# Patient Record
Sex: MALE | Race: BLACK OR AFRICAN AMERICAN | Employment: FULL TIME | URBAN - METROPOLITAN AREA
[De-identification: names, ages, dates, MRNs, and addresses within clinical notes are randomized per-mention and may not be internally consistent; named-entity substitution may affect disease eponyms.]

---
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
## 2017-01-17 RX ORDER — INSULIN GLARGINE 100 [IU]/ML
INJECTION, SOLUTION SUBCUTANEOUS
Qty: 4 VIAL | Refills: 0 | Status: SHIPPED | COMMUNITY
Start: 2017-01-17 | End: 2017-05-05 | Stop reason: SDUPTHER

## 2017-01-17 NOTE — TELEPHONE ENCOUNTER
Med list and chart notes reviewed.   Pharmacy Assistance Medication approved for pickup.    lantus 18 units qhs

## 2017-02-01 ENCOUNTER — CLINICAL SUPPORT (OUTPATIENT)
Dept: FAMILY MEDICINE CLINIC | Age: 52
End: 2017-02-01

## 2017-02-01 DIAGNOSIS — E11.9 TYPE 2 DIABETES MELLITUS WITHOUT COMPLICATION, UNSPECIFIED LONG TERM INSULIN USE STATUS: Primary | ICD-10-CM

## 2017-02-01 NOTE — PROGRESS NOTES
Discharge instructions reviewed with patient    Medication list and understanding of medications reviewed with patient. OTC and herbal medications reviewed and added to med list if applicable  Barriers to adherence assessed. Guidance given regarding new medications this visit, including reason for taking this medicine, and common side effects. Patient here to  lantus 4 vials from [employer] through the PAP. Went over meds and allergies. We also gave him 2 boxes of strips 1 box of syringes and relion meter and 1 box of lancets , and 1 box of alcohol wipes.

## 2017-02-02 ENCOUNTER — TELEPHONE (OUTPATIENT)
Dept: FAMILY MEDICINE CLINIC | Age: 52
End: 2017-02-02

## 2017-02-02 ENCOUNTER — OFFICE VISIT (OUTPATIENT)
Dept: FAMILY MEDICINE CLINIC | Age: 52
End: 2017-02-02

## 2017-02-02 VITALS
TEMPERATURE: 98.1 F | OXYGEN SATURATION: 99 % | HEART RATE: 83 BPM | HEIGHT: 73 IN | DIASTOLIC BLOOD PRESSURE: 75 MMHG | BODY MASS INDEX: 27.7 KG/M2 | RESPIRATION RATE: 16 BRPM | SYSTOLIC BLOOD PRESSURE: 140 MMHG | WEIGHT: 209 LBS

## 2017-02-02 DIAGNOSIS — Z79.4 TYPE 2 DIABETES MELLITUS WITHOUT COMPLICATION, WITH LONG-TERM CURRENT USE OF INSULIN (HCC): Primary | ICD-10-CM

## 2017-02-02 DIAGNOSIS — E11.9 TYPE 2 DIABETES MELLITUS WITHOUT COMPLICATION, WITH LONG-TERM CURRENT USE OF INSULIN (HCC): Primary | ICD-10-CM

## 2017-02-02 NOTE — PROGRESS NOTES
Discharge instructions reviewed with patient    Medication list and understanding of medications reviewed with patient. OTC and herbal medications reviewed and added to med list if applicable  Barriers to adherence assessed. Guidance given regarding new medications this visit, including reason for taking this medicine, and common side effects. Gave patient 10 units of Humalin R sc left upper arm per Dr. Grace Ledezma order for Blood Sugar [address]. Pt will re-check blood sugar in 2 hours at home.     Given AVS

## 2017-02-02 NOTE — PROGRESS NOTES
YOEL Amaral is a 46 y.o. male being seen today for   Chief Complaint   Patient presents with    Diabetes     \"pt stated that his diabetes is out of control\"   . he states that he started his insulin last night but just took 18 units still having high blood sugars all day. Feels numbness in hands and feet, low libido, poor energy. Blurry vision. Also having left shoulder pain. No injury. Tried otc meds but nothing helped. Worst with overhead movement. Past Medical History   Diagnosis Date    Diabetes ([address]          ROS  Patient states that he is feeling well. Denies complaints of chest pain, shortness of breath, swelling of legs, dizziness or weakness. he denies nausea, vomiting or diarrhea. Current Outpatient Prescriptions   Medication Sig    insulin glargine (LANTUS) 100 unit/mL injection Inject 18 units subcutaneously every night. Indications: type 2 diabetes mellitus    insulin lispro (HUMALOG) 100 unit/mL injection Use tid per sliding scale  Indications: type 2 diabetes mellitus    naproxen (NAPROSYN) 500 mg tablet Take 1 tablet by mouth two (2) times daily (with meals).  HYDROcodone-acetaminophen (NORCO) 5-325 mg per tablet Take 2 tablets by mouth every eight (8) hours as needed for Pain (cannot drive or work within 8 hours of taking narcotic pain medicine).  metFORMIN (GLUCOPHAGE) 1,000 mg tablet Take 1,000 mg by mouth two (2) times daily (with meals). No current facility-administered medications for this visit. PE  Visit Vitals    /75    Pulse 83    Temp 98.1 °F (36.7 °C) (Oral)    Resp 16    Ht 6' 1\" (1.854 m)    Wt 209 lb (94.8 kg)    SpO2 99%    BMI 27.57 kg/m2        Alert and oriented with normal mood and affect. he is well developed and well nourished . Lungs are clear without wheezing. Heart rate is regular without murmurs or gallops. There is no lower extremity edema.  Rotator cuff testing is negative but does have pain with forward abduction of arms. Results for orders placed or performed in visit on 09/26/16   AMB POC GLUCOSE BLOOD, BY GLUCOSE MONITORING DEVICE   Result Value Ref Range    Glucose POC [facility] mg/dL         Assessment and Plan:        ICD-10-CM ICD-9-CM    1. Type 2 diabetes mellitus without complication, with long-term current use of insulin ([facility]) E11.9 250.00     Z79.4 V58.67      incresae to 24 units of lantus tonight  If still elevated in a few days, increase to 30 units. Will refer to nurse navigator for case management. Try otc ibuprofen 800mg at bedtime.    Rotator cuff Alex Escobar MD

## 2017-02-02 NOTE — TELEPHONE ENCOUNTER
Can you call this nice patient next week.      He is self increasing insulin,   A lot of symptoms from high blood sugars so   Would like him to be able to get under better control fast.     Thank you

## 2017-02-02 NOTE — MR AVS SNAPSHOT
Visit Information Date & Time Provider Department Dept. Phone Encounter #  
 2/2/2017  3:15 PM Ryan Lopez, [address] [place] [address] [id number] Upcoming Health Maintenance Date Due Hepatitis C Screening 1965 HEMOGLOBIN A1C Q6M 1965 LIPID PANEL Q1 1963 FOOT EXAM Q1 8/18/1973 MICROALBUMIN Q1 8/18/1973 EYE EXAM RETINAL OR DILATED Q1 8/18/1973 Pneumococcal 19-64 Medium Risk (1 of 1 - PPSV23) 8/18/1982 DTaP/Tdap/Td series (1 - Tdap) 8/18/1984 FOBT Q 1 YEAR AGE 50-75 8/18/2013 INFLUENZA AGE 9 TO ADULT 8/1/2016 Allergies as of 2/2/2017  Review Complete On: 2/2/2017 By: Deshaun Santos No Known Allergies Current Immunizations  Never Reviewed No immunizations on file. Not reviewed this visit You Were Diagnosed With   
  
 Codes Comments Type 2 diabetes mellitus without complication, with long-term current use of insulin (HCC)    -  Primary ICD-10-CM: E11.9, Z79.4 ICD-9-CM: 250.00, V58.67 Vitals BP Pulse Temp Resp Height(growth percentile) Weight(growth percentile) 140/75 83 98.1 °F (36.7 °C) (Oral) 16 6' 1\" (1.854 m) 209 lb (94.8 kg) SpO2 BMI Smoking Status 99% 27.57 kg/m2 Never Smoker Vitals History BMI and BSA Data Body Mass Index Body Surface Area  
 27.57 kg/m 2 2.21 m 2 Preferred Pharmacy Pharmacy Name Phone Gavi Orellana [facility], [address] [phone] Your Updated Medication List  
  
   
This list is accurate as of: 2/2/17  4:28 PM.  Always use your most recent med list.  
  
  
  
  
 HYDROcodone-acetaminophen 5-325 mg per tablet Commonly known as:  Mandy Wheeler Take 2 tablets by mouth every eight (8) hours as needed for Pain (cannot drive or work within 8 hours of taking narcotic pain medicine). insulin glargine 100 unit/mL injection Commonly known as:  LANTUS  
 Inject 18 units subcutaneously every night. Indications: type 2 diabetes mellitus  
  
 insulin lispro 100 unit/mL injection Commonly known as:  HUMALOG Use tid per sliding scale  Indications: type 2 diabetes mellitus  
  
 metFORMIN 1,000 mg tablet Commonly known as:  GLUCOPHAGE Take 1,000 mg by mouth two (2) times daily (with meals). naproxen 500 mg tablet Commonly known as:  NAPROSYN Take 1 tablet by mouth two (2) times daily (with meals). Patient Instructions Increase lantus to 24 untis for the next few days, then increase to 30 units if blood sugars in morning still over 200. You can continue increasing by 3 or 4 units every 4 days until morning blood sugars are all in 100s or until you are getting low blood sugars. Rotator Cuff: Exercises Your Care Instructions Here are some examples of typical rehabilitation exercises for your condition. Start each exercise slowly. Ease off the exercise if you start to have pain. Your doctor or physical therapist will tell you when you can start these exercises and which ones will work best for you. How to do the exercises Pendulum swing Note: If you have pain in your back, do not do this exercise. 1. Hold on to a table or the back of a chair with your good arm. Then bend forward a little and let your sore arm hang straight down. This exercise does not use the arm muscles. Rather, use your legs and your hips to create movement that makes your arm swing freely. 2. Use the movement from your hips and legs to guide the slightly swinging arm back and forth like a pendulum (or elephant trunk). Then guide it in circles that start small (about the size of a dinner plate). Make the circles a bit larger each day, as your pain allows. 3. Do this exercise for 5 minutes, 5 to 7 times each day.  
4. As you have less pain, try bending over a little farther to do this exercise. This will increase the amount of movement at your shoulder. Posterior stretching exercise 1. Hold the elbow of your injured arm with your other hand. 2. Use your hand to pull your injured arm gently up and across your body. You will feel a gentle stretch across the back of your injured shoulder. 3. Hold for at least 15 to 30 seconds. Then slowly lower your arm. 4. Repeat 2 to 4 times. Up-the-back stretch Note: Your doctor or physical therapist may want you to wait to do this stretch until you have regained most of your range of motion and strength. You can do this stretch in different ways. Hold any of these stretches for at least 15 to 30 seconds. Repeat them 2 to 4 times. 1. Put your hand in your back pocket. Let it rest there to stretch your shoulder. 2. With your other hand, hold your injured arm (palm outward) behind your back by the wrist. Pull your arm up gently to stretch your shoulder. 3. Next, put a towel over your other shoulder. Put the hand of your injured arm behind your back. Now hold the back end of the towel. With the other hand, hold the front end of the towel in front of your body. Pull gently on the front end of the towel. This will bring your hand farther up your back to stretch your shoulder. Overhead stretch 1. Standing about an arm's length away, grasp onto a solid surface. You could use a countertop, a doorknob, or the back of a sturdy chair. 2. With your knees slightly bent, bend forward with your arms straight. Lower your upper body, and let your shoulders stretch. 3. As your shoulders are able to stretch farther, you may need to take a step or two backward. 4. Hold for at least 15 to 30 seconds. Then stand up and relax. If you had stepped back during your stretch, step forward so you can keep your hands on the solid surface. 5. Repeat 2 to 4 times. Shoulder flexion (lying down) Note: To make a wand for this exercise, use a piece of PVC pipe or a broom handle with the broom removed. Make the wand about a foot wider than your shoulders. 1. Lie on your back, holding a wand with both hands. Your palms should face down as you hold the wand. 2. Keeping your elbows straight, slowly raise your arms over your head. Raise them until you feel a stretch in your shoulders, upper back, and chest. 
3. Hold for 15 to 30 seconds. 4. Repeat 2 to 4 times. Shoulder rotation (lying down) Note: To make a wand for this exercise, use a piece of PVC pipe or a broom handle with the broom removed. Make the wand about a foot wider than your shoulders. 1. Lie on your back. Hold a wand with both hands with your elbows bent and palms up. 2. Keep your elbows close to your body, and move the wand across your body toward the sore arm. 3. Hold for 8 to 12 seconds. 4. Repeat 2 to 4 times. Wall climbing (to the side) Note: Avoid any movement that is straight to your side, and be careful not to arch your back. Your arm should stay about 30 degrees to the front of your side. 1. Stand with your side to a wall so that your fingers can just touch it at an angle about 30 degrees toward the front of your body. 2. Walk the fingers of your injured arm up the wall as high as pain permits. Try not to shrug your shoulder up toward your ear as you move your arm up. 3. Hold that position for a count of at least 15 to 20. 
4. Walk your fingers back down to the starting position. 5. Repeat at least 2 to 4 times. Try to reach higher each time. Wall climbing (to the front) Note: During this stretching exercise, be careful not to arch your back. 1. Face a wall, and stand so your fingers can just touch it. 2. Keeping your shoulder down, walk the fingers of your injured arm up the wall as high as pain permits. (Don't shrug your shoulder up toward your ear.) 3. Hold your arm in that position for at least 15 to 30 seconds. 4. Slowly walk your fingers back down to where you started. 5. Repeat at least 2 to 4 times. Try to reach higher each time. Shoulder blade squeeze 1. Stand with your arms at your sides, and squeeze your shoulder blades together. Do not raise your shoulders up as you squeeze. 2. Hold 6 seconds. 3. Repeat 8 to 12 times. Scapular exercise: Arm reach 1. Lie flat on your back. This exercise is a very slight motion that starts with your arms raised (elbows straight, arms straight). 2. From this position, reach higher toward the disha or ceiling. Keep your elbows straight. All motion should be from your shoulder blade only. 3. Relax your arms back to where you started. 4. Repeat 8 to 12 times. Arm raise to the side Note: During this strengthening exercise, your arm should stay about 30 degrees to the front of your side. 1. Slowly raise your injured arm to the side, with your thumb facing up. Raise your arm 60 degrees at the most (shoulder level is 90 degrees). 2. Hold the position for 3 to 5 seconds. Then lower your arm back to your side. If you need to, bring your \"good\" arm across your body and place it under the elbow as you lower your injured arm. Use your good arm to keep your injured arm from dropping down too fast. 
3. Repeat 8 to 12 times. 4. When you first start out, don't hold any extra weight in your hand. As you get stronger, you may use a 1-pound to 2-pound dumbbell or a small can of food. Shoulder flexor and extensor exercise Note: These are isometric exercises. That means you contract your muscles without actually moving. · Push forward (flex): Stand facing a wall or doorjamb, about 6 inches or less back. Hold your injured arm against your body. Make a closed fist with your thumb on top. Then gently push your hand forward into the wall with about 25% to 50% of your strength. Don't let your body move backward as you push. Hold for about 6 seconds. Relax for a few seconds. Repeat 8 to 12 times. · Push backward (extend): Stand with your back flat against a wall. Your upper arm should be against the wall, with your elbow bent 90 degrees (your hand straight ahead). Push your elbow gently back against the wall with about 25% to 50% of your strength. Don't let your body move forward as you push. Hold for about 6 seconds. Relax for a few seconds. Repeat 8 to 12 times. Scapular exercise: Wall push-ups Note: This exercise is best done with your fingers somewhat turned out, rather than straight up and down. 1. Stand facing a wall, about 12 inches to 18 inches away. 2. Place your hands on the wall at shoulder height. 3. Slowly bend your elbows and bring your face to the wall. Keep your back and hips straight. 4. Push back to where you started. 5. Repeat 8 to 12 times. 6. When you can do this exercise against a wall comfortably, you can try it against a counter. You can then slowly progress to the end of a couch, then to a sturdy chair, and finally to the floor. Scapular exercise: Retraction Note: For this exercise, you will need elastic exercise material, such as surgical tubing or Thera-Band. 1. Put the band around a solid object at about waist level. (A bedpost will work well.) Each hand should hold an end of the band. 2. With your elbows at your sides and bent to 90 degrees, pull the band back. Your shoulder blades should move toward each other. Then move your arms back where you started. 3. Repeat 8 to 12 times. 4. If you have good range of motion in your shoulders, try this exercise with your arms lifted out to the sides. Keep your elbows at a 90-degree angle. Raise the elastic band up to about shoulder level. Pull the band back to move your shoulder blades toward each other. Then move your arms back where you started. Internal rotator strengthening exercise 1. Start by tying a piece of elastic exercise material to a doorknob. You can use surgical tubing or Thera-Band. 2. Stand or sit with your shoulder relaxed and your elbow bent 90 degrees. Your upper arm should rest comfortably against your side. Squeeze a rolled towel between your elbow and your body for comfort. This will help keep your arm at your side. 3. Hold one end of the elastic band in the hand of the painful arm. 4. Slowly rotate your forearm toward your body until it touches your belly. Slowly move it back to where you started. 5. Keep your elbow and upper arm firmly tucked against the towel roll or at your side. 6. Repeat 8 to 12 times. External rotator strengthening exercise 1. Start by tying a piece of elastic exercise material to a doorknob. You can use surgical tubing or Thera-Band. (You may also hold one end of the band in each hand.) 2. Stand or sit with your shoulder relaxed and your elbow bent 90 degrees. Your upper arm should rest comfortably against your side. Squeeze a rolled towel between your elbow and your body for comfort. This will help keep your arm at your side. 3. Hold one end of the elastic band with the hand of the painful arm. 4. Start with your forearm across your belly. Slowly rotate the forearm out away from your body. Keep your elbow and upper arm tucked against the towel roll or the side of your body until you begin to feel tightness in your shoulder. Slowly move your arm back to where you started. 5. Repeat 8 to 12 times. Follow-up care is a key part of your treatment and safety. Be sure to make and go to all appointments, and call your doctor if you are having problems. It's also a good idea to know your test results and keep a list of the medicines you take. Where can you learn more? Go to http://sumi-kayode.info/. Enter Radha Mccrary in the search box to learn more about \"Rotator Cuff: Exercises. \" Current as of: May 23, 2016 Content Version: 11.1 © 6555-6475 [employer].  Care instructions adapted under license by [address] (which disclaims liability or warranty for this information). If you have questions about a medical condition or this instruction, always ask your healthcare professional. [address] any warranty or liability for your use of this information. Rotator Cuff Problems: Care Instructions Your Care Instructions The rotator cuff is a group of tendons and muscles around the shoulder that keeps the shoulder joint stable and allows you to raise and rotate your arm. Over time, daily wear and exercise can cause the tendons to rub on the bones of your shoulder. This is called impingement. This condition may cause the tendons to bruise, degenerate, or tear. In many people, these problems do not cause pain. When they do cause pain, you can use rest, physical therapy, ice and heat, and anti-inflammatory medicine to reduce pain and swelling. If you still have pain after trying these treatments, you and your doctor can discuss having a steroid injection or surgery. Follow-up care is a key part of your treatment and safety. Be sure to make and go to all appointments, and call your doctor if you are having problems. It's also a good idea to know your test results and keep a list of the medicines you take. How can you care for yourself at home? · Be safe with medicines. Read and follow all instructions on the label. ¨ If the doctor gave you a prescription medicine for pain, take it as prescribed. ¨ If you are not taking a prescription pain medicine, ask your doctor if you can take an over-the-counter medicine. · Put ice or a cold pack on your shoulder for 10 to 20 minutes at a time. Try to do this every 1 to 2 hours for the next 3 days (when you are awake). Put a thin cloth between the ice pack and your skin. · After 3 days, put a warm, wet towel on your shoulder. This is to relax the muscles and increase blood flow.  While holding the towel on your shoulder, lean forward so your arm hangs freely, and gently swing your arm back and forth like a pendulum. You also can do this standing under a warm shower. · Follow your doctor's advice for physical therapy. When your doctor says it is okay, try these stretching exercises. Do them slowly to avoid injury. Put a warm, wet towel on your shoulder before exercising. Stop any exercise that increases pain. ¨ Range-of-motion exercises. If it is not too painful, stretch your arm in four directions: across the body, up the back, to the side, and overhead. ¨ Pendulum exercise. Lean forward and hold onto a table or the back of a chair with your good arm. Bend at the waist, letting the arm with the sore shoulder hang straight down. Swing your arm back and forth like a pendulum, then in circles that start small and slowly grow larger. This exercise does not use the arm muscles. Instead, use your legs and your hips to create movement that makes your arm swing freely. Try this for about 5 minutes, several times a day. ¨ Wall climbing (to the side). Stand with your side to a wall so that your fingers can just touch it. Then turn so your body is turned slightly toward the wall. Walk the fingers of your injured arm up the wall as high as pain permits. Try not to shrug your shoulder up toward your ear as you move your arm up. Hold that position for a count of 15 to 30 seconds. Walk your fingers down to the starting position. Repeat 2 to 4 times, trying to reach higher each time. ¨ Wall climbing (to the front). Face a wall, standing so your fingers can just touch it. Walk the fingers of your affected arm up the wall as high as pain permits. Try not to shrug your shoulder up toward your ear as you move your arm up. Hold that position for a count of 15 to 30 seconds. Slowly walk your fingers to the starting position. Repeat 2 to 4 times, trying to reach higher each time. · Rest your shoulder when you are not doing stretches and other exercises. Your doctor may tell you to wait for the pain to go away before doing exercises. Do not lift heavy bags of groceries, play sports, or do anything else that makes you twist or stress your shoulder. Avoid activities where you move your affected arm above your head. When should you call for help? Call your doctor now or seek immediate medical care if: 
· You have severe pain. · You cannot move your shoulder or arm. · You have tingling or numbness in your arm or hand. · Your arm or hand is cool or pale. Watch closely for changes in your health, and be sure to contact your doctor if: 
· Your pain gets worse. · You have new or worse swelling in your arm or hand. · You do not get better as expected. Where can you learn more? Go to http://sumi-kayode.info/. Enter E207 in the search box to learn more about \"Rotator Cuff Problems: Care Instructions. \" Current as of: May 23, 2016 Content Version: 11.1 © 4215-3447 [employer]. Care instructions adapted under license by Vaughn Burton (which disclaims liability or warranty for this information). If you have questions about a medical condition or this instruction, always ask your healthcare professional. [address] any warranty or liability for your use of this information. Introducing [facility]! Sixto Santos introduces [email or web address] patient portal. Now you can access parts of your medical record, email your doctor's office, and request medication refills online. 1. In your internet browser, go to [email or web address] 2. Click on the First Time User? Click Here link in the Sign In box. You will see the New Member Sign Up page. 3. Enter your [email or web address] Access Code exactly as it appears below. You will not need to use this code after youve completed the sign-up process.  If you do not sign up before the expiration date, you must request a new code. · 2U Access Code: [email or web address] Expires: 5/3/2017  4:12 PM 
 
4. Enter the last four digits of your Social Security Number (xxxx) and Date of Birth (mm/dd/yyyy) as indicated and click Submit. You will be taken to the next sign-up page. 5. Create a 2U ID. This will be your 2U login ID and cannot be changed, so think of one that is secure and easy to remember. 6. Create a 2U password. You can change your password at any time. 7. Enter your Password Reset Question and Answer. This can be used at a later time if you forget your password. 8. Enter your e-mail address. You will receive e-mail notification when new information is available in [address] 9. Click Sign Up. You can now view and download portions of your medical record. 10. Click the Download Summary menu link to download a portable copy of your medical information. If you have questions, please visit the Frequently Asked Questions section of the 2U website. Remember, 2U is NOT to be used for urgent needs. For medical emergencies, dial 911. Now available from your iPhone and Android! Please provide this summary of care documentation to your next provider. Your primary care clinician is listed as [address]. If you have any questions after today's visit, please call [phone].

## 2017-02-02 NOTE — PATIENT INSTRUCTIONS
Increase lantus to 24 untis for the next few days, then increase to 30 units if blood sugars in morning still over 200. You can continue increasing by 3 or 4 units every 4 days until morning blood sugars are all in 100s or until you are getting low blood sugars. Rotator Cuff: Exercises  Your Care Instructions  Here are some examples of typical rehabilitation exercises for your condition. Start each exercise slowly. Ease off the exercise if you start to have pain. Your doctor or physical therapist will tell you when you can start these exercises and which ones will work best for you. How to do the exercises  Pendulum swing    Note: If you have pain in your back, do not do this exercise. 1. Hold on to a table or the back of a chair with your good arm. Then bend forward a little and let your sore arm hang straight down. This exercise does not use the arm muscles. Rather, use your legs and your hips to create movement that makes your arm swing freely. 2. Use the movement from your hips and legs to guide the slightly swinging arm back and forth like a pendulum (or elephant trunk). Then guide it in circles that start small (about the size of a dinner plate). Make the circles a bit larger each day, as your pain allows. 3. Do this exercise for 5 minutes, 5 to 7 times each day. 4. As you have less pain, try bending over a little farther to do this exercise. This will increase the amount of movement at your shoulder. Posterior stretching exercise    1. Hold the elbow of your injured arm with your other hand. 2. Use your hand to pull your injured arm gently up and across your body. You will feel a gentle stretch across the back of your injured shoulder. 3. Hold for at least 15 to 30 seconds. Then slowly lower your arm. 4. Repeat 2 to 4 times.   Up-the-back stretch    Note: Your doctor or physical therapist may want you to wait to do this stretch until you have regained most of your range of motion and strength. You can do this stretch in different ways. Hold any of these stretches for at least 15 to 30 seconds. Repeat them 2 to 4 times. 1. Put your hand in your back pocket. Let it rest there to stretch your shoulder. 2. With your other hand, hold your injured arm (palm outward) behind your back by the wrist. Pull your arm up gently to stretch your shoulder. 3. Next, put a towel over your other shoulder. Put the hand of your injured arm behind your back. Now hold the back end of the towel. With the other hand, hold the front end of the towel in front of your body. Pull gently on the front end of the towel. This will bring your hand farther up your back to stretch your shoulder. Overhead stretch    1. Standing about an arm's length away, grasp onto a solid surface. You could use a countertop, a doorknob, or the back of a sturdy chair. 2. With your knees slightly bent, bend forward with your arms straight. Lower your upper body, and let your shoulders stretch. 3. As your shoulders are able to stretch farther, you may need to take a step or two backward. 4. Hold for at least 15 to 30 seconds. Then stand up and relax. If you had stepped back during your stretch, step forward so you can keep your hands on the solid surface. 5. Repeat 2 to 4 times. Shoulder flexion (lying down)    Note: To make a wand for this exercise, use a piece of PVC pipe or a broom handle with the broom removed. Make the wand about a foot wider than your shoulders. 1. Lie on your back, holding a wand with both hands. Your palms should face down as you hold the wand. 2. Keeping your elbows straight, slowly raise your arms over your head. Raise them until you feel a stretch in your shoulders, upper back, and chest.  3. Hold for 15 to 30 seconds. 4. Repeat 2 to 4 times. Shoulder rotation (lying down)    Note: To make a wand for this exercise, use a piece of PVC pipe or a broom handle with the broom removed.  Make the wand about a foot wider than your shoulders. 1. Lie on your back. Hold a wand with both hands with your elbows bent and palms up. 2. Keep your elbows close to your body, and move the wand across your body toward the sore arm. 3. Hold for 8 to 12 seconds. 4. Repeat 2 to 4 times. Wall climbing (to the side)    Note: Avoid any movement that is straight to your side, and be careful not to arch your back. Your arm should stay about 30 degrees to the front of your side. 1. Stand with your side to a wall so that your fingers can just touch it at an angle about 30 degrees toward the front of your body. 2. Walk the fingers of your injured arm up the wall as high as pain permits. Try not to shrug your shoulder up toward your ear as you move your arm up. 3. Hold that position for a count of at least 15 to 20.  4. Walk your fingers back down to the starting position. 5. Repeat at least 2 to 4 times. Try to reach higher each time. Wall climbing (to the front)    Note: During this stretching exercise, be careful not to arch your back. 1. Face a wall, and stand so your fingers can just touch it. 2. Keeping your shoulder down, walk the fingers of your injured arm up the wall as high as pain permits. (Don't shrug your shoulder up toward your ear.)  3. Hold your arm in that position for at least 15 to 30 seconds. 4. Slowly walk your fingers back down to where you started. 5. Repeat at least 2 to 4 times. Try to reach higher each time. Shoulder blade squeeze    1. Stand with your arms at your sides, and squeeze your shoulder blades together. Do not raise your shoulders up as you squeeze. 2. Hold 6 seconds. 3. Repeat 8 to 12 times. Scapular exercise: Arm reach    1. Lie flat on your back. This exercise is a very slight motion that starts with your arms raised (elbows straight, arms straight). 2. From this position, reach higher toward the disha or ceiling. Keep your elbows straight.  All motion should be from your shoulder blade only.  3. Relax your arms back to where you started. 4. Repeat 8 to 12 times. Arm raise to the side    Note: During this strengthening exercise, your arm should stay about 30 degrees to the front of your side. 1. Slowly raise your injured arm to the side, with your thumb facing up. Raise your arm 60 degrees at the most (shoulder level is 90 degrees). 2. Hold the position for 3 to 5 seconds. Then lower your arm back to your side. If you need to, bring your \"good\" arm across your body and place it under the elbow as you lower your injured arm. Use your good arm to keep your injured arm from dropping down too fast.  3. Repeat 8 to 12 times. 4. When you first start out, don't hold any extra weight in your hand. As you get stronger, you may use a 1-pound to 2-pound dumbbell or a small can of food. Shoulder flexor and extensor exercise    Note: These are isometric exercises. That means you contract your muscles without actually moving. · Push forward (flex): Stand facing a wall or doorjamb, about 6 inches or less back. Hold your injured arm against your body. Make a closed fist with your thumb on top. Then gently push your hand forward into the wall with about 25% to 50% of your strength. Don't let your body move backward as you push. Hold for about 6 seconds. Relax for a few seconds. Repeat 8 to 12 times. · Push backward (extend): Stand with your back flat against a wall. Your upper arm should be against the wall, with your elbow bent 90 degrees (your hand straight ahead). Push your elbow gently back against the wall with about 25% to 50% of your strength. Don't let your body move forward as you push. Hold for about 6 seconds. Relax for a few seconds. Repeat 8 to 12 times. Scapular exercise: Wall push-ups    Note: This exercise is best done with your fingers somewhat turned out, rather than straight up and down. 1. Stand facing a wall, about 12 inches to 18 inches away.   2. Place your hands on the wall at shoulder height. 3. Slowly bend your elbows and bring your face to the wall. Keep your back and hips straight. 4. Push back to where you started. 5. Repeat 8 to 12 times. 6. When you can do this exercise against a wall comfortably, you can try it against a counter. You can then slowly progress to the end of a couch, then to a sturdy chair, and finally to the floor. Scapular exercise: Retraction    Note: For this exercise, you will need elastic exercise material, such as surgical tubing or Thera-Band. 1. Put the band around a solid object at about waist level. (A bedpost will work well.) Each hand should hold an end of the band. 2. With your elbows at your sides and bent to 90 degrees, pull the band back. Your shoulder blades should move toward each other. Then move your arms back where you started. 3. Repeat 8 to 12 times. 4. If you have good range of motion in your shoulders, try this exercise with your arms lifted out to the sides. Keep your elbows at a 90-degree angle. Raise the elastic band up to about shoulder level. Pull the band back to move your shoulder blades toward each other. Then move your arms back where you started. Internal rotator strengthening exercise    1. Start by tying a piece of elastic exercise material to a doorknob. You can use surgical tubing or Thera-Band. 2. Stand or sit with your shoulder relaxed and your elbow bent 90 degrees. Your upper arm should rest comfortably against your side. Squeeze a rolled towel between your elbow and your body for comfort. This will help keep your arm at your side. 3. Hold one end of the elastic band in the hand of the painful arm. 4. Slowly rotate your forearm toward your body until it touches your belly. Slowly move it back to where you started. 5. Keep your elbow and upper arm firmly tucked against the towel roll or at your side. 6. Repeat 8 to 12 times. External rotator strengthening exercise    1.  Start by tying a piece of elastic exercise material to a doorknob. You can use surgical tubing or Thera-Band. (You may also hold one end of the band in each hand.)  2. Stand or sit with your shoulder relaxed and your elbow bent 90 degrees. Your upper arm should rest comfortably against your side. Squeeze a rolled towel between your elbow and your body for comfort. This will help keep your arm at your side. 3. Hold one end of the elastic band with the hand of the painful arm. 4. Start with your forearm across your belly. Slowly rotate the forearm out away from your body. Keep your elbow and upper arm tucked against the towel roll or the side of your body until you begin to feel tightness in your shoulder. Slowly move your arm back to where you started. 5. Repeat 8 to 12 times. Follow-up care is a key part of your treatment and safety. Be sure to make and go to all appointments, and call your doctor if you are having problems. It's also a good idea to know your test results and keep a list of the medicines you take. Where can you learn more? Go to http://sumiItrybeforeIbuykayode.info/. Enter Betty Lamberto in the search box to learn more about \"Rotator Cuff: Exercises. \"  Current as of: May 23, 2016  Content Version: 11.1  © 1991-8544 [employer]. Care instructions adapted under license by [employer] (which disclaims liability or warranty for this information). If you have questions about a medical condition or this instruction, always ask your healthcare professional. [place] [address] any warranty or liability for your use of this information. Rotator Cuff Problems: Care Instructions  Your Care Instructions  The rotator cuff is a group of tendons and muscles around the shoulder that keeps the shoulder joint stable and allows you to raise and rotate your arm. Over time, daily wear and exercise can cause the tendons to rub on the bones of your shoulder. This is called impingement.  This condition may cause the tendons to bruise, degenerate, or tear. In many people, these problems do not cause pain. When they do cause pain, you can use rest, physical therapy, ice and heat, and anti-inflammatory medicine to reduce pain and swelling. If you still have pain after trying these treatments, you and your doctor can discuss having a steroid injection or surgery. Follow-up care is a key part of your treatment and safety. Be sure to make and go to all appointments, and call your doctor if you are having problems. It's also a good idea to know your test results and keep a list of the medicines you take. How can you care for yourself at home? · Be safe with medicines. Read and follow all instructions on the label. ¨ If the doctor gave you a prescription medicine for pain, take it as prescribed. ¨ If you are not taking a prescription pain medicine, ask your doctor if you can take an over-the-counter medicine. · Put ice or a cold pack on your shoulder for 10 to 20 minutes at a time. Try to do this every 1 to 2 hours for the next 3 days (when you are awake). Put a thin cloth between the ice pack and your skin. · After 3 days, put a warm, wet towel on your shoulder. This is to relax the muscles and increase blood flow. While holding the towel on your shoulder, lean forward so your arm hangs freely, and gently swing your arm back and forth like a pendulum. You also can do this standing under a warm shower. · Follow your doctor's advice for physical therapy. When your doctor says it is okay, try these stretching exercises. Do them slowly to avoid injury. Put a warm, wet towel on your shoulder before exercising. Stop any exercise that increases pain. ¨ Range-of-motion exercises. If it is not too painful, stretch your arm in four directions: across the body, up the back, to the side, and overhead. ¨ Pendulum exercise. Lean forward and hold onto a table or the back of a chair with your good arm.  Bend at the waist, letting the arm with the sore shoulder hang straight down. Swing your arm back and forth like a pendulum, then in circles that start small and slowly grow larger. This exercise does not use the arm muscles. Instead, use your legs and your hips to create movement that makes your arm swing freely. Try this for about 5 minutes, several times a day. ¨ Wall climbing (to the side). Stand with your side to a wall so that your fingers can just touch it. Then turn so your body is turned slightly toward the wall. Walk the fingers of your injured arm up the wall as high as pain permits. Try not to shrug your shoulder up toward your ear as you move your arm up. Hold that position for a count of 15 to 30 seconds. Walk your fingers down to the starting position. Repeat 2 to 4 times, trying to reach higher each time. ¨ Wall climbing (to the front). Face a wall, standing so your fingers can just touch it. Walk the fingers of your affected arm up the wall as high as pain permits. Try not to shrug your shoulder up toward your ear as you move your arm up. Hold that position for a count of 15 to 30 seconds. Slowly walk your fingers to the starting position. Repeat 2 to 4 times, trying to reach higher each time. · Rest your shoulder when you are not doing stretches and other exercises. Your doctor may tell you to wait for the pain to go away before doing exercises. Do not lift heavy bags of groceries, play sports, or do anything else that makes you twist or stress your shoulder. Avoid activities where you move your affected arm above your head. When should you call for help? Call your doctor now or seek immediate medical care if:  · You have severe pain. · You cannot move your shoulder or arm. · You have tingling or numbness in your arm or hand. · Your arm or hand is cool or pale. Watch closely for changes in your health, and be sure to contact your doctor if:  · Your pain gets worse.   · You have new or worse swelling in your arm or hand.  · You do not get better as expected. Where can you learn more? Go to http://sumi-kayode.info/. Enter E207 in the search box to learn more about \"Rotator Cuff Problems: Care Instructions. \"  Current as of: May 23, 2016  Content Version: 11.1  © 9260-0228 [employer]. Care instructions adapted under license by [employer] (which disclaims liability or warranty for this information). If you have questions about a medical condition or this instruction, always ask your healthcare professional. [address] any warranty or liability for your use of this information.

## 2017-02-21 ENCOUNTER — PATIENT OUTREACH (OUTPATIENT)
Dept: FAMILY MEDICINE CLINIC | Age: 52
End: 2017-02-21
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
## 2017-02-21 NOTE — PATIENT INSTRUCTIONS
Diabetes Care Plan    What is DM? Diabetes is a problem with your body that causes blood glucose (sugar) levels to rise higher than normal. Your body does not use insulin properly. At first, your pancreas makes extra insulin to make up for it. But, over time it isn't able to keep up and can't make enough insulin to keep your blood glucose at normal levels. Goal: Blood sugar goal before a meal should be below 130mg/dl. Blood sugar goal two hours after a meal should be below 180mg/dl. 1. Continue monitoring your blood glucose 3-4 times daily. 2. Record your blood sugar readings on your logs. 3. Review Eat Right When Money's Tight. 4.Take all your medication as prescribed. Call us for when you need refills. 5. Return for reevaluation 3 months to monitor your Diabetes. 6. Bring your blood sugar logs and your medication bottle at each visit with us. 7. Contact me if you have any questions or concerns @ [phone]    You have chosen to work on this goal before 2/28/17: Will take insulin daily as prescribed, eat a low carb diet and will have fasting morning blood sugars under 120. Diabetes increases your risk for many serious health problems. The good news? With the correct treatment and recommended lifestyle changes (eating healthy, exercising and taking your medication), many people with diabetes are able to prevent or delay the onset of complications. Next re-evaluation 2/28/17.

## 2017-02-21 NOTE — LETTER
2/22/2017 12:47 PM 
 
Mr. Sima Cuadra [address] [facility] [address] [id number] Dear Sima Cuadra, It was a pleasure talking with you about your Diabetes today. As we discussed, I am mailing you a copy of your Diabetes Care Plan, Blood Glucose logs, Diabetes class information, and some tips to eating healthy on a budget. Please call me if you have any questions or concerns @ [phone].  
 
 
 
Sincerely, 
 
 
Sirisha Henley RN

## 2017-02-24 ENCOUNTER — OFFICE VISIT (OUTPATIENT)
Dept: FAMILY MEDICINE CLINIC | Age: 52
End: 2017-02-24

## 2017-02-24 VITALS
HEIGHT: 73 IN | TEMPERATURE: 98.1 F | BODY MASS INDEX: 27.96 KG/M2 | SYSTOLIC BLOOD PRESSURE: 98 MMHG | DIASTOLIC BLOOD PRESSURE: 72 MMHG | OXYGEN SATURATION: 100 % | RESPIRATION RATE: 17 BRPM | WEIGHT: 211 LBS | HEART RATE: 77 BPM

## 2017-02-24 DIAGNOSIS — R07.9 CHEST PAIN, UNSPECIFIED TYPE: ICD-10-CM

## 2017-02-24 DIAGNOSIS — G62.9 NEUROPATHY: ICD-10-CM

## 2017-02-24 DIAGNOSIS — Z00.00 REGULAR CHECK-UP: Primary | ICD-10-CM

## 2017-02-24 DIAGNOSIS — E11.9 TYPE 2 DIABETES MELLITUS WITHOUT COMPLICATION, UNSPECIFIED LONG TERM INSULIN USE STATUS: ICD-10-CM

## 2017-02-24 LAB — GLUCOSE POC: 107 MG/DL
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
## 2017-02-24 NOTE — PROGRESS NOTES
EKG done,  Shown to Ana Driscoll NP. Discharge instructions reviewed with patient    Medication list and understanding of medications reviewed with patient. OTC and herbal medications reviewed and added to med list if applicable  Barriers to adherence assessed. Guidance given regarding new medications this visit, including reason for taking this medicine, and common side effects.     Exercise Stress test scheduled for Tuesday

## 2017-02-24 NOTE — PROGRESS NOTES
No chief complaint on file. 1. Have you been to the ER, urgent care clinic since your last visit? Hospitalized since your last visit? No    2. Have you seen or consulted any other health care providers outside of the [facility]\"" since your last visit? No    3. When was your last Mammogram, Pap smear, and/or Colon screening? Mammogram: Year: No Pap smear: year: No Colonoscopy: Year:No  PMH/FH/Social Hx reviewed and updated as needed      Applicable screenings reviewed and updated as needed  Medication reconciliation performed. Patient does not need medication refills. Health Maintenance reviewed.

## 2017-02-24 NOTE — MR AVS SNAPSHOT
Visit Information Date & Time Provider Department Dept. Phone Encounter #  
 2/24/2017  9:00 AM Hilario Flores [id number] Upcoming Health Maintenance Date Due Hepatitis C Screening 1965 HEMOGLOBIN A1C Q6M 1965 LIPID PANEL Q1 1965 FOOT EXAM Q1 8/18/1973 MICROALBUMIN Q1 8/18/1973 EYE EXAM RETINAL OR DILATED Q1 8/18/1973 Pneumococcal 19-64 Medium Risk (1 of 1 - PPSV23) 8/18/1982 DTaP/Tdap/Td series (1 - Tdap) 8/18/1984 FOBT Q 1 YEAR AGE 50-75 8/18/2013 INFLUENZA AGE 9 TO ADULT 8/1/2016 Allergies as of 2/24/2017  Review Complete On: 2/24/2017 By: Aimee Urban No Known Allergies Current Immunizations  Never Reviewed No immunizations on file. Not reviewed this visit You Were Diagnosed With   
  
 Codes Comments Regular check-up    -  Primary ICD-10-CM: Z00.00 ICD-9-CM: V70.0 Type 2 diabetes mellitus without complication, unspecified long term insulin use status (HCC)     ICD-10-CM: E11.9 ICD-9-CM: 250.00 Neuropathy     ICD-10-CM: G62.9 ICD-9-CM: 328. 9 Chest pain, unspecified type     ICD-10-CM: R07.9 ICD-9-CM: 786.50 Vitals BP  
  
  
  
  
  
 98/72 (BP 1 Location: Left arm, BP Patient Position: Sitting) Vitals History BMI and BSA Data Body Mass Index Body Surface Area  
 27.84 kg/m 2 2.22 m 2 Preferred Pharmacy Pharmacy Name Phone [facility], [address] [facility] [phone] Your Updated Medication List  
  
   
This list is accurate as of: 2/24/17 10:24 AM.  Always use your most recent med list.  
  
  
  
  
 HYDROcodone-acetaminophen 5-325 mg per tablet Commonly known as:  Soheila Gallagher Take 2 tablets by mouth every eight (8) hours as needed for Pain (cannot drive or work within 8 hours of taking narcotic pain medicine). insulin glargine 100 unit/mL injection Commonly known as:  LANTUS Inject 18 units subcutaneously every night. Indications: type 2 diabetes mellitus  
  
 insulin lispro 100 unit/mL injection Commonly known as:  HUMALOG Use tid per sliding scale  Indications: type 2 diabetes mellitus  
  
 metFORMIN 1,000 mg tablet Commonly known as:  GLUCOPHAGE Take 1,000 mg by mouth two (2) times daily (with meals). naproxen 500 mg tablet Commonly known as:  NAPROSYN Take 1 tablet by mouth two (2) times daily (with meals). We Performed the Following AMB POC GLUCOSE BLOOD, BY GLUCOSE MONITORING DEVICE [49578 CPT(R)] To-Do List   
 02/24/2017 ECG:  CARDIAC SPECT REST AND STRESS   
  
 02/24/2017 ECG:  NUCLEAR STRESS TEST   
  
 02/28/2017 ECG:  STRESS TEST CARDIAC   
  
 02/28/2017 12:30 PM  
  Appointment with [facility] NUC CARD ROOM at [facility] NON-INVASIVE CARD ([phone]) Age Limit for ALL Heart procedures @ all [facility] facilities: 18 yrs and older only. Under the age of 25, refer to [facility] ([phone]). PATIENTS SHOULD NOT BRING CHILDREN UNATTENDED TO APPTS. WEIGHT LIMIT:  300 lbs for the treadmill portion of this study. 1-NPO and no caffeine for 4 hours prior to the test 2-No beta blockers or calcium channel blockers day of the test unless specified by cardiology. Please bring with. 3-Patient will be walking/running on a Treadmill. Patient should wear comfortable shoes that are suitable for walking (NO Sandals/Flip Flops, High Heels, etc.) & comfortable clothing. Please report to the [facility] @ [facility],  [address], [address], [place], [address] [facility]! Sixto Santos introduces [email or web address] patient portal. Now you can access parts of your medical record, email your doctor's office, and request medication refills online. 1. In your internet browser, go to [email or web address] 2. Click on the First Time User? Click Here link in the Sign In box. You will see the New Member Sign Up page. 3. Enter your [employer] Access Code exactly as it appears below. You will not need to use this code after youve completed the sign-up process. If you do not sign up before the expiration date, you must request a new code. · [employer] Access Code: [email or web address] Expires: 5/3/2017  4:12 PM 
 
4. Enter the last four digits of your Social Security Number (xxxx) and Date of Birth (mm/dd/yyyy) as indicated and click Submit. You will be taken to the next sign-up page. 5. Create a [employer] ID. This will be your [employer] login ID and cannot be changed, so think of one that is secure and easy to remember. 6. Create a [employer] password. You can change your password at any time. 7. Enter your Password Reset Question and Answer. This can be used at a later time if you forget your password. 8. Enter your e-mail address. You will receive e-mail notification when new information is available in [address] 9. Click Sign Up. You can now view and download portions of your medical record. 10. Click the Download Summary menu link to download a portable copy of your medical information. If you have questions, please visit the Frequently Asked Questions section of the [employer] website. Remember, [employer] is NOT to be used for urgent needs. For medical emergencies, dial 911. Now available from your iPhone and Android! Please provide this summary of care documentation to your next provider. Your primary care clinician is listed as [address]. If you have any questions after today's visit, please call [phone].

## 2017-03-02 ENCOUNTER — PATIENT OUTREACH (OUTPATIENT)
Dept: FAMILY MEDICINE CLINIC | Age: 52
End: 2017-03-02

## 2017-03-02 NOTE — LETTER
3/2/2017 4:33 PM 
 
Mr. Rafael Haywood [facility], [address]  Dear Rafael Haywood, It was a pleasure talking with you about your Diabetes today. As we discussed, I am resending you some information I had previously mailed to your [place] address. You will find your Diabetes Care Plan, Blood Glucose logs, Diabetes class information, some tips to eating healthy on a budget and the Rule of 15 to treating low blood sugars. Please call me if you have any questions or concerns @ [phone].  
 
 
 
Sincerely, 
 
 
Cheryle Corwin, RN

## 2017-03-02 NOTE — PROGRESS NOTES
Follow up Chronic Condition    Focus: Diabetes    Pt reported BG readings:   AM PM  3/02 161   3/01 203 292  2/28 229 82  2/27 216  2/26 227  2/25 214 79  2/24 299  2/23 151 105    Unsure of accuracy of readings as pt was reading them off his meter and got confused. He was previously higher in the evenings and lower in the mornings. Pt reports he is taking 18-20 units Lantus. Recommended he keep it at 20-24 and monitor more frequently. Advised he write his BG's down on a log and be sure to eat some protein before bed. Pt is currently staying with a friend in [place] and would like me to resend the information I sent last week to his friends address at [address] [place], [address]  Will also send the [facility] of 15\" to help him when treating BG lows. Will follow up with pt in 1 week to obtain BG's. Patient has my number if questions arise.

## 2017-03-03 ENCOUNTER — HOSPITAL ENCOUNTER (OUTPATIENT)
Dept: NON INVASIVE DIAGNOSTICS | Age: 52
Discharge: HOME | End: 2017-03-03
Attending: NURSE PRACTITIONER

## 2017-03-06 ENCOUNTER — HOSPITAL ENCOUNTER (OUTPATIENT)
Dept: NON INVASIVE DIAGNOSTICS | Age: 52
Discharge: HOME | End: 2017-03-06
Attending: NURSE PRACTITIONER
Payer: SELF-PAY

## 2017-03-06 DIAGNOSIS — R07.9 CHEST PAIN, UNSPECIFIED TYPE: ICD-10-CM

## 2017-03-06 LAB
ATTENDING PHYSICIAN, CST07: NORMAL
DIAGNOSIS, 93000: NORMAL
DUKE TM SCORE RESULT, CST14: NORMAL
DUKE TREADMILL SCORE, CST13: NORMAL
ECG INTERP BEFORE EX, CST11: NORMAL
ECG INTERP DURING EX, CST12: NORMAL
FUNCTIONAL CAPACITY, CST17: NORMAL
KNOWN CARDIAC CONDITION, CST08: NORMAL
MAX. DIASTOLIC BP, CST04: 60 MMHG
MAX. HEART RATE, CST05: 137 BPM
MAX. SYSTOLIC BP, CST03: 164 MMHG
OVERALL BP RESPONSE TO EXERCISE, CST16: NORMAL
OVERALL HR RESPONSE TO EXERCISE, CST15: NORMAL
PEAK EX METS, CST10: 13.4 METS
PROTOCOL NAME, CST01: NORMAL
TEST INDICATION, CST09: NORMAL

## 2017-03-06 PROCEDURE — 93017 CV STRESS TEST TRACING ONLY: CPT

## 2017-03-07 NOTE — PROCEDURES
[address] [facility]  STRESS TEST (TREADMILL)    Name:  Brenda Rodriguez  MR#:  [id number]  :  1965  Account #:  [de-identified]  Date of Adm:  2017  Date of Service:  2017    ORDERING CARE GIVER: Adriana Mccord M.D. PRIMARY PHYSICIAN: Mark Anthony Aguilera M.D. REASON FOR STUDY: Chest pain. EKG RESTING: Normal sinus rhythm, rate 80, within normal limits. PROCEDURE PROTOCOL: Standard Lasha. TOTAL EXERCISE TIME: 10 minutes and 40 seconds, achieved heart  rate was 132, 82% of predicted maximum. REASON FOR TERMINATION: Knee pain, fatigue, dyspnea and knee  pain symptoms. The patient had chest tightness that was rated 1/10 at  rest remaining unchanged throughout the stress test which sounds  nonanginal.    ST CHANGES: None. BLOOD PRESSURE RESPONSE: Normal.    ARRHYTHMIAS: None. STRESS TEST FINDINGS: Inadequate stress test for diagnostic  purposes due to achieving less than 85% of predicted maximum, but a  completely negative test to 82% of predicted maximum with a good  exercise performance.         Rachele Trevizo DO     / [facility], [facility].  D:  2017   18:13  T:  2017   06:15  Job #:  [id number]

## 2017-03-10 ENCOUNTER — PATIENT OUTREACH (OUTPATIENT)
Dept: FAMILY MEDICINE CLINIC | Age: 52
End: 2017-03-10

## 2017-03-10 NOTE — PROGRESS NOTES
Follow up Chronic Condition    Focus: Blood glucose readings     AM Noon Dinner    Bedtime   3/06 257  3/07 152  253  3/08 152  191    156  3/09 203 143 205  3/10 162  154    246  3/11 155  90    263  3/12 131  76    392  3/13 129    Pt reports he gave himself too much of the Humalog, dropping his BG to 76. He became symptomatic and over treated the low BG which gave him the high of 392. Pt unaware of what his sliding scale should be. Will consult provider about sliding scale. Pt is mostly taking 18 units Lantus each night. Sometimes he will take 20 units and when his BG was 392 he took 25 units. Stressed to pt his BG's are still too high and he should not be taking less that 20 units. Pt states he will take 20 units every night for a week. Will follow up with pt when instructed by provider about sliding scale. Per provider, notified pt stress test was normal. Pt reports he is not currently experiencing chest pain, but does still feel it intermittently. Per provider, advised he try Pepcid 40mg daily to see if it will relieve his symptoms. Pt expressed understanding. Patient has my number if questions arise. ADDEND: Spoke with PCP and obtained sliding scale for pt. Called pt to instruct on sliding scale. Pt wrote down scale and I will also mail pt a copy. Pt expressed understanding. Will follow up with pt in 1 week for BG readings and follow up with diet changes.

## 2017-03-13 NOTE — PATIENT INSTRUCTIONS
Sliding Scale - Moderate Sensitivity     BG Value        Insulin Dose   0-60         ** Hypoglycemia Protocol **         No Insulin   150-169    Insulin 2 Units   170-219    Insulin 4 Units   220-269    Insulin 6 Units   270-300    Insulin 8 Units   Greater than 300 - 10 Units/Call Provider

## 2017-03-21 ENCOUNTER — PATIENT OUTREACH (OUTPATIENT)
Dept: FAMILY MEDICINE CLINIC | Age: 52
End: 2017-03-21

## 2017-04-11 ENCOUNTER — PATIENT OUTREACH (OUTPATIENT)
Dept: FAMILY MEDICINE CLINIC | Age: 52
End: 2017-04-11

## 2017-04-11 NOTE — LETTER
4/13/2017 12:25 PM 
 
Mr. Jem Peter [address] [facility] [address] [address] Dear Jem Peter, It was a pleasure talking with you about your Diabetes today. As we discussed, I am mailing you a copy of your updated Diabetes Care Plan and some information about stress management. Please call me if you have any questions or concerns @ [phone].  
 
 
 
Sincerely, 
 
 
Evangelina Bourne RN

## 2017-04-12 NOTE — PATIENT INSTRUCTIONS
Diabetes Care Plan    What is DM? Diabetes is a problem with your body that causes blood glucose (sugar) levels to rise higher than normal. Your body does not use insulin properly. At first, your pancreas makes extra insulin to make up for it. But, over time it isn't able to keep up and can't make enough insulin to keep your blood glucose at normal levels. Goal: Blood sugar goal before a meal should be below 130mg/dl. Blood sugar goal two hours after a meal should be below 180mg/dl. 1. Continue monitoring your blood glucose at least once daily but preferably 3 times daily. 2. Record your blood sugar readings on your logs. 3. Review the handouts on managing stress. 4. Exercise 3-5 times a week. 5.Take all your medication as prescribed. Call us for when you need refills. 6. Return for reevaluation May 2017 and every 3 months to monitor your Diabetes. 7. Bring your blood sugar logs and your medication bottle at each visit with us. 8. Contact me if you have any questions or concerns @ [phone]    You have chosen to work on this goal before next week: Try to work out a routine with your work schedule, so you are eating healthy meals at home and taking your insulin. Schedule a time when you feel you can take your Lantus at the same time every day. Take food to work with you. Next re-evaluation: I will call you on 4/20/17. Work-Life Balance: Care Instructions  Your Care Instructions  Do you ever feel like there is not enough time to do all of the things you have to do, and no time at all for the things you enjoy? If so, you are not alone. On average, people in the [place] have worked more and more hours each year since 1970. But in recent years, fewer people say they want to take on more at work, even if they would get promoted or get paid more money. More and more workers say they want time to spend with their families and to do things that are important to them.   Do you ever feel:  · That you always have more and more work to do at your job? · That too many people depend on you every day? · That you never have enough time for your family or friends? · That you never have time for hobbies or things you enjoy? · That each second of your day is scheduled? If you answered \"yes\" to any of these questions, take steps at work and at home to get your life into balance. Follow-up care is a key part of your treatment and safety. Be sure to make and go to all appointments, and call your doctor if you are having problems. How can you care for yourself at home? Manage your time  · Focus on the important things. Taking on too much can wear you out. Look at how you spend your time, and redirect your focus. Learn to say \"no\" and let go of things that do not matter. · Set one small goal at a time. Use a day planner. Break large projects into smaller ones. · Ask for help. Let your children, your spouse, your coworkers, and other people in your life help you get things done. · Leave your job at the office. If you give up free time to get more work done, you may pay for it with stress. If your job offers a flexible work schedule, use it to fit your own work style. For instance, come in earlier to have a longer lunch break, or make time for a yoga class or workout during your workday. · Unplug. Do not let technology (such as your cell phone or the Internet) erase the line between your time and your employer's time. Lower job stress  Job stress causes trouble at work and at home. At work, you may worry about things you have not had time to do at home. At home, you may worry about your job. This cycle upsets your work-life balance. Lowering your job stress can get your life back in balance. Job stress can be caused by:  · Pressure and deadlines. · Heavy workloads or long hours. · Not being allowed to make decisions. · Health and safety hazards. · Feeling you may lose your job.   · Unclear or changing job duties. · Too much responsibility. · Work that is very tiring or boring. Do any of these things bother you? Consider talking with your boss to change things. There are some things that you may not be able to control. But even a few small changes might help lower your stress. Take advantage of programs at work  Businesses make money and are better off in other ways if their employees are healthy and happy. For this reason, many companies have programs to help balance work life and home life. These programs may include:  · Flexible schedules and hours. · Time off for family reasons, education, or community service. · Being able to work from home. · Employee assistance programs to provide counseling. · Child-care programs. Check to see if your company has any of these or other programs that could help you. If not, consider talking to your boss about why work-life balance programs make good business sense. Even if your company does not start an official program, you may be able to get flexible hours, time off, or the ability to do some work from home. Know when to quit  If you are truly unhappy because of a stressful job, and if the suggestions here have not worked, it may be time to think about changing jobs or changing careers. But before you quit, take time to research your options. Where can you learn more? Go to http://sumi-kayode.info/. Enter X061 in the search box to learn more about \"Work-Life Balance: Care Instructions. \"  Current as of: July 26, 2016  Content Version: 11.2  © 4304-3801 [employer]. Care instructions adapted under license by [employer] (which disclaims liability or warranty for this information). If you have questions about a medical condition or this instruction, always ask your healthcare professional. [address] any warranty or liability for your use of this information.        Learning About Stress  What is stress? Stress is what you feel when you have to handle more than you are used to. Stress is a fact of life for most people, and it affects everyone differently. What causes stress for you may not be stressful for someone else. A lot of things can cause stress. You may feel stress when you go on a job interview, take a test, or run a race. This kind of short-term stress is normal and even useful. It can help you if you need to work hard or react quickly. For example, stress can help you finish an important job on time. Stress also can last a long time. Long-term stress is caused by stressful situations or events. Examples of long-term stress include long-term health problems, ongoing problems at work, or conflicts in your family. Long-term stress can harm your health. How does stress affect your health? When you are stressed, your body responds as though you are in danger. It makes hormones that speed up your heart, make you breathe faster, and give you a burst of energy. This is called the fight-or-flight stress response. If the stress is over quickly, your body goes back to normal and no harm is done. But if stress happens too often or lasts too long, it can have bad effects. Long-term stress can make you more likely to get sick, and it can make symptoms of some diseases worse. If you tense up when you are stressed, you may develop neck, shoulder, or low back pain. Stress is linked to high blood pressure and heart disease. Stress also harms your emotional health. It can make you kauffman, tense, or depressed. Your relationships may suffer, and you may not do well at work or school. What can you do to manage stress? How to relax your mind  · Write. It may help to write about things that are bothering you. This helps you find out how much stress you feel and what is causing it. When you know this, you can find better ways to cope. · Let your feelings out.  Talk, laugh, cry, and express anger when you need to. Talking with friends, family, a counselor, or a member of the clergy about your feelings is a healthy way to relieve stress. · Do something you enjoy. For example, listen to music or go to a movie. Practice your hobby or do volunteer work. · Meditate. This can help you relax, because you are not worrying about what happened before or what may happen in the future. · Do guided imagery. Imagine yourself in any setting that helps you feel calm. You can use audiotapes, books, or a teacher to guide you. How to relax your body  · Do something active. Exercise or activity can help reduce stress. Walking is a great way to get started. Even everyday activities such as housecleaning or yard work can help. · Do breathing exercises. For example:  ¨ From a standing position, bend forward from the waist with your knees slightly bent. Let your arms dangle close to the floor. ¨ Breathe in slowly and deeply as you return to a standing position. Roll up slowly and lift your head last.  ¨ Hold your breath for just a few seconds in the standing position. ¨ Breathe out slowly and bend forward from the waist.  · Try yoga or tobias chi. These techniques combine exercise and meditation. You may need some training at first to learn them. What can you do to prevent stress? · Manage your time. This helps you find time to do the things you want and need to do. · Get enough sleep. Your body recovers from the stresses of the day while you are sleeping. · Get support. Your family, friends, and community can make a difference in how you experience stress. Where can you learn more? Go to http://sumi-kayode.info/. Enter X780 in the search box to learn more about \"Learning About Stress. \"  Current as of: July 26, 2016  Content Version: 11.2  © 6630-5149 [employer].  Care instructions adapted under license by [employer] (which disclaims liability or warranty for this information). If you have questions about a medical condition or this instruction, always ask your healthcare professional. [place] [address] any warranty or liability for your use of this information.

## 2017-04-19 ENCOUNTER — HOSPITAL ENCOUNTER (OUTPATIENT)
Dept: LAB | Age: 52
Discharge: HOME | End: 2017-04-19

## 2017-04-19 ENCOUNTER — OFFICE VISIT (OUTPATIENT)
Dept: FAMILY MEDICINE CLINIC | Age: 52
End: 2017-04-19

## 2017-04-19 VITALS
OXYGEN SATURATION: 100 % | SYSTOLIC BLOOD PRESSURE: 93 MMHG | DIASTOLIC BLOOD PRESSURE: 62 MMHG | RESPIRATION RATE: 16 BRPM | HEIGHT: 73 IN | HEART RATE: 88 BPM | TEMPERATURE: 98.1 F

## 2017-04-19 DIAGNOSIS — E11.9 TYPE 2 DIABETES MELLITUS WITHOUT COMPLICATION, UNSPECIFIED LONG TERM INSULIN USE STATUS: ICD-10-CM

## 2017-04-19 DIAGNOSIS — Z00.00 ROUTINE CHECK-UP: Primary | ICD-10-CM

## 2017-04-19 LAB
ALBUMIN SERPL BCP-MCNC: 4.2 G/DL (ref 3.4–5)
ALBUMIN/GLOB SERPL: 1.3 {RATIO} (ref 0.8–1.7)
ALP SERPL-CCNC: 78 U/L (ref 45–117)
ALT SERPL-CCNC: 26 U/L (ref 16–61)
ANION GAP BLD CALC-SCNC: 7 MMOL/L (ref 3–18)
AST SERPL W P-5'-P-CCNC: 13 U/L (ref 15–37)
BILIRUB SERPL-MCNC: 0.2 MG/DL (ref 0.2–1)
BUN SERPL-MCNC: 15 MG/DL (ref 7–18)
BUN/CREAT SERPL: 13 (ref 12–20)
CALCIUM SERPL-MCNC: 9.3 MG/DL (ref 8.5–10.1)
CHLORIDE SERPL-SCNC: 103 MMOL/L (ref 100–108)
CHOLEST SERPL-MCNC: 219 MG/DL
CO2 SERPL-SCNC: 33 MMOL/L (ref 21–32)
CREAT SERPL-MCNC: 1.14 MG/DL (ref 0.6–1.3)
GLOBULIN SER CALC-MCNC: 3.2 G/DL (ref 2–4)
GLUCOSE POC: 253 MG/DL
GLUCOSE SERPL-MCNC: 172 MG/DL (ref 74–99)
HBA1C MFR BLD HPLC: 10.6 %
HDLC SERPL-MCNC: 41 MG/DL (ref 40–60)
HDLC SERPL: 5.3 {RATIO} (ref 0–5)
LDLC SERPL CALC-MCNC: 136.2 MG/DL (ref 0–100)
LIPID PROFILE,FLP: ABNORMAL
POTASSIUM SERPL-SCNC: 4.3 MMOL/L (ref 3.5–5.5)
PROT SERPL-MCNC: 7.4 G/DL (ref 6.4–8.2)
SODIUM SERPL-SCNC: 143 MMOL/L (ref 136–145)
TRIGL SERPL-MCNC: 209 MG/DL (ref ?–150)
TSH SERPL DL<=0.05 MIU/L-ACNC: 2.04 UIU/ML (ref 0.36–3.74)
VLDLC SERPL CALC-MCNC: 41.8 MG/DL

## 2017-04-19 PROCEDURE — 80061 LIPID PANEL: CPT | Performed by: NURSE PRACTITIONER

## 2017-04-19 PROCEDURE — 84443 ASSAY THYROID STIM HORMONE: CPT | Performed by: NURSE PRACTITIONER

## 2017-04-19 PROCEDURE — 80053 COMPREHEN METABOLIC PANEL: CPT | Performed by: NURSE PRACTITIONER

## 2017-04-19 RX ORDER — LISINOPRIL 5 MG/1
TABLET ORAL DAILY
COMMUNITY
End: 2017-09-08 | Stop reason: SDDI

## 2017-04-19 RX ORDER — HYDROXYZINE PAMOATE 25 MG/1
CAPSULE ORAL
Qty: 30 CAP | Refills: 3 | Status: SHIPPED | OUTPATIENT
Start: 2017-04-19 | End: 2019-01-02

## 2017-04-19 NOTE — PROGRESS NOTES
Discharge instructions reviewed with patient  Per provider:  Knee sleeve   Exercise  Call if you want further tx  PAP added cialis 10 mg prn 30 min prior to intercourse, Max dose 10 mg /day, and Lantus increased to 30 units sc daily       Medication list and understanding of medications reviewed with patient. OTC and herbal medications reviewed and added to med list if applicable  Barriers to adherence assessed. Guidance given regarding new medications this visit, including reason for taking this medicine, and common side effects.

## 2017-04-19 NOTE — PROGRESS NOTES
HPI  Jordy Downey is a 46 y.o. male being seen today for using 20 units of insulin nightly. Working night shift and feels it is making his BS worse but cannot change job at this time. Chief Complaint   Patient presents with    Medication Refill   . he states that he had a previous surgery to repair torn meniscus in his right knee and it hurts all the time. Had pain relief after surgery but pain has returned as he is on his feet all night. Does not wear any type brace on his knee. Past Medical History:   Diagnosis Date    Diabetes ([address]          ROS  Patient states that he is feeling well. Denies complaints of chest pain, shortness of breath, swelling of legs, dizziness or weakness. he denies nausea, vomiting or diarrhea. Current Outpatient Prescriptions   Medication Sig    lisinopril (PRINIVIL, ZESTRIL) 5 mg tablet Take  by mouth daily.  insulin glargine (LANTUS) 100 unit/mL injection Inject 18 units subcutaneously every night. Indications: type 2 diabetes mellitus (Patient taking differently: 20 Units by SubCUTAneous route nightly. Inject 18 units subcutaneously every night. Indications: type 2 diabetes mellitus)    insulin lispro (HUMALOG) 100 unit/mL injection Use tid per sliding scale  Indications: type 2 diabetes mellitus    metFORMIN (GLUCOPHAGE) 1,000 mg tablet Take 1,000 mg by mouth two (2) times daily (with meals).  naproxen (NAPROSYN) 500 mg tablet Take 1 tablet by mouth two (2) times daily (with meals).  HYDROcodone-acetaminophen (NORCO) 5-325 mg per tablet Take 2 tablets by mouth every eight (8) hours as needed for Pain (cannot drive or work within 8 hours of taking narcotic pain medicine). No current facility-administered medications for this visit.         PE  Visit Vitals    BP 93/62 (BP 1 Location: Left arm, BP Patient Position: Sitting)    Pulse 88    Temp 98.1 °F (36.7 °C) (Oral)    Resp 16    Ht 6' 1\" (1.854 m)    SpO2 100%        Alert and oriented with normal mood and affect. he is well developed and well nourished . Lungs are clear without wheezing. Heart rate is regular without murmurs or gallops. There is no lower extremity edema. Both knees most tender lateral and posterior aspect of joint. Results for orders placed or performed in visit on 04/19/17   AMB POC GLUCOSE BLOOD, BY GLUCOSE MONITORING DEVICE   Result Value Ref Range    Glucose  mg/dL         Assessment and Plan:        ICD-10-CM ICD-9-CM    1. Routine check-up Z00.00 V70.0 AMB POC GLUCOSE BLOOD, BY GLUCOSE MONITORING DEVICE   2.  Type 2 diabetes mellitus without complication, unspecified long term insulin use status E11.9 250.00 AMB POC HEMOGLOBIN N5W      METABOLIC PANEL, COMPREHENSIVE      LIPID PANEL      TSH 3RD GENERATION   decrease lisinopril to 2.5 mg daily for renal protection  Trial of vistaril prn daytime insomnia due to shift work (pt states benedryl did not work)  Pt asking to add cialis to PAP for ED - added for RN discharge to email [employer] and POC A1C 10.7 - increase insulin to 30 units daily  Exercise, change footwear and knee brace for knee pain - discussed injection as pain control option      Yanely Rubio NP

## 2017-04-19 NOTE — PATIENT INSTRUCTIONS
Knee Arthritis: Exercises  Your Care Instructions  Here are some examples of exercises for knee arthritis. Start each exercise slowly. Ease off the exercise if you start to have pain. Your doctor or physical therapist will tell you when you can start these exercises and which ones will work best for you. How to do the exercises  Knee flexion with heel slide    Lie on your back with your knees bent. Slide your heel back by bending your affected knee as far as you can. Then hook your other foot around your ankle to help pull your heel even farther back. Hold for about 6 seconds, then rest for up to 10 seconds. Repeat 8 to 12 times. Switch legs and repeat steps 1 through 4, even if only one knee is sore. Quad [employer] with your affected leg straight and supported on the floor or a firm bed. Place a small, rolled-up towel under your knee. Your other leg should be bent, with that foot flat on the floor. Tighten the thigh muscles of your affected leg by pressing the back of your knee down into the towel. Hold for about 6 seconds, then rest for up to 10 seconds. Repeat 8 to 12 times. Switch legs and repeat steps 1 through 4, even if only one knee is sore. Straight-leg raises to the front    Lie on your back with your good knee bent so that your foot rests flat on the floor. Your affected leg should be straight. Make sure that your low back has a normal curve. You should be able to slip your hand in between the floor and the small of your back, with your palm touching the floor and your back touching the back of your hand. Tighten the thigh muscles in your affected leg by pressing the back of your knee flat down to the floor. Hold your knee straight. Keeping the thigh muscles tight and your leg straight, lift your affected leg up so that your heel is about 12 inches off the floor. Hold for about 6 seconds, then lower slowly. Relax for up to 10 seconds between repetitions.   Repeat 8 to 12 times.  Switch legs and repeat steps 1 through 5, even if only one knee is sore. Active knee flexion    Lie on your stomach with your knees straight. If your kneecap is uncomfortable, roll up a washcloth and put it under your leg just above your kneecap. Lift the foot of your affected leg by bending the knee so that you bring the foot up toward your buttock. If this motion hurts, try it without bending your knee quite as far. This may help you avoid any painful motion. Slowly move your leg up and down. Repeat 8 to 12 times. Switch legs and repeat steps 1 through 4, even if only one knee is sore. Quadriceps stretch (facedown)    Lie flat on your stomach, and rest your face on the floor. Wrap a towel or belt strap around the lower part of your affected leg. Then use the towel or belt strap to slowly pull your heel toward your buttock until you feel a stretch. Hold for about 15 to 30 seconds, then relax your leg against the towel or belt strap. Repeat 2 to 4 times. Switch legs and repeat steps 1 through 4, even if only one knee is sore. Stationary exercise bike    If you do not have a stationary exercise bike at home, you can find one to ride at your local health club or community center. Adjust the height of the bike seat so that your knee is slightly bent when your leg is extended downward. If your knee hurts when the pedal reaches the top, you can raise the seat so that your knee does not bend as much. Start slowly. At first, try to do 5 to 10 minutes of cycling with little to no resistance. Then increase your time and the resistance bit by bit until you can do 20 to 30 minutes without pain. If you start to have pain, rest your knee until your pain gets back to the level that is normal for you. Or cycle for less time or with less effort. Follow-up care is a key part of your treatment and safety. Be sure to make and go to all appointments, and call your doctor if you are having problems.  It's also a good idea to know your test results and keep a list of the medicines you take. Where can you learn more? Go to http://sumi-kayode.info/. Enter C159 in the search box to learn more about \"Knee Arthritis: Exercises. \"  Current as of: May 23, 2016  Content Version: 11.2  © 9360-2782 [employer]. Care instructions adapted under license by [employer] (which disclaims liability or warranty for this information). If you have questions about a medical condition or this instruction, always ask your healthcare professional. [place] [address] any warranty or liability for your use of this information. Hamstring Strain: Rehab Exercises  Your Care Instructions  Here are some examples of typical rehabilitation exercises for your condition. Start each exercise slowly. Ease off the exercise if you start to have pain. Your doctor or physical therapist will tell you when you can start these exercises and which ones will work best for you. How to do the exercises  Hamstring set (heel dig)    1. Sit with your affected leg bent. Your good leg should be straight and supported on the floor. 2. Tighten the muscles on the back of your bent leg (hamstring) by pressing your heel into the floor. 3. Hold for about 6 seconds, and then rest for up to 10 seconds. 4. Repeat 8 to 12 times. Hamstring curl    1. Lie on your stomach with your knees straight. Place a pillow under your stomach. If your kneecap is uncomfortable, roll up a washcloth and put it under your leg just above your kneecap. 2. Lift the foot of your affected leg by bending your knee so that you bring your foot up toward your buttock. If this motion hurts, try it without bending your knee quite as far. This may help you avoid any painful motion. 3. Slowly move your leg up and down. 4. Repeat 8 to 12 times. When you can do this exercise with ease and no pain, add some resistance.  To do this:  · Tie the ends of an exercise band together to form a loop. Attach one end of the loop to a secure object or shut a door on it to hold it in place. (Or you can have someone hold one end of the loop to provide resistance.)  · Loop the other end of the exercise band around the lower part of your affected leg. · Repeat steps 1 through 4, slowly pulling back on the exercise band with your leg. Hip extension    1. Stand facing a wall with your hands on the wall at about chest level. 2. Keeping the knee of your affected leg straight, kick that leg straight back behind you. 3. Relax, and lower your leg back to the starting position. 4. Repeat 8 to 12 times. When you can do this exercise with ease and no pain, add some resistance. To do this:  · Tie the ends of an exercise band together to form a loop. Attach one end of the loop to a secure object or shut a door on it to hold it in place. (Or you can have someone hold one end of the loop to provide resistance.)  · Loop the other end of the exercise band around the lower part of your affected leg. · Repeat steps 1 through 4, slowly pulling back on the exercise band with your leg. Hamstring wall stretch    1. Lie on your back in a doorway, with your good leg through the open door. 2. Slide your affected leg up the wall to straighten your knee. You should feel a gentle stretch down the back of your leg. ¨ Do not arch your back. ¨ Do not bend either knee. ¨ Keep one heel touching the floor and the other heel touching the wall. Do not point your toes. 3. Hold the stretch for at least 1 minute to begin. Then try to lengthen the time you hold the stretch to as long as 6 minutes. 4. Repeat 2 to 4 times. If you do not have a place to do this exercise in a doorway, there is another way to do it:  1. Lie on your back, and bend the knee of your affected leg. 2. Loop a towel under the ball and toes of that foot, and hold the ends of the towel in your hands.   3. Straighten your knee, and slowly pull back on the towel. You should feel a gentle stretch down the back of your leg. 4. Hold the stretch for 15 to 30 seconds. Or even better, hold the stretch for 1 minute if you can. 5. Repeat 2 to 4 times. Calf stretch    1. Stand facing a wall with your hands on the wall at about eye level. Put your affected leg about a step behind your other leg. 2. Keeping your back leg straight and your back heel on the floor, bend your front knee and gently bring your hip and chest toward the wall until you feel a stretch in the calf of your back leg. 3. Hold the stretch for 15 to 30 seconds. 4. Repeat 2 to 4 times. 5. Repeat steps 1 through 4, but this time keep your back knee bent. Single-leg balance    1. Stand on a flat surface with your arms stretched out to your sides like you are making the letter \"T. \" Then lift your good leg off the floor, bending it at the knee. If you are not steady on your feet, use one hand to hold on to a chair, counter, or wall. 2. Standing on your affected leg, keep that knee straight. Try to balance on that leg for up to 30 seconds. Then rest for up to 10 seconds. 3. Repeat 6 to 8 times. 4. When you can balance on your affected leg for 30 seconds with your eyes open, try to balance on it with your eyes closed. When you can do this exercise with your eyes closed for 30 seconds and with ease and no pain, try standing on a pillow or piece of foam, and repeat steps 1 through 4. Follow-up care is a key part of your treatment and safety. Be sure to make and go to all appointments, and call your doctor if you are having problems. It's also a good idea to know your test results and keep a list of the medicines you take. Where can you learn more? Go to http://sumi-kayode.info/. Enter 182 [id number] [id number] in the search box to learn more about \"Hamstring Strain: Rehab Exercises. \"  Current as of: May 23, 2016  Content Version: 11.2  © 0444-7814 [employer]. Care instructions adapted under license by [employer] (which disclaims liability or warranty for this information). If you have questions about a medical condition or this instruction, always ask your healthcare professional. [address] any warranty or liability for your use of this information. Knee Arthritis: Care Instructions  Your Care Instructions  Knee arthritis is a breakdown of the cartilage that cushions your knee joint. When the cartilage wears down, your bones rub against each other. This causes pain and stiffness. Knee arthritis tends to get worse with time. Treatment for knee arthritis involves reducing pain, making the leg muscles stronger, and staying at a healthy body weight. The treatment usually does not improve the health of the cartilage, but it can reduce pain and improve how well your knee works. You can take simple measures to protect your knee joints, ease your pain, and help you stay active. Follow-up care is a key part of your treatment and safety. Be sure to make and go to all appointments, and call your doctor if you are having problems. It's also a good idea to know your test results and keep a list of the medicines you take. How can you care for yourself at home? · Know that knee arthritis will cause more pain on some days than on others. · Stay at a healthy weight. Lose weight if you are overweight. When you stand up, the pressure on your knees from every pound of body weight is multiplied four times. So if you lose 10 pounds, you will reduce the pressure on your knees by 40 pounds. · Talk to your doctor or physical therapist about exercises that will help ease joint pain. ¨ Stretch to help prevent stiffness and to prevent injury before you exercise. You may enjoy gentle forms of yoga to help keep your knee joints and muscles flexible. ¨ Walk instead of jog. ¨ Ride a bike.  This makes your thigh muscles stronger and takes pressure off your knee. ¨ Wear well-fitting and comfortable shoes. ¨ Exercise in chest-deep water. This can help you exercise longer with less pain. ¨ Avoid exercises that include squatting or kneeling. They can put a lot of strain on your knees. ¨ Talk to your doctor to make sure that the exercise you do is not making the arthritis worse. · Do not sit for long periods of time. Try to walk once in a while to keep your knee from getting stiff. · Ask your doctor or physical therapist whether shoe inserts may reduce your arthritis pain. · If you can afford it, get new athletic shoes at least every year. This can help reduce the strain on your knees. · Use a device to help you do everyday activities. ¨ A cane or walking stick can help you keep your balance when you walk. Hold the cane or walking stick in the hand opposite the painful knee. ¨ If you feel like you may fall when you walk, try using crutches or a front-wheeled walker. These can prevent falls that could cause more damage to your knee. ¨ A knee brace may help keep your knee stable and prevent pain. ¨ You also can use other things to make life easier, such as a higher toilet seat and handrails in the bathtub or shower. · Take pain medicines exactly as directed. ¨ Do not wait until you are in severe pain. You will get better results if you take it sooner. ¨ If you are not taking a prescription pain medicine, take an over-the-counter medicine such as acetaminophen (Tylenol), ibuprofen (Advil, Motrin), or naproxen (Aleve). Read and follow all instructions on the label. ¨ Do not take two or more pain medicines at the same time unless the doctor told you to. Many pain medicines have acetaminophen, which is Tylenol. Too much acetaminophen (Tylenol) can be harmful. ¨ Tell your doctor if you take a blood thinner, have diabetes, or have allergies to shellfish. · Ask your doctor if you might benefit from a shot of steroid medicine into your knee.  This may provide pain relief for several months. · Many people take the supplements glucosamine and chondroitin for osteoarthritis. Some people feel they help, but the medical research does not show that they work. Talk to your doctor before you take these supplements. When should you call for help? Call your doctor now or seek immediate medical care if:  · You have sudden swelling, warmth, or pain in your knee. · You have knee pain and a fever or rash. · You have such bad pain that you cannot use your knee. Watch closely for changes in your health, and be sure to contact your doctor if you have any problems. Where can you learn more? Go to http://sumi-kayode.info/. Enter O054 in the search box to learn more about \"Knee Arthritis: Care Instructions. \"  Current as of: October 31, 2016  Content Version: 11.2  © 8366-4849 [employer]. Care instructions adapted under license by [employer] (which disclaims liability or warranty for this information). If you have questions about a medical condition or this instruction, always ask your healthcare professional. [address] any warranty or liability for your use of this information.

## 2017-04-20 NOTE — PROGRESS NOTES
I have reviewed the attatched progress note and I agree with the plan and medical decision making as outlined by Hassie Meigs, Evalene Asa MD

## 2017-04-21 ENCOUNTER — PATIENT OUTREACH (OUTPATIENT)
Dept: FAMILY MEDICINE CLINIC | Age: 52
End: 2017-04-21

## 2017-04-21 NOTE — PROGRESS NOTES
Follow up Chronic Condition    Focus: Diabetes    Patient reports he is still having difficulty setting a routine for checking his BG's and administering his Lantus. Pt's current work schedule rotates between 10:30pm-7:30am and 7:30pm-4:30am. After talking through his rotating schedule, we decided 8pm would work best to administer his Lantus daily. Pt has not been checking his BG's much. Reports this morning BG at 10am when he woke up was 190. Pt took his Lantus, ate some grits and went back to bed. Pt has been using 30 units Lantus as directed by PCP. Pt was instructed by PCP to decrease his lisinopril to 2.5mg daily. Pt states he decided to stop taking the lisinopril until he picks up a new refill. Advised pt that it would be better if he continues to take the decreased dosage and check his BP at the pharmacy when he goes to  a refill. Pt states that the hydroxyzine was too strong for him and made him nauseous. Asking if the provider can prescribe something weaker. Will consult provider about sleeping pill and follow up with pt in 1 week to check BP reading. Patient has my number if questions arise.

## 2017-05-04 ENCOUNTER — PATIENT OUTREACH (OUTPATIENT)
Dept: FAMILY MEDICINE CLINIC | Age: 52
End: 2017-05-04

## 2017-05-04 NOTE — PROGRESS NOTES
Follow up Chronic Condition    Focus: Diabetes and Hypertension    Unable to reach pt by phone. Letter mailed to pt. Patient has my number if questions arise.

## 2017-05-04 NOTE — LETTER
5/10/2017 4:36 PM 
 
Mr. Krystal Blackmon [address] [facility] 83 [phone] Dear Krystal Blackmon I am the Nurse Navigator working with the [address] [place] [address]. I have been unable to reach you by phone. I would like to follow up with you about your diabetes when you have some time. You can reach me directly at [phone]. Thank you for allowing us to participate in your care!  
 
 
 
Sincerely, 
 
 
Delonte Thacker RN

## 2017-05-05 NOTE — LETTER
5/5/2017 12:36 PM 
 
Mr. Lenora Perez [address] [place] [phone] Thank you for participating in the [employer] Patient Assistance Program. 
 
This is notification that your item(s) was delivered to us. Please  your item(s) between 11:00 am and 1:00 pm, at one of our [place] sites within 14 days. When you arrive, you will need to register inside as a \"nurse visit\" to  your medication. Notify the registrar that you are there to  medication and they will register you as soon as possible. There may be a short wait but we try to make the process as fast as possible. If it has been more than 3 months since you saw the doctor, please come early and plan to stay for an office visit on the day you  your medicine. If you fail to follow-up for regular appointments, the [employer] may discontinue providing your medication. To see when the [address] will be in your neighborhood, go to 
[email or web address] 
or call [phone], select your language (1 for english or 2 for Vietnamese), and then option 2. Sincerely, Pranay Dyer MD

## 2017-05-05 NOTE — TELEPHONE ENCOUNTER
Per provider, received from [employer] medication assistance program:  Lantus insulin 100 units/mL  #4 -10 mL vials  Lot  [id number]  Exp 03/31/2019  Letter sent to patient for pickup

## 2017-05-12 RX ORDER — INSULIN GLARGINE 100 [IU]/ML
30 INJECTION, SOLUTION SUBCUTANEOUS
Qty: 4 VIAL | Refills: 0 | Status: SHIPPED | COMMUNITY
Start: 2017-05-12 | End: 2017-09-08 | Stop reason: SDUPTHER

## 2017-05-12 NOTE — TELEPHONE ENCOUNTER
Med list and chart notes reviewed.   Pharmacy Assistance Medication approved for pickup.    lantus 30 units daily

## 2017-06-07 NOTE — LETTER
6/7/2017 1:34 PM 
 
Mr. Pennie Chatterjee [address] [facility] 83 [phone] Thank you for participating in the [address] [place] [address] Patient Assistance Program. 
 
This is notification that your item(s) was delivered to us. Please  your item(s) between 11:00 am and 1:00 pm, at one of our [place] sites within 14 days. When you arrive, you will need to register inside as a \"nurse visit\" to  your medication. Notify the registrar that you are there to  medication and they will register you as soon as possible. There may be a short wait but we try to make the process as fast as possible. If it has been more than 3 months since you saw the doctor, please come early and plan to stay for an office visit on the day you  your medicine. If you fail to follow-up for regular appointments, the [address] [place] [address] may discontinue providing your medication. To see when the [address] will be in your neighborhood, go to 
[email or web address] 
or call [phone], select your language (1 for english or 2 for Kyrgyz), and then option 2. Sincerely, Trey Mcburney, MD

## 2017-06-07 NOTE — TELEPHONE ENCOUNTER
Rossi billy patient [employer] 4 vials of Humalog through the PAP. Letter sent to patient for  ands order routed to provider.

## 2017-06-08 RX ORDER — TADALAFIL 10 MG/1
10 TABLET ORAL
Qty: 30 TAB | Refills: 0 | Status: SHIPPED | COMMUNITY
Start: 2017-06-08 | End: 2017-09-08 | Stop reason: SDDI

## 2017-06-08 NOTE — TELEPHONE ENCOUNTER
Received #30 cialis 10mg from [employer] pt assistance  Med list and chart notes reviewed. Pharmacy Assistance Medication approved for pickup.    cialis 10mg po daily prn    If pt still has viagra, please makes ure he understands he can use either one but not both on same day.

## 2017-06-08 NOTE — LETTER
6/8/2017 7:19 AM 
 
Mr. Garret Lafleur [address] [facility] 83 [phone] Thank you for participating in the [address] [place] [address] Patient Assistance Program. 
 
This is notification that your cialis was delivered to us. Please  your item(s) between 11:00 am and 1:00 pm, at one of our [place] sites within 14 days. When you arrive, you will need to register inside as a \"nurse visit\" to  your medication. Notify the registrar that you are there to  medication and they will register you as soon as possible. There may be a short wait but we try to make the process as fast as possible. If you fail to follow-up for regular appointments, the [address] [place] [address] may discontinue providing your medication. To see when the [address] will be in your neighborhood, go to 
[email or web address] 
or call [phone], select your language (1 for english or 2 for Belarusian), and then option 2.

## 2017-06-09 ENCOUNTER — CLINICAL SUPPORT (OUTPATIENT)
Dept: FAMILY MEDICINE CLINIC | Age: 52
End: 2017-06-09

## 2017-06-09 DIAGNOSIS — E11.9 TYPE 2 DIABETES MELLITUS WITHOUT COMPLICATION, UNSPECIFIED LONG TERM INSULIN USE STATUS: Primary | ICD-10-CM

## 2017-06-09 NOTE — PROGRESS NOTES
Per provider patient picked up Pharmacy Assistance Program medication. Medication:cialis 30 tabs  ManufacturerTokimber Lea   Lot  [id number]         Exp 8/31/19      Medication: lantus 4 vials  : sanofi  Lot  [id number]        Exp 3/31/19      Medication: Humalog 4 vials  : Glendy Chaudhary  [id number]        Exp 12/31/19                      Patient verified understanding of medication and directions. Discharge instructions reviewed with patient. Medication list and understanding of medications reviewed with patient. OTC and herbal medications reviewed and added to med list if applicable  Barriers to adherence assessed. Guidance given regarding new medications this visit, including reason for taking this medicine, and common side effects.

## 2017-06-13 RX ORDER — INSULIN LISPRO 100 [IU]/ML
INJECTION, SOLUTION INTRAVENOUS; SUBCUTANEOUS
Qty: 4 VIAL | Refills: 0 | Status: SHIPPED | COMMUNITY
Start: 2017-06-13 | End: 2020-03-04 | Stop reason: SDUPTHER

## 2017-06-13 NOTE — TELEPHONE ENCOUNTER
Med list and chart notes reviewed.   Pharmacy Assistance Medication approved for pickup.    humalog insulin tid with meals per sliding scale

## 2017-08-23 ENCOUNTER — HOSPITAL ENCOUNTER (EMERGENCY)
Age: 52
Discharge: HOME | End: 2017-08-23
Attending: EMERGENCY MEDICINE
Payer: COMMERCIAL

## 2017-08-23 ENCOUNTER — APPOINTMENT (OUTPATIENT)
Dept: GENERAL RADIOLOGY | Age: 52
End: 2017-08-23
Attending: EMERGENCY MEDICINE
Payer: COMMERCIAL

## 2017-08-23 ENCOUNTER — APPOINTMENT (OUTPATIENT)
Dept: CT IMAGING | Age: 52
End: 2017-08-23
Attending: EMERGENCY MEDICINE
Payer: COMMERCIAL

## 2017-08-23 VITALS
SYSTOLIC BLOOD PRESSURE: 119 MMHG | RESPIRATION RATE: 19 BRPM | HEART RATE: 70 BPM | HEIGHT: 73 IN | DIASTOLIC BLOOD PRESSURE: 60 MMHG | OXYGEN SATURATION: 100 % | WEIGHT: 212 LBS | BODY MASS INDEX: 28.1 KG/M2 | TEMPERATURE: 98.3 F

## 2017-08-23 DIAGNOSIS — R42 DIZZINESS: Primary | ICD-10-CM

## 2017-08-23 DIAGNOSIS — R55 NEAR SYNCOPE: ICD-10-CM

## 2017-08-23 LAB
ALBUMIN SERPL-MCNC: 3.7 G/DL (ref 3.4–5)
ALBUMIN/GLOB SERPL: 1 {RATIO} (ref 0.8–1.7)
ALP SERPL-CCNC: 71 U/L (ref 45–117)
ALT SERPL-CCNC: 30 U/L (ref 16–61)
ANION GAP SERPL CALC-SCNC: 4 MMOL/L (ref 3–18)
APPEARANCE UR: CLEAR
AST SERPL-CCNC: 24 U/L (ref 15–37)
ATRIAL RATE: 67 BPM
ATRIAL RATE: 68 BPM
ATRIAL RATE: 79 BPM
BASOPHILS # BLD: 0 K/UL (ref 0–0.06)
BASOPHILS NFR BLD: 0 % (ref 0–2)
BILIRUB SERPL-MCNC: 0.2 MG/DL (ref 0.2–1)
BILIRUB UR QL: NEGATIVE
BUN SERPL-MCNC: 13 MG/DL (ref 7–18)
BUN/CREAT SERPL: 14 (ref 12–20)
CALCIUM SERPL-MCNC: 8.8 MG/DL (ref 8.5–10.1)
CALCULATED P AXIS, ECG09: 49 DEGREES
CALCULATED P AXIS, ECG09: 57 DEGREES
CALCULATED P AXIS, ECG09: 66 DEGREES
CALCULATED R AXIS, ECG10: 27 DEGREES
CALCULATED R AXIS, ECG10: 30 DEGREES
CALCULATED R AXIS, ECG10: 48 DEGREES
CALCULATED T AXIS, ECG11: 55 DEGREES
CALCULATED T AXIS, ECG11: 62 DEGREES
CALCULATED T AXIS, ECG11: 65 DEGREES
CHLORIDE SERPL-SCNC: 104 MMOL/L (ref 100–108)
CK MB CFR SERPL CALC: 0.7 % (ref 0–4)
CK MB CFR SERPL CALC: 0.7 % (ref 0–4)
CK MB SERPL-MCNC: 1.2 NG/ML (ref 5–25)
CK MB SERPL-MCNC: 1.5 NG/ML (ref 5–25)
CK SERPL-CCNC: 173 U/L (ref 39–308)
CK SERPL-CCNC: 206 U/L (ref 39–308)
CO2 SERPL-SCNC: 33 MMOL/L (ref 21–32)
COLOR UR: YELLOW
CREAT SERPL-MCNC: 0.96 MG/DL (ref 0.6–1.3)
DIAGNOSIS, 93000: NORMAL
DIFFERENTIAL METHOD BLD: ABNORMAL
EOSINOPHIL # BLD: 0.1 K/UL (ref 0–0.4)
EOSINOPHIL NFR BLD: 1 % (ref 0–5)
ERYTHROCYTE [DISTWIDTH] IN BLOOD BY AUTOMATED COUNT: 12.6 % (ref 11.6–14.5)
GLOBULIN SER CALC-MCNC: 3.7 G/DL (ref 2–4)
GLUCOSE BLD STRIP.AUTO-MCNC: 161 MG/DL (ref 70–110)
GLUCOSE SERPL-MCNC: 136 MG/DL (ref 74–99)
GLUCOSE UR STRIP.AUTO-MCNC: >1000 MG/DL
HCT VFR BLD AUTO: 40.1 % (ref 36–48)
HGB BLD-MCNC: 13.7 G/DL (ref 13–16)
HGB UR QL STRIP: NEGATIVE
KETONES UR QL STRIP.AUTO: NEGATIVE MG/DL
LEUKOCYTE ESTERASE UR QL STRIP.AUTO: NEGATIVE
LYMPHOCYTES # BLD: 1.8 K/UL (ref 0.9–3.6)
LYMPHOCYTES NFR BLD: 27 % (ref 21–52)
MAGNESIUM SERPL-MCNC: 2.5 MG/DL (ref 1.6–2.6)
MCH RBC QN AUTO: 29.7 PG (ref 24–34)
MCHC RBC AUTO-ENTMCNC: 34.2 G/DL (ref 31–37)
MCV RBC AUTO: 87 FL (ref 74–97)
MONOCYTES # BLD: 0.5 K/UL (ref 0.05–1.2)
MONOCYTES NFR BLD: 7 % (ref 3–10)
NEUTS SEG # BLD: 4.5 K/UL (ref 1.8–8)
NEUTS SEG NFR BLD: 65 % (ref 40–73)
NITRITE UR QL STRIP.AUTO: NEGATIVE
P-R INTERVAL, ECG05: 148 MS
P-R INTERVAL, ECG05: 152 MS
P-R INTERVAL, ECG05: 156 MS
PH UR STRIP: 5 [PH] (ref 5–8)
PLATELET # BLD AUTO: 212 K/UL (ref 135–420)
PMV BLD AUTO: 9.6 FL (ref 9.2–11.8)
POTASSIUM SERPL-SCNC: 3.9 MMOL/L (ref 3.5–5.5)
PROT SERPL-MCNC: 7.4 G/DL (ref 6.4–8.2)
PROT UR STRIP-MCNC: NEGATIVE MG/DL
Q-T INTERVAL, ECG07: 370 MS
Q-T INTERVAL, ECG07: 388 MS
Q-T INTERVAL, ECG07: 390 MS
QRS DURATION, ECG06: 82 MS
QRS DURATION, ECG06: 84 MS
QRS DURATION, ECG06: 86 MS
QTC CALCULATION (BEZET), ECG08: 412 MS
QTC CALCULATION (BEZET), ECG08: 412 MS
QTC CALCULATION (BEZET), ECG08: 424 MS
RBC # BLD AUTO: 4.61 M/UL (ref 4.7–5.5)
SODIUM SERPL-SCNC: 141 MMOL/L (ref 136–145)
SP GR UR REFRACTOMETRY: 1.03 (ref 1–1.03)
TROPONIN I SERPL-MCNC: <0.02 NG/ML (ref 0–0.04)
TROPONIN I SERPL-MCNC: <0.02 NG/ML (ref 0–0.04)
UROBILINOGEN UR QL STRIP.AUTO: 0.2 EU/DL (ref 0.2–1)
VENTRICULAR RATE, ECG03: 67 BPM
VENTRICULAR RATE, ECG03: 68 BPM
VENTRICULAR RATE, ECG03: 79 BPM
WBC # BLD AUTO: 6.9 K/UL (ref 4.6–13.2)

## 2017-08-23 PROCEDURE — 93005 ELECTROCARDIOGRAM TRACING: CPT

## 2017-08-23 PROCEDURE — 82962 GLUCOSE BLOOD TEST: CPT

## 2017-08-23 PROCEDURE — 99284 EMERGENCY DEPT VISIT MOD MDM: CPT

## 2017-08-23 PROCEDURE — 82550 ASSAY OF CK (CPK): CPT | Performed by: EMERGENCY MEDICINE

## 2017-08-23 PROCEDURE — 81003 URINALYSIS AUTO W/O SCOPE: CPT | Performed by: EMERGENCY MEDICINE

## 2017-08-23 PROCEDURE — 71010 XR CHEST PORT: CPT

## 2017-08-23 PROCEDURE — 80053 COMPREHEN METABOLIC PANEL: CPT | Performed by: EMERGENCY MEDICINE

## 2017-08-23 PROCEDURE — 96361 HYDRATE IV INFUSION ADD-ON: CPT

## 2017-08-23 PROCEDURE — 96374 THER/PROPH/DIAG INJ IV PUSH: CPT

## 2017-08-23 PROCEDURE — 83735 ASSAY OF MAGNESIUM: CPT | Performed by: EMERGENCY MEDICINE

## 2017-08-23 PROCEDURE — 70450 CT HEAD/BRAIN W/O DYE: CPT

## 2017-08-23 PROCEDURE — 74011250636 HC RX REV CODE- 250/636: Performed by: EMERGENCY MEDICINE

## 2017-08-23 PROCEDURE — 85025 COMPLETE CBC W/AUTO DIFF WBC: CPT | Performed by: EMERGENCY MEDICINE

## 2017-08-23 RX ORDER — MECLIZINE HCL 12.5 MG 12.5 MG/1
25 TABLET ORAL
Status: COMPLETED | OUTPATIENT
Start: 2017-08-23 | End: 2017-08-23

## 2017-08-23 RX ORDER — ONDANSETRON 2 MG/ML
4 INJECTION INTRAMUSCULAR; INTRAVENOUS
Status: COMPLETED | OUTPATIENT
Start: 2017-08-23 | End: 2017-08-23

## 2017-08-23 RX ORDER — MECLIZINE HYDROCHLORIDE 25 MG/1
25 TABLET ORAL
Qty: 20 TAB | Refills: 0 | Status: SHIPPED | OUTPATIENT
Start: 2017-08-23 | End: 2017-09-02

## 2017-08-23 RX ADMIN — SODIUM CHLORIDE 1000 ML: 900 INJECTION, SOLUTION INTRAVENOUS at 12:06

## 2017-08-23 RX ADMIN — MECLIZINE 25 MG: 12.5 TABLET ORAL at 12:05

## 2017-08-23 RX ADMIN — ONDANSETRON 4 MG: 2 INJECTION INTRAMUSCULAR; INTRAVENOUS at 12:05

## 2017-08-23 NOTE — ED NOTES
Hourly Rounding   Patient is is alert and oriented at this time. Patient is talking to a family friend at this time. Patient is in no acute distress.

## 2017-08-23 NOTE — LETTER
[address] [place] [address] [facility] EMERGENCY DEPT 
[address] [facility] [address] 
[phone] Work/School Note Date: 8/23/2017 To Whom It May concern: Rubi Murray was seen and treated today in the emergency room by the following provider(s): 
Attending Provider: Guicho Ramsey MD. Rubi Murray may return to work on August 25, 2017. Sincerely, Gilberto Albarran RN

## 2017-08-23 NOTE — ED NOTES
Hourly Rounding  Patient is in bed resting with eyes open , patient is stable on the monitor. Patient is alert and oriented x 4. Patient has family at the bedside. Will continue to monitor patient. Patient has been updated on plan of care a this time.

## 2017-08-23 NOTE — ED NOTES
Scheduled patient a follow-up appointment on 09/05/2017 at 9:50 am with Karime Pierson. Patient was giving date/time of his scheduled appointment. Patient will be contacted in 2 weeks to remind him of his scheduled appointment and the importance of keeping all scheduled appointments.

## 2017-08-23 NOTE — ED NOTES
Patient states he does not feel so good when he had to stand up for the orthostatics . Patient is clammy at this time. MD notified. Orders received.

## 2017-08-23 NOTE — ED PROVIDER NOTES
HPI Comments: Pt with history of DM, presents to ED with complaint of intermittent dizziness with occasional nausea over the past 4 days. He first noted symptoms on Sunday when \"I started my workout routine\". He notes that he started working out again [place] few months ago\". He denies any recent changes to his work-out and hasn't had prior symptoms. He had recurrent symptoms of lightheadedness, dizziness and \"feeling like I'm going to pass out\" after walking approx 1/2 mile to [facility] on Monday. First two episodes resolved with rest after \"a few minutes\". 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 No chest pain, no palpitations, no diaphoresis, no dyspnea, no neck or back pain, no abdominal pain. He states he came to the ED to be seen \"but there were just so many people here and I just didn't wait\". He denies any current symptoms. No recent changes to his medications. No fevers or chills, no headache, no numbness or weakness. He does note that he believes he has a sinus infection due to R sided pressure. No other acute symptoms or complaints were noted. Past Medical History:   Diagnosis Date    Diabetes [facility])        History reviewed. No pertinent surgical history. Family History:   Problem Relation Age of Onset    Seizures Mother     Hypertension Mother     No Known Problems Father     No Known Problems Sister     No Known Problems Brother     No Known Problems Sister     Hypertension Brother        Social History     Social History    Marital status: SINGLE     Spouse name: N/A    Number of children: N/A    Years of education: N/A     Occupational History    Not on file.      Social History Main Topics    Smoking status: Never Smoker    Smokeless tobacco: Never Used    Alcohol use No    Drug use: No    Sexual activity: No     Other Topics Concern    Not on file     Social History Narrative         ALLERGIES: Review of patient's allergies indicates no known allergies. Review of Systems   Constitutional: Negative for chills, diaphoresis and fever. HENT: Positive for congestion and sinus pressure. Negative for hearing loss, sore throat, trouble swallowing and voice change. Eyes: Negative for visual disturbance. Respiratory: Negative for chest tightness and shortness of breath. Cardiovascular: Negative for chest pain, palpitations and leg swelling. Gastrointestinal: Positive for nausea. Negative for abdominal pain, constipation, diarrhea and vomiting. Endocrine: Negative. Genitourinary: Negative for dysuria and hematuria. Musculoskeletal: Negative. Negative for back pain, neck pain and neck stiffness. Skin: Negative for pallor. Allergic/Immunologic: Negative. Neurological: Positive for dizziness and light-headedness. Negative for syncope, facial asymmetry, speech difficulty, weakness, numbness and headaches. Hematological: Does not bruise/bleed easily. Vitals:    08/23/17 1017   BP: 119/73   Pulse: 71   Resp: 20   Temp: 98.3 °F (36.8 °C)   SpO2: 99%   Weight: 96.2 kg (212 lb)   Height: 6' 1\" (1.854 m)            Physical Exam   Constitutional: He is oriented to person, place, and time. He appears well-developed and well-nourished. No distress. Resting comfortably on stretcher   HENT:   Head: Normocephalic and atraumatic. MM moist.  TM clear bilaterally. Small amount of clear fluid noted behind R TM, no purulence or drainage appreciated. Eyes: Conjunctivae and EOM are normal. Pupils are equal, round, and reactive to light. No scleral icterus. Sclera clear bilaterally   Neck: Normal range of motion. Neck supple. No JVD present. No thyromegaly present. Non-tender to palpation. No carotid bruits auscultated. Cardiovascular: Normal rate, regular rhythm and normal heart sounds.   Exam reveals no gallop and no friction rub. No murmur heard. Pulmonary/Chest: Effort normal and breath sounds normal. No respiratory distress. He has no wheezes. He has no rales. He exhibits no tenderness. No crepitance with palpation   Abdominal: Soft. Bowel sounds are normal. He exhibits no distension. There is no tenderness. There is no rebound and no guarding. Genitourinary:   Genitourinary Comments: No CVA tenderness   Musculoskeletal: He exhibits no edema or tenderness. Normal inspection of upper extremities. No edema noted to bilateral lower extremities   Lymphadenopathy:     He has no cervical adenopathy. Neurological: He is alert and oriented to person, place, and time. No cranial nerve deficit. He exhibits normal muscle tone. Coordination normal.   Normal motor and sensation bilaterally to upper and lower extremities. Gait WNL. Skin: Skin is warm and dry. No rash noted. He is not diaphoretic. Psychiatric:   Normal mood and affect. Vitals reviewed. MDM  Number of Diagnoses or Management Options  Diagnosis management comments: Pt with history of DM, presents with intermittent exertional dizziness and nausea. No history of CAD and no overt cardiopulmonary symptoms. Given exertional component and history of DM, will check labs, XR, EKG and eval for ACS. Dizziness with associated nausea, will check CT head for mass but neuro exam in ED is intact. Will give IVF and check orthostatics, possible that symptoms are due to sinusitis and more positional than exertional but other diagnoses need to be ruled out. Reviewed old chart:  Stress echo done March 2017 was non-diagnostic due to pt achieving only 82% of predicted max HR. Study concluded early due to knee pain/dyspnea/fatigue. Study was otherwise negative. Reviewed results to date with pt. He states that he is feeling the same lightheaded symptoms.   Normal sinus rhythm on monitor, BP WNL,no nystagmus on exam.  Will give PO meclizine and continue IVF, repeat enzymes. Amount and/or Complexity of Data Reviewed  Clinical lab tests: ordered and reviewed  Tests in the radiology section of CPT®: ordered and reviewed  Tests in the medicine section of CPT®: ordered and reviewed  Decide to obtain previous medical records or to obtain history from someone other than the patient: yes  Obtain history from someone other than the patient: yes  Review and summarize past medical records: yes  Independent visualization of images, tracings, or specimens: yes    Risk of Complications, Morbidity, and/or Mortality  Presenting problems: moderate  Management options: moderate    Patient Progress  Patient progress: stable    ED Course       Procedures    EKG:  NSR, rate 79, normal axis, normal intervals, no ST-T abnormalities. No significant changes noted from 2/24/17. CXR:  No acute pulmonary process    EKG #2:  NSR, rate 68, no ST-T abnormalities. No significant changes noted from 10:35 with exception of QTc of 412. EKG #3:  NSR, rate 67, no significant changes noted from 12:15.

## 2017-08-23 NOTE — ED NOTES
Hourly Rounding   Patient is in bed resting with eyes open , patient is stable on the monitor. Patient was given ice water and a diet ginger ale upon patient's request. MD made aware.

## 2017-08-23 NOTE — ED NOTES
Patient was able to ambulate around the ER without assistance , patient was in no acute distress. MD notified at this time.

## 2017-08-23 NOTE — DISCHARGE INSTRUCTIONS
Dizziness: Care Instructions  Your Care Instructions  Dizziness is the feeling of unsteadiness or fuzziness in your head. It is different than having vertigo, which is a feeling that the room is spinning or that you are moving or falling. It is also different from lightheadedness, which is the feeling that you are about to faint. It can be hard to know what causes dizziness. Some people feel dizzy when they have migraine headaches. Sometimes bouts of flu can make you feel dizzy. Some medical conditions, such as heart problems or high blood pressure, can make you feel dizzy. Many medicines can cause dizziness, including medicines for high blood pressure, pain, or anxiety. If a medicine causes your symptoms, your doctor may recommend that you stop or change the medicine. If it is a problem with your heart, you may need medicine to help your heart work better. If there is no clear reason for your symptoms, your doctor may suggest watching and waiting for a while to see if the dizziness goes away on its own. Follow-up care is a key part of your treatment and safety. Be sure to make and go to all appointments, and call your doctor if you are having problems. It's also a good idea to know your test results and keep a list of the medicines you take. How can you care for yourself at home? · If your doctor recommends or prescribes medicine, take it exactly as directed. Call your doctor if you think you are having a problem with your medicine. · Do not drive while you feel dizzy. · Try to prevent falls. Steps you can take include:  ¨ Using nonskid mats, adding grab bars near the tub, and using night-lights. ¨ Clearing your home so that walkways are free of anything you might trip on. ¨ Letting family and friends know that you have been feeling dizzy. This will help them know how to help you. When should you call for help? Call 911 anytime you think you may need emergency care.  For example, call if:  · You passed out (lost consciousness). · You have dizziness along with symptoms of a heart attack. These may include:  ¨ Chest pain or pressure, or a strange feeling in the chest.  ¨ Sweating. ¨ Shortness of breath. ¨ Nausea or vomiting. ¨ Pain, pressure, or a strange feeling in the back, neck, jaw, or upper belly or in one or both shoulders or arms. ¨ Lightheadedness or sudden weakness. ¨ A fast or irregular heartbeat. · You have symptoms of a stroke. These may include:  ¨ Sudden numbness, tingling, weakness, or loss of movement in your face, arm, or leg, especially on only one side of your body. ¨ Sudden vision changes. ¨ Sudden trouble speaking. ¨ Sudden confusion or trouble understanding simple statements. ¨ Sudden problems with walking or balance. ¨ A sudden, severe headache that is different from past headaches. Call your doctor now or seek immediate medical care if:  · You feel dizzy and have a fever, headache, or ringing in your ears. · You have new or increased nausea and vomiting. · Your dizziness does not go away or comes back. Watch closely for changes in your health, and be sure to contact your doctor if:  · You do not get better as expected. Where can you learn more? Go to http://sumi-kayode.info/. Enter J986 in the search box to learn more about \"Dizziness: Care Instructions. \"  Current as of: March 20, 2017  Content Version: 11.3  © 0206-8553 [employer]. Care instructions adapted under license by [employer] (which disclaims liability or warranty for this information). If you have questions about a medical condition or this instruction, always ask your healthcare professional. [place] [address] any warranty or liability for your use of this information. Lightheadedness or Faintness: Care Instructions  Your Care Instructions  Lightheadedness is a feeling that you are about to faint or \"pass out. \" You do not feel as if you or your surroundings are moving. It is different from vertigo, which is the feeling that you or things around you are spinning or tilting. Lightheadedness usually goes away or gets better when you lie down. If lightheadedness gets worse, it can lead to a fainting spell. It is common to feel lightheaded from time to time. Lightheadedness usually is not caused by a serious problem. It often is caused by a short-lasting drop in blood pressure and blood flow to your head that occurs when you get up too quickly from a seated or lying position. Follow-up care is a key part of your treatment and safety. Be sure to make and go to all appointments, and call your doctor if you are having problems. It's also a good idea to know your test results and keep a list of the medicines you take. How can you care for yourself at home? · Lie down for 1 or 2 minutes when you feel lightheaded. After lying down, sit up slowly and remain sitting for 1 to 2 minutes before slowly standing up. · Avoid movements, positions, or activities that have made you lightheaded in the past.  · Get plenty of rest, especially if you have a cold or flu, which can cause lightheadedness. · Make sure you drink plenty of fluids, especially if you have a fever or have been sweating. · Do not drive or put yourself and others in danger while you feel lightheaded. When should you call for help? Call 911 anytime you think you may need emergency care. For example, call if:  · You have symptoms of a stroke. These may include:  ¨ Sudden numbness, tingling, weakness, or loss of movement in your face, arm, or leg, especially on only one side of your body. ¨ Sudden vision changes. ¨ Sudden trouble speaking. ¨ Sudden confusion or trouble understanding simple statements. ¨ Sudden problems with walking or balance. ¨ A sudden, severe headache that is different from past headaches. · You have symptoms of a heart attack.  These may include:  ¨ Chest pain or pressure, or a strange feeling in the chest.  ¨ Sweating. ¨ Shortness of breath. ¨ Nausea or vomiting. ¨ Pain, pressure, or a strange feeling in the back, neck, jaw, or upper belly or in one or both shoulders or arms. ¨ Lightheadedness or sudden weakness. ¨ A fast or irregular heartbeat. After you call 911, the  may tell you to chew 1 adult-strength or 2 to 4 low-dose aspirin. Wait for an ambulance. Do not try to drive yourself. Watch closely for changes in your health, and be sure to contact your doctor if:  · Your lightheadedness gets worse or does not get better with home care. Where can you learn more? Go to http://sumi-kayode.info/. Enter E904 in the search box to learn more about \"Lightheadedness or Faintness: Care Instructions. \"  Current as of: March 20, 2017  Content Version: 11.3  © 5461-3749 [employer]. Care instructions adapted under license by [employer] (which disclaims liability or warranty for this information). If you have questions about a medical condition or this instruction, always ask your healthcare professional. [place] [address] any warranty or liability for your use of this information.

## 2017-08-31 ENCOUNTER — PATIENT OUTREACH (OUTPATIENT)
Dept: FAMILY MEDICINE CLINIC | Age: 52
End: 2017-08-31

## 2017-08-31 NOTE — PROGRESS NOTES
Follow up Chronic Condition    Focus: Diabetes    Notified patient now that he has insurance, we can no longer be his PCP and he will not be able to get PAP medications. Pt expressed understanding and has scheduled an appt with a new PCP on 9/5/17. Pt states his \"blood sugars are back on track\". He is now living in his own place and is cooking healthy meals and exercising. Stress level is decreased as well. Patient was seen in [facility] ED on 8/23/17 for dizziness and Near syncope. Pt states he was told he has a sinus infection but was not given any antibiotics, only medication for his dizziness and nausea. Pt has not picked up Meclizine. Advised pt to  the Meclizine to try because it may help relieve his symptoms. Advised of drowsiness side effect. Advised if he is still not feeling well by his appt, to discuss with new PCP or return to ED if symptoms get worse. Patient has my number if questions arise.

## 2017-09-08 ENCOUNTER — OFFICE VISIT (OUTPATIENT)
Dept: FAMILY MEDICINE CLINIC | Age: 52
End: 2017-09-08

## 2017-09-08 VITALS
HEART RATE: 85 BPM | DIASTOLIC BLOOD PRESSURE: 50 MMHG | BODY MASS INDEX: 27.5 KG/M2 | WEIGHT: 207.5 LBS | RESPIRATION RATE: 18 BRPM | TEMPERATURE: 98.5 F | OXYGEN SATURATION: 98 % | SYSTOLIC BLOOD PRESSURE: 98 MMHG | HEIGHT: 73 IN

## 2017-09-08 DIAGNOSIS — Z79.4 TYPE 2 DIABETES MELLITUS WITH MICROALBUMINURIA, WITH LONG-TERM CURRENT USE OF INSULIN (HCC): ICD-10-CM

## 2017-09-08 DIAGNOSIS — J01.10 ACUTE FRONTAL SINUSITIS, RECURRENCE NOT SPECIFIED: Primary | ICD-10-CM

## 2017-09-08 DIAGNOSIS — E11.29 TYPE 2 DIABETES MELLITUS WITH MICROALBUMINURIA, WITH LONG-TERM CURRENT USE OF INSULIN (HCC): ICD-10-CM

## 2017-09-08 DIAGNOSIS — R80.9 TYPE 2 DIABETES MELLITUS WITH MICROALBUMINURIA, WITH LONG-TERM CURRENT USE OF INSULIN (HCC): ICD-10-CM

## 2017-09-08 LAB
HBA1C MFR BLD HPLC: 11.4 %
MICROALBUMIN UR TEST STR-MCNC: 30 MG/L
MICROALBUMIN/CREAT RATIO POC: 100 MG/G

## 2017-09-08 RX ORDER — AMOXICILLIN AND CLAVULANATE POTASSIUM 875; 125 MG/1; MG/1
1 TABLET, FILM COATED ORAL 2 TIMES DAILY
Qty: 20 TAB | Refills: 0 | Status: SHIPPED | OUTPATIENT
Start: 2017-09-08 | End: 2017-09-18

## 2017-09-08 RX ORDER — INSULIN GLARGINE 100 [IU]/ML
25 INJECTION, SOLUTION SUBCUTANEOUS
Qty: 1 VIAL | Refills: 0
Start: 2017-09-08 | End: 2020-03-19 | Stop reason: SDUPTHER

## 2017-09-08 RX ORDER — LORATADINE 10 MG/1
10 TABLET ORAL DAILY
Qty: 30 TAB | Refills: 0 | Status: SHIPPED | OUTPATIENT
Start: 2017-09-08 | End: 2018-11-03

## 2017-09-08 NOTE — MR AVS SNAPSHOT
Visit Information Date & Time Provider Department Dept. Phone Encounter #  
 [facility]2017 10[phone] [id number] Follow-up Instructions Return if symptoms worsen or fail to improve. Upcoming Health Maintenance Date Due Hepatitis C Screening 1965 EYE EXAM RETINAL OR DILATED Q1 8/18/1973 Pneumococcal 19-64 Medium Risk (1 of 1 - PPSV23) 8/18/1982 DTaP/Tdap/Td series (1 - Tdap) 8/18/1984 FOBT Q 1 YEAR AGE 50-75 8/18/2013 INFLUENZA AGE 9 TO ADULT 8/1/2017 FOOT EXAM Q1 2/24/2018 HEMOGLOBIN A1C Q6M 3/8/2018 LIPID PANEL Q1 4/19/2018 MICROALBUMIN Q1 9/8/2018 Allergies as of 9/8/2017  Review Complete On: 9/8/2017 By: Fifi Wylie NP No Known Allergies Current Immunizations  Never Reviewed No immunizations on file. Not reviewed this visit You Were Diagnosed With   
  
 Codes Comments Type 2 diabetes mellitus without complication, unspecified long term insulin use status (HCC)    -  Primary ICD-10-CM: E11.9 ICD-9-CM: 250.00 Acute frontal sinusitis, recurrence not specified     ICD-10-CM: J01.10 ICD-9-CM: 375.3 Vitals BP Pulse Temp Resp Height(growth percentile) Weight(growth percentile) 98/50 (BP 1 Location: Right arm, BP Patient Position: Sitting) 85 98.5 °F (36.9 °C) (Oral) 18 6' 1\" (1.854 m) 207 lb 8 oz (94.1 kg) SpO2 BMI Smoking Status 98% 27.38 kg/m2 Former Smoker BMI and BSA Data Body Mass Index Body Surface Area  
 27.38 kg/m 2 2.2 m 2 Preferred Pharmacy Pharmacy Name Phone [facility] [address] [phone] Your Updated Medication List  
  
   
This list is accurate as of: 9/8/17 11:21 AM.  Always use your most recent med list.  
  
  
  
  
 amoxicillin-clavulanate 875-125 mg per tablet Commonly known as:  AUGMENTIN  
 Take 1 Tab by mouth two (2) times a day for 10 days. hydrOXYzine pamoate 25 mg capsule Commonly known as:  VISTARIL  
1 capsule by mouth daily as needed  Indications: insomnia  
  
 insulin glargine 100 unit/mL injection Commonly known as:  LANTUS  
25 Units by SubCUTAneous route nightly. Indications: type 2 diabetes mellitus  
  
 insulin lispro 100 unit/mL injection Commonly known as:  HUMALOG Use tid per sliding scale  Indications: type 2 diabetes mellitus  
  
 loratadine 10 mg tablet Commonly known as:  Kip Manual Take 1 Tab by mouth daily. Prescriptions Sent to Pharmacy Refills  
 amoxicillin-clavulanate (AUGMENTIN) 875-125 mg per tablet 0 Sig: Take 1 Tab by mouth two (2) times a day for 10 days. Class: Normal  
 Pharmacy: [place] [id number]. Ph #: [phone] Route: Oral  
 loratadine (CLARITIN) 10 mg tablet 0 Sig: Take 1 Tab by mouth daily. Class: Normal  
 Pharmacy: [place] [id number]. Ph #: [phone] Route: Oral  
  
We Performed the Following AMB POC HEMOGLOBIN A1C [56773 CPT(R)] AMB POC URINE, MICROALBUMIN, SEMIQUANTITATIVE [97363 CPT(R)] Follow-up Instructions Return if symptoms worsen or fail to improve. Introducing [facility] SERVICES! Wendy Reddy introduces [employer] patient portal. Now you can access parts of your medical record, email your doctor's office, and request medication refills online. 1. In your internet browser, go to [email or web address] 2. Click on the First Time User? Click Here link in the Sign In box. You will see the New Member Sign Up page. 3. Enter your [employer] Access Code exactly as it appears below. You will not need to use this code after youve completed the sign-up process. If you do not sign up before the expiration date, you must request a new code. · AVST Access Code: [email or web address] Expires: 11/21/2017  2:22 PM 
 
4. Enter the last four digits of your Social Security Number (xxxx) and Date of Birth (mm/dd/yyyy) as indicated and click Submit. You will be taken to the next sign-up page. 5. Create a AVST ID. This will be your AVST login ID and cannot be changed, so think of one that is secure and easy to remember. 6. Create a AVST password. You can change your password at any time. 7. Enter your Password Reset Question and Answer. This can be used at a later time if you forget your password. 8. Enter your e-mail address. You will receive e-mail notification when new information is available in [address] 9. Click Sign Up. You can now view and download portions of your medical record. 10. Click the Download Summary menu link to download a portable copy of your medical information. If you have questions, please visit the Frequently Asked Questions section of the AVST website. Remember, AVST is NOT to be used for urgent needs. For medical emergencies, dial 911. Now available from your iPhone and Android! Please provide this summary of care documentation to your next provider. Your primary care clinician is listed as NONE. If you have any questions after today's visit, please call [phone].

## 2017-09-08 NOTE — PROGRESS NOTES
HISTORY OF PRESENT ILLNESS  Earle Irving is a 45 y.o. male. 9/8/2017  10:54 AM    Chief Complaint   Patient presents with   [facility] Follow Up     had a ED visit on 8/23/17 for nausea and dizziness, still having these symptoms but not as bad,     Fatigue     Pt c/o felling tired alot        HPI: Here today as a new patient, not establishing care at this time. His main concern today that he would like to discuss is his ER visit on 8/23 where he was seen for nausea, dizziness, and sinus pressure- discharged on Meclizine- has not gotten filled. Still having symptoms and feels that they are getting worse- sinus pressure, congestion, dizziness, and fatigue. It is noted in the ED note that symptoms of dizziness and nausea occurring for 4 days. Initially, he agrees with this but later states that the ENT symptoms have been happening for weeks and maybe months. He states today that he was told nothing in the ER other than he had dizziness and possibly a sinus infection- sent home on Meclizine- did not get filled. Reports that he has tried OTC Sudafed for sinus symptoms. Believes he has a sinus infection. Just wants to know why he has been feeling this way. Review of Systems   Constitutional: Positive for malaise/fatigue. Negative for chills and fever. HENT: Positive for congestion and sinus pain. Negative for ear pain and sore throat. Eyes: Negative for double vision, photophobia and pain. Respiratory: Negative for cough, shortness of breath and wheezing. Cardiovascular: Negative for chest pain, palpitations and leg swelling. Gastrointestinal: Negative for abdominal pain, constipation, diarrhea, nausea and vomiting. Musculoskeletal: Negative for myalgias. Skin: Negative for rash. Neurological: Positive for dizziness. Negative for weakness and headaches.         PHQ Screening   PHQ over the last two weeks 2/24/2017   Little interest or pleasure in doing things Not at all   Feeling down, depressed or hopeless Not at all   Total Score PHQ 2 0         History  Past Medical History:   Diagnosis Date    Diabetes ([facility] [address]        Past Surgical History:   Procedure Laterality Date    HX MENISCUS REPAIR Right 2015       Social History     Social History    Marital status: SINGLE     Spouse name: N/A    Number of children: N/A    Years of education: N/A     Occupational History    Not on file. Social History Main Topics    Smoking status: Former Smoker    Smokeless tobacco: Never Used    Alcohol use 1.2 oz/week     2 Cans of beer per week      Comment: occ.  Drug use: No    Sexual activity: Yes     Partners: Female     Birth control/ protection: Condom     Other Topics Concern    Not on file     Social History Narrative       No Known Allergies    Current Outpatient Prescriptions   Medication Sig Dispense Refill    insulin lispro (HUMALOG) 100 unit/mL injection Use tid per sliding scale  Indications: type 2 diabetes mellitus 4 Vial 0    insulin glargine (LANTUS) 100 unit/mL injection 30 Units by SubCUTAneous route nightly. Indications: type 2 diabetes mellitus 4 Vial 0    hydrOXYzine pamoate (VISTARIL) 25 mg capsule 1 capsule by mouth daily as needed  Indications: insomnia 30 Cap 3    tadalafil (CIALIS) 10 mg tablet Take 1 Tab by mouth daily as needed. 30 Tab 0    lisinopril (PRINIVIL, ZESTRIL) 5 mg tablet Take  by mouth daily.  naproxen (NAPROSYN) 500 mg tablet Take 1 tablet by mouth two (2) times daily (with meals). 20 tablet 0    HYDROcodone-acetaminophen (NORCO) 5-325 mg per tablet Take 2 tablets by mouth every eight (8) hours as needed for Pain (cannot drive or work within 8 hours of taking narcotic pain medicine). 20 tablet 0    metFORMIN (GLUCOPHAGE) 1,000 mg tablet Take 1,000 mg by mouth two (2) times daily (with meals). Patient Care Team:  Patient Care Team:  None as PCP - General  Not On File Bsi        LABS:       Ref.  Range 9/8/2017 10:26   Hemoglobin A1c (POC) Latest Units: % 11.4      Ref. Range 9/8/2017 10:26   Microalbumin urine (POC) Latest Units: MG/L 30   Microalbumin/creat ratio (POC) Latest Units: MG/G 100       RADIOLOGY:    Portable chest x-ray, upright AP view, time 1046 hours on 8/23/2017:  INDICATION:  Dizziness. History of diabetes. COMPARISON STUDY: None. FINDINGS:  The study shows clear lungs bilaterally with normal pulmonary vascularity. Cardiac size and mediastinal contour are normal. In bony thorax, there is no  diagnostic finding. IMPRESSION  IMPRESSION:    Normal portable chest x-ray. CT HEAD WO CONT  IMPRESSION:    No acute intracranial process. Minimal sinusitis and mild sinonasal mucosal disease.       Physical Exam   Constitutional: He is oriented to person, place, and time. He appears well-developed and well-nourished. No distress. HENT:   Head: Normocephalic. Right Ear: Tympanic membrane, external ear and ear canal normal.   Left Ear: Tympanic membrane, external ear and ear canal normal.   Nose: Mucosal edema present. Right sinus exhibits maxillary sinus tenderness and frontal sinus tenderness. Left sinus exhibits no maxillary sinus tenderness and no frontal sinus tenderness. Mouth/Throat: Uvula is midline, oropharynx is clear and moist and mucous membranes are normal. No oropharyngeal exudate, posterior oropharyngeal edema or posterior oropharyngeal erythema. Neck: Normal range of motion. Neck supple. Cardiovascular: Normal rate, regular rhythm and normal heart sounds. No murmur heard. Pulmonary/Chest: Effort normal and breath sounds normal. No respiratory distress. Abdominal: Soft. Bowel sounds are normal. There is no tenderness. Lymphadenopathy:     He has no cervical adenopathy. Neurological: He is alert and oriented to person, place, and time. No cranial nerve deficit. He exhibits normal muscle tone. He displays a negative Romberg sign. Coordination normal.   Skin: Skin is warm and dry.        Vitals: 09/08/17 1044   BP: 98/50   Pulse: 85   Resp: 18   Temp: 98.5 °F (36.9 °C)   TempSrc: Oral   SpO2: 98%   Weight: 207 lb 8 oz (94.1 kg)   Height: 6' 1\" (1.854 m)   PainSc:   7   PainLoc: Knee     BP Readings from Last 3 Encounters:   09/08/17 98/50   08/23/17 119/60   04/19/17 93/62       ASSESSMENT and PLAN  Diagnoses and all orders for this visit:    Acute frontal sinusitis, recurrence not specified  *Discussed with patient. Advised on CT findings. Recommend AB therapy- at this time, he states now that he has been taking PCN for the last week due to symptoms- has not been improving symptoms. Advised on AB resistance risk and that self medicating is not encouraged. *Will do Augmentin BID for 10 days. *Advised that sinusitis may cause fatigue and dizziness. Can use Antivert if needed. Advised that allergies can also cause ENT symptoms. -     amoxicillin-clavulanate (AUGMENTIN) 875-125 mg per tablet; Take 1 Tab by mouth two (2) times a day for 10 days. -     loratadine (CLARITIN) 10 mg tablet; Take 1 Tab by mouth daily. Type 2 diabetes mellitus with microalbuminuria, with long-term current use of insulin (HCC)  *Uncontrolled. Medication list shows he should be on 30 units daily- he reports self decreasing- has been taking 20 units. Advised to increase up to 30 units daily and establish care with PCP. Encouraged to check glucose at least daily. *Microalbuminuria noted- attempted to discuss ACE/ARB but he reports being on in the past and not something he wants to do again.   -     AMB POC URINE, MICROALBUMIN, SEMIQUANTITATIVE  -     AMB POC HEMOGLOBIN A1C      *Plan of care reviewed with patient. Patient in agreement with plan and expresses understanding. All questions answered and patient encouraged to call or RTO if further questions or concerns. Follow-up Disposition:  Return if decide to establish care.

## 2018-03-24 ENCOUNTER — HOSPITAL ENCOUNTER (OUTPATIENT)
Dept: GENERAL RADIOLOGY | Age: 53
Discharge: HOME | End: 2018-03-24
Payer: COMMERCIAL

## 2018-03-24 DIAGNOSIS — M79.675 GREAT TOE PAIN, LEFT: ICD-10-CM

## 2018-03-24 PROCEDURE — 73630 X-RAY EXAM OF FOOT: CPT

## 2018-05-17 ENCOUNTER — HOSPITAL ENCOUNTER (OUTPATIENT)
Dept: LAB | Age: 53
Discharge: HOME | End: 2018-05-17
Payer: COMMERCIAL

## 2018-05-17 LAB
ANION GAP SERPL CALC-SCNC: 8 MMOL/L (ref 3–18)
BASOPHILS # BLD: 0 K/UL (ref 0–0.1)
BASOPHILS NFR BLD: 0 % (ref 0–2)
BUN SERPL-MCNC: 23 MG/DL (ref 7–18)
BUN/CREAT SERPL: 20 (ref 12–20)
CALCIUM SERPL-MCNC: 9.3 MG/DL (ref 8.5–10.1)
CHLORIDE SERPL-SCNC: 98 MMOL/L (ref 100–108)
CO2 SERPL-SCNC: 30 MMOL/L (ref 21–32)
CREAT SERPL-MCNC: 1.16 MG/DL (ref 0.6–1.3)
DIFFERENTIAL METHOD BLD: ABNORMAL
EOSINOPHIL # BLD: 0.1 K/UL (ref 0–0.4)
EOSINOPHIL NFR BLD: 2 % (ref 0–5)
ERYTHROCYTE [DISTWIDTH] IN BLOOD BY AUTOMATED COUNT: 12.7 % (ref 11.6–14.5)
GLUCOSE SERPL-MCNC: 332 MG/DL (ref 74–99)
HBA1C MFR BLD: 10.4 % (ref 4.2–5.6)
HCT VFR BLD AUTO: 35 % (ref 36–48)
HGB BLD-MCNC: 12 G/DL (ref 13–16)
LYMPHOCYTES # BLD: 2.1 K/UL (ref 0.9–3.6)
LYMPHOCYTES NFR BLD: 33 % (ref 21–52)
MCH RBC QN AUTO: 28.8 PG (ref 24–34)
MCHC RBC AUTO-ENTMCNC: 34.3 G/DL (ref 31–37)
MCV RBC AUTO: 83.9 FL (ref 74–97)
MONOCYTES # BLD: 0.4 K/UL (ref 0.05–1.2)
MONOCYTES NFR BLD: 6 % (ref 3–10)
NEUTS SEG # BLD: 3.6 K/UL (ref 1.8–8)
NEUTS SEG NFR BLD: 59 % (ref 40–73)
PLATELET # BLD AUTO: 232 K/UL (ref 135–420)
PMV BLD AUTO: 10.3 FL (ref 9.2–11.8)
POTASSIUM SERPL-SCNC: 4.8 MMOL/L (ref 3.5–5.5)
RBC # BLD AUTO: 4.17 M/UL (ref 4.7–5.5)
SODIUM SERPL-SCNC: 136 MMOL/L (ref 136–145)
WBC # BLD AUTO: 6.1 K/UL (ref 4.6–13.2)

## 2018-05-17 PROCEDURE — 36415 COLL VENOUS BLD VENIPUNCTURE: CPT | Performed by: PODIATRIST

## 2018-05-17 PROCEDURE — 85025 COMPLETE CBC W/AUTO DIFF WBC: CPT | Performed by: PODIATRIST

## 2018-05-17 PROCEDURE — 83036 HEMOGLOBIN GLYCOSYLATED A1C: CPT | Performed by: PODIATRIST

## 2018-05-17 PROCEDURE — 80048 BASIC METABOLIC PNL TOTAL CA: CPT | Performed by: PODIATRIST

## 2018-11-03 ENCOUNTER — HOSPITAL ENCOUNTER (EMERGENCY)
Age: 53
Discharge: HOME | End: 2018-11-03
Attending: EMERGENCY MEDICINE
Payer: COMMERCIAL

## 2018-11-03 ENCOUNTER — APPOINTMENT (OUTPATIENT)
Dept: VASCULAR SURGERY | Age: 53
End: 2018-11-03
Attending: EMERGENCY MEDICINE
Payer: COMMERCIAL

## 2018-11-03 VITALS
HEART RATE: 70 BPM | RESPIRATION RATE: 20 BRPM | WEIGHT: 219 LBS | TEMPERATURE: 98.6 F | SYSTOLIC BLOOD PRESSURE: 150 MMHG | BODY MASS INDEX: 29.03 KG/M2 | DIASTOLIC BLOOD PRESSURE: 82 MMHG | OXYGEN SATURATION: 99 % | HEIGHT: 73 IN

## 2018-11-03 DIAGNOSIS — E10.8 TYPE 1 DIABETES MELLITUS WITH COMPLICATION (HCC): ICD-10-CM

## 2018-11-03 DIAGNOSIS — M79.89 PAIN AND SWELLING OF LEFT LOWER LEG: ICD-10-CM

## 2018-11-03 DIAGNOSIS — M79.662 PAIN AND SWELLING OF LEFT LOWER LEG: ICD-10-CM

## 2018-11-03 DIAGNOSIS — R73.9 HYPERGLYCEMIA: Primary | ICD-10-CM

## 2018-11-03 LAB
ALBUMIN SERPL-MCNC: 3.3 G/DL (ref 3.4–5)
ALBUMIN/GLOB SERPL: 0.9 {RATIO} (ref 0.8–1.7)
ALP SERPL-CCNC: 67 U/L (ref 45–117)
ALT SERPL-CCNC: 26 U/L (ref 16–61)
ANION GAP SERPL CALC-SCNC: 7 MMOL/L (ref 3–18)
AST SERPL-CCNC: 18 U/L (ref 15–37)
BASOPHILS # BLD: 0 K/UL (ref 0–0.1)
BASOPHILS NFR BLD: 0 % (ref 0–2)
BILIRUB SERPL-MCNC: 0.1 MG/DL (ref 0.2–1)
BNP SERPL-MCNC: 401 PG/ML (ref 0–900)
BUN SERPL-MCNC: 10 MG/DL (ref 7–18)
BUN/CREAT SERPL: 11 (ref 12–20)
CALCIUM SERPL-MCNC: 8.1 MG/DL (ref 8.5–10.1)
CHLORIDE SERPL-SCNC: 103 MMOL/L (ref 100–108)
CO2 SERPL-SCNC: 31 MMOL/L (ref 21–32)
CREAT SERPL-MCNC: 0.95 MG/DL (ref 0.6–1.3)
D DIMER PPP FEU-MCNC: 0.56 UG/ML(FEU)
DIFFERENTIAL METHOD BLD: ABNORMAL
EOSINOPHIL # BLD: 0.2 K/UL (ref 0–0.4)
EOSINOPHIL NFR BLD: 3 % (ref 0–5)
ERYTHROCYTE [DISTWIDTH] IN BLOOD BY AUTOMATED COUNT: 12.9 % (ref 11.6–14.5)
GLOBULIN SER CALC-MCNC: 3.7 G/DL (ref 2–4)
GLUCOSE SERPL-MCNC: 170 MG/DL (ref 74–99)
HCT VFR BLD AUTO: 32.1 % (ref 36–48)
HGB BLD-MCNC: 10.5 G/DL (ref 13–16)
LYMPHOCYTES # BLD: 2.1 K/UL (ref 0.9–3.6)
LYMPHOCYTES NFR BLD: 30 % (ref 21–52)
MCH RBC QN AUTO: 29.3 PG (ref 24–34)
MCHC RBC AUTO-ENTMCNC: 32.7 G/DL (ref 31–37)
MCV RBC AUTO: 89.7 FL (ref 74–97)
MONOCYTES # BLD: 0.7 K/UL (ref 0.05–1.2)
MONOCYTES NFR BLD: 10 % (ref 3–10)
NEUTS SEG # BLD: 4.1 K/UL (ref 1.8–8)
NEUTS SEG NFR BLD: 57 % (ref 40–73)
PLATELET # BLD AUTO: 247 K/UL (ref 135–420)
PMV BLD AUTO: 9.4 FL (ref 9.2–11.8)
POTASSIUM SERPL-SCNC: 3.5 MMOL/L (ref 3.5–5.5)
PROT SERPL-MCNC: 7 G/DL (ref 6.4–8.2)
RBC # BLD AUTO: 3.58 M/UL (ref 4.7–5.5)
SODIUM SERPL-SCNC: 141 MMOL/L (ref 136–145)
TROPONIN I SERPL-MCNC: <0.02 NG/ML (ref 0–0.06)
WBC # BLD AUTO: 7.2 K/UL (ref 4.6–13.2)

## 2018-11-03 PROCEDURE — 85025 COMPLETE CBC W/AUTO DIFF WBC: CPT | Performed by: EMERGENCY MEDICINE

## 2018-11-03 PROCEDURE — 99285 EMERGENCY DEPT VISIT HI MDM: CPT

## 2018-11-03 PROCEDURE — 93971 EXTREMITY STUDY: CPT

## 2018-11-03 PROCEDURE — 85379 FIBRIN DEGRADATION QUANT: CPT | Performed by: EMERGENCY MEDICINE

## 2018-11-03 PROCEDURE — 80053 COMPREHEN METABOLIC PANEL: CPT | Performed by: EMERGENCY MEDICINE

## 2018-11-03 PROCEDURE — 83880 ASSAY OF NATRIURETIC PEPTIDE: CPT | Performed by: EMERGENCY MEDICINE

## 2018-11-03 PROCEDURE — 93005 ELECTROCARDIOGRAM TRACING: CPT

## 2018-11-03 PROCEDURE — 84484 ASSAY OF TROPONIN QUANT: CPT | Performed by: EMERGENCY MEDICINE

## 2018-11-03 NOTE — DISCHARGE INSTRUCTIONS
Please make sure that you understand these instructions & ask any questions before you leave. [email or web address] my additional verbal instructions. Return here if worsens or changes. Not all important medical problems are discovered in the ER, and that is why it is important to come back here if worse, and follow up with your health care provider without fail!

## 2018-11-03 NOTE — LETTER
NOTIFICATION OF RETURN TO WORK / SCHOOL 
11/3/2018 Mr. Mina Pierce [address] [facility] [address] [id number] To Whom It May Concern: Mina Pierce was under the care of [facility] today. He will return to work on 11/6/18. If there are questions or concerns please have the patient contact our department. Sincerely, YOLANDA Vann

## 2018-11-03 NOTE — ED TRIAGE NOTES
Pt states left foot/ankle/leg started swelling yesterday. States is a Type 2 diabetic and has been on antibiotics for a left foot ulcer. Also had a GI virus last week with vomiting and diarrhea. Also states his left calf feels tight

## 2018-11-03 NOTE — ED PROVIDER NOTES
EMERGENCY DEPARTMENT HISTORY AND PHYSICAL EXAM 
 
12:43 PM 
 
 
Date: 11/3/2018 Patient Name: Tommy Peralta History of Presenting Illness Chief Complaint Patient presents with  Leg Pain  Foot Pain History Provided By: Patient Chief Complaint: Leg Pain/Swelling Duration: 2 Days Timing:  Gradual 
Location: Left lower leg Quality: Tightness Severity: 8 out of 10 Modifying Factors: Elevated leg last night with no relief Associated Symptoms: denies any other associated signs or symptoms Additional History (Context): Tommy Peralta is a 52 y.o. male with PMHx of DM presenting to the ED c/o gradual onset of left lower leg pain and swelling that started 2 days ago. Describes pain as tightness and 8/10 in severity at this time. Reports he elevated his left leg last night with no relief. Reports no other modifying factors. Denies fever, chest pain, shortness of breath, and any other symptoms or complaints. Denies smoking, alcohol use, drug use. No family history of MI or CVA before age 61. PCP: Sandi Chadwick MD 
 
Current Outpatient Medications Medication Sig Dispense Refill  OTHER     
 insulin glargine (LANTUS) 100 unit/mL injection 25 Units by SubCUTAneous route nightly. Indications: type 2 diabetes mellitus 1 Vial 0  
 insulin lispro (HUMALOG) 100 unit/mL injection Use tid per sliding scale  Indications: type 2 diabetes mellitus 4 Vial 0  
 hydrOXYzine pamoate (VISTARIL) 25 mg capsule 1 capsule by mouth daily as needed  Indications: insomnia 30 Cap 3 Past History Past Medical History: 
Past Medical History:  
Diagnosis Date  Diabetes ([address] Past Surgical History: 
Past Surgical History:  
Procedure Laterality Date  HX MENISCUS REPAIR Right 2015 Family History: 
Family History Problem Relation Age of Onset  Seizures Mother  Hypertension Mother  No Known Problems Father  No Known Problems Sister  No Known Problems Brother  No Known Problems Sister  No Known Problems Brother Social History: 
Social History Tobacco Use  Smoking status: Former Smoker  Smokeless tobacco: Never Used  Tobacco comment: quit 0ver 25 years ago Substance Use Topics  Alcohol use: Yes Alcohol/week: 1.2 oz Types: 2 Cans of beer per week Comment: occ.  Drug use: No  
 
 
Allergies: 
No Known Allergies Review of Systems Review of Systems Constitutional: Negative for fever. HENT: Negative for sore throat. Eyes: Negative for pain. Respiratory: Negative for shortness of breath. Cardiovascular: Positive for leg swelling (left lower leg). Negative for chest pain. Gastrointestinal: Negative for abdominal pain and vomiting. Genitourinary: Negative for flank pain. Musculoskeletal: Positive for myalgias (left leg). Negative for back pain. Skin: Negative for rash. Neurological: Negative for syncope and headaches. Psychiatric/Behavioral: Negative for confusion. All other systems reviewed and are negative. Physical Exam  
 
Visit Vitals /82 Pulse 70 Temp 98.6 °F (37 °C) Resp 20 Ht 6' 1\" (1.854 m) Wt 99.3 kg (219 lb) SpO2 98% BMI 28.89 kg/m² Physical Exam  
Constitutional: He is oriented to person, place, and time. He appears well-developed and well-nourished. No distress. Cheerful, smiling HENT:  
Head: Normocephalic and atraumatic. Right Ear: External ear normal.  
Left Ear: External ear normal.  
Nose: Nose normal.  
Mouth/Throat: Oropharynx is clear and moist.  
Eyes: Conjunctivae and EOM are normal. Pupils are equal, round, and reactive to light. No scleral icterus. Neck: Normal range of motion. Neck supple. No JVD present. No tracheal deviation present. No thyromegaly present. Cardiovascular: Normal rate, regular rhythm, normal heart sounds and intact distal pulses. Exam reveals no gallop and no friction rub. No murmur heard. Pulmonary/Chest: Effort normal and breath sounds normal. He exhibits no tenderness. Abdominal: Soft. Bowel sounds are normal. He exhibits no distension. There is no tenderness. There is no rebound and no guarding. Musculoskeletal: Normal range of motion. He exhibits no edema or tenderness. Bilateral lower leg: No cords, posterior tib/fib tenderness, or Gabriel's sign. Left lower leg: Mild, barely perceptibel generalized edema. No hyperthermia. Left foot: Decubitus sore on great toes, per picture. No exudate. No signs of cellulitis. Pedal pulses strong and equal.  
  
Lymphadenopathy:  
  He has no cervical adenopathy. Neurological: He is alert and oriented to person, place, and time. No cranial nerve deficit. Coordination normal.  
Skin: Skin is warm and dry. See picture below. Psychiatric: He has a normal mood and affect. His behavior is normal. Judgment and thought content normal.  
Nursing note and vitals reviewed. Diagnostic Study Results Labs - Recent Results (from the past 12 hour(s)) D DIMER Collection Time: 11/03/18  1:17 PM  
Result Value Ref Range D DIMER 0.56 (H) <0.46 ug/ml(FEU) CBC WITH AUTOMATED DIFF Collection Time: 11/03/18  1:17 PM  
Result Value Ref Range WBC 7.2 4.6 - 13.2 K/uL  
 RBC 3.58 (L) 4.70 - 5.50 M/uL  
 HGB 10.5 (L) 13.0 - 16.0 g/dL HCT 32.1 (L) 36.0 - 48.0 % MCV 89.7 74.0 - 97.0 FL  
 MCH 29.3 24.0 - 34.0 PG  
 MCHC 32.7 31.0 - 37.0 g/dL  
 RDW 12.9 11.6 - 14.5 % PLATELET 247 518 - 083 K/uL MPV 9.4 9.2 - 11.8 FL  
 NEUTROPHILS 57 40 - 73 % LYMPHOCYTES 30 21 - 52 % MONOCYTES 10 3 - 10 % EOSINOPHILS 3 0 - 5 % BASOPHILS 0 0 - 2 %  
 ABS. NEUTROPHILS 4.1 1.8 - 8.0 K/UL  
 ABS. LYMPHOCYTES 2.1 0.9 - 3.6 K/UL  
 ABS. MONOCYTES 0.7 0.05 - 1.2 K/UL  
 ABS. EOSINOPHILS 0.2 0.0 - 0.4 K/UL  
 ABS. BASOPHILS 0.0 0.0 - 0.1 K/UL  
 DF AUTOMATED METABOLIC PANEL, COMPREHENSIVE  
 Collection Time: 11/03/18  1:17 PM  
Result Value Ref Range Sodium 141 136 - 145 mmol/L Potassium 3.5 3.5 - 5.5 mmol/L Chloride 103 100 - 108 mmol/L  
 CO2 31 21 - 32 mmol/L Anion gap 7 3.0 - 18 mmol/L Glucose 170 (H) 74 - 99 mg/dL BUN 10 7.0 - 18 MG/DL Creatinine 0.95 0.6 - 1.3 MG/DL  
 BUN/Creatinine ratio 11 (L) 12 - 20 GFR est AA >60 >60 ml/min/1.73m2 GFR est non-AA >60 >60 ml/min/1.73m2 Calcium 8.1 (L) 8.5 - 10.1 MG/DL Bilirubin, total 0.1 (L) 0.2 - 1.0 MG/DL  
 ALT (SGPT) 26 16 - 61 U/L  
 AST (SGOT) 18 15 - 37 U/L Alk. phosphatase 67 45 - 117 U/L Protein, total 7.0 6.4 - 8.2 g/dL Albumin 3.3 (L) 3.4 - 5.0 g/dL Globulin 3.7 2.0 - 4.0 g/dL A-G Ratio 0.9 0.8 - 1.7 NT-PRO BNP Collection Time: 11/03/18  1:17 PM  
Result Value Ref Range NT pro- 0 - 900 PG/ML  
TROPONIN I Collection Time: 11/03/18  1:17 PM  
Result Value Ref Range Troponin-I, Qt. <0.02 0.00 - 0.06 NG/ML  
EKG, 12 LEAD, INITIAL Collection Time: 11/03/18  1:26 PM  
Result Value Ref Range Ventricular Rate 63 BPM  
 Atrial Rate 63 BPM  
 P-R Interval 156 ms QRS Duration 84 ms Q-T Interval 390 ms QTC Calculation (Bezet) 399 ms Calculated P Axis 67 degrees Calculated R Axis 49 degrees Calculated T Axis 56 degrees Diagnosis Normal sinus rhythm Possible Left atrial enlargement Septal infarct (cited on or before 03-NOV-2018) Abnormal ECG When compared with ECG of 23-AUG-2017 13:28, 
Questionable change in initial forces of Septal leads Radiologic Studies - No orders to display DUPLEX LOWER EXT VENOUS LEFT Lower Extremity Venous Findings Right Lower Venous For comparison purposes, the right common femoral vein was briefly interrogated. The vein demonstrates normal color filing and compressibility. Doppler flow was phasic and spontaneous. Left Lower Venous The common femoral, saphenous femoral junction, profunda femoral, proximal femoral, mid femoral, distal femoral, popliteal, posterior tibial and peroneal vein(s) were imaged in the transverse and longitudinal planes. The vessels showed normal color filling and compressibility. Doppler interrogation of the veins showed phasic and spontaneous flow. Medical Decision Making I am the first provider for this patient. I reviewed the vital signs, available nursing notes, past medical history, past surgical history, family history and social history. Vital Signs-Reviewed the patient's vital signs. Pulse Oximetry Analysis -  97% on room air, normal  
 
EKG: Interpreted by the EP. Time Interpreted: 13:26 Rate: 63 Rhythm: Normal Sinus Rhythm Interpretation: Normal axis. Normal intervals. Q-waves in V1 and V2. Comparison: No change from previous EKG from Augst 23, 2017. Records Reviewed: Nursing Notes and Old Medical Records (Time of Review: 12:43 PM) ED Course: Progress Notes, Reevaluation, and Consults: 
Pt needs eval for left lower leg pain and swelling x 2 days. Ddx includes: DVT, CHF, diabetic neuropathy, Risk factors:  ETOH use. DM 1, htn, obesity, Suspect:  
Plan: screening tests for illness, ED ongong assessment,    
4:30 PM PVL results pending. Exam unchanged. 5:15 PM PVL results pending. Exam unchanged. 5:27 PM Dalton calling for PVL results. 5:34 PM Pt has continued to feel completely normal. Wants to go home because he is hungry. Offered pt a snack which he agreed to. ED Course Serial assessments were done - as above. Hemodynamically - normal tissue perfusion - Pt complaints - none - Pt condition during observation was unchanged -        
Discussed pt visit course, findings, trxt plan, answered questions - Gave verbal instructions - Verbalized understanding to me & a willingness to proceed with trxt plan -  
 
 Provider Notes (Medical Decision Making):  
 
Diagnosis Clinical Impression: 1. Hyperglycemia 2. Type 1 diabetes mellitus with complication (HCC) 3. Pain and swelling of left lower leg Disposition: Discharged Follow-up Information Follow up With Specialties Details Why Contact Info Rhea Camarena MD Family Practice Schedule an appointment as soon as possible for a visit in 2 days Recheck problems here today [address] [place], [place] [id number] 
[phone] [id number] [facility] EMERGENCY DEPT Emergency Medicine Go to If symptoms worsen or change  [address] [place] [id number] [phone] Medication List  
  
ASK your doctor about these medications   
hydrOXYzine pamoate 25 mg capsule Commonly known as:  VISTARIL 
1 capsule by mouth daily as needed  Indications: insomnia 
  
insulin glargine 100 unit/mL injection Commonly known as:  LANTUS 
25 Units by SubCUTAneous route nightly. Indications: type 2 diabetes mellitus 
  
insulin lispro 100 unit/mL injection Commonly known as:  HUMALOG Use tid per sliding scale  Indications: type 2 diabetes mellitus OTHER 
  
  
 
_______________________________ Attestations: 
Scribe Attestation Kaleemainor Raulan acting as a scribe for and in the presence of Linda Cyr MD     
November 03, 2018 at 12:43 PM 
    
Provider Attestation:     
I personally performed the services described in the documentation, reviewed the documentation, as recorded by the scribe in my presence, and it accurately and completely records my words and actions. November 03, 2018 at 12:43 PM - Linda Cyr MD   
_______________________________

## 2018-11-04 LAB
ATRIAL RATE: 63 BPM
CALCULATED P AXIS, ECG09: 67 DEGREES
CALCULATED R AXIS, ECG10: 49 DEGREES
CALCULATED T AXIS, ECG11: 56 DEGREES
DIAGNOSIS, 93000: NORMAL
P-R INTERVAL, ECG05: 156 MS
Q-T INTERVAL, ECG07: 390 MS
QRS DURATION, ECG06: 84 MS
QTC CALCULATION (BEZET), ECG08: 399 MS
VENTRICULAR RATE, ECG03: 63 BPM

## 2018-11-13 ENCOUNTER — HOSPITAL ENCOUNTER (OUTPATIENT)
Dept: GENERAL RADIOLOGY | Age: 53
Discharge: HOME | End: 2018-11-13
Payer: COMMERCIAL

## 2018-11-13 DIAGNOSIS — E11.40 DIABETIC NEUROPATHY (HCC): ICD-10-CM

## 2018-11-13 PROCEDURE — 73630 X-RAY EXAM OF FOOT: CPT

## 2018-12-27 ENCOUNTER — APPOINTMENT (OUTPATIENT)
Dept: MRI IMAGING | Age: 53
DRG: 617 | End: 2018-12-27
Attending: PODIATRIST
Payer: COMMERCIAL

## 2018-12-27 ENCOUNTER — APPOINTMENT (OUTPATIENT)
Dept: GENERAL RADIOLOGY | Age: 53
DRG: 617 | End: 2018-12-27
Attending: EMERGENCY MEDICINE
Payer: COMMERCIAL

## 2018-12-27 ENCOUNTER — HOSPITAL ENCOUNTER (INPATIENT)
Age: 53
LOS: 6 days | Discharge: HOME | DRG: 617 | End: 2019-01-02
Attending: EMERGENCY MEDICINE | Admitting: INTERNAL MEDICINE
Payer: COMMERCIAL

## 2018-12-27 ENCOUNTER — ANESTHESIA EVENT (OUTPATIENT)
Dept: SURGERY | Age: 53
DRG: 617 | End: 2018-12-27
Payer: COMMERCIAL

## 2018-12-27 DIAGNOSIS — E13.621 DIABETIC ULCER OF TOE OF LEFT FOOT ASSOCIATED WITH DIABETES MELLITUS OF OTHER TYPE, WITH OTHER ULCER SEVERITY (HCC): Primary | ICD-10-CM

## 2018-12-27 DIAGNOSIS — L97.528 DIABETIC ULCER OF TOE OF LEFT FOOT ASSOCIATED WITH DIABETES MELLITUS OF OTHER TYPE, WITH OTHER ULCER SEVERITY (HCC): Primary | ICD-10-CM

## 2018-12-27 PROBLEM — E11.621 DIABETIC FOOT ULCER (HCC): Status: ACTIVE | Noted: 2018-12-27

## 2018-12-27 PROBLEM — L97.509 DIABETIC FOOT ULCER (HCC): Status: ACTIVE | Noted: 2018-12-27

## 2018-12-27 LAB
ANION GAP SERPL CALC-SCNC: 9 MMOL/L (ref 3–18)
BASOPHILS # BLD: 0 K/UL (ref 0–0.1)
BASOPHILS NFR BLD: 0 % (ref 0–2)
BUN SERPL-MCNC: 24 MG/DL (ref 7–18)
BUN/CREAT SERPL: 16 (ref 12–20)
CALCIUM SERPL-MCNC: 9.5 MG/DL (ref 8.5–10.1)
CHLORIDE SERPL-SCNC: 100 MMOL/L (ref 100–108)
CO2 SERPL-SCNC: 29 MMOL/L (ref 21–32)
CREAT SERPL-MCNC: 1.48 MG/DL (ref 0.6–1.3)
DIFFERENTIAL METHOD BLD: ABNORMAL
EOSINOPHIL # BLD: 0.2 K/UL (ref 0–0.4)
EOSINOPHIL NFR BLD: 2 % (ref 0–5)
ERYTHROCYTE [DISTWIDTH] IN BLOOD BY AUTOMATED COUNT: 12.2 % (ref 11.6–14.5)
EST. AVERAGE GLUCOSE BLD GHB EST-MCNC: 246 MG/DL
GLUCOSE BLD STRIP.AUTO-MCNC: 139 MG/DL (ref 70–110)
GLUCOSE BLD STRIP.AUTO-MCNC: 142 MG/DL (ref 70–110)
GLUCOSE BLD STRIP.AUTO-MCNC: 231 MG/DL (ref 70–110)
GLUCOSE BLD STRIP.AUTO-MCNC: 254 MG/DL (ref 70–110)
GLUCOSE BLD STRIP.AUTO-MCNC: 98 MG/DL (ref 70–110)
GLUCOSE SERPL-MCNC: 275 MG/DL (ref 74–99)
HBA1C MFR BLD: 10.2 % (ref 4.2–5.6)
HCT VFR BLD AUTO: 34.3 % (ref 36–48)
HGB BLD-MCNC: 11.5 G/DL (ref 13–16)
LYMPHOCYTES # BLD: 1.8 K/UL (ref 0.9–3.6)
LYMPHOCYTES NFR BLD: 23 % (ref 21–52)
MCH RBC QN AUTO: 29.9 PG (ref 24–34)
MCHC RBC AUTO-ENTMCNC: 33.5 G/DL (ref 31–37)
MCV RBC AUTO: 89.3 FL (ref 74–97)
MONOCYTES # BLD: 0.9 K/UL (ref 0.05–1.2)
MONOCYTES NFR BLD: 11 % (ref 3–10)
NEUTS SEG # BLD: 5 K/UL (ref 1.8–8)
NEUTS SEG NFR BLD: 64 % (ref 40–73)
PLATELET # BLD AUTO: 305 K/UL (ref 135–420)
PMV BLD AUTO: 9.6 FL (ref 9.2–11.8)
POTASSIUM SERPL-SCNC: 4.2 MMOL/L (ref 3.5–5.5)
RBC # BLD AUTO: 3.84 M/UL (ref 4.7–5.5)
SODIUM SERPL-SCNC: 138 MMOL/L (ref 136–145)
WBC # BLD AUTO: 7.8 K/UL (ref 4.6–13.2)

## 2018-12-27 PROCEDURE — 74011250636 HC RX REV CODE- 250/636: Performed by: INTERNAL MEDICINE

## 2018-12-27 PROCEDURE — 74011000250 HC RX REV CODE- 250: Performed by: EMERGENCY MEDICINE

## 2018-12-27 PROCEDURE — 74011250637 HC RX REV CODE- 250/637: Performed by: FAMILY MEDICINE

## 2018-12-27 PROCEDURE — 36415 COLL VENOUS BLD VENIPUNCTURE: CPT

## 2018-12-27 PROCEDURE — 74011250637 HC RX REV CODE- 250/637: Performed by: INTERNAL MEDICINE

## 2018-12-27 PROCEDURE — 87186 SC STD MICRODIL/AGAR DIL: CPT

## 2018-12-27 PROCEDURE — 99284 EMERGENCY DEPT VISIT MOD MDM: CPT

## 2018-12-27 PROCEDURE — 74011250636 HC RX REV CODE- 250/636: Performed by: EMERGENCY MEDICINE

## 2018-12-27 PROCEDURE — 87070 CULTURE OTHR SPECIMN AEROBIC: CPT

## 2018-12-27 PROCEDURE — 74011250636 HC RX REV CODE- 250/636: Performed by: FAMILY MEDICINE

## 2018-12-27 PROCEDURE — 74011000258 HC RX REV CODE- 258: Performed by: FAMILY MEDICINE

## 2018-12-27 PROCEDURE — 85025 COMPLETE CBC W/AUTO DIFF WBC: CPT

## 2018-12-27 PROCEDURE — 82962 GLUCOSE BLOOD TEST: CPT

## 2018-12-27 PROCEDURE — 80048 BASIC METABOLIC PNL TOTAL CA: CPT

## 2018-12-27 PROCEDURE — 83036 HEMOGLOBIN GLYCOSYLATED A1C: CPT

## 2018-12-27 PROCEDURE — 74011636637 HC RX REV CODE- 636/637: Performed by: INTERNAL MEDICINE

## 2018-12-27 PROCEDURE — 73718 MRI LOWER EXTREMITY W/O DYE: CPT

## 2018-12-27 PROCEDURE — 65270000029 HC RM PRIVATE

## 2018-12-27 PROCEDURE — 87077 CULTURE AEROBIC IDENTIFY: CPT

## 2018-12-27 PROCEDURE — 74011636637 HC RX REV CODE- 636/637: Performed by: FAMILY MEDICINE

## 2018-12-27 PROCEDURE — 73660 X-RAY EXAM OF TOE(S): CPT

## 2018-12-27 PROCEDURE — 87040 BLOOD CULTURE FOR BACTERIA: CPT

## 2018-12-27 RX ORDER — OXYCODONE AND ACETAMINOPHEN 5; 325 MG/1; MG/1
1-2 TABLET ORAL
Status: DISCONTINUED | OUTPATIENT
Start: 2018-12-27 | End: 2019-01-02 | Stop reason: HOSPADM

## 2018-12-27 RX ORDER — ONDANSETRON 2 MG/ML
4 INJECTION INTRAMUSCULAR; INTRAVENOUS
Status: COMPLETED | OUTPATIENT
Start: 2018-12-27 | End: 2018-12-27

## 2018-12-27 RX ORDER — INSULIN GLARGINE 100 [IU]/ML
25 INJECTION, SOLUTION SUBCUTANEOUS DAILY
Status: DISCONTINUED | OUTPATIENT
Start: 2018-12-28 | End: 2019-01-02 | Stop reason: HOSPADM

## 2018-12-27 RX ORDER — DEXTROSE 50 % IN WATER (D50W) INTRAVENOUS SYRINGE
25-50 AS NEEDED
Status: DISCONTINUED | OUTPATIENT
Start: 2018-12-27 | End: 2019-01-02 | Stop reason: HOSPADM

## 2018-12-27 RX ORDER — ONDANSETRON 2 MG/ML
4 INJECTION INTRAMUSCULAR; INTRAVENOUS
Status: DISCONTINUED | OUTPATIENT
Start: 2018-12-27 | End: 2019-01-02 | Stop reason: HOSPADM

## 2018-12-27 RX ORDER — NALOXONE HYDROCHLORIDE 0.4 MG/ML
0.4 INJECTION, SOLUTION INTRAMUSCULAR; INTRAVENOUS; SUBCUTANEOUS AS NEEDED
Status: DISCONTINUED | OUTPATIENT
Start: 2018-12-27 | End: 2019-01-02 | Stop reason: HOSPADM

## 2018-12-27 RX ORDER — MORPHINE SULFATE 4 MG/ML
4 INJECTION INTRAVENOUS
Status: COMPLETED | OUTPATIENT
Start: 2018-12-27 | End: 2018-12-27

## 2018-12-27 RX ORDER — HEPARIN SODIUM 5000 [USP'U]/ML
5000 INJECTION, SOLUTION INTRAVENOUS; SUBCUTANEOUS EVERY 8 HOURS
Status: DISCONTINUED | OUTPATIENT
Start: 2018-12-28 | End: 2019-01-02 | Stop reason: HOSPADM

## 2018-12-27 RX ORDER — MAGNESIUM SULFATE 100 %
4 CRYSTALS MISCELLANEOUS AS NEEDED
Status: DISCONTINUED | OUTPATIENT
Start: 2018-12-27 | End: 2019-01-02 | Stop reason: HOSPADM

## 2018-12-27 RX ORDER — SODIUM CHLORIDE 0.9 % (FLUSH) 0.9 %
5-10 SYRINGE (ML) INJECTION AS NEEDED
Status: DISCONTINUED | OUTPATIENT
Start: 2018-12-27 | End: 2019-01-02 | Stop reason: HOSPADM

## 2018-12-27 RX ORDER — DOCUSATE SODIUM 100 MG/1
100 CAPSULE, LIQUID FILLED ORAL
Status: DISCONTINUED | OUTPATIENT
Start: 2018-12-27 | End: 2019-01-02 | Stop reason: HOSPADM

## 2018-12-27 RX ORDER — OXYCODONE AND ACETAMINOPHEN 5; 325 MG/1; MG/1
1 TABLET ORAL
Status: DISCONTINUED | OUTPATIENT
Start: 2018-12-27 | End: 2018-12-27

## 2018-12-27 RX ORDER — OXYCODONE AND ACETAMINOPHEN 5; 325 MG/1; MG/1
2 TABLET ORAL
Status: DISCONTINUED | OUTPATIENT
Start: 2018-12-27 | End: 2018-12-27

## 2018-12-27 RX ORDER — INSULIN GLARGINE 100 [IU]/ML
25 INJECTION, SOLUTION SUBCUTANEOUS ONCE
Status: COMPLETED | OUTPATIENT
Start: 2018-12-27 | End: 2018-12-27

## 2018-12-27 RX ORDER — FLUTICASONE PROPIONATE 50 MCG
2 SPRAY, SUSPENSION (ML) NASAL DAILY
Status: DISCONTINUED | OUTPATIENT
Start: 2018-12-28 | End: 2019-01-02 | Stop reason: HOSPADM

## 2018-12-27 RX ORDER — SODIUM CHLORIDE 0.9 % (FLUSH) 0.9 %
5-10 SYRINGE (ML) INJECTION EVERY 8 HOURS
Status: DISCONTINUED | OUTPATIENT
Start: 2018-12-27 | End: 2019-01-02 | Stop reason: HOSPADM

## 2018-12-27 RX ORDER — ACETAMINOPHEN 325 MG/1
650 TABLET ORAL
Status: DISCONTINUED | OUTPATIENT
Start: 2018-12-27 | End: 2019-01-02 | Stop reason: HOSPADM

## 2018-12-27 RX ORDER — INSULIN LISPRO 100 [IU]/ML
INJECTION, SOLUTION INTRAVENOUS; SUBCUTANEOUS
Status: DISCONTINUED | OUTPATIENT
Start: 2018-12-27 | End: 2019-01-02 | Stop reason: HOSPADM

## 2018-12-27 RX ADMIN — OXYCODONE AND ACETAMINOPHEN 1 TABLET: 5; 325 TABLET ORAL at 23:52

## 2018-12-27 RX ADMIN — INSULIN GLARGINE 25 UNITS: 100 INJECTION, SOLUTION SUBCUTANEOUS at 13:08

## 2018-12-27 RX ADMIN — OXYCODONE AND ACETAMINOPHEN 1 TABLET: 5; 325 TABLET ORAL at 13:32

## 2018-12-27 RX ADMIN — OXYCODONE AND ACETAMINOPHEN 1 TABLET: 5; 325 TABLET ORAL at 19:17

## 2018-12-27 RX ADMIN — MORPHINE SULFATE 4 MG: 4 INJECTION INTRAVENOUS at 05:44

## 2018-12-27 RX ADMIN — OXYCODONE AND ACETAMINOPHEN 1 TABLET: 5; 325 TABLET ORAL at 08:45

## 2018-12-27 RX ADMIN — Medication 10 ML: at 23:59

## 2018-12-27 RX ADMIN — INSULIN LISPRO 6 UNITS: 100 INJECTION, SOLUTION INTRAVENOUS; SUBCUTANEOUS at 13:08

## 2018-12-27 RX ADMIN — PIPERACILLIN SODIUM,TAZOBACTAM SODIUM 3.38 G: 3; .375 INJECTION, POWDER, FOR SOLUTION INTRAVENOUS at 13:09

## 2018-12-27 RX ADMIN — PIPERACILLIN SODIUM,TAZOBACTAM SODIUM 3.38 G: 3; .375 INJECTION, POWDER, FOR SOLUTION INTRAVENOUS at 19:15

## 2018-12-27 RX ADMIN — CEFTRIAXONE SODIUM 1 G: 1 INJECTION, POWDER, FOR SOLUTION INTRAMUSCULAR; INTRAVENOUS at 05:40

## 2018-12-27 RX ADMIN — ONDANSETRON 4 MG: 2 INJECTION INTRAMUSCULAR; INTRAVENOUS at 23:55

## 2018-12-27 RX ADMIN — ONDANSETRON 4 MG: 2 INJECTION INTRAMUSCULAR; INTRAVENOUS at 05:39

## 2018-12-27 RX ADMIN — Medication 10 ML: at 21:25

## 2018-12-27 NOTE — PROGRESS NOTES
MRI safety screening form needs to be filled out and faxed to [phone] MRI can be scheduled. If unable to acquire information from patient [employer] needs to be contacted.   If patient is claustrophobic or needs pain meds please have them ordered in advance to help facilitate patient exam.

## 2018-12-27 NOTE — PROGRESS NOTES
Reason for Admission:  Diabetic foot ulcer ([facility] [address]                 RRAT Score:    7           Plan for utilizing home health:   Not ordered at this time, pt is not homebound. Likelihood of Readmission:   LOW                         Transition of Care Plan:            Initial assessment completed with patient. Face sheet information confirmed:  yes. The patient requests we communicate with patient in reference to medical care. This patient lives in a single family home with patient. Patient is able to navigate steps as needed. Prior to hospitalization, patient was considered to be independent : yes . Cognitive status of patient: oriented to time, place, person and situation. Patient has a current ACP document on file: no  The patient's family will be available to transport patient home upon discharge. No DME. Patient is not currently active with home health. Patient has not ever stayed in a skilled nursing facility or rehab. .      This patient is on dialysis :no     Currently, the discharge plan is Home.       Care Management Interventions  PCP Verified by CM: Yes(pt stated a couple of months ago)  Palliative Care Criteria Met (RRAT>21 & CHF Dx)?: No  Mode of Transport at Discharge: Self  Transition of Care Consult (CM Consult): Discharge Planning  MyChart Signup: No  Discharge Durable Medical Equipment: No  Physical Therapy Consult: No  Occupational Therapy Consult: No  Speech Therapy Consult: No  Current Support Network: Lives Alone, Family Lives Nearby  Confirm Follow Up Transport: Self  Plan discussed with Pt/Family/Caregiver: Yes   Resource Information Provided?: No  Discharge Location  Discharge Placement: [address]  Case Management  [phone]

## 2018-12-27 NOTE — DIABETES MGMT
Diabetes Patient/Family Education Record  Factors That  May Influence Patients Ability  to Learn or  Comply with Recommendations   []   Language barrier    []   Cultural needs   []   Motivation    []   Cognitive limitation    []   Physical   []   Education    []   Physiological factors   []   Hearing/vision/speaking impairment   []   Quaker beliefs    []   Financial factors   []  Other:   [x]  No factors identified at this time. Person Instructed:   [x]   Patient   []   Family   []  Other     Preference for Learning:   [x]   Verbal   []   Written   []  Demonstration     Level of Comprehension & Competence:    [x]  Good                                      [] Fair                                     []  Poor                             []  Needs Reinforcement   [x]  Teachback completed    Education Component:   [x]  Medication management, including how to administer insulin (if appropriate) and potential medication interactions taking Lantus 35 units every HS and Humalog corrective coverage three times daily with meals. Patient works nightshift and sleeps during the day. He takes his insulin during the night. [x]  Nutritional management -obtain usual meal pattern states he watches his diet carefully. Attempts to do some \"natural\" recipes to aid in lowering his blood glucose. \"alkaline water\" \"unfiltered honey and organic cinnamon\"   []  Exercise   [x]  Signs, symptoms, and treatment of hyperglycemia and hypoglycemia states he has no issues with hypoglycemia. [x] Prevention, recognition and treatment of hyperglycemia and hypoglycemia   [x]  Importance of blood glucose monitoring and how to obtain a blood glucose meter  Has a glucometer and supplies at home. Checks his blood glucose at least 3 times daily.   Reinforced the importance of good blood glucose control for improved wound healing.   []  Instruction on use of the blood glucose meter   [x]  Discuss the importance of HbA1C monitoring current A1C pending. Will inform pt when complete   []  Sick day guidelines   []  Proper use and disposal of lancets, needles, syringes or insulin pens (if appropriate)   [x]  Potential long-term complications (retinopathy, kidney disease, neuropathy, foot care)   [x] Information about whom to contact in case of emergency or for more information    [x]  Goal:  Patient/family will demonstrate understanding of Diabetes Self Management Skills by: (date) __12/31/18____  Plan for post-discharge education or self-management support:    [] Outpatient class schedule provided            [] Patient Declined    [] Scheduled for outpatient classes (date) _______  Verify:  Does patient understand how diabetes medications work? yes  Does patient know what their most recent A1c is? pending  Does patient monitor glucose at home? yes  Does patient have difficulty obtaining diabetes medications?   No issues     Rita [facility] CDE  Ext [phone]

## 2018-12-27 NOTE — ED PROVIDER NOTES
Pt c/o left great toe pain, open ulceration. Says open x many months, but more open x one day, now w foul smelling drainage also x one day. No fever. + numbness to toe but still hurts. Foot swollen also. No other numbness. No injury. No weakness. Says long h/o dm. 129 last gluc check yest.  No cp or sob. Past Medical History:   Diagnosis Date    Diabetes [facility])        Past Surgical History:   Procedure Laterality Date    HX MENISCUS REPAIR Right 2015         Family History:   Problem Relation Age of Onset    Seizures Mother     Hypertension Mother     No Known Problems Father     No Known Problems Sister     No Known Problems Brother     No Known Problems Sister     No Known Problems Brother        Social History     Socioeconomic History    Marital status: SINGLE     Spouse name: Not on file    Number of children: Not on file    Years of education: Not on file    Highest education level: Not on file   Social Needs    Financial resource strain: Not on file    Food insecurity - worry: Not on file    Food insecurity - inability: Not on file   [employer] needs - medical: Not on file   [employer] needs - non-medical: Not on file   Occupational History    Not on file   Tobacco Use    Smoking status: Former Smoker    Smokeless tobacco: Never Used    Tobacco comment: quit 0ver 25 years ago   Substance and Sexual Activity    Alcohol use: Yes     Alcohol/week: 1.2 oz     Types: 2 Cans of beer per week     Comment: occ.  Drug use: No    Sexual activity: Yes     Partners: Female     Birth control/protection: Condom   Other Topics Concern    Not on file   Social History Narrative    Not on file         ALLERGIES: Patient has no known allergies. Review of Systems   Constitutional: Negative for diaphoresis and fever. HENT: Negative for congestion. Respiratory: Negative for cough and shortness of breath. Cardiovascular: Negative for chest pain.    Gastrointestinal: Negative for abdominal pain and nausea. Musculoskeletal: Positive for myalgias. Negative for back pain. Skin: Negative for rash. Neurological: Negative for dizziness. All other systems reviewed and are negative. Vitals:    12/27/18 0756 12/27/18 1117 12/27/18 1503 12/27/18 1923   BP: 130/73 114/66 118/65 150/83   Pulse: 69 65 70 69   Resp: 16 18 18 20   Temp: 97.4 °F (36.3 °C) 97.2 °F (36.2 °C) 97 °F (36.1 °C) 98.1 °F (36.7 °C)   SpO2: 98% 97% 98% 96%   Weight:       Height:                Physical Exam   Constitutional: He is oriented to person, place, and time. He appears well-developed. HENT:   Head: Normocephalic and atraumatic. Eyes: Conjunctivae are normal.   Neck: Normal range of motion. Cardiovascular: Normal rate and regular rhythm. Pulmonary/Chest: Effort normal. He has no wheezes. Abdominal: Soft. There is no tenderness. Musculoskeletal: He exhibits no tenderness. + left mid 1st toe posterior ulceration w bone exposed, scant foul smelling drainage. Distal toe sensitive to pain only, not to light touch. Neurological: He is alert and oriented to person, place, and time. Skin: No rash noted. Nursing note and vitals reviewed.        [facility]  ED Course as of Dec 27 1944   Thu Dec 27, 2018   0732 Pt transported for admit prior to my eval  [BT]      ED Course User Index  [BT] Ricardo Warren MD       Procedures    Vitals:  Patient Vitals for the past 12 hrs:   Temp Pulse Resp BP SpO2   12/27/18 1923 98.1 °F (36.7 °C) 69 20 150/83 96 %   12/27/18 1503 97 °F (36.1 °C) 70 18 118/65 98 %   12/27/18 1117 97.2 °F (36.2 °C) 65 18 114/66 97 %   12/27/18 0756 97.4 °F (36.3 °C) 69 16 130/73 98 %         Medications ordered:   Medications   insulin lispro (HUMALOG) injection (0 Units SubCUTAneous Held 12/27/18 1630)   glucose chewable tablet 16 g (not administered)   glucagon (GLUCAGEN) injection 1 mg (not administered)   dextrose (D50W) injection syrg 12.5-25 g (not administered) acetaminophen (TYLENOL) tablet 650 mg (not administered)   naloxone (NARCAN) injection 0.4 mg (not administered)   ondansetron (ZOFRAN) injection 4 mg (not administered)   docusate sodium (COLACE) capsule 100 mg (not administered)   heparin (porcine) injection 5,000 Units (not administered)   piperacillin-tazobactam (ZOSYN) 3.375 g in 0.9% sodium chloride (MBP/ADV) 100 mL MBP (3.375 g IntraVENous New Bag 12/27/18 1915)   insulin glargine (LANTUS) injection 25 Units (not administered)   sodium chloride (NS) flush 5-10 mL (5 mL IntraVENous Missed 12/27/18 1400)   sodium chloride (NS) flush 5-10 mL (not administered)   fluticasone (FLONASE) 50 mcg/actuation nasal spray 2 Spray (not administered)   oxyCODONE-acetaminophen (PERCOCET) 5-325 mg per tablet 1-2 Tab (1 Tab Oral Given 12/27/18 1917)   cefTRIAXone (ROCEPHIN) 1 g in sterile water (preservative free) 10 mL IV syringe (1 g IntraVENous Given 12/27/18 0540)   morphine injection 4 mg (4 mg IntraVENous Given 12/27/18 0544)   ondansetron (ZOFRAN) injection 4 mg (4 mg IntraVENous Given 12/27/18 0539)   insulin glargine (LANTUS) injection 25 Units (25 Units SubCUTAneous Given 12/27/18 1308)         Lab findings:  Recent Results (from the past 12 hour(s))   GLUCOSE, POC    Collection Time: 12/27/18  7:55 AM   Result Value Ref Range    Glucose (POC) 231 (H) 70 - 110 mg/dL   HEMOGLOBIN A1C WITH EAG    Collection Time: 12/27/18  9:53 AM   Result Value Ref Range    Hemoglobin A1c 10.2 (H) 4.2 - 5.6 %    Est. average glucose 246 mg/dL   GLUCOSE, POC    Collection Time: 12/27/18 11:16 AM   Result Value Ref Range    Glucose (POC) 254 (H) 70 - 110 mg/dL   GLUCOSE, POC    Collection Time: 12/27/18  3:06 PM   Result Value Ref Range    Glucose (POC) 139 (H) 70 - 110 mg/dL   GLUCOSE, POC    Collection Time: 12/27/18  4:54 PM   Result Value Ref Range    Glucose (POC) 98 70 - 110 mg/dL           X-Ray, CT or other radiology findings or impressions:  MRI FOOT LT WO CONT   Final Result IMPRESSION:   1. Pronounced soft tissue swelling within the great toe with acute   osteomyelitis involving the first distal phalanx. Normal T1 signal with   intermediate T2 signal in the first proximal phalanx favored to reflect reactive   osteitis. 2.  Ununited fracture of the second PIP joint. Mild marrow edema within the   distal aspect of the second proximal phalanx. Diffuse edema within the intrinsic   foot musculature which may reflect denervation change or infectious myositis. 3.  Moderate dorsal foot soft tissue swelling. 4.  Mild hallux digitorum longus tenosynovitis. 5.  Degenerative changes as above. XR GREAT TOE LT MIN 2 V   Final Result   IMPRESSION:        1. Exposure of the distal phalanx distal tuft of the great toe with   questionable cortical resorption. Suspect developing osteomyelitis. 2.  Subcutaneous emphysema. Potentially related to colonization by gas-forming   organism. MRI FOOT RT WO CONT    (Results Pending)       Progress notes, Consult notes or additional Procedure notes:   Concern for osteo, to admit. D/w dr Mumtaz Yeager, to d/w podiatry. Diagnosis:   1. Diabetic ulcer of toe of left foot associated with diabetes mellitus of other type, with other ulcer severity ([facility] [address]        Disposition: admit    Follow-up Information    None             Medication List      ASK your doctor about these medications    hydrOXYzine pamoate 25 mg capsule  Commonly known as:  VISTARIL  1 capsule by mouth daily as needed  Indications: insomnia     insulin glargine 100 unit/mL injection  Commonly known as:  LANTUS  25 Units by SubCUTAneous route nightly.  Indications: type 2 diabetes mellitus     insulin lispro 100 unit/mL injection  Commonly known as:  HUMALOG  Use tid per sliding scale  Indications: type 2 diabetes mellitus     IODOSORB 0.9 % gel  Generic drug:  cadexomer iodine     OTHER

## 2018-12-27 NOTE — ED NOTES
Hay Alexander [phone], they stated \"a crew is in the parking lot and will be right in\"    [id number] - [facility] Medical [facility] Crew came in and states they were going home and their supervisor would send a BLS crew from [place], [place] is 20 minutes

## 2018-12-27 NOTE — ROUTINE PROCESS
Bedside shift change report given to Laurent Messina (oncoming nurse) by Corby Louis RN (offgoing nurse). Report included the following information SBAR, Kardex, Intake/Output and Cardiac Rhythm NSR.

## 2018-12-27 NOTE — PROGRESS NOTES
conducted an initial consultation and Spiritual Assessment for Michael Enriquez, who is a 52 y. o.,male. Patients Primary Language is: [place]. According to the patients EMR [employer] Affiliation is: [facility].     The reason the Patient came to the hospital is:   Patient Active Problem List    Diagnosis Date Noted    Diabetic foot ulcer ([facility] 12/27/2018    Neuropathy 02/24/2017    Pain due to abscess 09/20/2016    Type 2 diabetes mellitus without complication ([facility] 89/07/7315        The  provided the following Interventions:  Initiated a relationship of care and support. Explored issues of ingrid, belief, spirituality and Yazidism/ritual needs while hospitalized. Listened empathically. Provided chaplaincy education. Provided information about Spiritual Care Services. Offered prayer and assurance of continued prayers on patient's behalf. Chart reviewed. The following outcomes where achieved:  Patient shared limited information about both their medical narrative and spiritual journey/beliefs.  confirmed Patient's Baptist Affiliation. Patient processed feeling about current hospitalization. Patient expressed gratitude for 's visit. Assessment:  Patient does not have any Yazidism/cultural needs that will affect patients preferences in health care. There are no spiritual or Yazidism issues which require intervention at this time. Plan:  Chaplains will continue to follow and will provide pastoral care on an as needed/requested basis.  recommends bedside caregivers page  on duty if patient shows signs of acute spiritual or emotional distress.     Chaplain Resident [address] [place] [address]   [phone]

## 2018-12-27 NOTE — ED TRIAGE NOTES
Patient presents to ER for C/O increase pain to left great toe. States he saw podiatry about a month ago and they shaved his ulceration, his next appointment is not until January 2nd.

## 2018-12-27 NOTE — H&P
Hospitalist Admission Note    NAME: Heaven Short   :  1965   MRN:  [id number]     Date/Time of admission:  2018 12:00 PM    Patient PCP: Sayda Romero MD  ________________________________________________________________________    My assessment of this patient's clinical condition and my plan of care is as follows. Assessment / Plan:  1. L 1st toe ulcer with cellulitis and suspected underlying osteomyelitis  2. Uncontrolled DM 2 with hyperglycemia, A1c 10.2%  3. Overweight, BMI 28.8     1. Admit to medical bed with podiatry in consultation. 2. Empiric zosyn. 3. MRI of forefoot, vascular studies if felt to be necessary. 4. Lantus, SSI. Diabetes educator consult. 5. Gentle hydration, monitor lytes/renal indices. 6. Analgesia as necessary. 7. Further plan pending podiatry evaluation - anticipate need for surgical intervention. Assistance appreciated in advance. Code Status: Full          Subjective:   CHIEF COMPLAINT: L great toe wound    HISTORY OF PRESENT ILLNESS:     Heaven Short is a 52 y.o.  male who presented to the ED overnight for evaluation of a L great toe wound and foot swelling. Patient reports developing an ulcer on the toe about 5 months ago and has been seen by his PCP as well as a podiatrist for treatment. He was given a walking boot about a month ago and states that he has been mostly compliant with wearing it as advised. Patient does admit to wearing a shoe for a day or two over this past weekend, and it was at this time that the wound \"opened up. \"  He attempted some local wound care himself, but it was when he developed swelling of the foot and purulent drainage that he decided to come to the ED for further evaluation. Xray of the toe done in ED this AM showed evidence for probable underlying bone involvement and patient has been referred for admission for further evaluation/management.       Past Medical History:   Diagnosis Date    Diabetes [facility])         Past Surgical History:   Procedure Laterality Date    HX MENISCUS REPAIR Right 2015       Social History     Tobacco Use    Smoking status: Former Smoker    Smokeless tobacco: Never Used    Tobacco comment: quit 0ver 25 years ago   Substance Use Topics    Alcohol use: Yes     Alcohol/week: 1.2 oz     Types: 2 Cans of beer per week     Comment: occ. Family History   Problem Relation Age of Onset    Seizures Mother     Hypertension Mother     No Known Problems Father     No Known Problems Sister     No Known Problems Brother     No Known Problems Sister     No Known Problems Brother      No Known Allergies     Prior to Admission medications    Medication Sig Start Date End Date Taking? Authorizing Provider   cadexomer iodine (IODOSORB) 0.9 % gel Apply  to affected area daily as needed. Yes Other, MD Amber   insulin glargine (LANTUS) 100 unit/mL injection 25 Units by SubCUTAneous route nightly.  Indications: type 2 diabetes mellitus 9/8/17  Yes Aileen Pacheco NP   OTHER     Other, MD Amber   insulin lispro (HUMALOG) 100 unit/mL injection Use tid per sliding scale  Indications: type 2 diabetes mellitus 6/13/17   Socorro Marie MD   hydrOXYzine pamoate (VISTARIL) 25 mg capsule 1 capsule by mouth daily as needed  Indications: insomnia 4/19/17   Cyndie Trent NP       REVIEW OF SYSTEMS:     Total of 12 systems reviewed as follows:       POSITIVE= bolded text  Negative = text not underlined  General:  fever, chills, sweats, generalized weakness, weight loss/gain,      loss of appetite   Eyes:    blurred vision, eye pain, loss of vision, double vision  ENT:    rhinorrhea, pharyngitis   Respiratory:   cough, sputum production, SOB, TAVAREZ, wheezing, pleuritic pain   Cardiology:   chest pain, palpitations, orthopnea, PND, edema, syncope   Gastrointestinal:  abdominal pain , N/V, diarrhea, dysphagia, constipation, bleeding   Genitourinary:  frequency, urgency, dysuria, hematuria, incontinence   Muskuloskeletal :  arthralgia, myalgia, back pain  Hematology:  easy bruising, nose or gum bleeding, lymphadenopathy   Dermatological: rash, ulceration, pruritis, color change / jaundice  Endocrine:   hot flashes or polydipsia   Neurological:  headache, dizziness, confusion, focal weakness, paresthesia,     Speech difficulties, memory loss, gait difficulty  Psychological: Feelings of anxiety, depression, agitation    Objective:   VITALS:    Visit Vitals  /66 (BP 1 Location: Left arm, BP Patient Position: At rest)   Pulse 65   Temp 97.2 °F (36.2 °C)   Resp 18   Ht 6' 1\" (1.854 m)   Wt 98.9 kg (218 lb)   SpO2 97%   BMI 28.76 kg/m²       PHYSICAL EXAM:    General:    In NAD. Nontoxic-appearing. HEENT: NCAT. Sclerae anicteric. EOMI. OP clear. Neck:  Supple, trachea midline. No TMG. Lungs:   Clear, no wheezes. Effort nonlabored. Chest wall:  No ACW TTP. Symmetrical expansion. Heart:   RRR. Abdomen:   Soft, NTTP. Extremities: Warm, no ischemia.  + L foot swelling. Skin:     Ulcer on dorsal and ventral surface of R great toe. Psych:  Mood normal, good insight. Neurologic: Awake and alert, motor grossly nonfocal.    _______________________________________________________________________  Care Plan discussed with:    Comments   Patient X    Family      RN X    Care Manager                    Consultant:      _______________________________________________________________________  Expected  Disposition:   Home  X   HH/PT/OT/RN    SNF/LTC    KEVIN    _________________________________________    Procedures:   XR L great toe:  IMPRESSION:       1. Exposure of the distal phalanx distal tuft of the great toe with  questionable cortical resorption. Suspect developing osteomyelitis.     2.  Subcutaneous emphysema. Potentially related to colonization by gas-forming  Organism.         LAB DATA REVIEWED:    Recent Results (from the past 24 hour(s))   CULTURE, WOUND W GRAM STAIN Collection Time: 12/27/18  3:35 AM   Result Value Ref Range    Special Requests: NO SPECIAL REQUESTS      GRAM STAIN RARE WBC'S      GRAM STAIN FEW GRAM POSITIVE COCCI IN PAIRS      Culture result: PENDING    CBC WITH AUTOMATED DIFF    Collection Time: 12/27/18  3:55 AM   Result Value Ref Range    WBC 7.8 4.6 - 13.2 K/uL    RBC 3.84 (L) 4.70 - 5.50 M/uL    HGB 11.5 (L) 13.0 - 16.0 g/dL    HCT 34.3 (L) 36.0 - 48.0 %    MCV 89.3 74.0 - 97.0 FL    MCH 29.9 24.0 - 34.0 PG    MCHC 33.5 31.0 - 37.0 g/dL    RDW 12.2 11.6 - 14.5 %    PLATELET 953 478 - 896 K/uL    MPV 9.6 9.2 - 11.8 FL    NEUTROPHILS 64 40 - 73 %    LYMPHOCYTES 23 21 - 52 %    MONOCYTES 11 (H) 3 - 10 %    EOSINOPHILS 2 0 - 5 %    BASOPHILS 0 0 - 2 %    ABS. NEUTROPHILS 5.0 1.8 - 8.0 K/UL    ABS. LYMPHOCYTES 1.8 0.9 - 3.6 K/UL    ABS. MONOCYTES 0.9 0.05 - 1.2 K/UL    ABS. EOSINOPHILS 0.2 0.0 - 0.4 K/UL    ABS.  BASOPHILS 0.0 0.0 - 0.1 K/UL    DF AUTOMATED     METABOLIC PANEL, BASIC    Collection Time: 12/27/18  3:55 AM   Result Value Ref Range    Sodium 138 136 - 145 mmol/L    Potassium 4.2 3.5 - 5.5 mmol/L    Chloride 100 100 - 108 mmol/L    CO2 29 21 - 32 mmol/L    Anion gap 9 3.0 - 18 mmol/L    Glucose 275 (H) 74 - 99 mg/dL    BUN 24 (H) 7.0 - 18 MG/DL    Creatinine 1.48 (H) 0.6 - 1.3 MG/DL    BUN/Creatinine ratio 16 12 - 20      GFR est AA 60 (L) >60 ml/min/1.73m2    GFR est non-AA 49 (L) >60 ml/min/1.73m2    Calcium 9.5 8.5 - 10.1 MG/DL   GLUCOSE, POC    Collection Time: 12/27/18  7:55 AM   Result Value Ref Range    Glucose (POC) 231 (H) 70 - 110 mg/dL   GLUCOSE, POC    Collection Time: 12/27/18 11:16 AM   Result Value Ref Range    Glucose (POC) 254 (H) 70 - 110 mg/dL       Franck Ortiz MD  [facility]

## 2018-12-27 NOTE — DIABETES MGMT
NUTRITIONAL ASSESSMENT GLYCEMIC CONTROL/ PLAN OF CARE     Helen Taylor           52 y.o.           12/27/2018                 1. Diabetic ulcer of toe of left foot associated with diabetes mellitus of other type, with other ulcer severity ([address]       INTERVENTIONS/PLAN:   Continue inpatient monitoring and intervention     ASSESSMENT:   Pt is a 52year old male with a past medical history significant for diabetes who presented with an infection of the left great toe. A1c elevated at 10.2%. Pt reports taking his home diabetes medications as prescribed. Reports following a healthy diet by avoiding fried food and fast food. Pt is currently NPO. Diabetes Management:   Recent blood glucose:     12/27/2018 07:55 12/27/2018 11:16   231 (H) 254 (H)   Within target range (non-ICU: <140; ICU<180): [] Yes   [x]  No    Current Insulin regimen:    Lantus insulin 25 units daily   Correctional Lispro insulin 4 times daily ACHS   Home medication/insulin regimen:  Lantus 35 units every HS and Humalog corrective coverage three times daily with meals.     HbA1c: 10.2% (estimated average glucose of 246 mg/dL)  Adequate glycemic control PTA:  [] Yes  [x] No     SUBJECTIVE/OBJECTIVE:   Information obtained from: patient, chart review     Diet: NPO    Medications: [x]  Reviewed     Most Recent POC Glucose:   Recent Labs     12/27/18  0355   *      Labs:   Lab Results   Component Value Date/Time    Hemoglobin A1c 10.2 (H) 12/27/2018 09:53 AM     Lab Results   Component Value Date/Time    Sodium 138 12/27/2018 03:55 AM    Potassium 4.2 12/27/2018 03:55 AM    Chloride 100 12/27/2018 03:55 AM    CO2 29 12/27/2018 03:55 AM    Anion gap 9 12/27/2018 03:55 AM    Glucose 275 (H) 12/27/2018 03:55 AM    BUN 24 (H) 12/27/2018 03:55 AM    Creatinine 1.48 (H) 12/27/2018 03:55 AM    Calcium 9.5 12/27/2018 03:55 AM    Magnesium 2.5 08/23/2017 10:37 AM    Albumin 3.3 (L) 11/03/2018 01:17 PM     Anthropometrics:   BMI (calculated): 28.8  Wt Readings from Last 1 Encounters:   12/27/18 98.9 kg (218 lb)      Ht Readings from Last 1 Encounters:   12/27/18 6' 1\" (1.854 m)     Estimated Nutrition Needs:   3017-8877 Kcal/day, 79-99 grams protein/day    Based on:   [x]  Actual BW    [] IBW   [] Adjusted BW      Nutrition Diagnoses:    Altered nutrition related lab value related to diabetes as evidenced by Hemoglobin A1c of 10.2%  Nutrition Interventions: assessment of home management   Goal: Blood glucose will be within target range of  mg/dL by 12/30/18     Nutrition Monitoring and Evaluation    []     Monitor po intake on meal rounds  [x]     Continue inpatient monitoring and intervention  []     Other:    Lc Cifuentes RD, CDE  pgr [phone]

## 2018-12-27 NOTE — CONSULTS
Consult    Patient: Earle Irving MRN: [id number]  SSN: [id number]    YOB: 1965  Age: 52 y.o. Sex: male      Subjective: Earle Irving is a 52 y.o. male who is being seen for asked to evaluate and treat infection left great toe. .Reviewed history outpatient treatment. Past Medical History:   Diagnosis Date    Diabetes [facility])      Past Surgical History:   Procedure Laterality Date    HX MENISCUS REPAIR Right 2015      Family History   Problem Relation Age of Onset    Seizures Mother     Hypertension Mother     No Known Problems Father     No Known Problems Sister     No Known Problems Brother     No Known Problems Sister     No Known Problems Brother      Social History     Tobacco Use    Smoking status: Former Smoker    Smokeless tobacco: Never Used    Tobacco comment: quit 0ver 25 years ago   Substance Use Topics    Alcohol use: Yes     Alcohol/week: 1.2 oz     Types: 2 Cans of beer per week     Comment: occ.       Current Facility-Administered Medications   Medication Dose Route Frequency Provider Last Rate Last Dose    insulin lispro (HUMALOG) injection   SubCUTAneous AC&HS Rony Copeland MD   6 Units at 12/27/18 1308    glucose chewable tablet 16 g  4 Tab Oral PRN Rony Copeland MD        glucagon (GLUCAGEN) injection 1 mg  1 mg IntraMUSCular PRN Rony Copeland MD        dextrose (D50W) injection syrg 12.5-25 g  25-50 mL IntraVENous PRN Rony Copeland MD        acetaminophen (TYLENOL) tablet 650 mg  650 mg Oral Q6H PRN Rony Copeland MD        oxyCODONE-acetaminophen (PERCOCET) 5-325 mg per tablet 1 Tab  1 Tab Oral Q6H PRN Rony Copeland MD   1 Tab at 12/27/18 0845    naloxone (NARCAN) injection 0.4 mg  0.4 mg IntraVENous PRN Rony Copeland MD        [facility]ron [facility]) injection 4 mg  4 mg IntraVENous Q6H PRN Rony Copeland MD        docusate sodium (COLACE) capsule 100 mg  100 mg Oral BID PRPARVEZ Copeland MD  [START ON 12/28/2018] heparin (porcine) injection 5,000 Units  5,000 Units SubCUTAneous Q8H Jenny Mccarthy MD        piperacillin-tazobactam (ZOSYN) 3.375 g in 0.9% sodium chloride (MBP/ADV) 100 mL MBP  3.375 g IntraVENous Q6H Aliec Blanco  mL/hr at 12/27/18 1309 3.375 g at 12/27/18 1309    [START ON 12/28/2018] insulin glargine (LANTUS) injection 25 Units  25 Units SubCUTAneous DAILY Alice Blanco MD            No Known Allergies    Review of Systems:  A comprehensive review of systems was negative except for that written in the History of Present Illness. Objective:     Vitals:    12/27/18 0632 12/27/18 0700 12/27/18 0756 12/27/18 1117   BP: 149/79 143/74 130/73 114/66   Pulse: 70 65 69 65   Resp: 16 12 16 18   Temp: 98.5 °F (36.9 °C)  97.4 °F (36.3 °C) 97.2 °F (36.2 °C)   SpO2: 97% 96% 98% 97%   Weight:       Height:            Physical Exam:  Seen at bedside awake and alert. Inspected left foot. Swelling and erythema of forefoot. Swollen great toe. Sloughed necrotic wet tissue distal half of hallux. Exposed necrotic bone. Smells. Charcot Hallux. Diabetic Neuropathy. Lytic process distal phalange. Assessment:     Hospital Problems  Date Reviewed: 9/8/2017          Codes Class Noted POA    [facility] ICD-10-CM: E11.621, L97.509  ICD-9-CM: 250.80, 707.15  12/27/2018 Unknown              Plan:     Osteitis, needs partial or complete toe amputation. Stat MRI.     Signed By: Carrie Alcaraz DPM     December 27, 2018

## 2018-12-28 ENCOUNTER — ANESTHESIA (OUTPATIENT)
Dept: SURGERY | Age: 53
DRG: 617 | End: 2018-12-28
Payer: COMMERCIAL

## 2018-12-28 LAB
ANION GAP SERPL CALC-SCNC: 4 MMOL/L (ref 3–18)
BASOPHILS # BLD: 0 K/UL (ref 0–0.1)
BASOPHILS NFR BLD: 0 % (ref 0–2)
BUN SERPL-MCNC: 14 MG/DL (ref 7–18)
BUN/CREAT SERPL: 15 (ref 12–20)
CALCIUM SERPL-MCNC: 8.2 MG/DL (ref 8.5–10.1)
CHLORIDE SERPL-SCNC: 105 MMOL/L (ref 100–108)
CO2 SERPL-SCNC: 28 MMOL/L (ref 21–32)
CREAT SERPL-MCNC: 0.96 MG/DL (ref 0.6–1.3)
DIFFERENTIAL METHOD BLD: ABNORMAL
EOSINOPHIL # BLD: 0.2 K/UL (ref 0–0.4)
EOSINOPHIL NFR BLD: 2 % (ref 0–5)
ERYTHROCYTE [DISTWIDTH] IN BLOOD BY AUTOMATED COUNT: 12.5 % (ref 11.6–14.5)
GLUCOSE BLD STRIP.AUTO-MCNC: 130 MG/DL (ref 70–110)
GLUCOSE BLD STRIP.AUTO-MCNC: 165 MG/DL (ref 70–110)
GLUCOSE BLD STRIP.AUTO-MCNC: 198 MG/DL (ref 70–110)
GLUCOSE BLD STRIP.AUTO-MCNC: 87 MG/DL (ref 70–110)
GLUCOSE BLD STRIP.AUTO-MCNC: 95 MG/DL (ref 70–110)
GLUCOSE BLD STRIP.AUTO-MCNC: 97 MG/DL (ref 70–110)
GLUCOSE SERPL-MCNC: 184 MG/DL (ref 74–99)
HCT VFR BLD AUTO: 30.6 % (ref 36–48)
HGB BLD-MCNC: 10.2 G/DL (ref 13–16)
LYMPHOCYTES # BLD: 1.6 K/UL (ref 0.9–3.6)
LYMPHOCYTES NFR BLD: 21 % (ref 21–52)
MAGNESIUM SERPL-MCNC: 2.2 MG/DL (ref 1.6–2.6)
MCH RBC QN AUTO: 29.3 PG (ref 24–34)
MCHC RBC AUTO-ENTMCNC: 33.3 G/DL (ref 31–37)
MCV RBC AUTO: 87.9 FL (ref 74–97)
MONOCYTES # BLD: 0.4 K/UL (ref 0.05–1.2)
MONOCYTES NFR BLD: 6 % (ref 3–10)
NEUTS SEG # BLD: 5.4 K/UL (ref 1.8–8)
NEUTS SEG NFR BLD: 71 % (ref 40–73)
PHOSPHATE SERPL-MCNC: 2.9 MG/DL (ref 2.5–4.9)
PLATELET # BLD AUTO: 287 K/UL (ref 135–420)
PMV BLD AUTO: 9.4 FL (ref 9.2–11.8)
POTASSIUM SERPL-SCNC: 4.2 MMOL/L (ref 3.5–5.5)
RBC # BLD AUTO: 3.48 M/UL (ref 4.7–5.5)
SODIUM SERPL-SCNC: 137 MMOL/L (ref 136–145)
T4 FREE SERPL-MCNC: 1.3 NG/DL (ref 0.7–1.5)
TSH SERPL DL<=0.05 MIU/L-ACNC: 0.87 UIU/ML (ref 0.36–3.74)
WBC # BLD AUTO: 7.6 K/UL (ref 4.6–13.2)

## 2018-12-28 PROCEDURE — 77030031139 HC SUT VCRL2 J&J -A: Performed by: PODIATRIST

## 2018-12-28 PROCEDURE — 74011636637 HC RX REV CODE- 636/637: Performed by: FAMILY MEDICINE

## 2018-12-28 PROCEDURE — 85025 COMPLETE CBC W/AUTO DIFF WBC: CPT

## 2018-12-28 PROCEDURE — 76060000033 HC ANESTHESIA 1 TO 1.5 HR: Performed by: PODIATRIST

## 2018-12-28 PROCEDURE — 77030010509 HC AIRWY LMA MSK TELE -A: Performed by: ANESTHESIOLOGY

## 2018-12-28 PROCEDURE — 74011000250 HC RX REV CODE- 250

## 2018-12-28 PROCEDURE — 84443 ASSAY THYROID STIM HORMONE: CPT

## 2018-12-28 PROCEDURE — 88305 TISSUE EXAM BY PATHOLOGIST: CPT

## 2018-12-28 PROCEDURE — 83735 ASSAY OF MAGNESIUM: CPT

## 2018-12-28 PROCEDURE — 84439 ASSAY OF FREE THYROXINE: CPT

## 2018-12-28 PROCEDURE — 88307 TISSUE EXAM BY PATHOLOGIST: CPT

## 2018-12-28 PROCEDURE — 87076 CULTURE ANAEROBE IDENT EACH: CPT

## 2018-12-28 PROCEDURE — 74011250636 HC RX REV CODE- 250/636: Performed by: INTERNAL MEDICINE

## 2018-12-28 PROCEDURE — 74011250636 HC RX REV CODE- 250/636

## 2018-12-28 PROCEDURE — 80048 BASIC METABOLIC PNL TOTAL CA: CPT

## 2018-12-28 PROCEDURE — 82962 GLUCOSE BLOOD TEST: CPT

## 2018-12-28 PROCEDURE — 87077 CULTURE AEROBIC IDENTIFY: CPT

## 2018-12-28 PROCEDURE — 77030018836 HC SOL IRR NACL ICUM -A: Performed by: PODIATRIST

## 2018-12-28 PROCEDURE — 74011250636 HC RX REV CODE- 250/636: Performed by: FAMILY MEDICINE

## 2018-12-28 PROCEDURE — 36415 COLL VENOUS BLD VENIPUNCTURE: CPT

## 2018-12-28 PROCEDURE — 88304 TISSUE EXAM BY PATHOLOGIST: CPT

## 2018-12-28 PROCEDURE — 74011000258 HC RX REV CODE- 258: Performed by: FAMILY MEDICINE

## 2018-12-28 PROCEDURE — 77030020782 HC GWN BAIR PAWS FLX 3M -B: Performed by: PODIATRIST

## 2018-12-28 PROCEDURE — 87186 SC STD MICRODIL/AGAR DIL: CPT

## 2018-12-28 PROCEDURE — 84100 ASSAY OF PHOSPHORUS: CPT

## 2018-12-28 PROCEDURE — 88311 DECALCIFY TISSUE: CPT

## 2018-12-28 PROCEDURE — 77030002986 HC SUT PROL J&J -A: Performed by: PODIATRIST

## 2018-12-28 PROCEDURE — 77030032490 HC SLV COMPR SCD KNE COVD -B: Performed by: PODIATRIST

## 2018-12-28 PROCEDURE — 74011250636 HC RX REV CODE- 250/636: Performed by: NURSE ANESTHETIST, CERTIFIED REGISTERED

## 2018-12-28 PROCEDURE — 65270000029 HC RM PRIVATE

## 2018-12-28 PROCEDURE — 74011636637 HC RX REV CODE- 636/637: Performed by: INTERNAL MEDICINE

## 2018-12-28 PROCEDURE — 74011250636 HC RX REV CODE- 250/636: Performed by: PODIATRIST

## 2018-12-28 PROCEDURE — 0Y6Q0Z3 DETACHMENT AT LEFT 1ST TOE, LOW, OPEN APPROACH: ICD-10-PCS | Performed by: PODIATRIST

## 2018-12-28 PROCEDURE — 74011000250 HC RX REV CODE- 250: Performed by: PODIATRIST

## 2018-12-28 PROCEDURE — 87075 CULTR BACTERIA EXCEPT BLOOD: CPT

## 2018-12-28 PROCEDURE — 87070 CULTURE OTHR SPECIMN AEROBIC: CPT

## 2018-12-28 PROCEDURE — 76210000006 HC OR PH I REC 0.5 TO 1 HR: Performed by: PODIATRIST

## 2018-12-28 PROCEDURE — 76010000149 HC OR TIME 1 TO 1.5 HR: Performed by: PODIATRIST

## 2018-12-28 PROCEDURE — 74011250637 HC RX REV CODE- 250/637: Performed by: FAMILY MEDICINE

## 2018-12-28 RX ORDER — MIDAZOLAM HYDROCHLORIDE 1 MG/ML
INJECTION, SOLUTION INTRAMUSCULAR; INTRAVENOUS AS NEEDED
Status: DISCONTINUED | OUTPATIENT
Start: 2018-12-28 | End: 2018-12-28 | Stop reason: HOSPADM

## 2018-12-28 RX ORDER — LIDOCAINE HYDROCHLORIDE 20 MG/ML
INJECTION, SOLUTION EPIDURAL; INFILTRATION; INTRACAUDAL; PERINEURAL AS NEEDED
Status: DISCONTINUED | OUTPATIENT
Start: 2018-12-28 | End: 2018-12-28 | Stop reason: HOSPADM

## 2018-12-28 RX ORDER — LIDOCAINE HYDROCHLORIDE 10 MG/ML
0.1 INJECTION, SOLUTION EPIDURAL; INFILTRATION; INTRACAUDAL; PERINEURAL AS NEEDED
Status: DISCONTINUED | OUTPATIENT
Start: 2018-12-28 | End: 2018-12-28 | Stop reason: HOSPADM

## 2018-12-28 RX ORDER — MAGNESIUM SULFATE 100 %
4 CRYSTALS MISCELLANEOUS AS NEEDED
Status: DISCONTINUED | OUTPATIENT
Start: 2018-12-28 | End: 2018-12-28 | Stop reason: HOSPADM

## 2018-12-28 RX ORDER — NEOMYCIN AND POLYMYXIN B SULFATES 40; 200000 MG/ML; [USP'U]/ML
SOLUTION IRRIGATION AS NEEDED
Status: DISCONTINUED | OUTPATIENT
Start: 2018-12-28 | End: 2018-12-28 | Stop reason: HOSPADM

## 2018-12-28 RX ORDER — INSULIN LISPRO 100 [IU]/ML
INJECTION, SOLUTION INTRAVENOUS; SUBCUTANEOUS ONCE
Status: DISCONTINUED | OUTPATIENT
Start: 2018-12-28 | End: 2018-12-28 | Stop reason: HOSPADM

## 2018-12-28 RX ORDER — SODIUM CHLORIDE, SODIUM LACTATE, POTASSIUM CHLORIDE, CALCIUM CHLORIDE 600; 310; 30; 20 MG/100ML; MG/100ML; MG/100ML; MG/100ML
75 INJECTION, SOLUTION INTRAVENOUS CONTINUOUS
Status: DISCONTINUED | OUTPATIENT
Start: 2018-12-28 | End: 2018-12-28 | Stop reason: HOSPADM

## 2018-12-28 RX ORDER — SODIUM CHLORIDE, SODIUM LACTATE, POTASSIUM CHLORIDE, CALCIUM CHLORIDE 600; 310; 30; 20 MG/100ML; MG/100ML; MG/100ML; MG/100ML
25 INJECTION, SOLUTION INTRAVENOUS CONTINUOUS
Status: DISCONTINUED | OUTPATIENT
Start: 2018-12-28 | End: 2018-12-28 | Stop reason: HOSPADM

## 2018-12-28 RX ORDER — FENTANYL CITRATE 50 UG/ML
50 INJECTION, SOLUTION INTRAMUSCULAR; INTRAVENOUS AS NEEDED
Status: DISCONTINUED | OUTPATIENT
Start: 2018-12-28 | End: 2018-12-28 | Stop reason: HOSPADM

## 2018-12-28 RX ORDER — ONDANSETRON 2 MG/ML
INJECTION INTRAMUSCULAR; INTRAVENOUS AS NEEDED
Status: DISCONTINUED | OUTPATIENT
Start: 2018-12-28 | End: 2018-12-28 | Stop reason: HOSPADM

## 2018-12-28 RX ORDER — FENTANYL CITRATE 50 UG/ML
INJECTION, SOLUTION INTRAMUSCULAR; INTRAVENOUS AS NEEDED
Status: DISCONTINUED | OUTPATIENT
Start: 2018-12-28 | End: 2018-12-28 | Stop reason: HOSPADM

## 2018-12-28 RX ORDER — PROPOFOL 10 MG/ML
INJECTION, EMULSION INTRAVENOUS AS NEEDED
Status: DISCONTINUED | OUTPATIENT
Start: 2018-12-28 | End: 2018-12-28 | Stop reason: HOSPADM

## 2018-12-28 RX ORDER — DEXTROSE 50 % IN WATER (D50W) INTRAVENOUS SYRINGE
25-50 AS NEEDED
Status: DISCONTINUED | OUTPATIENT
Start: 2018-12-28 | End: 2018-12-28 | Stop reason: HOSPADM

## 2018-12-28 RX ORDER — EPHEDRINE SULFATE 50 MG/ML
INJECTION, SOLUTION INTRAVENOUS AS NEEDED
Status: DISCONTINUED | OUTPATIENT
Start: 2018-12-28 | End: 2018-12-28 | Stop reason: HOSPADM

## 2018-12-28 RX ORDER — NALOXONE HYDROCHLORIDE 0.4 MG/ML
0.1 INJECTION, SOLUTION INTRAMUSCULAR; INTRAVENOUS; SUBCUTANEOUS ONCE
Status: DISCONTINUED | OUTPATIENT
Start: 2018-12-28 | End: 2018-12-28 | Stop reason: HOSPADM

## 2018-12-28 RX ADMIN — OXYCODONE AND ACETAMINOPHEN 2 TABLET: 5; 325 TABLET ORAL at 16:29

## 2018-12-28 RX ADMIN — PIPERACILLIN SODIUM,TAZOBACTAM SODIUM 3.38 G: 3; .375 INJECTION, POWDER, FOR SOLUTION INTRAVENOUS at 00:01

## 2018-12-28 RX ADMIN — SODIUM CHLORIDE, SODIUM LACTATE, POTASSIUM CHLORIDE, AND CALCIUM CHLORIDE: 600; 310; 30; 20 INJECTION, SOLUTION INTRAVENOUS at 14:00

## 2018-12-28 RX ADMIN — MIDAZOLAM HYDROCHLORIDE 2 MG: 1 INJECTION, SOLUTION INTRAMUSCULAR; INTRAVENOUS at 14:08

## 2018-12-28 RX ADMIN — INSULIN LISPRO 2 UNITS: 100 INJECTION, SOLUTION INTRAVENOUS; SUBCUTANEOUS at 22:13

## 2018-12-28 RX ADMIN — FLUTICASONE PROPIONATE 2 SPRAY: 50 SPRAY, METERED NASAL at 08:44

## 2018-12-28 RX ADMIN — PIPERACILLIN SODIUM,TAZOBACTAM SODIUM 3.38 G: 3; .375 INJECTION, POWDER, FOR SOLUTION INTRAVENOUS at 06:10

## 2018-12-28 RX ADMIN — OXYCODONE AND ACETAMINOPHEN 2 TABLET: 5; 325 TABLET ORAL at 19:54

## 2018-12-28 RX ADMIN — HEPARIN SODIUM 5000 UNITS: 5000 INJECTION, SOLUTION INTRAVENOUS; SUBCUTANEOUS at 19:54

## 2018-12-28 RX ADMIN — FENTANYL CITRATE 100 MCG: 50 INJECTION, SOLUTION INTRAMUSCULAR; INTRAVENOUS at 14:15

## 2018-12-28 RX ADMIN — Medication 10 ML: at 06:10

## 2018-12-28 RX ADMIN — OXYCODONE AND ACETAMINOPHEN 1 TABLET: 5; 325 TABLET ORAL at 08:43

## 2018-12-28 RX ADMIN — EPHEDRINE SULFATE 10 MG: 50 INJECTION, SOLUTION INTRAVENOUS at 14:48

## 2018-12-28 RX ADMIN — PIPERACILLIN SODIUM,TAZOBACTAM SODIUM 3.38 G: 3; .375 INJECTION, POWDER, FOR SOLUTION INTRAVENOUS at 16:30

## 2018-12-28 RX ADMIN — PIPERACILLIN SODIUM,TAZOBACTAM SODIUM 3.38 G: 3; .375 INJECTION, POWDER, FOR SOLUTION INTRAVENOUS at 19:57

## 2018-12-28 RX ADMIN — LIDOCAINE HYDROCHLORIDE 40 MG: 20 INJECTION, SOLUTION EPIDURAL; INFILTRATION; INTRACAUDAL; PERINEURAL at 14:15

## 2018-12-28 RX ADMIN — PROPOFOL 25 MG: 10 INJECTION, EMULSION INTRAVENOUS at 14:15

## 2018-12-28 RX ADMIN — EPHEDRINE SULFATE 10 MG: 50 INJECTION, SOLUTION INTRAVENOUS at 14:44

## 2018-12-28 RX ADMIN — Medication 10 ML: at 19:58

## 2018-12-28 RX ADMIN — ONDANSETRON 4 MG: 2 INJECTION INTRAMUSCULAR; INTRAVENOUS at 14:50

## 2018-12-28 RX ADMIN — INSULIN GLARGINE 25 UNITS: 100 INJECTION, SOLUTION SUBCUTANEOUS at 08:43

## 2018-12-28 NOTE — CDMP QUERY
The medical record reflects the following: 
 
Risk:   long h/o diabetes;  
 
Clinical Indicators:     creat   11/3-   0.95  with GFR  >  60 
                                   creat   12/27 -    1.48   with GFR  60 
                                   creat   0.96-      0.96   with   GFR   > 60 Treatment:   BMP monitoring daily Please clarify if this patient is being treated/managed for: 
 
=>   ARF  in setting  of   hyperglycemia with diabetic foot infection   With  metabolic panel   daily =>Other Explanation of clinical findings =>Unable to Determine (no explanation of clinical findings) R ISK:  Increased SCr x 1.5 or GFR decrease > 25% (within 7 days) INJURY:  Increased SCr x 2.0 or GFR decreased > 50% FAILURE:  Increased SCr x 3.0 or GFR decrease > 75% or SCr >4.0 mg/dL or acute increase >0.5 mg/dL Please clarify and document your clinical opinion in the progress notes and discharge summary including the definitive and/or presumptive diagnosis, (suspected or probable), related to the above clinical findings. Please include clinical findings supporting your diagnosis. If you DECLINE this query or would like to communicate with [facility], please utilize the \"[facility] message box\" at the TOP of the Progress Note on the right. Thank you,     April Ibarra RN   CCDS   x [phone]

## 2018-12-28 NOTE — BRIEF OP NOTE
BRIEF OPERATIVE NOTE    Date of Procedure: 12/28/2018   Preoperative Diagnosis: DX  Postoperative Diagnosis: * No post-op diagnosis entered *    Procedure(s):  INCISION AND DRAINAGE/POSSIBLE AMPUTATION OF LEFT GREAT TOE  Surgeon(s) and Role:     Andrea Ford DPM - Primary         Surgical Assistant: Antonio Krishnamurthy    Surgical Staff:  Circ-1: Paola Samuel  Scrub Tech-1: Adriana Murdock  Surg Asst-1: Natasha Barrier  Event Time In Time Out   Incision Start 12/28/2018 1430    Incision Close       Anesthesia: MAC   Estimated Blood Loss: 0  Specimens:   ID Type Source Tests Collected by Time Destination   1 : Left Great Toe Tissue Preservative Toe  Donte Gonzalez DPM 12/28/2018 1442 Pathology   2 : Left Great Toe, Distal Phalanx Bone Preservative Toe  Donte Gonzalez DPM 12/28/2018 1455 Pathology   3 : Clean Margins Proximal Phalanx Left Great Toe Preservative Toe  Donte Gonzalez DPM 12/28/2018 1456 Pathology   1 : Left Great Toe, Distal Phalanx Tissue Toe CULTURE, ANAEROBIC, CULTURE, TISSUE W Juanito Gobble [facility] 12/28/2018 1440 Microbiology   2 : Clean Margins Proximal Phalanx Left Great Toe Tissue   [facility] 12/28/2018 1441 Microbiology      Findings: osteitis distal phalange.    Complications: none  Implants: * No implants in log *

## 2018-12-28 NOTE — H&P
H&P Update:  Amrit Araiza was seen and examined. History and physical has been reviewed. The patient has been examined. There have been no significant clinical changes since the completion of the originally dated History and Physical.  Reviewed MRI results with wife and Patient, inspected foot. Recommend great toe partial or complete amputation. Advised need for long term antibiotics and difficulty and risk partial toe salvage.     Signed By: Lise Dowling DPM     December 28, 2018 12:49 PM

## 2018-12-28 NOTE — ROUTINE PROCESS
Bedside shift change report given to Khadra Estrada RN (oncoming nurse) by Olivia Doan RN (offgoing nurse). Report included the following information SBAR, Kardex, Procedure Summary, Intake/Output and Recent Results.

## 2018-12-28 NOTE — ROUTINE PROCESS
Bedside and Verbal shift change report given to SANDY Fajardo (oncoming nurse) by Orlando Nazario   (offgoing nurse).  Report included the following information SBAR, MAR.

## 2018-12-28 NOTE — PERIOP NOTES
TRANSFER - OUT REPORT:    Verbal report given to Corey Osman RN(name) on Heaven Em  being transferred to [address] [place] [address](unit) for routine post - op       Report consisted of patients Situation, Background, Assessment and   Recommendations(SBAR). Information from the following report(s) SBAR, Kardex, OR Summary, Procedure Summary, Intake/Output, MAR and Recent Results was reviewed with the receiving nurse. Lines:   Peripheral IV 12/27/18 Left Antecubital (Active)   Site Assessment Clean, dry, & intact 12/28/2018  3:15 PM   Phlebitis Assessment 0 12/28/2018  3:15 PM   Infiltration Assessment 0 12/28/2018  3:15 PM   Dressing Status Clean, dry, & intact 12/28/2018  3:15 PM   Dressing Type Tape;Transparent 12/28/2018  3:15 PM   Hub Color/Line Status Pink;Capped 12/28/2018  3:15 PM   Action Taken Open ports on tubing capped 12/28/2018  7:56 AM   Alcohol Cap Used Yes 12/28/2018  7:56 AM       Peripheral IV 12/27/18 Right Antecubital (Active)   Site Assessment Clean, dry, & intact 12/28/2018  3:15 PM   Phlebitis Assessment 0 12/28/2018  3:15 PM   Infiltration Assessment 0 12/28/2018  3:15 PM   Dressing Status Clean, dry, & intact 12/28/2018  3:15 PM   Dressing Type Tape;Transparent 12/28/2018  3:15 PM   Hub Color/Line Status Pink; Infusing 12/28/2018  3:15 PM   Action Taken Open ports on tubing capped 12/28/2018  7:56 AM   Alcohol Cap Used Yes 12/28/2018  7:56 AM        Opportunity for questions and clarification was provided.       Patient transported with:   Registered Nurse

## 2018-12-28 NOTE — PROGRESS NOTES
MRI Screening form needs to be filled out and faxed to [phone] MRI can be scheduled. If unable to obtain information from pt, [facility] needs to be contacted.  If pt is claustro or will need pain meds, please have ordered in advance in order to facilitate exam.

## 2018-12-28 NOTE — PROGRESS NOTES
[facility]  Progress Note    Patient: Karan Estimable Age: 52 y.o. : 1965 MR#: [id number] SSN: [id number]  Date/Time: 2018 12:46 PM    Subjective/24-hour events:     No new complaints overnight. Assessment:   L 1st toe ulcer with cellulitis and underlying acute osteomyelitis   Uncontrolled DM 2 with hyperglycemia  Overweight, BMI 28.8    Plan:  Surgical intervention per podiatry. Blood sugars well controlled, continue insulin as ordered. Supportive care o/w. Follow. Case discussed with:  [x]Patient  []Family  []Nursing  []Case Management  DVT Prophylaxis:  []Lovenox  [x]Hep SQ  []SCDs  []Coumadin   []On Heparin gtt    Objective:   VS:   Visit Vitals  /85 (BP 1 Location: Left arm, BP Patient Position: At rest)   Pulse 70   Temp 98.8 °F (37.1 °C)   Resp 16   Ht 6' 1\" (1.854 m)   Wt 97.8 kg (215 lb 11.2 oz)   SpO2 98%   BMI 28.46 kg/m²      Tmax/24hrs: Temp (24hrs), Av.3 °F (36.8 °C), Min:97 °F (36.1 °C), Max:99.1 °F (37.3 °C)      Intake/Output Summary (Last 24 hours) at 2018 1246  Last data filed at 2018 1805  Gross per 24 hour   Intake 100 ml   Output --   Net 100 ml       General:  In NAD. Nontoxic-appearing. Cardiovascular:  RRR. Pulmonary:  No wheezes, effort nonlabored. GI:  Abdomen soft, NTTP. Extremities:  Warm, no ischemia.   Neuro:  Awake and alert, motor grossly nonfocal.    Labs:    Recent Results (from the past 24 hour(s))   GLUCOSE, POC    Collection Time: 18  3:06 PM   Result Value Ref Range    Glucose (POC) 139 (H) 70 - 110 mg/dL   GLUCOSE, POC    Collection Time: 18  4:54 PM   Result Value Ref Range    Glucose (POC) 98 70 - 110 mg/dL   GLUCOSE, POC    Collection Time: 18  9:20 PM   Result Value Ref Range    Glucose (POC) 142 (H) 70 - 132 mg/dL   METABOLIC PANEL, BASIC    Collection Time: 18  2:52 AM   Result Value Ref Range    Sodium 137 136 - 145 mmol/L    Potassium 4.2 3.5 - 5.5 mmol/L    Chloride 105 100 - 108 mmol/L    CO2 28 21 - 32 mmol/L    Anion gap 4 3.0 - 18 mmol/L    Glucose 184 (H) 74 - 99 mg/dL    BUN 14 7.0 - 18 MG/DL    Creatinine 0.96 0.6 - 1.3 MG/DL    BUN/Creatinine ratio 15 12 - 20      GFR est AA >60 >60 ml/min/1.73m2    GFR est non-AA >60 >60 ml/min/1.73m2    Calcium 8.2 (L) 8.5 - 10.1 MG/DL   MAGNESIUM    Collection Time: 12/28/18  2:52 AM   Result Value Ref Range    Magnesium 2.2 1.6 - 2.6 mg/dL   PHOSPHORUS    Collection Time: 12/28/18  2:52 AM   Result Value Ref Range    Phosphorus 2.9 2.5 - 4.9 MG/DL   CBC WITH AUTOMATED DIFF    Collection Time: 12/28/18  2:52 AM   Result Value Ref Range    WBC 7.6 4.6 - 13.2 K/uL    RBC 3.48 (L) 4.70 - 5.50 M/uL    HGB 10.2 (L) 13.0 - 16.0 g/dL    HCT 30.6 (L) 36.0 - 48.0 %    MCV 87.9 74.0 - 97.0 FL    MCH 29.3 24.0 - 34.0 PG    MCHC 33.3 31.0 - 37.0 g/dL    RDW 12.5 11.6 - 14.5 %    PLATELET 049 876 - 952 K/uL    MPV 9.4 9.2 - 11.8 FL    NEUTROPHILS 71 40 - 73 %    LYMPHOCYTES 21 21 - 52 %    MONOCYTES 6 3 - 10 %    EOSINOPHILS 2 0 - 5 %    BASOPHILS 0 0 - 2 %    ABS. NEUTROPHILS 5.4 1.8 - 8.0 K/UL    ABS. LYMPHOCYTES 1.6 0.9 - 3.6 K/UL    ABS. MONOCYTES 0.4 0.05 - 1.2 K/UL    ABS. EOSINOPHILS 0.2 0.0 - 0.4 K/UL    ABS.  BASOPHILS 0.0 0.0 - 0.1 K/UL    DF AUTOMATED     TSH 3RD GENERATION    Collection Time: 12/28/18  2:52 AM   Result Value Ref Range    TSH 0.87 0.36 - 3.74 uIU/mL   T4, FREE    Collection Time: 12/28/18  2:52 AM   Result Value Ref Range    T4, Free 1.3 0.7 - 1.5 NG/DL   GLUCOSE, POC    Collection Time: 12/28/18  7:41 AM   Result Value Ref Range    Glucose (POC) 165 (H) 70 - 110 mg/dL   GLUCOSE, POC    Collection Time: 12/28/18 11:18 AM   Result Value Ref Range    Glucose (POC) 130 (H) 70 - 110 mg/dL       Signed By: Kin Melchor MD     December 28, 2018 12:46 PM

## 2018-12-28 NOTE — PROGRESS NOTES
MRI Safety Screening form needs to be filled out and faxed to [phone] MRI can be scheduled. If unable to acquire information from pt, [facility] must be contacted.  If pt is claustrophobic or will need pain meds, please have ordered in advance to help facilitate MRI exam.

## 2018-12-28 NOTE — ANESTHESIA POSTPROCEDURE EVALUATION
Procedure(s):  INCISION AND DRAINAGE/PARTIAL  AMPUTATION OF LEFT GREAT TOE.     Anesthesia Post Evaluation      Multimodal analgesia: multimodal analgesia used between 6 hours prior to anesthesia start to PACU discharge  Patient location during evaluation: bedside  Patient participation: complete - patient participated  Level of consciousness: awake  Pain management: adequate  Airway patency: patent  Anesthetic complications: no  Cardiovascular status: stable  Respiratory status: acceptable  Hydration status: acceptable  Post anesthesia nausea and vomiting:  controlled      Visit Vitals  /69   Pulse 75   Temp 36.7 °C (98 °F)   Resp 16   Ht 6' 1\" (1.854 m)   Wt 97.8 kg (215 lb 11.2 oz)   SpO2 100%   BMI 28.46 kg/m²

## 2018-12-29 LAB
ANION GAP SERPL CALC-SCNC: 4 MMOL/L (ref 3–18)
BACTERIA SPEC CULT: ABNORMAL
BASOPHILS # BLD: 0 K/UL (ref 0–0.1)
BASOPHILS NFR BLD: 0 % (ref 0–2)
BUN SERPL-MCNC: 8 MG/DL (ref 7–18)
BUN/CREAT SERPL: 8 (ref 12–20)
CALCIUM SERPL-MCNC: 8.2 MG/DL (ref 8.5–10.1)
CHLORIDE SERPL-SCNC: 105 MMOL/L (ref 100–108)
CO2 SERPL-SCNC: 30 MMOL/L (ref 21–32)
CREAT SERPL-MCNC: 0.95 MG/DL (ref 0.6–1.3)
DIFFERENTIAL METHOD BLD: ABNORMAL
EOSINOPHIL # BLD: 0.2 K/UL (ref 0–0.4)
EOSINOPHIL NFR BLD: 3 % (ref 0–5)
ERYTHROCYTE [DISTWIDTH] IN BLOOD BY AUTOMATED COUNT: 12.4 % (ref 11.6–14.5)
GLUCOSE BLD STRIP.AUTO-MCNC: 132 MG/DL (ref 70–110)
GLUCOSE BLD STRIP.AUTO-MCNC: 135 MG/DL (ref 70–110)
GLUCOSE BLD STRIP.AUTO-MCNC: 161 MG/DL (ref 70–110)
GLUCOSE SERPL-MCNC: 132 MG/DL (ref 74–99)
GRAM STN SPEC: ABNORMAL
GRAM STN SPEC: ABNORMAL
HCT VFR BLD AUTO: 30.4 % (ref 36–48)
HGB BLD-MCNC: 10 G/DL (ref 13–16)
LYMPHOCYTES # BLD: 2.2 K/UL (ref 0.9–3.6)
LYMPHOCYTES NFR BLD: 31 % (ref 21–52)
MCH RBC QN AUTO: 29.1 PG (ref 24–34)
MCHC RBC AUTO-ENTMCNC: 32.9 G/DL (ref 31–37)
MCV RBC AUTO: 88.4 FL (ref 74–97)
MONOCYTES # BLD: 0.4 K/UL (ref 0.05–1.2)
MONOCYTES NFR BLD: 6 % (ref 3–10)
NEUTS SEG # BLD: 4.2 K/UL (ref 1.8–8)
NEUTS SEG NFR BLD: 60 % (ref 40–73)
PLATELET # BLD AUTO: 286 K/UL (ref 135–420)
PMV BLD AUTO: 9.2 FL (ref 9.2–11.8)
POTASSIUM SERPL-SCNC: 3.8 MMOL/L (ref 3.5–5.5)
RBC # BLD AUTO: 3.44 M/UL (ref 4.7–5.5)
SERVICE CMNT-IMP: ABNORMAL
SODIUM SERPL-SCNC: 139 MMOL/L (ref 136–145)
WBC # BLD AUTO: 7 K/UL (ref 4.6–13.2)

## 2018-12-29 PROCEDURE — 74011250637 HC RX REV CODE- 250/637: Performed by: INTERNAL MEDICINE

## 2018-12-29 PROCEDURE — 74011636637 HC RX REV CODE- 636/637: Performed by: FAMILY MEDICINE

## 2018-12-29 PROCEDURE — 74011250637 HC RX REV CODE- 250/637: Performed by: FAMILY MEDICINE

## 2018-12-29 PROCEDURE — 74011250636 HC RX REV CODE- 250/636: Performed by: INTERNAL MEDICINE

## 2018-12-29 PROCEDURE — 74011250636 HC RX REV CODE- 250/636: Performed by: FAMILY MEDICINE

## 2018-12-29 PROCEDURE — 65270000029 HC RM PRIVATE

## 2018-12-29 PROCEDURE — 36415 COLL VENOUS BLD VENIPUNCTURE: CPT

## 2018-12-29 PROCEDURE — 80048 BASIC METABOLIC PNL TOTAL CA: CPT

## 2018-12-29 PROCEDURE — 74011636637 HC RX REV CODE- 636/637: Performed by: INTERNAL MEDICINE

## 2018-12-29 PROCEDURE — 74011000258 HC RX REV CODE- 258: Performed by: FAMILY MEDICINE

## 2018-12-29 PROCEDURE — 82962 GLUCOSE BLOOD TEST: CPT

## 2018-12-29 PROCEDURE — 85025 COMPLETE CBC W/AUTO DIFF WBC: CPT

## 2018-12-29 RX ADMIN — PIPERACILLIN SODIUM,TAZOBACTAM SODIUM 3.38 G: 3; .375 INJECTION, POWDER, FOR SOLUTION INTRAVENOUS at 08:16

## 2018-12-29 RX ADMIN — PIPERACILLIN SODIUM,TAZOBACTAM SODIUM 3.38 G: 3; .375 INJECTION, POWDER, FOR SOLUTION INTRAVENOUS at 12:55

## 2018-12-29 RX ADMIN — ACETAMINOPHEN 650 MG: 325 TABLET ORAL at 17:08

## 2018-12-29 RX ADMIN — HEPARIN SODIUM 5000 UNITS: 5000 INJECTION, SOLUTION INTRAVENOUS; SUBCUTANEOUS at 03:58

## 2018-12-29 RX ADMIN — PIPERACILLIN SODIUM,TAZOBACTAM SODIUM 3.38 G: 3; .375 INJECTION, POWDER, FOR SOLUTION INTRAVENOUS at 01:38

## 2018-12-29 RX ADMIN — INSULIN LISPRO 2 UNITS: 100 INJECTION, SOLUTION INTRAVENOUS; SUBCUTANEOUS at 08:40

## 2018-12-29 RX ADMIN — DOCUSATE SODIUM 100 MG: 100 CAPSULE, LIQUID FILLED ORAL at 10:08

## 2018-12-29 RX ADMIN — OXYCODONE AND ACETAMINOPHEN 2 TABLET: 5; 325 TABLET ORAL at 08:14

## 2018-12-29 RX ADMIN — INSULIN GLARGINE 25 UNITS: 100 INJECTION, SOLUTION SUBCUTANEOUS at 08:41

## 2018-12-29 RX ADMIN — ONDANSETRON 4 MG: 2 INJECTION INTRAMUSCULAR; INTRAVENOUS at 10:07

## 2018-12-29 RX ADMIN — HEPARIN SODIUM 5000 UNITS: 5000 INJECTION, SOLUTION INTRAVENOUS; SUBCUTANEOUS at 20:30

## 2018-12-29 RX ADMIN — Medication 5 ML: at 22:00

## 2018-12-29 RX ADMIN — HEPARIN SODIUM 5000 UNITS: 5000 INJECTION, SOLUTION INTRAVENOUS; SUBCUTANEOUS at 12:54

## 2018-12-29 RX ADMIN — Medication 10 ML: at 08:16

## 2018-12-29 RX ADMIN — PIPERACILLIN SODIUM,TAZOBACTAM SODIUM 3.38 G: 3; .375 INJECTION, POWDER, FOR SOLUTION INTRAVENOUS at 20:29

## 2018-12-29 RX ADMIN — FLUTICASONE PROPIONATE 2 SPRAY: 50 SPRAY, METERED NASAL at 08:43

## 2018-12-29 RX ADMIN — OXYCODONE AND ACETAMINOPHEN 2 TABLET: 5; 325 TABLET ORAL at 01:43

## 2018-12-29 RX ADMIN — ONDANSETRON 4 MG: 2 INJECTION INTRAMUSCULAR; INTRAVENOUS at 15:43

## 2018-12-29 RX ADMIN — OXYCODONE AND ACETAMINOPHEN 2 TABLET: 5; 325 TABLET ORAL at 15:42

## 2018-12-29 RX ADMIN — OXYCODONE AND ACETAMINOPHEN 2 TABLET: 5; 325 TABLET ORAL at 20:28

## 2018-12-29 RX ADMIN — Medication 10 ML: at 13:00

## 2018-12-29 NOTE — OP NOTES
[facility] REPORT    Yobany Miller  MR#: [id number]  : 1965  ACCOUNT #: [de-identified]   DATE OF SERVICE: 2018    PREOPERATIVE DIAGNOSIS:  Osteomyelitis, possible abscess, left great toe. POSTOPERATIVE DIAGNOSIS:  Osteomyelitis, distal phalanx. PROCEDURE PERFORMED:  Partial amputation of left great toe. SURGEON:  Carrie Alcaraz DPM.    ASSISTANT: -    ANESTHESIA: -    ESTIMATED BLOOD LOSS:  -    SPECIMENS REMOVED: -    IMPLANTS: -. HOSPITAL PROCEDURE:  The patient was placed on the operating table in supine position. After adequate induction of MAC anesthesia, which was converted to LMA intraoperatively, attention was directed to the left great toe. The end of the toe had devitalized tissue, was deformed with bone protruding through the skin. This was devitalized bone. Swelling in the foot and toe had reduced with IV antibiotics. I debrided away devitalized tissue. I then carefully excised around the distal phalanx incising all necrotic tissue and exposed the distal phalanx, which had osteomyelitis of its distal portion. I excised the distal phalanx completely. Wound edges were debrided. There was no deeper purulence noted. No pus extruding up the tendons and vascularity was grossly intact. I excised the interphalangeal joint sesamoid and debrided away the articular surface of the proximal phalanx to allow for closure. I sent bone for pathology and culture. I thoroughly irrigated with antibiotic solution and loosely approximated with Prolene suture and Steri-Strips. A Betadine soft dressing was applied. Patient tolerated procedure and anesthesia well. Vital signs stable throughout. The patient was transported to the recovery room in stable condition.       GRETCHEN Jackman / ELTON  D: 2018 15:02     T: 2018 10:59  JOB #: [id number]  CC: Manas Hogan DPM

## 2018-12-29 NOTE — ROUTINE PROCESS
Bedside and Verbal shift change report given to [address] (oncoming nurse) by Shellie Ibarra (offgoing nurse). Report included the following information SBAR, MAR, VS and summary of care. Patient awake and alert, family member at bs.

## 2018-12-29 NOTE — PROGRESS NOTES
Problem: Falls - Risk of  Goal: *Absence of Falls  Document Kraig Fall Risk and appropriate interventions in the flowsheet.   Outcome: Progressing Towards Goal  Fall Risk Interventions:  Mobility Interventions: Assess mobility with egress test, PT Consult for mobility concerns         Medication Interventions: Patient to call before getting OOB, Teach patient to arise slowly

## 2018-12-29 NOTE — ROUTINE PROCESS
patient wants to see or talk to dr. Omaira Hernandez or podiatrist on call. I paged . Waiting for call back. Dr. Ashley Garibay called back will see patient dixie. 1900 report given to tosha her.

## 2018-12-29 NOTE — PROGRESS NOTES
[facility]  Progress Note    Patient: Susana Zuniga Age: 52 y.o. : 1965 MR#: [id number] SSN: [id number]  Date/Time: 2018 2:00 PM    Subjective/24-hour events:     Some mild discomfort at surgcial site but nothing new overnight. Remains afebrile. Assessment:   L 1st toe ulcer with cellulitis and underlying acute osteomyelitis   Uncontrolled DM 2 with hyperglycemia  Overweight, BMI 28.8    Plan:  Wound care per podiatry. Insulin - adjust as necessary. Supportive care o/w. Follow. Case discussed with:  [x]Patient  [x]Family  []Nursing  []Case Management  DVT Prophylaxis:  []Lovenox  [x]Hep SQ  []SCDs  []Coumadin   []On Heparin gtt    Objective:   VS:   Visit Vitals  /76 (BP 1 Location: Right arm)   Pulse 70   Temp 97.2 °F (36.2 °C)   Resp 20   Ht 6' 1\" (1.854 m)   Wt 99.9 kg (220 lb 4.8 oz)   SpO2 95%   BMI 29.07 kg/m²      Tmax/24hrs: Temp (24hrs), Av.1 °F (36.7 °C), Min:97.2 °F (36.2 °C), Max:99 °F (37.2 °C)      Intake/Output Summary (Last 24 hours) at 2018 1400  Last data filed at 2018 0906  Gross per 24 hour   Intake 910 ml   Output --   Net 910 ml       General:  In NAD. Nontoxic-appearing. Cardiovascular:  RRR. Pulmonary:  No wheezes, effort nonlabored. GI:  Abdomen soft, NTTP. Extremities:  Warm, no ischemia.   Neuro:  Awake and alert, motor grossly nonfocal.    Labs:    Recent Results (from the past 24 hour(s))   CULTURE, TISSUE W GRAM STAIN    Collection Time: 18  2:40 PM   Result Value Ref Range    Special Requests: LEFT  TOE  GRT DISTAL PHALANX        GRAM STAIN NO WBC'S SEEN      GRAM STAIN FEW GRAM POSITIVE COCCI IN PAIRS      Culture result: MODERATE STREPTOCOCCI, BETA HEMOLYTIC GROUP B (A)      Culture result: MODERATE POSSIBLE STREPTOCOCCI, ALPHA HEMOLYTIC (A)     CULTURE, ANAEROBIC    Collection Time: 18  2:40 PM   Result Value Ref Range    Special Requests: LEFT  TOE  GRT DISTAL PHALANX Culture result: CULTURE IN PROGRESS,FURTHER UPDATES TO FOLLOW     CULTURE, TISSUE W GRAM STAIN    Collection Time: 12/28/18  2:41 PM   Result Value Ref Range    Special Requests: LEFT  TOE  CLEAN MERGINS PROXIMAL PHALANX        GRAM STAIN NO ORGANISMS SEEN      GRAM STAIN NO WBC'S SEEN      Culture result: CULTURE IN [address] UPDATES TO FOLLOW     CULTURE, ANAEROBIC    Collection Time: 12/28/18  2:41 PM   Result Value Ref Range    Special Requests: LEFT  TOE  CLEAN MERGINS PROXIMAL PHALANX        Culture result: CULTURE IN PROGRESS,FURTHER UPDATES TO FOLLOW     GLUCOSE, POC    Collection Time: 12/28/18  3:22 PM   Result Value Ref Range    Glucose (POC) 87 70 - 110 mg/dL   GLUCOSE, POC    Collection Time: 12/28/18  4:07 PM   Result Value Ref Range    Glucose (POC) 95 70 - 110 mg/dL   GLUCOSE, POC    Collection Time: 12/28/18 10:12 PM   Result Value Ref Range    Glucose (POC) 198 (H) 70 - 791 mg/dL   METABOLIC PANEL, BASIC    Collection Time: 12/29/18  1:54 AM   Result Value Ref Range    Sodium 139 136 - 145 mmol/L    Potassium 3.8 3.5 - 5.5 mmol/L    Chloride 105 100 - 108 mmol/L    CO2 30 21 - 32 mmol/L    Anion gap 4 3.0 - 18 mmol/L    Glucose 132 (H) 74 - 99 mg/dL    BUN 8 7.0 - 18 MG/DL    Creatinine 0.95 0.6 - 1.3 MG/DL    BUN/Creatinine ratio 8 (L) 12 - 20      GFR est AA >60 >60 ml/min/1.73m2    GFR est non-AA >60 >60 ml/min/1.73m2    Calcium 8.2 (L) 8.5 - 10.1 MG/DL   CBC WITH AUTOMATED DIFF    Collection Time: 12/29/18  1:54 AM   Result Value Ref Range    WBC 7.0 4.6 - 13.2 K/uL    RBC 3.44 (L) 4.70 - 5.50 M/uL    HGB 10.0 (L) 13.0 - 16.0 g/dL    HCT 30.4 (L) 36.0 - 48.0 %    MCV 88.4 74.0 - 97.0 FL    MCH 29.1 24.0 - 34.0 PG    MCHC 32.9 31.0 - 37.0 g/dL    RDW 12.4 11.6 - 14.5 %    PLATELET 476 509 - 813 K/uL    MPV 9.2 9.2 - 11.8 FL    NEUTROPHILS 60 40 - 73 %    LYMPHOCYTES 31 21 - 52 %    MONOCYTES 6 3 - 10 %    EOSINOPHILS 3 0 - 5 %    BASOPHILS 0 0 - 2 %    ABS.  NEUTROPHILS 4.2 1.8 - 8.0 K/UL ABS. LYMPHOCYTES 2.2 0.9 - 3.6 K/UL    ABS. MONOCYTES 0.4 0.05 - 1.2 K/UL    ABS. EOSINOPHILS 0.2 0.0 - 0.4 K/UL    ABS.  BASOPHILS 0.0 0.0 - 0.1 K/UL    DF AUTOMATED     GLUCOSE, POC    Collection Time: 12/29/18  8:21 AM   Result Value Ref Range    Glucose (POC) 161 (H) 70 - 110 mg/dL       Signed By: Rojelio Lopez MD     December 29, 2018 2:00 PM

## 2018-12-30 LAB
ANION GAP SERPL CALC-SCNC: 3 MMOL/L (ref 3–18)
BASOPHILS # BLD: 0 K/UL (ref 0–0.1)
BASOPHILS NFR BLD: 0 % (ref 0–2)
BUN SERPL-MCNC: 8 MG/DL (ref 7–18)
BUN/CREAT SERPL: 8 (ref 12–20)
CALCIUM SERPL-MCNC: 7.9 MG/DL (ref 8.5–10.1)
CHLORIDE SERPL-SCNC: 104 MMOL/L (ref 100–108)
CO2 SERPL-SCNC: 31 MMOL/L (ref 21–32)
CREAT SERPL-MCNC: 0.95 MG/DL (ref 0.6–1.3)
DIFFERENTIAL METHOD BLD: ABNORMAL
EOSINOPHIL # BLD: 0.2 K/UL (ref 0–0.4)
EOSINOPHIL NFR BLD: 3 % (ref 0–5)
ERYTHROCYTE [DISTWIDTH] IN BLOOD BY AUTOMATED COUNT: 12.3 % (ref 11.6–14.5)
GLUCOSE BLD STRIP.AUTO-MCNC: 100 MG/DL (ref 70–110)
GLUCOSE BLD STRIP.AUTO-MCNC: 120 MG/DL (ref 70–110)
GLUCOSE BLD STRIP.AUTO-MCNC: 135 MG/DL (ref 70–110)
GLUCOSE BLD STRIP.AUTO-MCNC: 193 MG/DL (ref 70–110)
GLUCOSE SERPL-MCNC: 104 MG/DL (ref 74–99)
HCT VFR BLD AUTO: 29.7 % (ref 36–48)
HGB BLD-MCNC: 9.9 G/DL (ref 13–16)
LYMPHOCYTES # BLD: 2.2 K/UL (ref 0.9–3.6)
LYMPHOCYTES NFR BLD: 34 % (ref 21–52)
MCH RBC QN AUTO: 29.5 PG (ref 24–34)
MCHC RBC AUTO-ENTMCNC: 33.3 G/DL (ref 31–37)
MCV RBC AUTO: 88.4 FL (ref 74–97)
MONOCYTES # BLD: 0.7 K/UL (ref 0.05–1.2)
MONOCYTES NFR BLD: 11 % (ref 3–10)
NEUTS SEG # BLD: 3.4 K/UL (ref 1.8–8)
NEUTS SEG NFR BLD: 52 % (ref 40–73)
PLATELET # BLD AUTO: 288 K/UL (ref 135–420)
PMV BLD AUTO: 9.1 FL (ref 9.2–11.8)
POTASSIUM SERPL-SCNC: 4 MMOL/L (ref 3.5–5.5)
RBC # BLD AUTO: 3.36 M/UL (ref 4.7–5.5)
SODIUM SERPL-SCNC: 138 MMOL/L (ref 136–145)
WBC # BLD AUTO: 6.6 K/UL (ref 4.6–13.2)

## 2018-12-30 PROCEDURE — 65270000029 HC RM PRIVATE

## 2018-12-30 PROCEDURE — 74011250636 HC RX REV CODE- 250/636: Performed by: INTERNAL MEDICINE

## 2018-12-30 PROCEDURE — 82962 GLUCOSE BLOOD TEST: CPT

## 2018-12-30 PROCEDURE — 74011250637 HC RX REV CODE- 250/637: Performed by: INTERNAL MEDICINE

## 2018-12-30 PROCEDURE — 74011250637 HC RX REV CODE- 250/637: Performed by: FAMILY MEDICINE

## 2018-12-30 PROCEDURE — 80048 BASIC METABOLIC PNL TOTAL CA: CPT

## 2018-12-30 PROCEDURE — 74011636637 HC RX REV CODE- 636/637: Performed by: INTERNAL MEDICINE

## 2018-12-30 PROCEDURE — 74011250636 HC RX REV CODE- 250/636: Performed by: FAMILY MEDICINE

## 2018-12-30 PROCEDURE — 74011636637 HC RX REV CODE- 636/637: Performed by: FAMILY MEDICINE

## 2018-12-30 PROCEDURE — 36415 COLL VENOUS BLD VENIPUNCTURE: CPT

## 2018-12-30 PROCEDURE — 85025 COMPLETE CBC W/AUTO DIFF WBC: CPT

## 2018-12-30 PROCEDURE — 74011000258 HC RX REV CODE- 258: Performed by: FAMILY MEDICINE

## 2018-12-30 RX ADMIN — Medication 5 ML: at 06:00

## 2018-12-30 RX ADMIN — PIPERACILLIN SODIUM,TAZOBACTAM SODIUM 3.38 G: 3; .375 INJECTION, POWDER, FOR SOLUTION INTRAVENOUS at 12:09

## 2018-12-30 RX ADMIN — PIPERACILLIN SODIUM,TAZOBACTAM SODIUM 3.38 G: 3; .375 INJECTION, POWDER, FOR SOLUTION INTRAVENOUS at 20:04

## 2018-12-30 RX ADMIN — Medication 10 ML: at 12:12

## 2018-12-30 RX ADMIN — HEPARIN SODIUM 5000 UNITS: 5000 INJECTION, SOLUTION INTRAVENOUS; SUBCUTANEOUS at 04:00

## 2018-12-30 RX ADMIN — PIPERACILLIN SODIUM,TAZOBACTAM SODIUM 3.38 G: 3; .375 INJECTION, POWDER, FOR SOLUTION INTRAVENOUS at 08:02

## 2018-12-30 RX ADMIN — PIPERACILLIN SODIUM,TAZOBACTAM SODIUM 3.38 G: 3; .375 INJECTION, POWDER, FOR SOLUTION INTRAVENOUS at 03:20

## 2018-12-30 RX ADMIN — ONDANSETRON 4 MG: 2 INJECTION INTRAMUSCULAR; INTRAVENOUS at 08:08

## 2018-12-30 RX ADMIN — HEPARIN SODIUM 5000 UNITS: 5000 INJECTION, SOLUTION INTRAVENOUS; SUBCUTANEOUS at 20:02

## 2018-12-30 RX ADMIN — DOCUSATE SODIUM 100 MG: 100 CAPSULE, LIQUID FILLED ORAL at 08:11

## 2018-12-30 RX ADMIN — ONDANSETRON 4 MG: 2 INJECTION INTRAMUSCULAR; INTRAVENOUS at 18:02

## 2018-12-30 RX ADMIN — OXYCODONE AND ACETAMINOPHEN 2 TABLET: 5; 325 TABLET ORAL at 08:07

## 2018-12-30 RX ADMIN — HEPARIN SODIUM 5000 UNITS: 5000 INJECTION, SOLUTION INTRAVENOUS; SUBCUTANEOUS at 12:05

## 2018-12-30 RX ADMIN — OXYCODONE AND ACETAMINOPHEN 2 TABLET: 5; 325 TABLET ORAL at 18:01

## 2018-12-30 RX ADMIN — INSULIN GLARGINE 25 UNITS: 100 INJECTION, SOLUTION SUBCUTANEOUS at 08:19

## 2018-12-30 RX ADMIN — INSULIN LISPRO 2 UNITS: 100 INJECTION, SOLUTION INTRAVENOUS; SUBCUTANEOUS at 12:03

## 2018-12-30 RX ADMIN — Medication 10 ML: at 23:07

## 2018-12-30 NOTE — PROGRESS NOTES
Seen at bedside awake and alert. Moderate pain. Dressing clean and dry, intact. Inspected wound. Well approximated. No signs infection. Redressed. Need ID consult in AM for antibiotic.

## 2018-12-30 NOTE — ROUTINE PROCESS
Bedside, Verbal and Written shift change report given to Remberto Mustafa (oncoming nurse) by Kamilah Dodge (offgoing nurse). Report included the following information SBAR, Kardex, MAR and Accordion.

## 2018-12-30 NOTE — PROGRESS NOTES
[facility]ist [facility]  Progress Note    Patient: Chung Francisco Age: 52 y.o. : 1965 MR#: [id number] SSN: [id number]  Date/Time: 2018 11:08 AM    Subjective/24-hour events:     No complaints. Remains afebrile. F/U wound assessment done by podiatry this AM.    Assessment:   L 1st toe ulcer with cellulitis and underlying acute osteomyelitis   Uncontrolled DM 2 with hyperglycemia  Overweight, BMI 28.8    Plan:  Continue wound care per podiatry. Wound looking good at this point. Patient prefers to wait for ID to see prior to being discharged. Continue Zosyn as ordered for now. ID coverage should be available in the next 1-2 days. Supportive care otherwise. Follow. Case discussed with:  [x]Patient  []Family  []Nursing  []Case Management  DVT Prophylaxis:  []Lovenox  [x]Hep SQ  []SCDs  []Coumadin   []On Heparin gtt    Objective:   VS:   Visit Vitals  /74 (BP 1 Location: Right arm, BP Patient Position: At rest)   Pulse 71   Temp 98.3 °F (36.8 °C)   Resp 18   Ht 6' 1\" (1.854 m)   Wt 100 kg (220 lb 6.4 oz)   SpO2 95%   BMI 29.08 kg/m²      Tmax/24hrs: Temp (24hrs), Av.2 °F (36.8 °C), Min:97.2 °F (36.2 °C), Max:98.5 °F (36.9 °C)      Intake/Output Summary (Last 24 hours) at 2018 1108  Last data filed at 2018 0320  Gross per 24 hour   Intake 1020 ml   Output --   Net 1020 ml       General:  In NAD. Nontoxic-appearing. Cardiovascular:  RRR. Pulmonary:  No wheezes, effort nonlabored. GI:  Abdomen soft, NTTP. Extremities:  Warm, no ischemia.   Neuro:  Awake and alert, motor grossly nonfocal.    Labs:    Recent Results (from the past 24 hour(s))   GLUCOSE, POC    Collection Time: 18  5:02 PM   Result Value Ref Range    Glucose (POC) 132 (H) 70 - 110 mg/dL   GLUCOSE, POC    Collection Time: 18  9:53 PM   Result Value Ref Range    Glucose (POC) 135 (H) 70 - 345 mg/dL   METABOLIC PANEL, BASIC    Collection Time: 18  3:15 AM   Result Value Ref Range    Sodium 138 136 - 145 mmol/L    Potassium 4.0 3.5 - 5.5 mmol/L    Chloride 104 100 - 108 mmol/L    CO2 31 21 - 32 mmol/L    Anion gap 3 3.0 - 18 mmol/L    Glucose 104 (H) 74 - 99 mg/dL    BUN 8 7.0 - 18 MG/DL    Creatinine 0.95 0.6 - 1.3 MG/DL    BUN/Creatinine ratio 8 (L) 12 - 20      GFR est AA >60 >60 ml/min/1.73m2    GFR est non-AA >60 >60 ml/min/1.73m2    Calcium 7.9 (L) 8.5 - 10.1 MG/DL   CBC WITH AUTOMATED DIFF    Collection Time: 12/30/18  3:15 AM   Result Value Ref Range    WBC 6.6 4.6 - 13.2 K/uL    RBC 3.36 (L) 4.70 - 5.50 M/uL    HGB 9.9 (L) 13.0 - 16.0 g/dL    HCT 29.7 (L) 36.0 - 48.0 %    MCV 88.4 74.0 - 97.0 FL    MCH 29.5 24.0 - 34.0 PG    MCHC 33.3 31.0 - 37.0 g/dL    RDW 12.3 11.6 - 14.5 %    PLATELET 794 922 - 692 K/uL    MPV 9.1 (L) 9.2 - 11.8 FL    NEUTROPHILS 52 40 - 73 %    LYMPHOCYTES 34 21 - 52 %    MONOCYTES 11 (H) 3 - 10 %    EOSINOPHILS 3 0 - 5 %    BASOPHILS 0 0 - 2 %    ABS. NEUTROPHILS 3.4 1.8 - 8.0 K/UL    ABS. LYMPHOCYTES 2.2 0.9 - 3.6 K/UL    ABS. MONOCYTES 0.7 0.05 - 1.2 K/UL    ABS. EOSINOPHILS 0.2 0.0 - 0.4 K/UL    ABS.  BASOPHILS 0.0 0.0 - 0.1 K/UL    DF AUTOMATED     GLUCOSE, POC    Collection Time: 12/30/18  7:20 AM   Result Value Ref Range    Glucose (POC) 120 (H) 70 - 110 mg/dL       Signed By: Silvina Reyes MD     December 30, 2018 11:08 AM

## 2018-12-31 LAB
BACTERIA SPEC CULT: ABNORMAL
GLUCOSE BLD STRIP.AUTO-MCNC: 114 MG/DL (ref 70–110)
GLUCOSE BLD STRIP.AUTO-MCNC: 155 MG/DL (ref 70–110)
GLUCOSE BLD STRIP.AUTO-MCNC: 179 MG/DL (ref 70–110)
GLUCOSE BLD STRIP.AUTO-MCNC: 180 MG/DL (ref 70–110)
GRAM STN SPEC: ABNORMAL
GRAM STN SPEC: ABNORMAL
SERVICE CMNT-IMP: ABNORMAL

## 2018-12-31 PROCEDURE — 74011000258 HC RX REV CODE- 258: Performed by: INTERNAL MEDICINE

## 2018-12-31 PROCEDURE — 74011636637 HC RX REV CODE- 636/637: Performed by: INTERNAL MEDICINE

## 2018-12-31 PROCEDURE — 74011250636 HC RX REV CODE- 250/636: Performed by: INTERNAL MEDICINE

## 2018-12-31 PROCEDURE — 65270000029 HC RM PRIVATE

## 2018-12-31 PROCEDURE — 82962 GLUCOSE BLOOD TEST: CPT

## 2018-12-31 PROCEDURE — 74011250636 HC RX REV CODE- 250/636: Performed by: FAMILY MEDICINE

## 2018-12-31 PROCEDURE — 74011250637 HC RX REV CODE- 250/637: Performed by: FAMILY MEDICINE

## 2018-12-31 PROCEDURE — 74011000258 HC RX REV CODE- 258: Performed by: FAMILY MEDICINE

## 2018-12-31 PROCEDURE — 74011636637 HC RX REV CODE- 636/637: Performed by: FAMILY MEDICINE

## 2018-12-31 RX ADMIN — INSULIN LISPRO 2 UNITS: 100 INJECTION, SOLUTION INTRAVENOUS; SUBCUTANEOUS at 12:35

## 2018-12-31 RX ADMIN — OXYCODONE AND ACETAMINOPHEN 2 TABLET: 5; 325 TABLET ORAL at 01:15

## 2018-12-31 RX ADMIN — INSULIN GLARGINE 25 UNITS: 100 INJECTION, SOLUTION SUBCUTANEOUS at 08:38

## 2018-12-31 RX ADMIN — PIPERACILLIN SODIUM,TAZOBACTAM SODIUM 3.38 G: 3; .375 INJECTION, POWDER, FOR SOLUTION INTRAVENOUS at 08:38

## 2018-12-31 RX ADMIN — FLUTICASONE PROPIONATE 2 SPRAY: 50 SPRAY, METERED NASAL at 09:00

## 2018-12-31 RX ADMIN — PIPERACILLIN AND TAZOBACTAM 4.5 G: 4; .5 INJECTION, POWDER, FOR SOLUTION INTRAVENOUS at 12:38

## 2018-12-31 RX ADMIN — INSULIN LISPRO 2 UNITS: 100 INJECTION, SOLUTION INTRAVENOUS; SUBCUTANEOUS at 17:31

## 2018-12-31 RX ADMIN — PIPERACILLIN SODIUM,TAZOBACTAM SODIUM 3.38 G: 3; .375 INJECTION, POWDER, FOR SOLUTION INTRAVENOUS at 01:15

## 2018-12-31 RX ADMIN — Medication 10 ML: at 17:32

## 2018-12-31 RX ADMIN — HEPARIN SODIUM 5000 UNITS: 5000 INJECTION, SOLUTION INTRAVENOUS; SUBCUTANEOUS at 20:32

## 2018-12-31 RX ADMIN — OXYCODONE AND ACETAMINOPHEN 1 TABLET: 5; 325 TABLET ORAL at 12:36

## 2018-12-31 RX ADMIN — Medication 5 ML: at 06:00

## 2018-12-31 RX ADMIN — HEPARIN SODIUM 5000 UNITS: 5000 INJECTION, SOLUTION INTRAVENOUS; SUBCUTANEOUS at 12:37

## 2018-12-31 RX ADMIN — PIPERACILLIN AND TAZOBACTAM 4.5 G: 4; .5 INJECTION, POWDER, FOR SOLUTION INTRAVENOUS at 20:32

## 2018-12-31 NOTE — CONSULTS
Infectious Disease Consultation Note    Requested by: Dr. Jennifer Jimenez    Reason: left great toe infection    Current abx Prior abx   Pip/tazo since 12/27 Ceftriaxone 12/27     Lines:       Assessment :    52 y.o.  male with h/o uncontrolled type 2 DM (last hgbA1C 10.2 on 12/27/18) who presented to the ED on 12/27/18 for evaluation of a L great toe wound and foot swelling. Clinical presentation c/w diabetic left foot infection, acute on chronic osteomyelitis left great toe distal phalanx s/p amputation of left great toe distal phalanx on 12/28. preop wound cultures 12/27 - few pseudomonas, many group b strep, moderate strep viridans, moderate cons. Intra op cultures 12/28 of left toe distal phalanx: group b strep, alpha strep. Clean margins - group b strep, alpha strep, staph capitis. Intra op findings d/w dr. Annabelle Gordillo. Grossly clean margins achieved at surgery. D/w pathology. Biopsy of clean margins elias be available on 1/2/19  Since MRI left foot revealed bone changes proximal bone left great toe, I will need to wait for histology prior to making decisions about outpatient iv versus po abx. Recommendations:    1. Continue pip/tazo - increase dose to 4.5 g iv q 6 hours  2. F/u biopsy of clean margins  3. Will finalize abx on 1/2/19 once above information available. Thank you for consultation request. Above plan was discussed in details with patient,  and dr Annabelle Gordillo. Please call me if any further questions or concerns. Will continue to participate in the care of this patient. HPI:    52 y.o.  male with h/o uncontrolled type 2 DM (last hgbA1C 10.2 on 12/27/18) who presented to the ED on 12/27/18 for evaluation of a L great toe wound and foot swelling. Patient reports developing an ulcer on the toe about 5 months ago and has been seen by his PCP as well as a podiatrist for treatment.   He was given a walking boot about a month ago and states that he has been mostly compliant with wearing it as advised. Patient does admit to wearing a shoe for a day or two 4 days prior to presentation, and it was at this time that the wound \"opened up. \"  He attempted some local wound care himself, but it was when he developed swelling of the foot and purulent drainage that he decided to come to the ED for further evaluation. Xray of the toe done in ED this AM showed evidence for probable underlying bone involvement and patient was admitted for further evaluation/management. He was evaluated by podiatrist and underwent partial amputation left great toe on 12/28/18. Intra op cultures 12/28 of left toe distal phalanx: group b strep, alpha strep. Clean margins - group b strep, alpha strep, staph capitis. I have been consulted for further recommendations.        patient denies any fever/chills throughout this time. Patient denies headaches, visual disturbances, sore throat, runny nose, earaches, cp, sob, chills, cough, abdominal pain, diarrhea, burning micturition,  or weakness in extremities. He denies back pain/flank pain. He denies recent sick contacts. No h/o recent travel. No known h/o MRSA colonization or infection in the past.  No prior h/o feet infection/surgery. Past Medical History:   Diagnosis Date    Diabetes [facility])        Past Surgical History:   Procedure Laterality Date    HX MENISCUS REPAIR Right 2015          Medication List      ASK your doctor about these medications    hydrOXYzine pamoate 25 mg capsule  Commonly known as:  VISTARIL  1 capsule by mouth daily as needed  Indications: insomnia     insulin glargine 100 unit/mL injection  Commonly known as:  LANTUS  25 Units by SubCUTAneous route nightly.  Indications: type 2 diabetes mellitus     insulin lispro 100 unit/mL injection  Commonly known as:  HUMALOG  Use tid per sliding scale  Indications: type 2 diabetes mellitus     IODOSORB 0.9 % gel  Generic drug:  cadexomer iodine     OTHER            Current Facility-Administered Medications   Medication Dose Route Frequency    insulin lispro (HUMALOG) injection   SubCUTAneous AC&HS    glucose chewable tablet 16 g  4 Tab Oral PRN    glucagon (GLUCAGEN) injection 1 mg  1 mg IntraMUSCular PRN    dextrose (D50W) injection syrg 12.5-25 g  25-50 mL IntraVENous PRN    acetaminophen (TYLENOL) tablet 650 mg  650 mg Oral Q6H PRN    naloxone (NARCAN) injection 0.4 mg  0.4 mg IntraVENous PRN    ondansetron (ZOFRAN) injection 4 mg  4 mg IntraVENous Q6H PRN    docusate sodium (COLACE) capsule 100 mg  100 mg Oral BID PRN    heparin (porcine) injection 5,000 Units  5,000 Units SubCUTAneous Q8H    piperacillin-tazobactam (ZOSYN) 3.375 g in 0.9% sodium chloride (MBP/ADV) 100 mL MBP  3.375 g IntraVENous Q6H    insulin glargine (LANTUS) injection 25 Units  25 Units SubCUTAneous DAILY    sodium chloride (NS) flush 5-10 mL  5-10 mL IntraVENous Q8H    sodium chloride (NS) flush 5-10 mL  5-10 mL IntraVENous PRN    fluticasone (FLONASE) 50 mcg/actuation nasal spray 2 Spray  2 Spray Both Nostrils DAILY    oxyCODONE-acetaminophen (PERCOCET) 5-325 mg per tablet 1-2 Tab  1-2 Tab Oral Q4H PRN       Allergies: Patient has no known allergies.     Family History   Problem Relation Age of Onset    Seizures Mother     Hypertension Mother     No Known Problems Father     No Known Problems Sister     No Known Problems Brother     No Known Problems Sister     No Known Problems Brother      Social History     Socioeconomic History    Marital status: SINGLE     Spouse name: Not on file    Number of children: Not on file    Years of education: Not on file    Highest education level: Not on file   Social Needs    Financial resource strain: Not on file    Food insecurity - worry: Not on file    Food insecurity - inability: Not on file   [employer] needs - medical: Not on file   [employer] needs - non-medical: Not on file   Occupational History    Not on file   Tobacco Use    Smoking status: Former Smoker    Smokeless tobacco: Never Used    Tobacco comment: quit 0ver 25 years ago   Substance and Sexual Activity    Alcohol use: Yes     Alcohol/week: 1.2 oz     Types: 2 Cans of beer per week     Comment: occ.  Drug use: No    Sexual activity: Yes     Partners: Female     Birth control/protection: Condom   Other Topics Concern    Not on file   Social History Narrative    Not on file     Social History     Tobacco Use   Smoking Status Former Smoker   Smokeless Tobacco Never Used   Tobacco Comment    quit 0ver 25 years ago        Temp (24hrs), Av.3 °F (36.8 °C), Min:98.1 °F (36.7 °C), Max:98.5 °F (36.9 °C)    Visit Vitals  /74 (BP 1 Location: Right arm, BP Patient Position: At rest)   Pulse 68   Temp 98.5 °F (36.9 °C)   Resp 18   Ht 6' 1\" (1.854 m)   Wt 99.9 kg (220 lb 4.8 oz)   SpO2 99%   BMI 29.07 kg/m²       ROS: 12 point ROS obtained in details. Pertinent positives as mentioned in HPI,   otherwise negative    Physical Exam:    General:          In NAD. Nontoxic-appearing. HEENT:           NCAT. Sclerae anicteric. EOMI. OP clear. Neck:               Supple, trachea midline. No TMG. Lungs:             Clear, no wheezes. Effort nonlabored. Chest wall:      No ACW TTP. Symmetrical expansion. Heart:              RRR. Abdomen:        Soft, NTTP. Extremities:     Warm, left foot surgical dressing not opened, +2 DP pulses right foot  Skin:                surgical changes left great toe   Psych:             Mood normal, good insight.   Neurologic:      Awake and alert, motor grossly nonfocal.           Labs: Results:   Chemistry Recent Labs     18   * 132*    139   K 4.0 3.8    105   CO2 31 30   BUN 8 8   CREA 0.95 0.95   CA 7.9* 8.2*   AGAP 3 4   BUCR 8* 8*      CBC w/Diff Recent Labs     18   WBC 6.6 7.0   RBC 3.36* 3.44*   HGB 9.9* 10.0*   HCT 29.7* 30.4*    286   GRANS 52 60 LYMPH 34 31   EOS 3 3      Microbiology Recent Labs     12/28/18  1441 12/28/18  1440   CULT CULTURE IN PROGRESS,FURTHER UPDATES TO FOLLOW  RARE STREPTOCOCCI, BETA HEMOLYTIC GROUP B*  RARE STREPTOCOCCI, ALPHA HEMOLYTIC*  RARE STAPHYLOCOCCUS SPECIES* ANAEROBES NOT RULED OUT. SCREENING IN PROGRESS.   MODERATE STREPTOCOCCI, BETA HEMOLYTIC GROUP B*  MODERATE STREPTOCOCCI, ALPHA HEMOLYTIC*  RARE ANGELIA ALBICANS*          RADIOLOGY:    All available imaging studies/reports in [facility] for this admission were reviewed    Dr. Amrit Palomares, Infectious Disease Specialist  [phone]  December 31, 2018  8:39 AM

## 2018-12-31 NOTE — ROUTINE PROCESS
Bedside and Verbal shift change report given to Georgie (oncoming nurse) by Zainab Ballard (offgoing nurse). Report included the following information Kardex, Intake/Output and MAR.

## 2018-12-31 NOTE — CDMP QUERY
The medical record reflects the following: 
 
Risk:    uncontrolled diabetes; osteitis left great toe. Clinical Indicators:  per op note \"  The end of the toe had devitalized tissue, was deformed with bone protruding through the skin. This was devitalized bone. Swelling in the foot and toe had reduced with IV antibiotics. I debrided away devitalized tissue. Treatment: partial amputation left great toe Please clarify if this patient is being treated/managed for: 
 
=>  left great toe bone necrosis  in setting of devitalized bone tissue  which was excised/ amputated 
=>Other Explanation of clinical findings =>Unable to Determine (no explanation of clinical findings) Please clarify and document your clinical opinion in the progress notes and discharge summary including the definitive and/or presumptive diagnosis, (suspected or probable), related to the above clinical findings. Please include clinical findings supporting your diagnosis. If you DECLINE this query or would like to communicate with [facility], please utilize the \"[employer] message box\" at the TOP of the Progress Note on the right. Thank you,    Татьяна Wood RN   CCDS  x [phone]

## 2018-12-31 NOTE — PROGRESS NOTES
[facility]    Patient: Negro Leal Age: 52 y.o. : 1965 MR#: [id number] SSN: [id number]  Date/Time: 2018 3:42 PM    Subjective/24-hour events:     No complaints. Assessment:   L 1st toe ulcer with cellulitis and underlying acute osteomyelitis   Uncontrolled DM 2 with hyperglycemia  Overweight, BMI 28.8    Plan:  Await final path results. Recommendations re: antibiotic therapy going forward pending final report. Zosyn dose increased to 4.5 g in interim. Wound care per podiatry. Insulin as ordered. Disposition once final antibiotic recs made. Case discussed with:  [x]Patient  []Family  []Nursing  []Case Management  DVT Prophylaxis:  []Lovenox  [x]Hep SQ  []SCDs  []Coumadin   []On Heparin gtt    Objective:   VS:   Visit Vitals  /79 (BP 1 Location: Right arm, BP Patient Position: At rest)   Pulse 61   Temp 97.7 °F (36.5 °C)   Resp 18   Ht 6' 1\" (1.854 m)   Wt 99.9 kg (220 lb 4.8 oz)   SpO2 98%   BMI 29.07 kg/m²      Tmax/24hrs: Temp (24hrs), Av.2 °F (36.8 °C), Min:97.7 °F (36.5 °C), Max:98.5 °F (36.9 °C)    No intake or output data in the 24 hours ending 18 1542    General:  In NAD. Nontoxic-appearing. Cardiovascular:  RRR. Pulmonary:  No wheezes, effort nonlabored. GI:  Abdomen soft, NTTP. Extremities:  Warm, no ischemia.   Neuro:  Awake and alert, motor grossly nonfocal.    Labs:    Recent Results (from the past 24 hour(s))   GLUCOSE, POC    Collection Time: 18  4:25 PM   Result Value Ref Range    Glucose (POC) 100 70 - 110 mg/dL   GLUCOSE, POC    Collection Time: 18  8:00 PM   Result Value Ref Range    Glucose (POC) 135 (H) 70 - 110 mg/dL   GLUCOSE, POC    Collection Time: 18  8:37 AM   Result Value Ref Range    Glucose (POC) 114 (H) 70 - 110 mg/dL   GLUCOSE, POC    Collection Time: 18 11:35 AM   Result Value Ref Range    Glucose (POC) 155 (H) 70 - 110 mg/dL       Signed By: Carolina Sampson Heidi Dunbar MD     December 31, 2018 3:42 PM

## 2018-12-31 NOTE — PROGRESS NOTES
Seen at bedside  Awake and alert. Wounds clean, no necrosis or drainage. Wound care. Discussed with ID and agree with plan.

## 2018-12-31 NOTE — ROUTINE PROCESS
Bedside, Verbal and Written shift change report given to Jose Loya RN (oncoming nurse) by Benito Harley RN (offgoing nurse). Report included the following information SBAR, Kardex, Intake/Output and MAR.

## 2019-01-01 LAB
BACTERIA SPEC CULT: ABNORMAL
GLUCOSE BLD STRIP.AUTO-MCNC: 103 MG/DL (ref 70–110)
GLUCOSE BLD STRIP.AUTO-MCNC: 117 MG/DL (ref 70–110)
GLUCOSE BLD STRIP.AUTO-MCNC: 149 MG/DL (ref 70–110)
GLUCOSE BLD STRIP.AUTO-MCNC: 154 MG/DL (ref 70–110)
GLUCOSE BLD STRIP.AUTO-MCNC: 234 MG/DL (ref 70–110)
GRAM STN SPEC: ABNORMAL
GRAM STN SPEC: ABNORMAL
SERVICE CMNT-IMP: ABNORMAL

## 2019-01-01 PROCEDURE — 74011250636 HC RX REV CODE- 250/636: Performed by: INTERNAL MEDICINE

## 2019-01-01 PROCEDURE — 74011636637 HC RX REV CODE- 636/637: Performed by: INTERNAL MEDICINE

## 2019-01-01 PROCEDURE — 74011000258 HC RX REV CODE- 258: Performed by: INTERNAL MEDICINE

## 2019-01-01 PROCEDURE — 65270000029 HC RM PRIVATE

## 2019-01-01 PROCEDURE — 74011250637 HC RX REV CODE- 250/637: Performed by: FAMILY MEDICINE

## 2019-01-01 PROCEDURE — 74011636637 HC RX REV CODE- 636/637: Performed by: FAMILY MEDICINE

## 2019-01-01 PROCEDURE — 82962 GLUCOSE BLOOD TEST: CPT

## 2019-01-01 RX ADMIN — PIPERACILLIN AND TAZOBACTAM 4.5 G: 4; .5 INJECTION, POWDER, FOR SOLUTION INTRAVENOUS at 19:03

## 2019-01-01 RX ADMIN — OXYCODONE AND ACETAMINOPHEN 2 TABLET: 5; 325 TABLET ORAL at 00:27

## 2019-01-01 RX ADMIN — Medication 5 ML: at 14:00

## 2019-01-01 RX ADMIN — HEPARIN SODIUM 5000 UNITS: 5000 INJECTION, SOLUTION INTRAVENOUS; SUBCUTANEOUS at 12:56

## 2019-01-01 RX ADMIN — INSULIN GLARGINE 25 UNITS: 100 INJECTION, SOLUTION SUBCUTANEOUS at 10:27

## 2019-01-01 RX ADMIN — INSULIN LISPRO 2 UNITS: 100 INJECTION, SOLUTION INTRAVENOUS; SUBCUTANEOUS at 00:03

## 2019-01-01 RX ADMIN — INSULIN LISPRO 2 UNITS: 100 INJECTION, SOLUTION INTRAVENOUS; SUBCUTANEOUS at 21:48

## 2019-01-01 RX ADMIN — PIPERACILLIN AND TAZOBACTAM 4.5 G: 4; .5 INJECTION, POWDER, FOR SOLUTION INTRAVENOUS at 12:55

## 2019-01-01 RX ADMIN — PIPERACILLIN AND TAZOBACTAM 4.5 G: 4; .5 INJECTION, POWDER, FOR SOLUTION INTRAVENOUS at 10:18

## 2019-01-01 RX ADMIN — Medication 5 ML: at 06:21

## 2019-01-01 RX ADMIN — HEPARIN SODIUM 5000 UNITS: 5000 INJECTION, SOLUTION INTRAVENOUS; SUBCUTANEOUS at 21:12

## 2019-01-01 RX ADMIN — FLUTICASONE PROPIONATE 2 SPRAY: 50 SPRAY, METERED NASAL at 10:27

## 2019-01-01 RX ADMIN — INSULIN LISPRO 4 UNITS: 100 INJECTION, SOLUTION INTRAVENOUS; SUBCUTANEOUS at 19:03

## 2019-01-01 RX ADMIN — PIPERACILLIN AND TAZOBACTAM 4.5 G: 4; .5 INJECTION, POWDER, FOR SOLUTION INTRAVENOUS at 02:03

## 2019-01-01 RX ADMIN — OXYCODONE AND ACETAMINOPHEN 1 TABLET: 5; 325 TABLET ORAL at 12:56

## 2019-01-01 RX ADMIN — Medication 10 ML: at 21:08

## 2019-01-01 RX ADMIN — OXYCODONE AND ACETAMINOPHEN 1 TABLET: 5; 325 TABLET ORAL at 19:03

## 2019-01-01 RX ADMIN — Medication 10 ML: at 00:29

## 2019-01-01 NOTE — ROUTINE PROCESS
Bedside, Verbal and Written shift change report given to Cadence Rao (oncoming nurse) by Minerva Solares (offgoing nurse). Report included the following information SBAR, Kardex, MAR and Accordion.

## 2019-01-01 NOTE — ROUTINE PROCESS
Bedside shift change report given to JUDE Macario (oncoming nurse) by Mckenna Roche RN (offgoing nurse). Report included the following information SBAR, Kardex, Intake/Output, Recent Results and Cardiac Rhythm NSR.

## 2019-01-01 NOTE — PROGRESS NOTES
[facility]    Patient: Elodia Jackson Age: 52 y.o. : 1965 MR#: [id number] SSN: [id number]  Date/Time: 2019 10:05 AM    Subjective/24-hour events:     No new medical complaints. Does state that he doesn't believe that his foot was dressed appropriately. Assessment:   L 1st toe ulcer with cellulitis and underlying acute osteomyelitis   Uncontrolled DM 2 with hyperglycemia  Overweight, BMI 28.8    Plan:  Path results pending. Continue antibiotics as ordered for now. Final recommendations on antibiotic to follow. Sugars acceptable. Continue lantus and SSI, adjust as necessary. Have discussed foot dressing issue with nursing. Disposition pending final antibiotic recs. Case discussed with:  [x]Patient  []Family  [x]Nursing  []Case Management  DVT Prophylaxis:  []Lovenox  [x]Hep SQ  []SCDs  []Coumadin   []On Heparin gtt    Objective:   VS:   Visit Vitals  /83 (BP 1 Location: Right arm, BP Patient Position: At rest)   Pulse 64   Temp 98 °F (36.7 °C)   Resp 18   Ht 6' 1\" (1.854 m)   Wt 99.9 kg (220 lb 4.8 oz)   SpO2 95%   BMI 29.07 kg/m²      Tmax/24hrs: Temp (24hrs), Av.2 °F (36.8 °C), Min:97.7 °F (36.5 °C), Max:98.6 °F (37 °C)      Intake/Output Summary (Last 24 hours) at 2019 1005  Last data filed at 2018 1833  Gross per 24 hour   Intake 100 ml   Output --   Net 100 ml       General:  In NAD. Nontoxic-appearing. Cardiovascular:  RRR. Pulmonary:  No wheezes, effort nonlabored. GI:  Abdomen soft, NTTP. Extremities:  Warm, no ischemia.   Neuro:  Awake and alert, motor grossly nonfocal.    Labs:    Recent Results (from the past 24 hour(s))   GLUCOSE, POC    Collection Time: 18 11:35 AM   Result Value Ref Range    Glucose (POC) 155 (H) 70 - 110 mg/dL   GLUCOSE, POC    Collection Time: 18  5:31 PM   Result Value Ref Range    Glucose (POC) 179 (H) 70 - 110 mg/dL   GLUCOSE, POC    Collection Time: 18 11:42 PM   Result Value Ref Range    Glucose (POC) 180 (H) 70 - 110 mg/dL   GLUCOSE, POC    Collection Time: 01/01/19  7:42 AM   Result Value Ref Range    Glucose (POC) 103 70 - 110 mg/dL       Signed By: Pretty Abrams MD     January 1, 2019 10:05 AM

## 2019-01-02 VITALS
DIASTOLIC BLOOD PRESSURE: 80 MMHG | HEIGHT: 73 IN | BODY MASS INDEX: 28.65 KG/M2 | WEIGHT: 216.2 LBS | TEMPERATURE: 97 F | RESPIRATION RATE: 20 BRPM | OXYGEN SATURATION: 99 % | SYSTOLIC BLOOD PRESSURE: 130 MMHG | HEART RATE: 66 BPM

## 2019-01-02 LAB
BACTERIA SPEC CULT: NORMAL
BACTERIA SPEC CULT: NORMAL
ERYTHROCYTE [DISTWIDTH] IN BLOOD BY AUTOMATED COUNT: 12.5 % (ref 11.6–14.5)
GLUCOSE BLD STRIP.AUTO-MCNC: 195 MG/DL (ref 70–110)
GLUCOSE BLD STRIP.AUTO-MCNC: 74 MG/DL (ref 70–110)
GLUCOSE BLD STRIP.AUTO-MCNC: 97 MG/DL (ref 70–110)
HCT VFR BLD AUTO: 31.9 % (ref 36–48)
HGB BLD-MCNC: 10.6 G/DL (ref 13–16)
MCH RBC QN AUTO: 29.4 PG (ref 24–34)
MCHC RBC AUTO-ENTMCNC: 33.2 G/DL (ref 31–37)
MCV RBC AUTO: 88.6 FL (ref 74–97)
PLATELET # BLD AUTO: 335 K/UL (ref 135–420)
PMV BLD AUTO: 8.9 FL (ref 9.2–11.8)
RBC # BLD AUTO: 3.6 M/UL (ref 4.7–5.5)
SERVICE CMNT-IMP: NORMAL
SERVICE CMNT-IMP: NORMAL
WBC # BLD AUTO: 7.2 K/UL (ref 4.6–13.2)

## 2019-01-02 PROCEDURE — 36415 COLL VENOUS BLD VENIPUNCTURE: CPT

## 2019-01-02 PROCEDURE — 74011250636 HC RX REV CODE- 250/636: Performed by: INTERNAL MEDICINE

## 2019-01-02 PROCEDURE — 74011636637 HC RX REV CODE- 636/637: Performed by: FAMILY MEDICINE

## 2019-01-02 PROCEDURE — 74011000258 HC RX REV CODE- 258: Performed by: INTERNAL MEDICINE

## 2019-01-02 PROCEDURE — 85027 COMPLETE CBC AUTOMATED: CPT

## 2019-01-02 PROCEDURE — 82962 GLUCOSE BLOOD TEST: CPT

## 2019-01-02 PROCEDURE — 74011636637 HC RX REV CODE- 636/637: Performed by: INTERNAL MEDICINE

## 2019-01-02 PROCEDURE — 77030029211 HC GEL MEDIH TU INLC -B

## 2019-01-02 PROCEDURE — 74011250637 HC RX REV CODE- 250/637: Performed by: FAMILY MEDICINE

## 2019-01-02 RX ORDER — LEVOFLOXACIN 750 MG/1
750 TABLET ORAL DAILY
Qty: 21 TAB | Refills: 0 | Status: SHIPPED | OUTPATIENT
Start: 2019-01-02 | End: 2019-01-23

## 2019-01-02 RX ORDER — OXYCODONE AND ACETAMINOPHEN 5; 325 MG/1; MG/1
1-2 TABLET ORAL
Qty: 10 TAB | Refills: 0 | Status: SHIPPED | OUTPATIENT
Start: 2019-01-02 | End: 2019-12-18

## 2019-01-02 RX ADMIN — PIPERACILLIN AND TAZOBACTAM 4.5 G: 4; .5 INJECTION, POWDER, FOR SOLUTION INTRAVENOUS at 08:31

## 2019-01-02 RX ADMIN — PIPERACILLIN AND TAZOBACTAM 4.5 G: 4; .5 INJECTION, POWDER, FOR SOLUTION INTRAVENOUS at 12:36

## 2019-01-02 RX ADMIN — INSULIN GLARGINE 25 UNITS: 100 INJECTION, SOLUTION SUBCUTANEOUS at 08:55

## 2019-01-02 RX ADMIN — FLUTICASONE PROPIONATE 2 SPRAY: 50 SPRAY, METERED NASAL at 08:59

## 2019-01-02 RX ADMIN — Medication 10 ML: at 15:08

## 2019-01-02 RX ADMIN — HEPARIN SODIUM 5000 UNITS: 5000 INJECTION, SOLUTION INTRAVENOUS; SUBCUTANEOUS at 05:54

## 2019-01-02 RX ADMIN — HEPARIN SODIUM 5000 UNITS: 5000 INJECTION, SOLUTION INTRAVENOUS; SUBCUTANEOUS at 12:36

## 2019-01-02 RX ADMIN — PIPERACILLIN AND TAZOBACTAM 4.5 G: 4; .5 INJECTION, POWDER, FOR SOLUTION INTRAVENOUS at 01:18

## 2019-01-02 RX ADMIN — OXYCODONE AND ACETAMINOPHEN 1 TABLET: 5; 325 TABLET ORAL at 12:36

## 2019-01-02 RX ADMIN — INSULIN LISPRO 2 UNITS: 100 INJECTION, SOLUTION INTRAVENOUS; SUBCUTANEOUS at 12:19

## 2019-01-02 RX ADMIN — Medication 10 ML: at 05:55

## 2019-01-02 NOTE — PROGRESS NOTES
Princess Melchor    Bedside and Verbal shift change report given to Angie Juarez RN (oncoming nurse) by Nazanin Henriquez RN (offgoing nurse). Report included the following information SBAR, Kardex, Intake/Output, MAR and Recent Results. Patient resting quietly w/o complaints of pain/discomfort.

## 2019-01-02 NOTE — PROGRESS NOTES
Infectious Disease progress Note    Requested by: Dr. Fiorella Camarena    Reason: left great toe infection    Current abx Prior abx   Pip/tazo since 12/27 Ceftriaxone 12/27     Lines:       Assessment :    52 y.o.  male with h/o uncontrolled type 2 DM (last hgbA1C 10.2 on 12/27/18) who presented to the ED on 12/27/18 for evaluation of a L great toe wound and foot swelling. Clinical presentation c/w diabetic left foot infection, acute on chronic osteomyelitis left great toe distal phalanx s/p amputation of left great toe distal phalanx on 12/28. preop wound cultures 12/27 - few pseudomonas, many group b strep, moderate strep viridans, moderate cons. Intra op cultures 12/28 of left toe distal phalanx: group b strep, alpha strep. Clean margins - group b strep, alpha strep, staph capitis. Intra op findings d/w dr. Sergio Gustafson. Grossly clean margins achieved at surgery. D/w pathology. Biopsy of clean margins negative for osteomyelitis. Clinically better. Recommendations:    1. discontinue pip/tazo. Start po levofloxacin 750 q 24 hour till 1/23/18.   2. F/u with podiatry as outpatient     Above plan was discussed in details with patient,  and dr Fiorella Camarena. Please call me if any further questions or concerns. Will continue to participate in the care of this patient. subjective:  Feels better.  patient denies any fever/chills throughout this time. Patient denies headaches, visual disturbances, sore throat, runny nose, earaches, cp, sob, chills, cough, abdominal pain, diarrhea, burning micturition,  or weakness in extremities. He denies back pain/flank pain. Medication List      ASK your doctor about these medications    hydrOXYzine pamoate 25 mg capsule  Commonly known as:  VISTARIL  1 capsule by mouth daily as needed  Indications: insomnia     insulin glargine 100 unit/mL injection  Commonly known as:  LANTUS  25 Units by SubCUTAneous route nightly.  Indications: type 2 diabetes mellitus     insulin lispro 100 unit/mL injection  Commonly known as:  HUMALOG  Use tid per sliding scale  Indications: type 2 diabetes mellitus     IODOSORB 0.9 % gel  Generic drug:  cadexomer iodine     OTHER            Current Facility-Administered Medications   Medication Dose Route Frequency    piperacillin-tazobactam (ZOSYN) 4.5 g in 0.9% sodium chloride (MBP/ADV) 100 mL MBP  4.5 g IntraVENous Q6H    insulin lispro (HUMALOG) injection   SubCUTAneous AC&HS    glucose chewable tablet 16 g  4 Tab Oral PRN    glucagon (GLUCAGEN) injection 1 mg  1 mg IntraMUSCular PRN    dextrose (D50W) injection syrg 12.5-25 g  25-50 mL IntraVENous PRN    acetaminophen (TYLENOL) tablet 650 mg  650 mg Oral Q6H PRN    naloxone (NARCAN) injection 0.4 mg  0.4 mg IntraVENous PRN    ondansetron (ZOFRAN) injection 4 mg  4 mg IntraVENous Q6H PRN    docusate sodium (COLACE) capsule 100 mg  100 mg Oral BID PRN    heparin (porcine) injection 5,000 Units  5,000 Units SubCUTAneous Q8H    insulin glargine (LANTUS) injection 25 Units  25 Units SubCUTAneous DAILY    sodium chloride (NS) flush 5-10 mL  5-10 mL IntraVENous Q8H    sodium chloride (NS) flush 5-10 mL  5-10 mL IntraVENous PRN    fluticasone (FLONASE) 50 mcg/actuation nasal spray 2 Spray  2 Spray Both Nostrils DAILY    oxyCODONE-acetaminophen (PERCOCET) 5-325 mg per tablet 1-2 Tab  1-2 Tab Oral Q4H PRN       Allergies: Patient has no known allergies. Temp (24hrs), Av.1 °F (36.7 °C), Min:97.9 °F (36.6 °C), Max:98.3 °F (36.8 °C)    Visit Vitals  /84 (BP 1 Location: Right arm, BP Patient Position: At rest)   Pulse 65   Temp 97.9 °F (36.6 °C)   Resp 20   Ht 6' 1\" (1.854 m)   Wt 98.1 kg (216 lb 3.2 oz)   SpO2 98%   BMI 28.52 kg/m²       ROS: 12 point ROS obtained in details. Pertinent positives as mentioned in HPI,   otherwise negative    Physical Exam:    General:          In NAD. Nontoxic-appearing. HEENT:           NCAT. Sclerae anicteric. EOMI.   OP clear.  Neck:               Supple, trachea midline. No TMG. Lungs:             Clear, no wheezes. Effort nonlabored. Chest wall:      No ACW TTP. Symmetrical expansion. Heart:              RRR. Abdomen:        Soft, NTTP. Extremities:     Warm, left foot surgical dressing not opened, +2 DP pulses right foot  Skin:                surgical changes left great toe   Psych:             Mood normal, good insight. Neurologic:      Awake and alert, motor grossly nonfocal.           Labs: Results:   Chemistry No results for input(s): GLU, NA, K, CL, CO2, BUN, CREA, CA, AGAP, BUCR, TBIL, GPT, AP, TP, ALB, GLOB, AGRAT in the last 72 hours. CBC w/Diff Recent Labs     01/02/19  0242   WBC 7.2   RBC 3.60*   HGB 10.6*   HCT 31.9*         Microbiology No results for input(s): CULT in the last 72 hours.        RADIOLOGY:    All available imaging studies/reports in [facility] for this admission were reviewed    Dr. Haleigh Zeng, Infectious Disease Specialist  [phone]  January 2, 2019  8:39 AM

## 2019-01-02 NOTE — ROUTINE PROCESS
Bedside and Verbal shift change report given to jessica RN (oncoming nurse) by Carlton Kumari RN   (offgoing nurse). Report included the following information SBAR, Kardex, Intake/Output and MAR.

## 2019-01-02 NOTE — PROGRESS NOTES
[facility] Hospitalist Group  Progress Note    Patient: Michael Enriquez Age: 52 y.o. : 1965 MR#: [id number] SSN: [id number]  Date/Time: 2019 1:03 PM    Subjective/24-hour events:     No new complaints. Assessment:   L 1st toe ulcer with cellulitis and underlying acute osteomyelitis and bone necrosis s/p partial amputation  Uncontrolled DM 2 with hyperglycemia  Overweight, BMI 28.8    Plan:  Home with PO levaquin x 3 weeks per ID recs. Outpatient f/u as advised. Case discussed with:  [x]Patient  []Family  [x]Nursing  []Case Management  DVT Prophylaxis:  []Lovenox  [x]Hep SQ  []SCDs  []Coumadin   []On Heparin gtt    Objective:   VS:   Visit Vitals  /79 (BP 1 Location: Right arm, BP Patient Position: Sitting)   Pulse 73   Temp 97.5 °F (36.4 °C)   Resp 20   Ht 6' 1\" (1.854 m)   Wt 98.1 kg (216 lb 3.2 oz)   SpO2 97%   BMI 28.52 kg/m²      Tmax/24hrs: Temp (24hrs), Av.9 °F (36.6 °C), Min:97.5 °F (36.4 °C), Max:98.2 °F (36.8 °C)    No intake or output data in the 24 hours ending 19 1303    General:  In NAD. Nontoxic-appearing. Cardiovascular:  RRR. Pulmonary:  No wheezes, effort nonlabored. GI:  Abdomen soft, NTTP. Extremities:  Warm, no ischemia.   Neuro:  Awake and alert, motor grossly nonfocal.    Labs:    Recent Results (from the past 24 hour(s))   GLUCOSE, POC    Collection Time: 19  2:52 PM   Result Value Ref Range    Glucose (POC) 234 (H) 70 - 110 mg/dL   GLUCOSE, POC    Collection Time: 19  7:12 PM   Result Value Ref Range    Glucose (POC) 149 (H) 70 - 110 mg/dL   GLUCOSE, POC    Collection Time: 19  9:48 PM   Result Value Ref Range    Glucose (POC) 154 (H) 70 - 110 mg/dL   CBC W/O DIFF    Collection Time: 19  2:42 AM   Result Value Ref Range    WBC 7.2 4.6 - 13.2 K/uL    RBC 3.60 (L) 4.70 - 5.50 M/uL    HGB 10.6 (L) 13.0 - 16.0 g/dL    HCT 31.9 (L) 36.0 - 48.0 %    MCV 88.6 74.0 - 97.0 FL    MCH 29.4 24.0 - 34.0 PG    MCHC 33.2 31.0 - 37.0 g/dL    RDW 12.5 11.6 - 14.5 %    PLATELET 680 501 - 945 K/uL    MPV 8.9 (L) 9.2 - 11.8 FL   GLUCOSE, POC    Collection Time: 01/02/19  7:18 AM   Result Value Ref Range    Glucose (POC) 97 70 - 110 mg/dL   GLUCOSE, POC    Collection Time: 01/02/19 11:18 AM   Result Value Ref Range    Glucose (POC) 195 (H) 70 - 110 mg/dL       Signed By: Robert Perera MD     January 2, 2019 1:03 PM

## 2019-01-02 NOTE — PROGRESS NOTES
Patient discharged home in stable condition. Signed AVS sheet placed in chart. Prescriptions and AVS reviewed and provided. Signed letter for absence from work from Dr. Howard Silence provided.

## 2019-01-02 NOTE — PROGRESS NOTES
1/2/2019      RE: Elayne Spatz      To Whom it May Concern: This is to certify that Elayne Spatz has been hospitalized and under my care from 12/27/2018 through 1/3/2019. He may return to work on Monday, 1/7/2019 with the following restriction:  No prolonged walking/standing > 20-30 minutes at a time. Please feel free to contact my office if you have any questions or concerns. Thank you for your assistance in this matter.     Sincerely,        Cabrera Ag MD   [facility] [address]  [phone]

## 2019-01-02 NOTE — PROGRESS NOTES
Seen at bedside awake and alert. Discussed with ID. Cleared for discharge. Medihoney ointment. Follow up 7-10 days.

## 2019-01-28 NOTE — DISCHARGE SUMMARY
Discharge Summary    Patient: Susana Zuniga MRN: [id number]  CSN: [id number]    YOB: 1965  Age: 52 y.o. Sex: male    DOA: 12/27/2018 LOS:  LOS: 6 days   Discharge Date: 1/2/2019     Admission Diagnoses: Diabetic foot ulcer ([facility] [address]    Discharge Diagnoses:    L 1st toe ulcer with cellulitis and underlying acute osteomyelitis   Uncontrolled DM 2 with hyperglycemia  Overweight, BMI 28.8      Discharge Condition: Stable    PHYSICAL EXAM  Visit Vitals  /80 (BP 1 Location: Right arm, BP Patient Position: At rest)   Pulse 66   Temp 97 °F (36.1 °C)   Resp 20   Ht 6' 1\" (1.854 m)   Wt 98.1 kg (216 lb 3.2 oz)   SpO2 99%   BMI 28.52 kg/m²       General: In NAD. HEENT: NCAT. Sclerae anicteric. Lungs:  Clear, no wheezes. Effort nonlabored. Heart:  RRR. Abdomen: Soft, NTTP. Extremities: Warm, no edema or ischemia. L foot dressings C/D/I. Psych:   Good insight. Not anxious or agitated. Neurologic:  Awake and alert, motor nonfocal.    Hospital Course:   See admissin H&P for full details of HPI. Patient admitted to medical bed with podiatry in consultation. He went for partial L great toe amputation on 12/28 per podiatry recommendation. Procedure was tolerated well and there were no wound care issues going forward. ID evaluation was subsequently obtained and final recommendations on antibiotic therapy have been made based on culture/pathology results. Patient is without any evidence for any ongoing or systemic infection and is medically stable for discharge home with outpatient follow up as advised. Consults:   Podiatry  Infectious Diseases    Significant Diagnostic Studies:   MRI L foot:  IMPRESSION  IMPRESSION:  1. Pronounced soft tissue swelling within the great toe with acute  osteomyelitis involving the first distal phalanx. Normal T1 signal with  intermediate T2 signal in the first proximal phalanx favored to reflect reactive  osteitis.    2.  Ununited fracture of the second PIP joint. Mild marrow edema within the  distal aspect of the second proximal phalanx. Diffuse edema within the intrinsic  foot musculature which may reflect denervation change or infectious myositis. 3.  Moderate dorsal foot soft tissue swelling. 4.  Mild hallux digitorum longus tenosynovitis. 5.  Degenerative changes as above. XR L great toe:  IMPRESSION:       1. Exposure of the distal phalanx distal tuft of the great toe with  questionable cortical resorption. Suspect developing osteomyelitis.     2.  Subcutaneous emphysema. Potentially related to colonization by gas-forming  organism. Discharge Medications:    Discharge Medication List as of 1/2/2019  4:23 PM      START taking these medications    Details   oxyCODONE-acetaminophen (PERCOCET) 5-325 mg per tablet Take 1-2 Tabs by mouth every four (4) hours as needed. Max Daily Amount: 12 Tabs., Print, Disp-10 Tab, R-0      levoFLOXacin (LEVAQUIN) 750 mg tablet Take 1 Tab by mouth daily for 21 days. , Print, Disp-21 Tab, R-0         CONTINUE these medications which have NOT CHANGED    Details   cadexomer iodine (IODOSORB) 0.9 % gel Apply  to affected area daily as needed., Historical Med      insulin glargine (LANTUS) 100 unit/mL injection 25 Units by SubCUTAneous route nightly. Indications: type 2 diabetes mellitus, No PrintPharmacy Assistance Program medicationDisp-1 Vial, R-0      insulin lispro (HUMALOG) 100 unit/mL injection Use tid per sliding scale  Indications: type 2 diabetes mellitus, ProgramPharmacy Assistance programDisp-4 Vial, R-0         STOP taking these medications       OTHER Comments:   Reason for Stopping:         hydrOXYzine pamoate (VISTARIL) 25 mg capsule Comments:   Reason for Stopping:                   Activity: As tolerated    Diet: Cardiac Diet and Diabetic Diet    Disposition:  Home    Follow-up: with PCP, Inna Simpson MD in 1 week and podiatry as directed. Morris Ruiz.  Michael White MD  [facility]

## 2019-10-22 NOTE — PROGRESS NOTES
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 States they are shorthanded causing him to  extra shifts and working variable hours. He is having a hard time keeping a consistent schedule so is not checking his BG's much and has not been taking insulin consistently. Reports he has been eating from the snack machine at work and when he gets home he is not eating much because he sleeps most of the day. Reports he has lost 4 lbs. Pt states he is getting tired of sleeping the day away and plans to work on getting up sooner. Pt reported BG's  [email or web address]: 215  [email or web address]: 156  [email or web address]: 207  [email or web address]: 219  [email or web address]: 194    Advised pt as he adjusts to his new schedule, try to create routines, such as when he wakes up he could make himself a healthy breakfast. He take his insulin at that time since work is too unpredictable as far as when he can break to eat. Advised to decide on a time to take his Lantus and stick to that same time everyday. Also encouraged to make checking his BG's part of that routine. Mailed pt updated care plan along with stress management information. Will follow up with pt in 1 week. Patient has my number if questions arise. The skin at the access site was anesthetized. Using a flashback needle with the Modified Seldinger technique the right femoral vein was succesfully accessed times 1 in a retrograde fashion over the guidewire, under fluoroscopic guidance using a Sheath Streetman Cv 5f .038x10 Old Part 93741-70.

## 2020-02-04 ENCOUNTER — ANESTHESIA EVENT (OUTPATIENT)
Dept: ENDOSCOPY | Age: 55
End: 2020-02-04
Payer: COMMERCIAL

## 2020-02-05 ENCOUNTER — ANESTHESIA (OUTPATIENT)
Dept: ENDOSCOPY | Age: 55
End: 2020-02-05
Payer: COMMERCIAL

## 2020-02-05 ENCOUNTER — HOSPITAL ENCOUNTER (OUTPATIENT)
Age: 55
Setting detail: OUTPATIENT SURGERY
Discharge: HOME | End: 2020-02-05
Attending: INTERNAL MEDICINE | Admitting: INTERNAL MEDICINE
Payer: COMMERCIAL

## 2020-02-05 VITALS
HEART RATE: 65 BPM | DIASTOLIC BLOOD PRESSURE: 69 MMHG | OXYGEN SATURATION: 98 % | RESPIRATION RATE: 15 BRPM | TEMPERATURE: 96.9 F | SYSTOLIC BLOOD PRESSURE: 110 MMHG | HEIGHT: 73 IN | BODY MASS INDEX: 26.93 KG/M2 | WEIGHT: 203.2 LBS

## 2020-02-05 LAB
GLUCOSE BLD STRIP.AUTO-MCNC: 224 MG/DL (ref 70–110)
GLUCOSE BLD STRIP.AUTO-MCNC: 235 MG/DL (ref 70–110)
GLUCOSE BLD STRIP.AUTO-MCNC: 269 MG/DL (ref 70–110)

## 2020-02-05 PROCEDURE — 74011000250 HC RX REV CODE- 250: Performed by: NURSE ANESTHETIST, CERTIFIED REGISTERED

## 2020-02-05 PROCEDURE — 77030018846 HC SOL IRR STRL H20 ICUM -A: Performed by: INTERNAL MEDICINE

## 2020-02-05 PROCEDURE — 74011250636 HC RX REV CODE- 250/636: Performed by: NURSE ANESTHETIST, CERTIFIED REGISTERED

## 2020-02-05 PROCEDURE — 77030008565 HC TBNG SUC IRR ERBE -B: Performed by: INTERNAL MEDICINE

## 2020-02-05 PROCEDURE — 82962 GLUCOSE BLOOD TEST: CPT

## 2020-02-05 PROCEDURE — 74011636637 HC RX REV CODE- 636/637: Performed by: NURSE ANESTHETIST, CERTIFIED REGISTERED

## 2020-02-05 PROCEDURE — 76060000031 HC ANESTHESIA FIRST 0.5 HR: Performed by: INTERNAL MEDICINE

## 2020-02-05 PROCEDURE — 76040000019: Performed by: INTERNAL MEDICINE

## 2020-02-05 RX ORDER — MAGNESIUM SULFATE 100 %
4 CRYSTALS MISCELLANEOUS AS NEEDED
Status: DISCONTINUED | OUTPATIENT
Start: 2020-02-05 | End: 2020-02-05 | Stop reason: HOSPADM

## 2020-02-05 RX ORDER — DEXTROSE 50 % IN WATER (D50W) INTRAVENOUS SYRINGE
25-50 AS NEEDED
Status: DISCONTINUED | OUTPATIENT
Start: 2020-02-05 | End: 2020-02-05 | Stop reason: HOSPADM

## 2020-02-05 RX ORDER — INSULIN LISPRO 100 [IU]/ML
INJECTION, SOLUTION INTRAVENOUS; SUBCUTANEOUS ONCE
Status: DISCONTINUED | OUTPATIENT
Start: 2020-02-05 | End: 2020-02-05 | Stop reason: HOSPADM

## 2020-02-05 RX ORDER — INSULIN LISPRO 100 [IU]/ML
INJECTION, SOLUTION INTRAVENOUS; SUBCUTANEOUS ONCE
Status: COMPLETED | OUTPATIENT
Start: 2020-02-05 | End: 2020-02-05

## 2020-02-05 RX ORDER — PROPOFOL 10 MG/ML
INJECTION, EMULSION INTRAVENOUS AS NEEDED
Status: DISCONTINUED | OUTPATIENT
Start: 2020-02-05 | End: 2020-02-05 | Stop reason: HOSPADM

## 2020-02-05 RX ORDER — SODIUM CHLORIDE, SODIUM LACTATE, POTASSIUM CHLORIDE, CALCIUM CHLORIDE 600; 310; 30; 20 MG/100ML; MG/100ML; MG/100ML; MG/100ML
50 INJECTION, SOLUTION INTRAVENOUS CONTINUOUS
Status: DISCONTINUED | OUTPATIENT
Start: 2020-02-05 | End: 2020-02-05 | Stop reason: HOSPADM

## 2020-02-05 RX ORDER — SODIUM CHLORIDE 0.9 % (FLUSH) 0.9 %
5-40 SYRINGE (ML) INJECTION EVERY 8 HOURS
Status: DISCONTINUED | OUTPATIENT
Start: 2020-02-05 | End: 2020-02-05 | Stop reason: HOSPADM

## 2020-02-05 RX ORDER — SODIUM CHLORIDE 0.9 % (FLUSH) 0.9 %
5-40 SYRINGE (ML) INJECTION AS NEEDED
Status: DISCONTINUED | OUTPATIENT
Start: 2020-02-05 | End: 2020-02-05 | Stop reason: HOSPADM

## 2020-02-05 RX ORDER — LIDOCAINE HYDROCHLORIDE 20 MG/ML
INJECTION, SOLUTION EPIDURAL; INFILTRATION; INTRACAUDAL; PERINEURAL AS NEEDED
Status: DISCONTINUED | OUTPATIENT
Start: 2020-02-05 | End: 2020-02-05 | Stop reason: HOSPADM

## 2020-02-05 RX ORDER — LIDOCAINE HYDROCHLORIDE 10 MG/ML
0.1 INJECTION, SOLUTION EPIDURAL; INFILTRATION; INTRACAUDAL; PERINEURAL AS NEEDED
Status: DISCONTINUED | OUTPATIENT
Start: 2020-02-05 | End: 2020-02-05 | Stop reason: HOSPADM

## 2020-02-05 RX ADMIN — SODIUM CHLORIDE, SODIUM LACTATE, POTASSIUM CHLORIDE, AND CALCIUM CHLORIDE 50 ML/HR: 600; 310; 30; 20 INJECTION, SOLUTION INTRAVENOUS at 07:38

## 2020-02-05 RX ADMIN — PROPOFOL 50 MG: 10 INJECTION, EMULSION INTRAVENOUS at 08:09

## 2020-02-05 RX ADMIN — PROPOFOL 100 MG: 10 INJECTION, EMULSION INTRAVENOUS at 08:06

## 2020-02-05 RX ADMIN — LIDOCAINE HYDROCHLORIDE 100 MG: 20 INJECTION, SOLUTION EPIDURAL; INFILTRATION; INTRACAUDAL; PERINEURAL at 08:06

## 2020-02-05 RX ADMIN — FAMOTIDINE 20 MG: 10 INJECTION, SOLUTION INTRAVENOUS at 07:38

## 2020-02-05 RX ADMIN — PROPOFOL 30 MG: 10 INJECTION, EMULSION INTRAVENOUS at 08:15

## 2020-02-05 RX ADMIN — PROPOFOL 30 MG: 10 INJECTION, EMULSION INTRAVENOUS at 08:10

## 2020-02-05 RX ADMIN — PROPOFOL 30 MG: 10 INJECTION, EMULSION INTRAVENOUS at 08:13

## 2020-02-05 RX ADMIN — PROPOFOL 30 MG: 10 INJECTION, EMULSION INTRAVENOUS at 08:19

## 2020-02-05 RX ADMIN — PROPOFOL 30 MG: 10 INJECTION, EMULSION INTRAVENOUS at 08:11

## 2020-02-05 RX ADMIN — PROPOFOL 30 MG: 10 INJECTION, EMULSION INTRAVENOUS at 08:17

## 2020-02-05 RX ADMIN — INSULIN LISPRO 9 UNITS: 100 INJECTION, SOLUTION INTRAVENOUS; SUBCUTANEOUS at 07:38

## 2020-02-05 RX ADMIN — PROPOFOL 30 MG: 10 INJECTION, EMULSION INTRAVENOUS at 08:07

## 2020-02-05 NOTE — ANESTHESIA POSTPROCEDURE EVALUATION
Procedure(s):  COLONOSCOPY. MAC    Anesthesia Post Evaluation      Multimodal analgesia: multimodal analgesia used between 6 hours prior to anesthesia start to PACU discharge  Patient location during evaluation: PACU  Patient participation: complete - patient participated  Level of consciousness: awake and alert  Pain management: adequate  Airway patency: patent  Anesthetic complications: no  Cardiovascular status: acceptable and hemodynamically stable  Respiratory status: acceptable and room air  Hydration status: acceptable  Post anesthesia nausea and vomiting:  controlled      Vitals Value Taken Time   /64 2/5/2020  8:45 AM   Temp 37.1 °C (98.7 °F) 2/5/2020  8:28 AM   Pulse 69 2/5/2020  8:56 AM   Resp 16 2/5/2020  8:56 AM   SpO2 100 % 2/5/2020  8:56 AM   Vitals shown include unvalidated device data.

## 2020-02-05 NOTE — ANESTHESIA PREPROCEDURE EVALUATION
Anesthetic History   No history of anesthetic complications            Review of Systems / Medical History  Patient summary reviewed, nursing notes reviewed and pertinent labs reviewed    Pulmonary  Within defined limits                 Neuro/Psych   Within defined limits           Cardiovascular  Within defined limits                Exercise tolerance: >4 METS     GI/Hepatic/Renal  Within defined limits              Endo/Other    Diabetes: using insulin         Other Findings              Physical Exam    Airway  Mallampati: II  TM Distance: 4 - 6 cm  Neck ROM: normal range of motion   Mouth opening: Normal     Cardiovascular    Rhythm: regular  Rate: normal         Dental  No notable dental hx       Pulmonary  Breath sounds clear to auscultation               Abdominal  GI exam deferred       Other Findings            Anesthetic Plan    ASA: 2  Anesthesia type: MAC            Anesthetic plan and risks discussed with: Patient

## 2020-02-05 NOTE — H&P
[facility]    [email or web address]      Impression:   1.crcs      Plan:     1. [place] mac all risks benfits and alt discussed       Chief Complaint:       HPI:  Rylie Wright is a 62 y.o. male who is being seen on consult for crcs. PMH:   Past Medical History:   Diagnosis Date    Diabetes ([address]        PSH:   Past Surgical History:   Procedure Laterality Date    HX AMPUTATION TOE Left 12/2018    left great toe    HX MENISCUS REPAIR Right 2015       Social HX:   Social History     Socioeconomic History    Marital status: SINGLE     Spouse name: Not on file    Number of children: Not on file    Years of education: Not on file    Highest education level: Not on file   Occupational History    Not on file   Social Needs    Financial resource strain: Not on file    Food insecurity:     Worry: Not on file     Inability: Not on file    Transportation needs:     Medical: Not on file     Non-medical: Not on file   Tobacco Use    Smoking status: Former Smoker    Smokeless tobacco: Never Used    Tobacco comment: quit 0ver 25 years ago   Substance and Sexual Activity    Alcohol use: Yes     Alcohol/week: 2.0 standard drinks     Types: 2 Cans of beer per week     Comment: occ.     Drug use: No    Sexual activity: Yes     Partners: Female     Birth control/protection: Condom   Lifestyle    Physical activity:     Days per week: Not on file     Minutes per session: Not on file    Stress: Not on file   Relationships    Social connections:     Talks on phone: Not on file     Gets together: Not on file     Attends Mosque service: Not on file     Active member of club or organization: Not on file     Attends meetings of clubs or organizations: Not on file     Relationship status: Not on file    Intimate partner violence:     Fear of current or ex partner: Not on file     Emotionally abused: Not on file     Physically abused: Not on file     Forced sexual activity: Not on file   Other Topics Concern    Not on file   Social History Narrative    Not on file       FHX:   Family History   Problem Relation Age of Onset    Seizures Mother     Hypertension Mother     No Known Problems Father     No Known Problems Sister     No Known Problems Brother     No Known Problems Sister     No Known Problems Brother        Allergy:   No Known Allergies    Home Medications:     Medications Prior to Admission   Medication Sig    insulin aspart protamine/insulin aspart (NOVOLOG MIX 70/30) 100 unit/mL (70-30) injection by SubCUTAneous route.  testosterone 30 mg/actuation (1.5 mL) slpm APPLY 2 PUMPS (60MG) TO SKIN DAILY IN THE MORNING TO THE UNDERARM    CIALIS 5 mg tablet Take 1 tab PO daily.  sildenafil, pulm. hypertension, (REVATIO) 20 mg tablet Take 2-5 tablets as needed.  insulin glargine (LANTUS) 100 unit/mL injection 25 Units by SubCUTAneous route nightly. Indications: type 2 diabetes mellitus    insulin lispro (HUMALOG) 100 unit/mL injection Use tid per sliding scale  Indications: type 2 diabetes mellitus       Review of Systems:     Constitutional: No fevers, chills, weight loss, fatigue. Skin: No rashes, pruritis, jaundice, ulcerations, erythema. HENT: No headaches, nosebleeds, sinus pressure, rhinorrhea, sore throat. Eyes: No visual changes, blurred vision, eye pain, photophobia, jaundice. Cardiovascular: No chest pain, heart palpitations. Respiratory: No cough, SOB, wheezing, chest discomfort, orthopnea. Gastrointestinal: neg   Genitourinary: No dysuria, bleeding, discharge, pyuria. Musculoskeletal: No weakness, arthralgias, wasting. Endo: No sweats. Heme: No bruising, easy bleeding. Allergies: As noted. Neurological: Cranial nerves intact. Alert and oriented. Gait not assessed. Psychiatric:  No anxiety, depression, hallucinations.                  Visit Vitals  Ht 6' 1\" (1.854 m)   Wt 96.2 kg (212 lb)   BMI 27.97 kg/m² Physical Assessment:     constitutional: appearance: well developed, well nourished, normal habitus, no deformities, in no acute distress. skin: inspection: no rashes, ulcers, icterus or other lesions; no clubbing or telangiectasias. palpation: no induration or subcutaneos nodules. eyes: inspection: normal conjunctivae and lids; no jaundice pupils: symmetrical, normoreactive to light, normal accommodation and size. ENMT: mouth: normal oral mucosa,lips and gums; good dentition. oropharynx: normal tongue, hard and soft palate; posterior pharynx without erythema, exudate or lesions. neck: no masses organomegaly or tenderness. respiratory: effort: normal chest excursion; no intercostal retraction or accessory muscle use. cardiovascular: abdominal aorta: normal size and position; no bruits. palpation: PMI of normal size and position; normal rhythm; no thrill or murmurs. abdominal: abdomen: normal consistency; no tenderness or masses. hernias: no hernias appreciated. liver: normal size and consistency. spleen: not palpable. rectal: hemoccult/guaiac: not performed. musculoskeletal: no deformities or muscle wasting   lymphatic: axilae: not palpable. groin: not palpable. neck: within normal limits. other: not palpable. neurologic: cranial nerves: II-XII normal.   psychiatric: judgement/insight: within normal limits. memory: within normal limits for recent and remote events. mood and affect: no evidence of depression, anxiety or agitation. orientation: oriented to time, space and person. Basic Metabolic Profile   No results for input(s): NA, K, CL, CO2, BUN, GLU, CA, MG, PHOS in the last 72 hours. No lab exists for component: CREAT      CBC w/Diff    No results for input(s): WBC, RBC, HGB, HCT, MCV, MCH, MCHC, RDW, PLT, HGBEXT, HCTEXT, PLTEXT in the last 72 hours.     No lab exists for component: MPV No results for input(s): GRANS, LYMPH, EOS, PRO, MYELO, METAS, BLAST in the last 72 hours.    No lab exists for component: MONO, BASO     Hepatic Function   No results for input(s): ALB, TP, TBILI, GPT, SGOT, AP, AML, LPSE in the last 72 hours. No lab exists for component: Glenwood Sacks, MD, M.D. [facility], [address] [place] [address]  [email or web address]

## 2020-02-05 NOTE — PERIOP NOTES
Discharge instructions reviewed with patient and family. No concerns voiced. verbalizes understanding.

## 2020-02-05 NOTE — DISCHARGE INSTRUCTIONS
Colonoscopy: What to Expect at [address] [place] [address]  After you have a colonoscopy, you will stay at the clinic for 1 to 2 hours until the medicines wear off. Then you can go home. But you will need to arrange for a ride. Your doctor will tell you when you can eat and do your other usual activities. Your doctor will talk to you about when you will need your next colonoscopy. Your doctor can help you decide how often you need to be checked. This will depend on the results of your test and your risk for colorectal cancer. After the test, you may be bloated or have gas pains. You may need to pass gas. If a biopsy was done or a polyp was removed, you may have streaks of blood in your stool (feces) for a few days. Problems such as heavy rectal bleeding may not occur until several weeks after the test. This isn't common. But it can happen after polyps are removed. This care sheet gives you a general idea about how long it will take for you to recover. But each person recovers at a different pace. Follow the steps below to get better as quickly as possible. How can you care for yourself at home? Activity    · Rest when you feel tired.     · You can do your normal activities when it feels okay to do so. Diet    · Follow your doctor's directions for eating.     · Unless your doctor has told you not to, drink plenty of fluids. This helps to replace the fluids that were lost during the colon prep.     · Do not drink alcohol. Medicines    · Your doctor will tell you if and when you can restart your medicines. He or she will also give you instructions about taking any new medicines.     · If you take blood thinners, such as warfarin (Coumadin), clopidogrel (Plavix), or aspirin, be sure to talk to your doctor. He or she will tell you if and when to start taking those medicines again.  Make sure that you understand exactly what your doctor wants you to do.     · If polyps were removed or a biopsy was done during the test, your doctor may tell you not to take aspirin or other anti-inflammatory medicines for a few days. These include ibuprofen (Advil, Motrin) and naproxen (Aleve). Other instructions    · For your safety, do not drive or operate machinery until the medicine wears off and you can think clearly. Your doctor may tell you not to drive or operate machinery until the day after your test.     · Do not sign legal documents or make major decisions until the medicine wears off and you can think clearly. The anesthesia can make it hard for you to fully understand what you are agreeing to. Follow-up care is a key part of your treatment and safety. Be sure to make and go to all appointments, and call your doctor if you are having problems. It's also a good idea to know your test results and keep a list of the medicines you take. When should you call for help? Call 911 anytime you think you may need emergency care. For example, call if:    · You passed out (lost consciousness).     · You pass maroon or bloody stools.     · You have trouble breathing.    Call your doctor now or seek immediate medical care if:    · You have pain that does not get better after you take pain medicine.     · You are sick to your stomach or cannot drink fluids.     · You have new or worse belly pain.     · You have blood in your stools.     · You have a fever.     · You cannot pass stools or gas.    Watch closely for changes in your health, and be sure to contact your doctor if you have any problems. Where can you learn more? Go to http://sumi-kayode.info/. Enter E264 in the search box to learn more about \"Colonoscopy: What to Expect at Home. \"  Current as of: December 19, 2018  Content Version: 12.2  © 4783-1383 [employer]. Care instructions adapted under license by [employer] (which disclaims liability or warranty for this information).  If you have questions about a medical condition or this instruction, always ask your healthcare professional. [place] [address] any warranty or liability for your use of this information. DISCHARGE SUMMARY from Nurse    PATIENT INSTRUCTIONS:    After general anesthesia or intravenous sedation, for 24 hours or while taking prescription Narcotics:  · Limit your activities  · Do not drive and operate hazardous machinery  · Do not make important personal or business decisions  · Do  not drink alcoholic beverages  · If you have not urinated within 8 hours after discharge, please contact your surgeon on call. Report the following to your surgeon:  · Excessive pain, swelling, redness or odor of or around the surgical area  · Temperature over 100.5  · Nausea and vomiting lasting longer than 4 hours or if unable to take medications  · Any signs of decreased circulation or nerve impairment to extremity: change in color, persistent  numbness, tingling, coldness or increase pain  · Any questions    What to do at Home:  Recommended activity: Activity as tolerated and no driving for today. *  Please give a list of your current medications to your Primary Care Provider. *  Please update this list whenever your medications are discontinued, doses are      changed, or new medications (including over-the-counter products) are added. *  Please carry medication information at all times in case of emergency situations. These are general instructions for a healthy lifestyle:    No smoking/ No tobacco products/ Avoid exposure to second hand smoke  Surgeon General's Warning:  Quitting smoking now greatly reduces serious risk to your health.     Obesity, smoking, and sedentary lifestyle greatly increases your risk for illness    A healthy diet, regular physical exercise & weight monitoring are important for maintaining a healthy lifestyle    You may be retaining fluid if you have a history of heart failure or if you experience any of the following symptoms:  Weight gain of 3 pounds or more overnight or 5 pounds in a week, increased swelling in our hands or feet or shortness of breath while lying flat in bed. Please call your doctor as soon as you notice any of these symptoms; do not wait until your next office visit. The discharge information has been reviewed with the patient and family. The patient and family verbalized understanding. Discharge medications reviewed with the patient and family and appropriate educational materials and side effects teaching were provided.   ___________________________________________________________________________________________________________________________________

## 2020-03-04 ENCOUNTER — OFFICE VISIT (OUTPATIENT)
Dept: FAMILY MEDICINE CLINIC | Age: 55
End: 2020-03-04

## 2020-03-04 VITALS
TEMPERATURE: 97.7 F | BODY MASS INDEX: 26.77 KG/M2 | RESPIRATION RATE: 16 BRPM | HEIGHT: 73 IN | OXYGEN SATURATION: 97 % | WEIGHT: 202 LBS | SYSTOLIC BLOOD PRESSURE: 98 MMHG | DIASTOLIC BLOOD PRESSURE: 63 MMHG | HEART RATE: 75 BPM

## 2020-03-04 DIAGNOSIS — E11.9 TYPE 2 DIABETES MELLITUS WITHOUT COMPLICATION, UNSPECIFIED WHETHER LONG TERM INSULIN USE (HCC): Primary | ICD-10-CM

## 2020-03-04 LAB
GLUCOSE POC: 384 MG/DL
GLUCOSE POC: NORMAL MG/DL

## 2020-03-04 RX ORDER — INSULIN GLARGINE 100 [IU]/ML
INJECTION, SOLUTION SUBCUTANEOUS
Qty: 1 VIAL | Refills: 0 | Status: SHIPPED | COMMUNITY
Start: 2020-03-04 | End: 2020-03-19 | Stop reason: ALTCHOICE

## 2020-03-04 RX ORDER — INSULIN LISPRO 100 [IU]/ML
INJECTION, SOLUTION INTRAVENOUS; SUBCUTANEOUS
Qty: 1 VIAL | Refills: 0 | Status: SHIPPED | COMMUNITY
Start: 2020-03-04 | End: 2020-04-07 | Stop reason: SDUPTHER

## 2020-03-04 NOTE — PROGRESS NOTES
CSB information given to the patient    Sample of lantus and humalog given to the patient    Discharge instructions reviewed with patient    Medication list and understanding of medications reviewed with patient. OTC and herbal medications reviewed and added to med list if applicable  Barriers to adherence assessed. Guidance given regarding new medications this visit, including reason for taking this medicine, and common side effects. AVS explained to patient. Patient expressed understanding.

## 2020-03-04 NOTE — PROGRESS NOTES
\"Pt came in stated to me he have not had any medication for his diabetes medication pt stated that he took his blood sugar this morning and it was 405\"

## 2020-03-04 NOTE — PROGRESS NOTES
HISTORY OF PRESENT ILLNESS  Kaila Harrison is a 62 y.o. male here for DM. HPI  Mr. David Oseguera has been off his Lantus for a month. He was taking 18units hs. He took his blood sugar this morning and it was 450. He had short-acting insulin/Humalog and took 10 units and ate breakfast (eggs) about an hour prior to visit. He denies CP, SOB, abdominal pain. He does have some anxiety about his diabetes not being controlled. He usually listens to music to cope with this but hasn't done it lately. He was last seen here in 2017 but has had insurance up until recently. Review of Systems   Constitutional: Negative. HENT: Negative. Eyes: Negative. Respiratory: Negative. Cardiovascular: Negative. Gastrointestinal: Negative. Genitourinary: Negative. Musculoskeletal: Negative. Skin: Negative. POC Glucose 9:15 HHH  POC Glucose 9:45 384  Physical Exam  Vitals signs and nursing note reviewed. Constitutional:       General: He is not in acute distress. Appearance: Normal appearance. He is normal weight. He is not ill-appearing, toxic-appearing or diaphoretic. HENT:      Mouth/Throat:      Mouth: Mucous membranes are moist.      Pharynx: Oropharynx is clear. Eyes:      Pupils: Pupils are equal, round, and reactive to light. Neck:      Musculoskeletal: Normal range of motion and neck supple. No neck rigidity or muscular tenderness. Cardiovascular:      Rate and Rhythm: Normal rate and regular rhythm. Pulses: Normal pulses. Radial pulses are 2+ on the right side and 2+ on the left side. Dorsalis pedis pulses are 2+ on the right side and 2+ on the left side. Heart sounds: Normal heart sounds, S1 normal and S2 normal. No murmur. Pulmonary:      Effort: Pulmonary effort is normal.      Breath sounds: Normal breath sounds and air entry. No wheezing or rhonchi. Abdominal:      General: Bowel sounds are normal.      Palpations: Abdomen is soft. Tenderness:  There is no abdominal tenderness. There is no right CVA tenderness or left CVA tenderness. Musculoskeletal: Normal range of motion. General: No tenderness. Right lower leg: No edema. Left lower leg: No edema. Skin:     General: Skin is warm and dry. Capillary Refill: Capillary refill takes less than 2 seconds. Neurological:      General: No focal deficit present. Mental Status: He is alert and oriented to person, place, and time. Cranial Nerves: No cranial nerve deficit. Sensory: No sensory deficit. Psychiatric:         Mood and Affect: Mood normal.         Behavior: Behavior normal.         Thought Content: Thought content normal.         Judgment: Judgment normal.         ASSESSMENT and PLAN    ICD-10-CM ICD-9-CM    1.  Type 2 diabetes mellitus without complication, unspecified whether long term insulin use (HCC) E11.9 250.00 AMB POC GLUCOSE BLOOD, BY GLUCOSE MONITORING DEVICE   PAP for Lantus and Humalog  Samples provided this visit: Lantus 1 vial and Humalog 1 vial  CSB contact info and \"walk-in Wed\"     -anxiety     -also discussed stress reduction techniques to include music therapy  RTC in 1 month for DM check  Refer to Kenny Henson for DM  Mr. Ashish Quiñonez agrees to plan  Reviewed emergent symptoms associated with diabetes  RTC in 1 month for DM recheck    Mr. Ashish Quiñonez agrees to plan   AMB POC GLUCOSE BLOOD, BY GLUCOSE MONITORING DEVICE

## 2020-03-19 DIAGNOSIS — E11.29 TYPE 2 DIABETES MELLITUS WITH MICROALBUMINURIA, WITH LONG-TERM CURRENT USE OF INSULIN (HCC): ICD-10-CM

## 2020-03-19 DIAGNOSIS — Z79.4 TYPE 2 DIABETES MELLITUS WITH MICROALBUMINURIA, WITH LONG-TERM CURRENT USE OF INSULIN (HCC): ICD-10-CM

## 2020-03-19 DIAGNOSIS — R80.9 TYPE 2 DIABETES MELLITUS WITH MICROALBUMINURIA, WITH LONG-TERM CURRENT USE OF INSULIN (HCC): ICD-10-CM

## 2020-03-19 RX ORDER — INSULIN GLARGINE 100 [IU]/ML
25 INJECTION, SOLUTION SUBCUTANEOUS
Qty: 1 VIAL | Refills: 0 | Status: SHIPPED | COMMUNITY
Start: 2020-03-19

## 2020-03-19 NOTE — TELEPHONE ENCOUNTER
Received (1) box 5X3ml box of Lantus Solostar from [employer].    Order routed to provider and letter sent to patient for pickup.

## 2020-03-19 NOTE — LETTER
3/19/2020 3:35 PM 
 
Mr. Ortiz Cristin [address] [facility] [phone] Thank you for participating in the [address] [place] [address] Patient Assistance Program. 
 
This is notification that your item(s) was delivered to us. Please  your item(s) between 11:00 am and 1:00 pm, at one of our [facility] sites as soon as possible When you arrive, you will need to register inside as a \"nurse visit\" to  your medication. Notify the registrar that you are there to  medication and they will register you as soon as possible. There may be a short wait but we try to make the process as fast as possible. It is important to see you regularly to make sure your insulin is at the correct dosage. If it has been more than 3 months since you saw the doctor, please come early and plan to stay for an office visit on the day you  your medicine. If you fail to follow-up for regular appointments, the [address] [place] [address] may discontinue providing your medication. To see when the [address] will be in your neighborhood, go to 
[email or web address] 
or call [phone], select your language (1 for english or 2 for Mozambican), and then option 2. 
 
DURING THE CORONA VIRUS PANDEMIC. PLEASE CALL OUR OFFICE FOR WHEN WE WILL BE IN [place] SINCE OUR SCHEDULES ARE SUBJECT TO CHANGE. PLEASE PICKUP YOUR MEDICINE AS SOON AS YOU CAN. Sincerely, Demetri Shin MD

## 2020-03-20 NOTE — TELEPHONE ENCOUNTER
Med list and chart notes reviewed.   Pharmacy Assistance Medication approved for pickup.    lantus 25 units daily

## 2020-03-23 ENCOUNTER — TELEPHONE (OUTPATIENT)
Dept: FAMILY MEDICINE CLINIC | Age: 55
End: 2020-03-23

## 2020-03-23 ENCOUNTER — PATIENT OUTREACH (OUTPATIENT)
Dept: FAMILY MEDICINE CLINIC | Age: 55
End: 2020-03-23

## 2020-03-23 RX ORDER — FLUTICASONE PROPIONATE 50 MCG
2 SPRAY, SUSPENSION (ML) NASAL DAILY
COMMUNITY
End: 2020-09-18

## 2020-03-23 NOTE — PATIENT INSTRUCTIONS
Diabetes Sick-Day Plan: Care Instructions  Your Care Instructions    If you have diabetes, many other illnesses can make your blood sugar go up. This can be dangerous. When you are sick with the flu or another illness, your body releases hormones to fight infection. These hormones raise blood sugar levels. They also make it hard for insulin or other medicines to lower your blood sugar. Work with your doctor to make a plan for what to do on days when you are sick. Follow-up care is a key part of your treatment and safety. Be sure to make and go to all appointments, and call your doctor if you are having problems. It's also a good idea to know your test results and keep a list of the medicines you take. How can you care for yourself at home? · Work with your doctor to write up a sick-day plan for what to do on days when you are sick. Your blood sugar can go up or down, depending on your illness and whether you can keep food down. Call your doctor when you are sick. Ask if you need to adjust your pills or insulin. · Write down the diabetes medicines you have been taking and whether you have changed the dose based on your sick-day plan. Have this information ready when you call your doctor. · Eat your normal types and amounts of food. Drink extra fluids, such as water, broth, and fruit juice, to prevent dehydration. ? If your blood sugar level is higher than the blood sugar level your doctor recommends (for example, above 240 milligrams per deciliter [mg/dL]), drink extra liquids that do not contain sugar. Examples are water and sugar-free cola. ? If you can't eat your usual foods, drink extra liquids, such as soup, sports drinks, or milk. You may also eat food that is gentle on the stomach. These foods include crackers, gelatin dessert, and applesauce. Try to eat or drink 50 grams of carbohydrates every 3 to 4 hours.  For example, 6 saltine crackers, 1 cup (8 ounces) of milk, and ½ cup (4 ounces) of orange juice each contain about 15 grams of carbohydrate. · Check your blood sugar at least every 3 to 4 hours. If it goes up fast, check it more often. And check it even through the night. Take insulin if your doctor told you to do so. If you and your doctor did not have a sick-day plan for taking extra insulin, call him or her for advice. · If you take insulin, check your urine or blood for ketones. This is even more important if your blood sugar is high. · Do not take any over-the-counter medicines, such as pain relievers, decongestants, or herbal products or other natural medicines, without talking with your doctor first.  · Do not drive. If you need to see your doctor or go anywhere else, ask a family member or friend to drive you. When should you call for help? Call 911 anytime you think you may need emergency care. For example, call if:    · You passed out (lost consciousness).     · You are confused or cannot think clearly.     · Your blood sugar is very high or very low.    Watch closely for changes in your health, and be sure to contact your doctor if:    · Your blood sugar stays outside the level your doctor set for you.     · You have any problems. Where can you learn more? Go to http://sumi-kayode.info/  Enter L970 in the search box to learn more about \"Diabetes Sick-Day Plan: Care Instructions. \"  Current as of: December 19, 2019Content Version: 12.4  © 3911-3656 Healthwise, Incorporated. Care instructions adapted under license by [employer] (which disclaims liability or warranty for this information). If you have questions about a medical condition or this instruction, always ask your healthcare professional. [address] any warranty or liability for your use of this information.

## 2020-03-23 NOTE — PROGRESS NOTES
Was asked by Jeremías Arambula NP to open patient to Case Management for Diabetes due to uncontrolled DM. PMH:   Past Medical History:   Diagnosis Date    Diabetes [facility])        Two patient identifiers verified. Discussed the role of Nurse Navigator and case management program.  Patient agrees to case management services at this time. Patient reports he recently lost his job, therefore losing his insurance as well. He had run out of Lantus. Got sample form [facility] and now taking 18 units daily. Also using Humalog per sliding scale usually twice a day. He receives SNAP benefit, but has no transportation and no money for anything other than food. Checks BG's 2-3 times daily. Reports recent BG's- [id number],[phone], 291, 177, [id number]. States he is eating a diabetic diet, eats whole grains, avoids fried foods, working on decreasing bread intake, eats a lot of salmon, does not eat fast food. Patient has experienced tremendous stress lately due to losing his job and unable to find work due to current pandemic. His girlfriend is also experiencing multiple stressors making it hard for him to talk to her about his worries. He did join a [employer] one month ago so will attend their online service during the pandemic. He also is trying meditation and music therapy. Patient plays the piano and guitar. Patient has noted he sometimes gets knots in his abdomen where he injects his insulin. He alternates sides but generally injecting in the same areas on each side. Discussed rotating injection sites, staying 2 inches from [employer]. Encouraged relaxation techniques during this stressful time and working out for stress reduction. Provider consulted regarding BG's. Patient also having sinus pain and pressure. Telephone call routed to provider and provider will call patient. Mailed to patient sick day management and [facility] Coronavirus update. Follow up with patient next week.        Patient verbalized understanding of all information discussed. Pt has my contact information for any further questions, concerns, or needs. Patient expressed no questions, concerns or needs for this NN at this time.

## 2020-03-23 NOTE — TELEPHONE ENCOUNTER
Spoke with diabetic pt on phone. States he has pressure and drainage for a few weeks. Is clear. He has used loratadine and flonase in the past and has been helpful but has not tried for this episode. He does have symptoms in the winter as well. He has some flonase at home but does not have loratadine. He is not sure he has the money to buy at this time. He will start flonase one time a day. Ask friend or family if anyone has otc loratadine or similar he can try. Could also use generic benadryl (often at dollar store) but will have likely fatigue with that.      If not iimproving he can call back    Increase lantus to 20 units a day

## 2020-03-23 NOTE — LETTER
3/23/2020 4:52 PM 
 
Mr. Jose A Lorenzo [address] [facility] [address] Dear Jose A Lorenzo, It was a pleasure talking with you about your Diabetes today. With the pandemic looming, I decided to send you some information about sick day management with Diabetes just in case. I've also included some information the [facility] has drafted for our patients. It includes a hotline [facility] has made available to the community for questions concerning Coronavirus. Please call me if you have any questions or concerns @ [phone].  
 
 
 
 
Sincerely, 
 
 
Luigi Cadet RN

## 2020-03-23 NOTE — TELEPHONE ENCOUNTER
Patient reports recent 's- [id number] 29, 291, 177, 204, 181. Patient taking Lantus 18 units daily and Humalog per sliding scale BID. Pt has complaint of sinus pain and pressure. Would appreciate call from provider.

## 2020-03-30 ENCOUNTER — PATIENT OUTREACH (OUTPATIENT)
Dept: FAMILY MEDICINE CLINIC | Age: 55
End: 2020-03-30

## 2020-03-30 NOTE — PROGRESS NOTES
Follow up Chronic Condition    Focus: Diabetes    Patient states his mother passed away last week and he was the one that found her. He had stopped eating and taking his medicine for a couple days but is back on track now. Reports he increased his Lantus to 22 units daily and BG's have been around 118. He would like to  his PAP Humalog tomorrow. Will coordinate with office staff tomorrow to have pt  his medication. Will follow up with patient next week. Patient has my number if questions arise.

## 2020-04-07 NOTE — TELEPHONE ENCOUNTER
Received Humalog 3 vials from [employer] patient assistance program. Order routed to the provider and  Call placed to patient to advise patient states he will  from office at [facility]

## 2020-04-08 ENCOUNTER — PATIENT OUTREACH (OUTPATIENT)
Dept: FAMILY MEDICINE CLINIC | Age: 55
End: 2020-04-08

## 2020-04-08 RX ORDER — INSULIN LISPRO 100 [IU]/ML
INJECTION, SOLUTION INTRAVENOUS; SUBCUTANEOUS
Qty: 3 VIAL | Refills: 0 | Status: SHIPPED | COMMUNITY
Start: 2020-04-08 | End: 2020-08-13 | Stop reason: SDUPTHER

## 2020-04-08 NOTE — TELEPHONE ENCOUNTER
Med list and chart notes reviewed.   Pharmacy Assistance Medication approved for pickup.    humalog tid per sliding scale

## 2020-04-08 NOTE — LETTER
4/10/2020 10:20 AM 
 
Mr. Jae Lopez [address] [place] [id number] Dear Jae Lopez I am the Nurse Navigator working with the [address] [place] [address]. It has been a pleasure working with you on your Diabetes. I am leaving the [address] [place] [address] temporarily to [address] elsewhere during the [facility], [place] Virus outbreak, therefore I must terminate your enrollment in case management. Please call our main office at [phone] if you have any questions or need anything. Thank you for allowing us to participate in your care!  
 
 
 
 
Sincerely, 
 
 
Michelle Sen RN

## 2020-04-10 NOTE — PROGRESS NOTES
Unable to reach patient by phone to notify need to terminate CM d/t NN reassignment during C19 outbreak. Episode closed and letter mailed to patient.

## 2020-06-29 ENCOUNTER — CLINICAL SUPPORT (OUTPATIENT)
Dept: FAMILY MEDICINE CLINIC | Age: 55
End: 2020-06-29

## 2020-06-29 DIAGNOSIS — E11.29 TYPE 2 DIABETES MELLITUS WITH MICROALBUMINURIA, WITH LONG-TERM CURRENT USE OF INSULIN (HCC): Primary | ICD-10-CM

## 2020-06-29 DIAGNOSIS — Z79.4 TYPE 2 DIABETES MELLITUS WITH MICROALBUMINURIA, WITH LONG-TERM CURRENT USE OF INSULIN (HCC): Primary | ICD-10-CM

## 2020-06-29 DIAGNOSIS — R80.9 TYPE 2 DIABETES MELLITUS WITH MICROALBUMINURIA, WITH LONG-TERM CURRENT USE OF INSULIN (HCC): Primary | ICD-10-CM

## 2020-06-30 NOTE — PROGRESS NOTES
Per provider patient picked up Pharmacy Assistance Program medication. Medication: Humalog 100mg (3 vials)  Gail Carver    Lot  [id number]            Exp 09/30/22    Patient verified understanding of medication and directions. Discharge instructions reviewed with patient. Medication list and understanding of medications reviewed with patient. OTC and herbal medications reviewed and added to med list if applicable  Barriers to adherence assessed. Medication handed to patient by Dr. Raquel Fang.

## 2020-08-13 RX ORDER — INSULIN LISPRO 100 [IU]/ML
INJECTION, SOLUTION INTRAVENOUS; SUBCUTANEOUS
Qty: 3 VIAL | Refills: 0 | Status: SHIPPED | COMMUNITY
Start: 2020-08-13 | End: 2020-11-16 | Stop reason: SDUPTHER

## 2020-08-13 NOTE — TELEPHONE ENCOUNTER
Received Humalog vials from [address] [place] [address] patient assistance program.  Order routed to provider and letter mailed to patient for .

## 2020-08-13 NOTE — LETTER
8/13/2020 10:33 AM 
 
Mr. Kathy Bartholomew [address] [place] [phone] Thank you for participating in the [address] [place] [address] Patient Assistance Program. 
 
This is notification that your item(s) was delivered to us. Please  your item(s) between 11:00 am and 1:00 pm, at one of our [place] sites as soon as possible When you arrive, you will need to register inside as a \"nurse visit\" to  your medication. Notify the registrar that you are there to  medication and they will register you as soon as possible. There may be a short wait but we try to make the process as fast as possible. It is important to see you regularly to make sure your insulin is at the correct dosage. If it has been more than 3 months since you saw the doctor, please come early and plan to stay for an office visit on the day you  your medicine. If you fail to follow-up for regular appointments, the [address] [place] [address] may discontinue providing your medication. To see when the [address] will be in your neighborhood, go to 
[email or web address] 
or call [phone], select your language (1 for english or 2 for Croatian), and then option 2. Sincerely, Anabel Canas MD

## 2020-08-13 NOTE — TELEPHONE ENCOUNTER
Med list and chart notes reviewed. Pharmacy Assistance Medication approved for pickup.    humalog tid per sliding sclae.    Pt due for follow up

## 2020-08-24 ENCOUNTER — OFFICE VISIT (OUTPATIENT)
Dept: FAMILY MEDICINE CLINIC | Facility: CLINIC | Age: 55
End: 2020-08-24

## 2020-08-24 ENCOUNTER — HOSPITAL ENCOUNTER (OUTPATIENT)
Dept: LAB | Age: 55
Discharge: HOME | End: 2020-08-24
Payer: COMMERCIAL

## 2020-08-24 VITALS
TEMPERATURE: 98.8 F | DIASTOLIC BLOOD PRESSURE: 78 MMHG | SYSTOLIC BLOOD PRESSURE: 148 MMHG | HEART RATE: 72 BPM | OXYGEN SATURATION: 97 % | RESPIRATION RATE: 18 BRPM

## 2020-08-24 DIAGNOSIS — J02.0 STREP PHARYNGITIS: Primary | ICD-10-CM

## 2020-08-24 DIAGNOSIS — J01.90 ACUTE SINUSITIS, RECURRENCE NOT SPECIFIED, UNSPECIFIED LOCATION: ICD-10-CM

## 2020-08-24 DIAGNOSIS — R05.9 COUGH: ICD-10-CM

## 2020-08-24 DIAGNOSIS — R53.83 FATIGUE, UNSPECIFIED TYPE: ICD-10-CM

## 2020-08-24 LAB
S PYO AG THROAT QL: POSITIVE
VALID INTERNAL CONTROL?: YES

## 2020-08-24 PROCEDURE — 87635 SARS-COV-2 COVID-19 AMP PRB: CPT

## 2020-08-24 RX ORDER — AMOXICILLIN 500 MG/1
500 CAPSULE ORAL 3 TIMES DAILY
Qty: 30 CAP | Refills: 0 | Status: SHIPPED | OUTPATIENT
Start: 2020-08-24 | End: 2020-09-18

## 2020-08-24 NOTE — LETTER
NOTIFICATION RETURN TO WORK / SCHOOL 
 
8/24/2020 2:17 PM 
 
Mr. Estrellita Peters [address] [facility] [phone] To Whom It May Concern: Estrellita Peters is currently under the care of Julio1 S Bentley Rasheed and must remain on quarantine until test results return and indicate Covid-19 is \"undetected\". He will return to work/school on: 8/31/2020 If there are questions or concerns please have the patient contact our office. Sincerely, Vonnie De Dios DNP

## 2020-08-24 NOTE — PROGRESS NOTES
SUBJECTIVE:  Chief Complaint   Patient presents with    Fatigue     2/3 days    Chest Pain     chest tightness x 2/3 days    Cough     productive x 2/3 days    Diarrhea     last week    Ear Pressure     left side     Patient also complains of sinus pain and pressure x1 month. States he has an appointment with ENT last week but there was an issue with the scope machine and after waiting 2-1/2 hours he had to leave. He states he has an appointment with ENT today. I did encourage him to cancel that appointment as he is being tested for COVID. Patient denies recent travel. Pt exposed to sick contacts under investigations for possible Covid UNKNOWN. Works for the [employer] as a  in [place]. States he wears a mask most of the day but is impossible to wear it all day because it is too hot. Pt is not a current smoker. OBJECTIVE    Visit Vitals  /78 (BP 1 Location: Left arm, BP Patient Position: Sitting)   Pulse 72   Temp 98.8 °F (37.1 °C) (Oral)   Resp 18   SpO2 97%      General:  well nourished, cooperative, pleasant and in no apparent distress. Sick but not toxic appearing. Eyes:   The lids are without swelling, lesions, or drainage. The conjunctiva is clear and noninjected. ENT:  ENT exam normal, no neck nodes or sinus tenderness, pharynx erythematous without exudate and postnasal drip noted. Pressure to maxillary areas. Neck: normal and no adenopathy. Lungs/CV: clear to auscultation, no wheezes or rales and unlabored breathing. Heart: regular rhythm normal rate. Skin:  No rashes, no jaundice. ASSESSMENT / PLAN     ICD-10-CM ICD-9-CM    1. Strep pharyngitis  J02.0 034.0 amoxicillin (AMOXIL) 500 mg capsule   2. Acute sinusitis, recurrence not specified, unspecified location  J01.90 461.9 amoxicillin (AMOXIL) 500 mg capsule   3. Fatigue, unspecified type  R53.83 780.79 AMB POC RAPID STREP A      NOVEL CORONAVIRUS (COVID-19)   4.  Cough  R05 786.2 NOVEL CORONAVIRUS (COVID-19) Positive for strep. Based on CDC recommendations and limited testing supplies, only those patients who meet criteria will be tested for Covid. High priority groups for testing   Symptomatic and/or Exposure /Test for Covid  Immunocompromised host (on prednisone, biological therapy, blood cancer, metastatic cancer or active chemotherapy)   [facility] care worker in the home    Other high-risk group: o age >47   o Uncontrolled DM   o Uncontrolled HTN   o BMI >40, CKD/ESRD    Dialysis patients (patients going to HD units, not asymptomatic home HD/PD)    Anyone living in a congregate setting     Non High-risk patient category           Test for COVID-19   Asymptomatic, no known exposure  No    Asymptomatic, possible exposure  No    Asymptomatic, definite exposure  Provider discretion    Symptomatic, no known exposure  Yes    Symptomatic, + exposure  Yes      Patient does  meet criteria for Covid testing. COVID-19 testing was completed. Work note was given until [employer] are available. If Covid testing was completed and is negative, patient may return back to work despite quarantine originally set in place if applicable. Awaiting Covid 19 results at this time. Instructed pt on the importance of rest, fluid intake (with avoidance of red fluids), and vit C supplements. Instructed pt to check temp if possible and to take acetaminophen or NSAIDs if fevers are noted. Instructed patient to remember to wash hands, disinfect surroundings, and avoid touching face. Instructed pt to remain home and and self quarantine until Covid results are negative and all symptoms have improved or subsided. If they must leave home, wear a mask. Patient verbalized understanding. Instructed pt to change toothbrush after 24 hours of taking ABX therapy to help prevent reinfection of strep. Patient verbalized understanding.     We have provided the patient with a detailed after visit summary which was reviewed, and red flag symptoms that would warrant an ER visit were emphasized. CC'd chart to PCP: No     Due to patient's symptoms of sinusitis, treated for both strep and sinusitis. Dr. Elayne Kinney, NOELC, DNP      This visit was provided as a focused evaluation during the COVID -19 pandemic/national emergency. A comprehensive review of all previous patient history and testing was not conducted. Pertinent findings were elicited during the visit.

## 2020-08-24 NOTE — PROGRESS NOTES
Evelyn Connelly presents today for   Chief Complaint   Patient presents with    Fatigue     2/3 days    Chest Pain     chest tightness x 2/3 days    Cough     productive x 2/3 days    Diarrhea     last week    Ear Pressure     left side       Is someone accompanying this pt? no    Is the patient using any DME equipment during OV? no    Travel and Exposure Screening was performed during check in or rooming process Yes, patient hasn't traveled. Depression Screening:  3 most recent PHQ Screens 8/24/2020   Little interest or pleasure in doing things Not at all   Feeling down, depressed, irritable, or hopeless Not at all   Total Score PHQ 2 0       Fall Risk  No flowsheet data found.     This Visit Test  Results for orders placed or performed in visit on 08/24/20   AMB POC RAPID STREP A   Result Value Ref Range    VALID INTERNAL CONTROL POC Yes     Group A Strep Ag Positive Negative

## 2020-08-27 ENCOUNTER — TELEPHONE (OUTPATIENT)
Dept: FAMILY MEDICINE CLINIC | Facility: CLINIC | Age: 55
End: 2020-08-27

## 2020-08-27 LAB — SARS-COV-2, COV2NT: NOT DETECTED

## 2020-08-27 NOTE — PROGRESS NOTES
[facility] clinic nurses: Please notify pt their Covid test was Negative. If patients symptoms have not improved, they need to follow-up with their PCP. Remind pt to stay home but if they must leave home, take all precautions: wear a mask, continue social distancing, disinfect home/work areas, avoid touching the face, and sanitize hands frequently. If they need a return to work note, please provide. Thank you.

## 2020-08-28 ENCOUNTER — TELEPHONE (OUTPATIENT)
Dept: FAMILY MEDICINE CLINIC | Facility: CLINIC | Age: 55
End: 2020-08-28

## 2020-09-11 ENCOUNTER — CLINICAL SUPPORT (OUTPATIENT)
Dept: FAMILY MEDICINE CLINIC | Age: 55
End: 2020-09-11

## 2020-09-11 DIAGNOSIS — E11.9 TYPE 2 DIABETES MELLITUS WITHOUT COMPLICATION, UNSPECIFIED WHETHER LONG TERM INSULIN USE (HCC): Primary | ICD-10-CM

## 2020-09-12 ENCOUNTER — HOSPITAL ENCOUNTER (OUTPATIENT)
Dept: MRI IMAGING | Age: 55
Discharge: HOME | End: 2020-09-12
Attending: PODIATRIST
Payer: MEDICAID

## 2020-09-12 DIAGNOSIS — M86.171 OSTEOMYELITIS OF ANKLE OR FOOT, RIGHT, ACUTE (HCC): ICD-10-CM

## 2020-09-12 LAB — CREAT UR-MCNC: 1.3 MG/DL (ref 0.6–1.3)

## 2020-09-12 PROCEDURE — A9575 INJ GADOTERATE MEGLUMI 0.1ML: HCPCS | Performed by: PODIATRIST

## 2020-09-12 PROCEDURE — 73720 MRI LWR EXTREMITY W/O&W/DYE: CPT

## 2020-09-12 PROCEDURE — 82565 ASSAY OF CREATININE: CPT

## 2020-09-12 PROCEDURE — 74011636320 HC RX REV CODE- 636/320: Performed by: PODIATRIST

## 2020-09-12 RX ADMIN — GADOTERATE MEGLUMINE 20 ML: 376.9 INJECTION INTRAVENOUS at 12:32

## 2020-09-15 ENCOUNTER — ANESTHESIA EVENT (OUTPATIENT)
Dept: SURGERY | Age: 55
DRG: 314 | End: 2020-09-15
Payer: MEDICAID

## 2020-09-15 ENCOUNTER — HOSPITAL ENCOUNTER (INPATIENT)
Age: 55
LOS: 3 days | Discharge: HOME HEALTH | DRG: 314 | End: 2020-09-18
Attending: EMERGENCY MEDICINE | Admitting: HOSPITALIST
Payer: MEDICAID

## 2020-09-15 ENCOUNTER — APPOINTMENT (OUTPATIENT)
Dept: VASCULAR SURGERY | Age: 55
DRG: 314 | End: 2020-09-15
Attending: HOSPITALIST
Payer: MEDICAID

## 2020-09-15 ENCOUNTER — ANESTHESIA (OUTPATIENT)
Dept: SURGERY | Age: 55
DRG: 314 | End: 2020-09-15
Payer: MEDICAID

## 2020-09-15 ENCOUNTER — APPOINTMENT (OUTPATIENT)
Dept: GENERAL RADIOLOGY | Age: 55
DRG: 314 | End: 2020-09-15
Attending: EMERGENCY MEDICINE
Payer: MEDICAID

## 2020-09-15 DIAGNOSIS — M86.9 OSTEOMYELITIS OF GREAT TOE OF RIGHT FOOT (HCC): Primary | ICD-10-CM

## 2020-09-15 DIAGNOSIS — R60.0 EDEMA OF RIGHT LOWER EXTREMITY: ICD-10-CM

## 2020-09-15 LAB
ANION GAP SERPL CALC-SCNC: 4 MMOL/L (ref 3–18)
BASOPHILS # BLD: 0 K/UL (ref 0–0.1)
BASOPHILS NFR BLD: 0 % (ref 0–2)
BUN SERPL-MCNC: 35 MG/DL (ref 7–18)
BUN/CREAT SERPL: 26 (ref 12–20)
CALCIUM SERPL-MCNC: 8.8 MG/DL (ref 8.5–10.1)
CHLORIDE SERPL-SCNC: 104 MMOL/L (ref 100–111)
CO2 SERPL-SCNC: 28 MMOL/L (ref 21–32)
COVID-19 RAPID TEST, COVR: NOT DETECTED
CREAT SERPL-MCNC: 1.37 MG/DL (ref 0.6–1.3)
DIFFERENTIAL METHOD BLD: ABNORMAL
EOSINOPHIL # BLD: 0 K/UL (ref 0–0.4)
EOSINOPHIL NFR BLD: 0 % (ref 0–5)
ERYTHROCYTE [DISTWIDTH] IN BLOOD BY AUTOMATED COUNT: 12.7 % (ref 11.6–14.5)
EST. AVERAGE GLUCOSE BLD GHB EST-MCNC: 200 MG/DL
GLUCOSE BLD STRIP.AUTO-MCNC: 102 MG/DL (ref 70–110)
GLUCOSE BLD STRIP.AUTO-MCNC: 117 MG/DL (ref 70–110)
GLUCOSE BLD STRIP.AUTO-MCNC: 134 MG/DL (ref 70–110)
GLUCOSE BLD STRIP.AUTO-MCNC: 219 MG/DL (ref 70–110)
GLUCOSE BLD STRIP.AUTO-MCNC: 242 MG/DL (ref 70–110)
GLUCOSE BLD STRIP.AUTO-MCNC: 92 MG/DL (ref 70–110)
GLUCOSE SERPL-MCNC: 138 MG/DL (ref 74–99)
HBA1C MFR BLD: 8.6 % (ref 4.2–5.6)
HCT VFR BLD AUTO: 32.5 % (ref 36–48)
HGB BLD-MCNC: 11 G/DL (ref 13–16)
LACTATE BLD-SCNC: 0.57 MMOL/L (ref 0.4–2)
LYMPHOCYTES # BLD: 2 K/UL (ref 0.9–3.6)
LYMPHOCYTES NFR BLD: 16 % (ref 21–52)
MCH RBC QN AUTO: 30 PG (ref 24–34)
MCHC RBC AUTO-ENTMCNC: 33.8 G/DL (ref 31–37)
MCV RBC AUTO: 88.6 FL (ref 74–97)
MONOCYTES # BLD: 1.1 K/UL (ref 0.05–1.2)
MONOCYTES NFR BLD: 8 % (ref 3–10)
NEUTS SEG # BLD: 9.6 K/UL (ref 1.8–8)
NEUTS SEG NFR BLD: 76 % (ref 40–73)
PLATELET # BLD AUTO: 311 K/UL (ref 135–420)
PMV BLD AUTO: 9.6 FL (ref 9.2–11.8)
POTASSIUM SERPL-SCNC: 4.1 MMOL/L (ref 3.5–5.5)
RBC # BLD AUTO: 3.67 M/UL (ref 4.7–5.5)
SODIUM SERPL-SCNC: 136 MMOL/L (ref 136–145)
SOURCE, COVRS: NORMAL
SPECIMEN TYPE, XMCV1T: NORMAL
WBC # BLD AUTO: 12.7 K/UL (ref 4.6–13.2)

## 2020-09-15 PROCEDURE — 87040 BLOOD CULTURE FOR BACTERIA: CPT

## 2020-09-15 PROCEDURE — 87205 SMEAR GRAM STAIN: CPT

## 2020-09-15 PROCEDURE — 74011636637 HC RX REV CODE- 636/637: Performed by: HOSPITALIST

## 2020-09-15 PROCEDURE — 74011000258 HC RX REV CODE- 258: Performed by: HOSPITALIST

## 2020-09-15 PROCEDURE — 76210000017 HC OR PH I REC 1.5 TO 2 HR: Performed by: PODIATRIST

## 2020-09-15 PROCEDURE — 77030040922 HC BLNKT HYPOTHRM STRY -A: Performed by: PODIATRIST

## 2020-09-15 PROCEDURE — 87075 CULTR BACTERIA EXCEPT BLOOD: CPT

## 2020-09-15 PROCEDURE — 88307 TISSUE EXAM BY PATHOLOGIST: CPT

## 2020-09-15 PROCEDURE — 74011000250 HC RX REV CODE- 250: Performed by: NURSE ANESTHETIST, CERTIFIED REGISTERED

## 2020-09-15 PROCEDURE — 77030031139 HC SUT VCRL2 J&J -A: Performed by: PODIATRIST

## 2020-09-15 PROCEDURE — 77030006773 HC BLD SAW OSC BRSM -A: Performed by: PODIATRIST

## 2020-09-15 PROCEDURE — 80048 BASIC METABOLIC PNL TOTAL CA: CPT

## 2020-09-15 PROCEDURE — 87077 CULTURE AEROBIC IDENTIFY: CPT

## 2020-09-15 PROCEDURE — 87186 SC STD MICRODIL/AGAR DIL: CPT

## 2020-09-15 PROCEDURE — 74011000272 HC RX REV CODE- 272: Performed by: PODIATRIST

## 2020-09-15 PROCEDURE — 76010000149 HC OR TIME 1 TO 1.5 HR: Performed by: PODIATRIST

## 2020-09-15 PROCEDURE — 2709999900 HC NON-CHARGEABLE SUPPLY: Performed by: PODIATRIST

## 2020-09-15 PROCEDURE — 65270000029 HC RM PRIVATE

## 2020-09-15 PROCEDURE — 2709999900 HC NON-CHARGEABLE SUPPLY

## 2020-09-15 PROCEDURE — 77030013708 HC HNDPC SUC IRR PULS STRY –B: Performed by: PODIATRIST

## 2020-09-15 PROCEDURE — 0Y6P0Z0 DETACHMENT AT RIGHT 1ST TOE, COMPLETE, OPEN APPROACH: ICD-10-PCS | Performed by: PODIATRIST

## 2020-09-15 PROCEDURE — 85025 COMPLETE CBC W/AUTO DIFF WBC: CPT

## 2020-09-15 PROCEDURE — 83036 HEMOGLOBIN GLYCOSYLATED A1C: CPT

## 2020-09-15 PROCEDURE — 74011250636 HC RX REV CODE- 250/636: Performed by: NURSE ANESTHETIST, CERTIFIED REGISTERED

## 2020-09-15 PROCEDURE — 93971 EXTREMITY STUDY: CPT

## 2020-09-15 PROCEDURE — 93005 ELECTROCARDIOGRAM TRACING: CPT

## 2020-09-15 PROCEDURE — 74011250636 HC RX REV CODE- 250/636: Performed by: EMERGENCY MEDICINE

## 2020-09-15 PROCEDURE — 96374 THER/PROPH/DIAG INJ IV PUSH: CPT

## 2020-09-15 PROCEDURE — 77030040361 HC SLV COMPR DVT MDII -B: Performed by: PODIATRIST

## 2020-09-15 PROCEDURE — 88311 DECALCIFY TISSUE: CPT

## 2020-09-15 PROCEDURE — 76060000033 HC ANESTHESIA 1 TO 1.5 HR: Performed by: PODIATRIST

## 2020-09-15 PROCEDURE — 74011000258 HC RX REV CODE- 258: Performed by: NURSE ANESTHETIST, CERTIFIED REGISTERED

## 2020-09-15 PROCEDURE — 77030011265 HC ELECTRD BLD HEX COVD -A: Performed by: PODIATRIST

## 2020-09-15 PROCEDURE — 74011000250 HC RX REV CODE- 250

## 2020-09-15 PROCEDURE — 88305 TISSUE EXAM BY PATHOLOGIST: CPT

## 2020-09-15 PROCEDURE — 87635 SARS-COV-2 COVID-19 AMP PRB: CPT

## 2020-09-15 PROCEDURE — 73562 X-RAY EXAM OF KNEE 3: CPT

## 2020-09-15 PROCEDURE — 74011000250 HC RX REV CODE- 250: Performed by: PODIATRIST

## 2020-09-15 PROCEDURE — 74011250636 HC RX REV CODE- 250/636: Performed by: HOSPITALIST

## 2020-09-15 PROCEDURE — 82962 GLUCOSE BLOOD TEST: CPT

## 2020-09-15 PROCEDURE — 77030010509 HC AIRWY LMA MSK TELE -A: Performed by: ANESTHESIOLOGY

## 2020-09-15 PROCEDURE — 83605 ASSAY OF LACTIC ACID: CPT

## 2020-09-15 PROCEDURE — 87102 FUNGUS ISOLATION CULTURE: CPT

## 2020-09-15 PROCEDURE — 99284 EMERGENCY DEPT VISIT MOD MDM: CPT

## 2020-09-15 PROCEDURE — 77030002986 HC SUT PROL J&J -A: Performed by: PODIATRIST

## 2020-09-15 RX ORDER — HYDROCODONE BITARTRATE AND ACETAMINOPHEN 5; 325 MG/1; MG/1
1 TABLET ORAL ONCE
Status: DISCONTINUED | OUTPATIENT
Start: 2020-09-15 | End: 2020-09-15 | Stop reason: HOSPADM

## 2020-09-15 RX ORDER — MAGNESIUM SULFATE 100 %
4 CRYSTALS MISCELLANEOUS AS NEEDED
Status: DISCONTINUED | OUTPATIENT
Start: 2020-09-15 | End: 2020-09-18 | Stop reason: HOSPADM

## 2020-09-15 RX ORDER — VANCOMYCIN/0.9 % SOD CHLORIDE 1.5G/250ML
1500 PLASTIC BAG, INJECTION (ML) INTRAVENOUS
Status: DISCONTINUED | OUTPATIENT
Start: 2020-09-15 | End: 2020-09-15

## 2020-09-15 RX ORDER — MORPHINE SULFATE 2 MG/ML
2 INJECTION, SOLUTION INTRAMUSCULAR; INTRAVENOUS ONCE
Status: COMPLETED | OUTPATIENT
Start: 2020-09-15 | End: 2020-09-15

## 2020-09-15 RX ORDER — AZELASTINE 1 MG/ML
2 SPRAY, METERED NASAL 2 TIMES DAILY
Status: DISCONTINUED | OUTPATIENT
Start: 2020-09-15 | End: 2020-09-18 | Stop reason: HOSPADM

## 2020-09-15 RX ORDER — ONDANSETRON 4 MG/1
4 TABLET, ORALLY DISINTEGRATING ORAL
Status: DISCONTINUED | OUTPATIENT
Start: 2020-09-15 | End: 2020-09-18 | Stop reason: HOSPADM

## 2020-09-15 RX ORDER — INSULIN GLARGINE 100 [IU]/ML
15 INJECTION, SOLUTION SUBCUTANEOUS
Status: DISCONTINUED | OUTPATIENT
Start: 2020-09-15 | End: 2020-09-18 | Stop reason: HOSPADM

## 2020-09-15 RX ORDER — BUPIVACAINE HYDROCHLORIDE 5 MG/ML
INJECTION, SOLUTION EPIDURAL; INTRACAUDAL AS NEEDED
Status: DISCONTINUED | OUTPATIENT
Start: 2020-09-15 | End: 2020-09-15 | Stop reason: HOSPADM

## 2020-09-15 RX ORDER — ACETAMINOPHEN 325 MG/1
650 TABLET ORAL
Status: DISCONTINUED | OUTPATIENT
Start: 2020-09-15 | End: 2020-09-18 | Stop reason: HOSPADM

## 2020-09-15 RX ORDER — MIDAZOLAM HYDROCHLORIDE 1 MG/ML
INJECTION, SOLUTION INTRAMUSCULAR; INTRAVENOUS AS NEEDED
Status: DISCONTINUED | OUTPATIENT
Start: 2020-09-15 | End: 2020-09-15 | Stop reason: HOSPADM

## 2020-09-15 RX ORDER — LIDOCAINE HYDROCHLORIDE 20 MG/ML
INJECTION, SOLUTION EPIDURAL; INFILTRATION; INTRACAUDAL; PERINEURAL AS NEEDED
Status: DISCONTINUED | OUTPATIENT
Start: 2020-09-15 | End: 2020-09-15 | Stop reason: HOSPADM

## 2020-09-15 RX ORDER — ACETAMINOPHEN 650 MG/1
650 SUPPOSITORY RECTAL
Status: DISCONTINUED | OUTPATIENT
Start: 2020-09-15 | End: 2020-09-18 | Stop reason: HOSPADM

## 2020-09-15 RX ORDER — KETOROLAC TROMETHAMINE 15 MG/ML
15 INJECTION, SOLUTION INTRAMUSCULAR; INTRAVENOUS
Status: COMPLETED | OUTPATIENT
Start: 2020-09-15 | End: 2020-09-15

## 2020-09-15 RX ORDER — FLUTICASONE PROPIONATE 50 MCG
2 SPRAY, SUSPENSION (ML) NASAL DAILY
Status: DISCONTINUED | OUTPATIENT
Start: 2020-09-16 | End: 2020-09-18 | Stop reason: HOSPADM

## 2020-09-15 RX ORDER — SODIUM CHLORIDE, SODIUM LACTATE, POTASSIUM CHLORIDE, CALCIUM CHLORIDE 600; 310; 30; 20 MG/100ML; MG/100ML; MG/100ML; MG/100ML
75 INJECTION, SOLUTION INTRAVENOUS CONTINUOUS
Status: DISCONTINUED | OUTPATIENT
Start: 2020-09-15 | End: 2020-09-15 | Stop reason: HOSPADM

## 2020-09-15 RX ORDER — VANCOMYCIN 2 GRAM/500 ML IN 0.9 % SODIUM CHLORIDE INTRAVENOUS
2000 ONCE
Status: COMPLETED | OUTPATIENT
Start: 2020-09-15 | End: 2020-09-15

## 2020-09-15 RX ORDER — HYDRALAZINE HYDROCHLORIDE 25 MG/1
25 TABLET, FILM COATED ORAL
Status: DISCONTINUED | OUTPATIENT
Start: 2020-09-15 | End: 2020-09-18 | Stop reason: HOSPADM

## 2020-09-15 RX ORDER — LIDOCAINE HYDROCHLORIDE 10 MG/ML
INJECTION, SOLUTION EPIDURAL; INFILTRATION; INTRACAUDAL; PERINEURAL AS NEEDED
Status: DISCONTINUED | OUTPATIENT
Start: 2020-09-15 | End: 2020-09-15 | Stop reason: HOSPADM

## 2020-09-15 RX ORDER — PROPOFOL 10 MG/ML
VIAL (ML) INTRAVENOUS
Status: DISCONTINUED | OUTPATIENT
Start: 2020-09-15 | End: 2020-09-15 | Stop reason: HOSPADM

## 2020-09-15 RX ORDER — DEXTROSE 50 % IN WATER (D50W) INTRAVENOUS SYRINGE
25-50 AS NEEDED
Status: DISCONTINUED | OUTPATIENT
Start: 2020-09-15 | End: 2020-09-18 | Stop reason: HOSPADM

## 2020-09-15 RX ORDER — DIPHENHYDRAMINE HYDROCHLORIDE 50 MG/ML
25 INJECTION, SOLUTION INTRAMUSCULAR; INTRAVENOUS
Status: DISCONTINUED | OUTPATIENT
Start: 2020-09-15 | End: 2020-09-15 | Stop reason: HOSPADM

## 2020-09-15 RX ORDER — ONDANSETRON 2 MG/ML
4 INJECTION INTRAMUSCULAR; INTRAVENOUS
Status: DISCONTINUED | OUTPATIENT
Start: 2020-09-15 | End: 2020-09-18 | Stop reason: HOSPADM

## 2020-09-15 RX ORDER — INSULIN LISPRO 100 [IU]/ML
INJECTION, SOLUTION INTRAVENOUS; SUBCUTANEOUS EVERY 6 HOURS
Status: DISCONTINUED | OUTPATIENT
Start: 2020-09-15 | End: 2020-09-18

## 2020-09-15 RX ORDER — VANCOMYCIN/0.9 % SOD CHLORIDE 1.5G/250ML
1500 PLASTIC BAG, INJECTION (ML) INTRAVENOUS
Status: COMPLETED | OUTPATIENT
Start: 2020-09-15 | End: 2020-09-17

## 2020-09-15 RX ORDER — HYDROMORPHONE HYDROCHLORIDE 2 MG/ML
0.5 INJECTION, SOLUTION INTRAMUSCULAR; INTRAVENOUS; SUBCUTANEOUS
Status: DISCONTINUED | OUTPATIENT
Start: 2020-09-15 | End: 2020-09-15 | Stop reason: HOSPADM

## 2020-09-15 RX ORDER — MAGNESIUM SULFATE 100 %
4 CRYSTALS MISCELLANEOUS AS NEEDED
Status: DISCONTINUED | OUTPATIENT
Start: 2020-09-15 | End: 2020-09-15 | Stop reason: HOSPADM

## 2020-09-15 RX ORDER — DEXTROSE 50 % IN WATER (D50W) INTRAVENOUS SYRINGE
25-50 AS NEEDED
Status: DISCONTINUED | OUTPATIENT
Start: 2020-09-15 | End: 2020-09-15 | Stop reason: HOSPADM

## 2020-09-15 RX ORDER — SODIUM CHLORIDE 9 MG/ML
75 INJECTION, SOLUTION INTRAVENOUS CONTINUOUS
Status: DISCONTINUED | OUTPATIENT
Start: 2020-09-15 | End: 2020-09-16

## 2020-09-15 RX ORDER — PROPOFOL 10 MG/ML
INJECTION, EMULSION INTRAVENOUS AS NEEDED
Status: DISCONTINUED | OUTPATIENT
Start: 2020-09-15 | End: 2020-09-15 | Stop reason: HOSPADM

## 2020-09-15 RX ORDER — MORPHINE SULFATE 2 MG/ML
2 INJECTION, SOLUTION INTRAMUSCULAR; INTRAVENOUS
Status: DISCONTINUED | OUTPATIENT
Start: 2020-09-15 | End: 2020-09-18

## 2020-09-15 RX ORDER — EPHEDRINE SULFATE/0.9% NACL/PF 25 MG/5 ML
SYRINGE (ML) INTRAVENOUS AS NEEDED
Status: DISCONTINUED | OUTPATIENT
Start: 2020-09-15 | End: 2020-09-15 | Stop reason: HOSPADM

## 2020-09-15 RX ORDER — FENTANYL CITRATE 50 UG/ML
50 INJECTION, SOLUTION INTRAMUSCULAR; INTRAVENOUS AS NEEDED
Status: DISCONTINUED | OUTPATIENT
Start: 2020-09-15 | End: 2020-09-15 | Stop reason: HOSPADM

## 2020-09-15 RX ORDER — POLYETHYLENE GLYCOL 3350 17 G/17G
17 POWDER, FOR SOLUTION ORAL DAILY PRN
Status: DISCONTINUED | OUTPATIENT
Start: 2020-09-15 | End: 2020-09-18 | Stop reason: HOSPADM

## 2020-09-15 RX ADMIN — HYDROMORPHONE HYDROCHLORIDE 0.5 MG: 2 INJECTION, SOLUTION INTRAMUSCULAR; INTRAVENOUS; SUBCUTANEOUS at 20:10

## 2020-09-15 RX ADMIN — PROPOFOL 180 MG: 10 INJECTION, EMULSION INTRAVENOUS at 17:54

## 2020-09-15 RX ADMIN — DEXMEDETOMIDINE HYDROCHLORIDE 6 MCG: 100 INJECTION, SOLUTION, CONCENTRATE INTRAVENOUS at 17:45

## 2020-09-15 RX ADMIN — PIPERACILLIN AND TAZOBACTAM 3.38 G: 3; .375 INJECTION, POWDER, LYOPHILIZED, FOR SOLUTION INTRAVENOUS at 23:21

## 2020-09-15 RX ADMIN — MORPHINE SULFATE 2 MG: 2 INJECTION, SOLUTION INTRAMUSCULAR; INTRAVENOUS at 05:11

## 2020-09-15 RX ADMIN — KETOROLAC TROMETHAMINE 15 MG: 15 INJECTION, SOLUTION INTRAMUSCULAR; INTRAVENOUS at 02:46

## 2020-09-15 RX ADMIN — PIPERACILLIN AND TAZOBACTAM 3.38 G: 3; .375 INJECTION, POWDER, LYOPHILIZED, FOR SOLUTION INTRAVENOUS at 06:40

## 2020-09-15 RX ADMIN — SODIUM CHLORIDE 125 ML/HR: 900 INJECTION, SOLUTION INTRAVENOUS at 03:00

## 2020-09-15 RX ADMIN — MIDAZOLAM HYDROCHLORIDE 2 MG: 2 INJECTION, SOLUTION INTRAMUSCULAR; INTRAVENOUS at 17:36

## 2020-09-15 RX ADMIN — PIPERACILLIN AND TAZOBACTAM 3.38 G: 3; .375 INJECTION, POWDER, LYOPHILIZED, FOR SOLUTION INTRAVENOUS at 18:20

## 2020-09-15 RX ADMIN — VANCOMYCIN HYDROCHLORIDE 2000 MG: 10 INJECTION, POWDER, LYOPHILIZED, FOR SOLUTION INTRAVENOUS at 07:27

## 2020-09-15 RX ADMIN — PROPOFOL 75 MCG/KG/MIN: 10 INJECTION, EMULSION INTRAVENOUS at 17:45

## 2020-09-15 RX ADMIN — MORPHINE SULFATE 2 MG: 2 INJECTION, SOLUTION INTRAMUSCULAR; INTRAVENOUS at 23:42

## 2020-09-15 RX ADMIN — MORPHINE SULFATE 2 MG: 2 INJECTION, SOLUTION INTRAMUSCULAR; INTRAVENOUS at 13:07

## 2020-09-15 RX ADMIN — INSULIN LISPRO 4 UNITS: 100 INJECTION, SOLUTION INTRAVENOUS; SUBCUTANEOUS at 06:48

## 2020-09-15 RX ADMIN — VANCOMYCIN HYDROCHLORIDE 1500 MG: 10 INJECTION, POWDER, LYOPHILIZED, FOR SOLUTION INTRAVENOUS at 23:21

## 2020-09-15 RX ADMIN — INSULIN LISPRO 4 UNITS: 100 INJECTION, SOLUTION INTRAVENOUS; SUBCUTANEOUS at 23:29

## 2020-09-15 RX ADMIN — Medication 10 MG: at 18:16

## 2020-09-15 RX ADMIN — SODIUM CHLORIDE 125 ML/HR: 900 INJECTION, SOLUTION INTRAVENOUS at 12:01

## 2020-09-15 RX ADMIN — PIPERACILLIN AND TAZOBACTAM 3.38 G: 3; .375 INJECTION, POWDER, LYOPHILIZED, FOR SOLUTION INTRAVENOUS at 12:58

## 2020-09-15 RX ADMIN — INSULIN GLARGINE 15 UNITS: 100 INJECTION, SOLUTION SUBCUTANEOUS at 21:52

## 2020-09-15 RX ADMIN — LIDOCAINE HYDROCHLORIDE 100 MG: 20 INJECTION, SOLUTION EPIDURAL; INFILTRATION; INTRACAUDAL; PERINEURAL at 17:36

## 2020-09-15 NOTE — PROGRESS NOTES
Advance Care Planning     Advance Care Planning Clinical Specialist  Conversation Note      Date of ACP Conversation: 9/15/2020    Conversation Conducted with:   Patient with Decision Making Capacity    ACP Clinical Specialist: Chiqui Main    *When Decision Maker makes decisions on behalf of the incapacitated patient: Tanya Jose is asked to consider and make decisions based on patient values, known preferences, or best interests. Health Care Decision Maker:    Current Designated Health Care Decision Maker:   (If there is a valid [place] named in the [address] Makers\" box in the ACP activity, but it is not visible above, be sure to open that field and then select the health care decision maker relationship (ie \"primary\") in the blank space to the right of the name.) Validate  this information as still accurate & up-to-date; edit [place] field as needed.)    Note: Assess and validate information in current ACP documents, as indicated. If no Decision Maker listed above or available through scanned documents, then:    If no Authorized Decision Maker has previously been identified, then patient chooses [place]:  \"Who would you like to name as your primary health care decision-maker? \"    Name: Jorge Carrillo   Relationship: Sister Phone number: [phone]  Zoe Espinoer this person be reached easily? \" YES    Note: If the relationship of these Decision-Makers to the patient does NOT follow your state's Next of Kin hierarchy, recommend that patient complete ACP document that meets state-specific requirements to allow them to act on the patient's behalf when appropriate.           NOTE: If the patient has a valid advance directive AND now provides care preference(s) that are inconsistent with that prior directive, advise the patient to consider either: creating a new advance directive that complies with state-specific requirements; or, if that is not possible, orally revoking that prior directive in accordance with state-specific requirements, which must be documented in the EHR.         Conversation Outcomes:  [x] [address] discussion completed  [] Existing advance directive reviewed with patient; no changes to patient's previously recorded wishes   [] New Advance Directive completed   [] Portable Do Not Rescitate prepared for Provider review and signature  [] POLST/POST/MOLST/MOST prepared for Provider review and signature       Follow-up plan:    [] Schedule follow-up conversation to continue planning  [] Referred individual to Provider for additional questions/concerns   [] Advised patient/agent/surrogate to review completed ACP document and update if needed with changes in condition, patient preferences or care setting     [x] This note routed to one or more involved healthcare providers

## 2020-09-15 NOTE — ED PROVIDER NOTES
Coleman Feng is a 60 y.o. male with past medical history of type 2 diabetes coming in complaining of right great toe pain and swelling for the last couple weeks. Patient states that he had an ulcer of the toe for which he was seeing his podiatrist, Dr. Katya Albarran, and it improved, however worsened again over the last few weeks. 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 States he has been on Bactrim as well as another antibiotic which she cannot remember the name of. Patient states he saw his podiatrist in the office this morning and was told to come in for admission for partial amputation of his right great toe due to a bone infection. Patient states a couple nights ago he had some chills and checked his temperature and was 100.7. He states that he took some ibuprofen and it decreased. He denies any body aches, cough, shortness of breath, or exposure to COVID-19. Patient does note that he saw an ENT a few weeks ago and was diagnosed with strep pharyngitis and was given a course of antibiotics. He states his symptoms have improved. Patient also notes that he has had some achy, constant, non-radiating pain to his right medial knee after he fell and twisted it. He does report some edema in the lower extremity distal to the knee and the ankle and foot. Denies any purulent drainage. Patient has no other complaints at this time.            Past Medical History:   Diagnosis Date    Diabetes [facility])        Past Surgical History:   Procedure Laterality Date    COLONOSCOPY N/A 2/5/2020    COLONOSCOPY performed by Griffin Alvarado MD at [facility]COPY    HX AMPUTATION TOE Left 12/2018    left great toe    HX MENISCUS REPAIR Right 2015         Family History:   Problem Relation Age of Onset    Seizures Mother     Hypertension Mother     No Known Problems Father     No Known Problems Sister     No Known Problems Brother     No Known Problems Sister     No Known Problems Brother Social History     Socioeconomic History    Marital status: SINGLE     Spouse name: Not on file    Number of children: Not on file    Years of education: Not on file    Highest education level: Not on file   Occupational History    Not on file   Social Needs    Financial resource strain: Not on file    Food insecurity     Worry: Not on file     Inability: Not on file    Transportation needs     Medical: Not on file     Non-medical: Not on file   Tobacco Use    Smoking status: Former Smoker    Smokeless tobacco: Never Used    Tobacco comment: quit 0ver 25 years ago   Substance and Sexual Activity    Alcohol use: Yes     Alcohol/week: 2.0 standard drinks     Types: 2 Cans of beer per week     Comment: occ.  Drug use: No    Sexual activity: Yes     Partners: Female     Birth control/protection: Condom   Lifestyle    Physical activity     Days per week: Not on file     Minutes per session: Not on file    Stress: Not on file   Relationships    Social connections     Talks on phone: Not on file     Gets together: Not on file     Attends Gnosticist service: Not on file     Active member of club or organization: Not on file     Attends meetings of clubs or organizations: Not on file     Relationship status: Not on file    Intimate partner violence     Fear of current or ex partner: Not on file     Emotionally abused: Not on file     Physically abused: Not on file     Forced sexual activity: Not on file   Other Topics Concern    Not on file   Social History Narrative    Not on file         ALLERGIES: Patient has no known allergies. Review of Systems   Constitutional: Positive for chills and fever. Respiratory: Negative. Negative for cough and shortness of breath. Cardiovascular: Positive for leg swelling. Negative for chest pain. Gastrointestinal: Negative. Negative for abdominal pain, nausea and vomiting. Musculoskeletal: Positive for arthralgias. Negative for myalgias.    Skin: Positive for rash and wound. Neurological: Negative. Negative for dizziness, weakness and light-headedness. All other systems reviewed and are negative. Vitals:    09/15/20 0206   BP: (!) 186/90   Pulse: 77   Resp: 16   Temp: 98.6 °F (37 °C)   SpO2: 99%   Weight: 103.4 kg (228 lb)            Physical Exam  Vitals signs reviewed. Constitutional:       General: He is not in acute distress. Appearance: Normal appearance. He is well-developed. HENT:      Head: Normocephalic and atraumatic. Eyes:      Extraocular Movements: Extraocular movements intact. Conjunctiva/sclera: Conjunctivae normal.      Pupils: Pupils are equal, round, and reactive to light. Neck:      Musculoskeletal: Normal range of motion and neck supple. Cardiovascular:      Rate and Rhythm: Normal rate and regular rhythm. Heart sounds: S1 normal and S2 normal.   Pulmonary:      Effort: Pulmonary effort is normal. No accessory muscle usage or respiratory distress. Breath sounds: Normal breath sounds. Abdominal:      General: There is no distension. Palpations: Abdomen is not rigid. Tenderness: There is no abdominal tenderness. Musculoskeletal: Normal range of motion. General: No tenderness. Right lower leg: Edema present. Comments: Full range of motion of the right knee. No warmth or effusion. Skin:     General: Skin is warm. Comments: Patient has a ulcer of the distal and plantar aspect of the right great toe. No evidence of fluctuance or ascending erythema or warmth. Full range of motion of the ankle and knee. There is some soft tissue swelling over the right lower extremity proximal to the ankle into the ankle and foot. No crepitus. Compartments are soft. Neurovascularly intact. Neurological:      General: No focal deficit present. Mental Status: He is alert and oriented to person, place, and time.    Psychiatric:         Speech: Speech normal. MDM  Number of Diagnoses or Management Options  Osteomyelitis of great toe of right foot [facility]):   Diagnosis management comments: Kadi Villagran is a 60 y.o. male coming in from his podiatry office with instructions to be admitted for toe osteomyelitis. Patient does have a chronic nonhealing ulcer. He does have an MRI report from a couple days ago that shows osteomyelitis of the great toe. Patient did have a reported fever of 100.7 at home a couple days ago, afebrile here. No other symptoms at risk factors for COVID-19. Symptoms likely due to osteomyelitis. No evidence of sepsis or significant systemic involvement. Will touch base with patient's podiatrist and plan for admission. Procedures        Vitals:  Patient Vitals for the past 12 hrs:   Temp Pulse Resp BP SpO2   09/15/20 0206 98.6 °F (37 °C) 77 16 (!) 186/90 99 %       Medications ordered:   Medications   0.9% sodium chloride infusion (125 mL/hr IntraVENous New Bag 9/15/20 0300)   ketorolac (TORADOL) injection 15 mg (15 mg IntraVENous Given 9/15/20 0246)         Lab findings:  Recent Results (from the past 12 hour(s))   CBC WITH AUTOMATED DIFF    Collection Time: 09/15/20  2:35 AM   Result Value Ref Range    WBC 12.7 4.6 - 13.2 K/uL    RBC 3.67 (L) 4.70 - 5.50 M/uL    HGB 11.0 (L) 13.0 - 16.0 g/dL    HCT 32.5 (L) 36.0 - 48.0 %    MCV 88.6 74.0 - 97.0 FL    MCH 30.0 24.0 - 34.0 PG    MCHC 33.8 31.0 - 37.0 g/dL    RDW 12.7 11.6 - 14.5 %    PLATELET 970 987 - 210 K/uL    MPV 9.6 9.2 - 11.8 FL    NEUTROPHILS 76 (H) 40 - 73 %    LYMPHOCYTES 16 (L) 21 - 52 %    MONOCYTES 8 3 - 10 %    EOSINOPHILS 0 0 - 5 %    BASOPHILS 0 0 - 2 %    ABS. NEUTROPHILS 9.6 (H) 1.8 - 8.0 K/UL    ABS. LYMPHOCYTES 2.0 0.9 - 3.6 K/UL    ABS. MONOCYTES 1.1 0.05 - 1.2 K/UL    ABS. EOSINOPHILS 0.0 0.0 - 0.4 K/UL    ABS.  BASOPHILS 0.0 0.0 - 0.1 K/UL    DF AUTOMATED     METABOLIC PANEL, BASIC    Collection Time: 09/15/20  2:35 AM   Result Value Ref Range    Sodium 136 136 - 145 mmol/L    Potassium 4.1 3.5 - 5.5 mmol/L    Chloride 104 100 - 111 mmol/L    CO2 28 21 - 32 mmol/L    Anion gap 4 3.0 - 18 mmol/L    Glucose 138 (H) 74 - 99 mg/dL    BUN 35 (H) 7.0 - 18 MG/DL    Creatinine 1.37 (H) 0.6 - 1.3 MG/DL    BUN/Creatinine ratio 26 (H) 12 - 20      GFR est AA >60 >60 ml/min/1.73m2    GFR est non-AA 54 (L) >60 ml/min/1.73m2    Calcium 8.8 8.5 - 10.1 MG/DL   GLUCOSE, POC    Collection Time: 09/15/20  2:40 AM   Result Value Ref Range    Glucose (POC) 134 (H) 70 - 110 mg/dL   POC LACTIC ACID    Collection Time: 09/15/20  2:42 AM   Result Value Ref Range    Lactic Acid (POC) 0.57 0.40 - 2.00 mmol/L       X-Ray, CT or other radiology findings or impressions:  XR KNEE RT 3 V    (Results Pending)       Progress notes, Consult notes or additional Procedure notes:     Consult: Spoke to Dr. Emerson France, podiatry, regarding patient's symptoms, exam, and history. He recommended n.p.o. after breakfast and maintenance IV fluids. States that he is plan to take the patient to the operating room tomorrow. Specifically asked if he wanted any IV antibiotics started for the patient in the emergency department and he declined. Consult: Spoke to Dr. Marco Ragsdale, hospitalist, regarding patient's symptoms, exam, history, and podiatry recommendations. He agrees to accept the patient to medical/surgical floor. Disposition:  Diagnosis:   1. Osteomyelitis of great toe of right foot ([facility] [address]    2. Edema of right lower extremity        Disposition: Admit to med/surg    Follow-up Information    None          Patient's Medications   Start Taking    No medications on file   Continue Taking    AMOXICILLIN (AMOXIL) 500 MG CAPSULE    Take 1 Cap by mouth three (3) times daily. Indications: throat irritation    FLUTICASONE PROPIONATE (FLONASE ALLERGY RELIEF) 50 MCG/ACTUATION NASAL SPRAY    2 Sprays by Both Nostrils route daily. INJECTOR DEVICE DAYA    DISPENSE INJECT-EASE AUTO-INJECTOR FOR USE WITH BI-MIX INJECTIONS. INSULIN GLARGINE (LANTUS) 100 UNIT/ML INJECTION    25 Units by SubCUTAneous route nightly. Indications: type 2 diabetes mellitus    INSULIN LISPRO (HUMALOG) 100 UNIT/ML INJECTION    Use tid per sliding scale  Indications: type 2 diabetes mellitus    SILDENAFIL, PULM. HYPERTENSION, (REVATIO) 20 MG TABLET    Take 2-5 tablets as needed. SYRINGE WITH NEEDLE, DISP, (ALLERGY SYRINGE) 1 ML 27 X 1/2\" SYRG    FOR USE WITH INTRACAVERNOSAL INJECTIONS. TADALAFIL (CIALIS) 5 MG TABLET    Take 1 Tab by mouth nightly. Take 1 tab PO daily.     TESTOSTERONE 30 MG/ACTUATION (1.5 ML) SLPM    APPLY 2 PUMPS (60MG) TO SKIN DAILY IN THE MORNING TO THE UNDERARM    TRI MIX COMPOUND    0.7-1 mL by IntraCAVernosal route as needed (FOR E.D.). DISPENSE COMPOUNDED TRI-MIX (PGE-1  5 mcg/ Papaverine 50 mg/  Phentolamine 6 mg/ml)   These Medications have changed    No medications on file   Stop Taking    No medications on file

## 2020-09-15 NOTE — PROGRESS NOTES
Reason for Admission:  Osteomyelitis of great toe of right foot ([facility] [address] [M86.9]  Edema of right lower extremity [R60.0]  Osteomyelitis of great toe of right foot ([address] [M86.9]                 RUR Score:    9%            Plan for utilizing home health:    Yes, FOC verbal consent for [facility]. Likelihood of Readmission:   LOW                         Transition of Care Plan:              Initial assessment completed with patient. Cognitive status of patient: oriented to time, place, person and situation. Face sheet information confirmed:  yes. The patient designates his sister Trevor Ramos [phone] to participate in his discharge plan and to receive any needed information. This patient lives alone  in a single family home,  with 3 steps to enter. Patient is able to navigate steps as needed. Prior to hospitalization, patient was considered to be independent with ADLs/IADLS : yes . Patient has a current ACP document on file: yes     One of the patient's family members will be available to transport patient home upon discharge. The patient already has none reported,  medical equipment available in the home. Patient is not currently active with home health. Patient has not stayed in a skilled nursing facility or rehab. This patient is on dialysis :no    List of available Home Health agencies were provided and reviewed with the patient prior to discharge. Freedom of choice signed: yes, for [facility]. Currently, the discharge plan is Home with [address] [facility] [place]. The patient states that he can obtain his medications from the pharmacy, and take his medications as directed. Patient's current insurance is [employer].                   Shelly Bennett RN  Case Management [phone]

## 2020-09-15 NOTE — PROGRESS NOTES
conducted an initial consultation and Spiritual Assessment for Chelsie Lehman, who is a 60 y. o.,male. Patients Primary Language is: [place]. According to the patients EMR Pentecostal Affiliation is: Devine.     The reason the Patient came to the hospital is:   Patient Active Problem List    Diagnosis Date Noted    Edema of right lower extremity 09/15/2020    Osteomyelitis of great toe of right foot ([facility] [address] 09/15/2020    Diabetic foot ulcer ([facility] 12/27/2018    Neuropathy 02/24/2017    Pain due to abscess 09/20/2016    Type 2 diabetes mellitus without complication ([facility] 49/32/3428        The  provided the following Interventions:  Initiated a relationship of care and support. Explored issues of ingrid, belief, spirituality and Roman Catholic/ritual needs while hospitalized. Listened empathically. Offered prayer and assurance of continued prayers on patient's behalf. Chart reviewed. The following outcomes where achieved:  Patient shared limited information about both their medical narrative and spiritual journey/beliefs. Patient processed feeling about current hospitalization. Patient expressed gratitude for 's visit. Assessment:  Patient does not have any Roman Catholic/cultural needs that will affect patients preferences in health care. There are no spiritual or Roman Catholic issues which require intervention at this time. Plan:  Chaplains will continue to follow and will provide pastoral care on an as needed/requested basis.  recommends bedside caregivers page  on duty if patient shows signs of acute spiritual or emotional distress.       [address] [facility]   [phone]

## 2020-09-15 NOTE — H&P
HISTORY & PHYSICAL      Patient: Paul Nickerson MRN: [id number]  CSN: [id number]    YOB: 1965  Age: 60 y.o. Sex: male    DOA: 9/15/2020 LOS:  LOS: 0 days        DOA: 9/15/2020        Assessment/Plan     Active Problems:    Edema of right lower extremity (9/15/2020)      Osteomyelitis of great toe of right foot ([address] (9/15/2020)        Plan:  1. Osteomyelitis right great toe IV antibiotics, IV fluids, n.p.o. for surgery in a.m. Podiatry consulted in [address] [place] [address]. 2. History of type 2 diabetes is currently on Lantus and lispro at home, will hold and start on insulin sliding scale since patient is n.p.o.  3. History of recent sinusitistreated with p.o. antibiotics. DVT prophylaxisSCDs no anticoagulation since plan for surgery in a.m. can start anticoagulation postop  Full code              HPI:     Paul Nickerson is a 60 y.o. male who has a history of type 2 diabetes, controlled with Lantus and lispro, Is being admitted to the hospital secondary to osteomyelitis right great toe. Patient mentions that he has had a problem with ulceration on the toe for the last 2-1/2 to 3 months. Initially there was a small ulcer which has slowly gotten bigger with time. He had seen the podiatrist and mentions that with time the ulcer had healed. He had gone back to work at the [employer] and mentions that the toe was exposed to moist situations and the ulcer opened up again. He went to see podiatry yesterday and was advised to go to the emergency room since he has also noted that his lower leg below the knee has also been swollen. ER evaluationpatient noted to have ulceration of the right great toe with possible osteomyelitis. Podiatry consulted by ER and advised to keep n.p.o. after midnight for possible surgery in a.m. Patient has been started on broad-spectrum IV antibiotics. Patient will be admitted to the hospital for further evaluation and treatment.     Past Medical History: Diagnosis Date    Diabetes [facility])        Past Surgical History:   Procedure Laterality Date    COLONOSCOPY N/A 2/5/2020    COLONOSCOPY performed by Brenda Sullivan MD at [address] HX AMPUTATION TOE Left 12/2018    left great toe    HX MENISCUS REPAIR Right 2015       Family History   Problem Relation Age of Onset    Seizures Mother     Hypertension Mother     No Known Problems Father     No Known Problems Sister     No Known Problems Brother     No Known Problems Sister     No Known Problems Brother        Social History     Socioeconomic History    Marital status: SINGLE     Spouse name: Not on file    Number of children: Not on file    Years of education: Not on file    Highest education level: Not on file   Tobacco Use    Smoking status: Former Smoker    Smokeless tobacco: Never Used    Tobacco comment: quit 0ver 25 years ago   Substance and Sexual Activity    Alcohol use: Yes     Alcohol/week: 2.0 standard drinks     Types: 2 Cans of beer per week     Comment: occ.  Drug use: No    Sexual activity: Yes     Partners: Female     Birth control/protection: Condom       Prior to Admission medications    Medication Sig Start Date End Date Taking? Authorizing Provider   amoxicillin (AMOXIL) 500 mg capsule Take 1 Cap by mouth three (3) times daily.  Indications: throat irritation 8/24/20   [facility] [place]   insulin lispro (HUMALOG) 100 unit/mL injection Use tid per sliding scale  Indications: type 2 diabetes mellitus 8/13/20   Miri Yin MD   Syringe with Needle, Disp, (Allergy Syringe) 1 mL 27 x 1/2\" syrg FOR USE WITH INTRACAVERNOSAL INJECTIONS. 7/27/20   Rodrigue Morgan MD   Injector Device rah DISPENSE INJECT-EASE AUTO-INJECTOR FOR USE WITH BI-MIX INJECTIONS. 7/27/20   Rodrigue Morgan MD   tri mix compound 0.7-1 mL by IntraCAVernosal route as needed (FOR E.D.). DISPENSE COMPOUNDED TRI-MIX (PGE-1  5 mcg/ Papaverine 50 mg/  Phentolamine 6 mg/ml) 7/27/20   Jyothi Dalton, MD   tadalafiL (Cialis) 5 mg tablet Take 1 Tab by mouth nightly. Take 1 tab PO daily. 7/14/20   Todd Tinoco MD   fluticasone propionate (Flonase Allergy Relief) 50 mcg/actuation nasal spray 2 Sprays by Both Nostrils route daily. Provider, Historical   insulin glargine (LANTUS) 100 unit/mL injection 25 Units by SubCUTAneous route nightly. Indications: type 2 diabetes mellitus 3/19/20   Varinder Sol MD   testosterone 30 mg/actuation (1.5 mL) slpm APPLY 2 PUMPS (60MG) TO SKIN DAILY IN THE MORNING TO THE UNDERARM 12/14/19   Provider, Historical   sildenafil, pulm. hypertension, (REVATIO) 20 mg tablet Take 2-5 tablets as needed. 12/18/19   Todd Tinoco MD       No Known Allergies    Review of Systems  A comprehensive review of systems was negative except for that written in the History of Present Illness. Physical Exam:      Visit Vitals  BP (!) 186/90 (BP 1 Location: Left arm, BP Patient Position: At rest)   Pulse 77   Temp 98.6 °F (37 °C)   Resp 16   Wt 103.4 kg (228 lb)   SpO2 99%   BMI 30.08 kg/m²       Physical Exam:    Gen: In general, this is a well nourished male in no acute distress  HEENT: Sclerae nonicteric. Oral mucous membranes moist. Dentition normal  Neck: Supple with midline trachea. CV: RRR without murmur or rub appreciated. Resp:Respirations are unlabored without use of accessory muscles. Lung fields B/L without wheezes or rhonchi. Abd: Soft, nontender, nondistended. Extrem: Right leg swollen 1+ edema, right foot first toe significant ulceration with discoloration and possible osteomyelitis  Skin: Warm, no visible rashes. Neuro: Patient is alert, oriented, and cooperative. No obvious focal defects. Moves all 4 extremities.     Labs Reviewed:    Recent Results (from the past 24 hour(s))   CBC WITH AUTOMATED DIFF    Collection Time: 09/15/20  2:35 AM   Result Value Ref Range    WBC 12.7 4.6 - 13.2 K/uL    RBC 3.67 (L) 4.70 - 5.50 M/uL    HGB 11.0 (L) 13.0 - 16.0 g/dL HCT 32.5 (L) 36.0 - 48.0 %    MCV 88.6 74.0 - 97.0 FL    MCH 30.0 24.0 - 34.0 PG    MCHC 33.8 31.0 - 37.0 g/dL    RDW 12.7 11.6 - 14.5 %    PLATELET 676 026 - 566 K/uL    MPV 9.6 9.2 - 11.8 FL    NEUTROPHILS 76 (H) 40 - 73 %    LYMPHOCYTES 16 (L) 21 - 52 %    MONOCYTES 8 3 - 10 %    EOSINOPHILS 0 0 - 5 %    BASOPHILS 0 0 - 2 %    ABS. NEUTROPHILS 9.6 (H) 1.8 - 8.0 K/UL    ABS. LYMPHOCYTES 2.0 0.9 - 3.6 K/UL    ABS. MONOCYTES 1.1 0.05 - 1.2 K/UL    ABS. EOSINOPHILS 0.0 0.0 - 0.4 K/UL    ABS. BASOPHILS 0.0 0.0 - 0.1 K/UL    DF AUTOMATED     METABOLIC PANEL, BASIC    Collection Time: 09/15/20  2:35 AM   Result Value Ref Range    Sodium 136 136 - 145 mmol/L    Potassium 4.1 3.5 - 5.5 mmol/L    Chloride 104 100 - 111 mmol/L    CO2 28 21 - 32 mmol/L    Anion gap 4 3.0 - 18 mmol/L    Glucose 138 (H) 74 - 99 mg/dL    BUN 35 (H) 7.0 - 18 MG/DL    Creatinine 1.37 (H) 0.6 - 1.3 MG/DL    BUN/Creatinine ratio 26 (H) 12 - 20      GFR est AA >60 >60 ml/min/1.73m2    GFR est non-AA 54 (L) >60 ml/min/1.73m2    Calcium 8.8 8.5 - 10.1 MG/DL   GLUCOSE, POC    Collection Time: 09/15/20  2:40 AM   Result Value Ref Range    Glucose (POC) 134 (H) 70 - 110 mg/dL   POC LACTIC ACID    Collection Time: 09/15/20  2:42 AM   Result Value Ref Range    Lactic Acid (POC) 0.57 0.40 - 2.00 mmol/L       Imaging Reviewed:    XR Results (most recent):  Results from Hospital Encounter encounter on 12/27/18   XR GREAT TOE LT MIN 2 V    Narrative EXAM:  Left great toe series    CLINICAL INDICATION:  Increased pain to the great toe, shaved ulceration at  podiatrist office about a month ago. COMPARISON:  11/13/18    TECHNIQUE:  3 views. FINDINGS:      - On the lateral view, there is probable exposure of the distal tuft of the  great toe distal phalanx to the soft tissue defect, i.e. exposure to the  external elements. Questionable cortical resorption at the distal tuft.   - Subcutaneous emphysema is observed in the distal toe soft tissue.  - Healing fracture is identified at the 2nd proximal phalanx. Impression IMPRESSION:      1. Exposure of the distal phalanx distal tuft of the great toe with  questionable cortical resorption. Suspect developing osteomyelitis. 2.  Subcutaneous emphysema. Potentially related to colonization by gas-forming  organism. Linn Peterson MD  9/15/2020, 5:05 AM        Disclaimer: Sections of this note are dictated using utilizing voice recognition software. Minor typographical errors may be present. If questions arise, please do not hesitate to contact me or call our department.

## 2020-09-15 NOTE — ED TRIAGE NOTES
Pt presents to triage with a rx dated for 9/14 from Dr Alec Fuller requesting admit for right great toe amputation. Pt states he was seen in the office today by his provider and was told to come to the ed.

## 2020-09-15 NOTE — ANESTHESIA PREPROCEDURE EVALUATION
Relevant Problems   No relevant active problems       Anesthetic History   No history of anesthetic complications            Review of Systems / Medical History  Patient summary reviewed, nursing notes reviewed and pertinent labs reviewed    Pulmonary                Comments: Had some SOB with strep infection 3 weeks ago, cleared with antibiotics. No further sx. Sinus infection. Started steroids     Neuro/Psych         Neuromuscular disease    Comments: Diabetic neuropathy, but still good pain reception in feet Cardiovascular                  Exercise tolerance: >4 METS     GI/Hepatic/Renal                Endo/Other    Diabetes: well controlled, type 2, using insulin         Other Findings   Comments: Osteomyelitis R great toe.            Physical Exam    Airway  Mallampati: I  TM Distance: 4 - 6 cm  Neck ROM: normal range of motion   Mouth opening: Normal     Cardiovascular    Rhythm: regular  Rate: normal         Dental  No notable dental hx       Pulmonary  Breath sounds clear to auscultation               Abdominal  GI exam deferred       Other Findings            Anesthetic Plan    ASA: 3  Anesthesia type: MAC and general - backup          Induction: Intravenous  Anesthetic plan and risks discussed with: Patient

## 2020-09-15 NOTE — ANESTHESIA POSTPROCEDURE EVALUATION
Procedure(s):  AMPUTATION RIGHT GREAT TOE.    MAC, general - backup    Anesthesia Post Evaluation      Multimodal analgesia: multimodal analgesia used between 6 hours prior to anesthesia start to PACU discharge  Patient location during evaluation: bedside  Patient participation: complete - patient participated  Level of consciousness: awake  Pain management: adequate  Airway patency: patent  Anesthetic complications: no  Cardiovascular status: acceptable  Respiratory status: acceptable  Hydration status: acceptable  Post anesthesia nausea and vomiting:  controlled  Final Post Anesthesia Temperature Assessment:  Normothermia (36.0-37.5 degrees C)      INITIAL Post-op Vital signs:   Vitals Value Taken Time   /67 9/15/2020  6:57 PM   Temp 36.4 °C (97.6 °F) 9/15/2020  6:57 PM   Pulse 65 9/15/2020  7:06 PM   Resp 13 9/15/2020  7:06 PM   SpO2 100 % 9/15/2020  7:06 PM   Vitals shown include unvalidated device data.

## 2020-09-15 NOTE — PROGRESS NOTES
Patient admitted this morning, seen for follow-up. Patient feeling better. Denies any chest pain or shortness of breath. Patient sees he does not have high blood pressure and does not take any medications at home. Lantus 18 units at nighttime. Visit Vitals  BP (!) 164/89   Pulse 70   Temp 98.1 °F (36.7 °C)   Resp 15   Wt 103.4 kg (228 lb)   SpO2 100%   BMI 30.08 kg/m²       Exam  General:  Alert, cooperative, no acute distress    Pulmonary:  CTA Bilaterally. No Wheezing/Rales. Cardiovascular: Regular rate and Rhythm. GI:  Soft, Non distended, Non tender. + Bowel sounds. Extremities: Right great toe ulcer with slough tissue, no redness but diffuse swelling of his toe foot and leg noted. No erythema  Psych: Good insight. Not anxious or agitated. Neurologic: Alert and oriented X 3. No acute neuro deficits. Labs, chart, and vitals noted    Patient for OR for partial amputation of his left great toe. We will continue empiric antibiotics with vancomycin and Zosyn, ID will be consulted. Discussed with Dr. Ismael Gramajo  Patient low cardiovascular risk for low risk surgery. Benefits outweigh risk. Patient does not wanted to be started on hypertensive medication, he states he will talk to his PCP. Will use as needed hydralazine for now. Disclaimer: Sections of this note are dictated using utilizing voice recognition software. Minor typographical errors may be present. If questions arise, please do not hesitate to contact me or call our department.

## 2020-09-15 NOTE — PROGRESS NOTES
Physician Progress Note      PATIENT:               Michelle Garza  CSN #:                  [id number]  :                       1965  ADMIT DATE:       9/15/2020 2:09 AM  DISCH DATE:  RESPONDING  PROVIDER #:        Vadim Chavez MD          QUERY TEXT:    Pt admitted with osteomyelitis to the right great toe. Pt noted to have DM. Please document in progress notes and discharge summary the likely cause of ulcer: The medical record reflects the following:  Risk Factors: DM, right great toe ulcer  Clinical Indicators: MRI Osteomyelitis involving the great toe distal phalanx  Treatment: Podiatry consulted, surgery in a.m. right great toe amputation    Thank you,  Carmella Cortes RN, [facility]  [phone]  Options provided:  -- Right great toe ulcer and osteomyelitis due to diabetes  -- Right great toe ulcer and osteomyelitis NOT related to diabetes  -- Other - I will add my own diagnosis  -- Disagree - Not applicable / Not valid  -- Disagree - Clinically unable to determine / Unknown  -- Refer to Clinical Documentation Reviewer    PROVIDER RESPONSE TEXT:    This patient has Right great toe ulcer and osteomyelitis due to diabetes.     Query created by: Fahad Miller on 9/15/2020 11:04 AM      Electronically signed by:  Vadim Chavez MD 9/15/2020 3:16 PM

## 2020-09-15 NOTE — CONSULTS
Podiatry Consult    Subjective:         Date of Consultation: September 15, 2020     Patient is a 60 y.o. male with PMHx of diabetes with peripheral neuropathy was seen in office for right great toe ulcer. He has chronic ulcer to right great toe which was healed at one point however it opened back up when he went to work. He couldn't stay off from work any longer at that time and had to go to work. He wore steel toe boots and had to be on his feet longer period of time. He had an MRI outpatient which confirmed osteomyelitis of right 1st distal phalanx. He was recommended to go to ER and get admitted to hospital for toe amputation and abx management. Patient also complaints of knee pain and states that he fell down in man \[facility]\"" recently and since then his symptoms have gotten worse. Patient denies N/V/c/F. Patient Active Problem List    Diagnosis Date Noted    Edema of right lower extremity 09/15/2020    Osteomyelitis of great toe of right foot ([facility] 09/15/2020    Diabetic foot ulcer ([facility] 12/27/2018    Neuropathy 02/24/2017    Pain due to abscess 09/20/2016    Type 2 diabetes mellitus without complication ([facility] 63/95/8479     Past Medical History:   Diagnosis Date    Diabetes [facility])       Past Surgical History:   Procedure Laterality Date    COLONOSCOPY N/A 2/5/2020    COLONOSCOPY performed by Tammy Raza MD at [address] HX AMPUTATION TOE Left 12/2018    left great toe    HX MENISCUS REPAIR Right 2015      Family History   Problem Relation Age of Onset    Seizures Mother     Hypertension Mother     No Known Problems Father     No Known Problems Sister     No Known Problems Brother     No Known Problems Sister     No Known Problems Brother       Social History     Tobacco Use    Smoking status: Former Smoker    Smokeless tobacco: Never Used    Tobacco comment: quit 0ver 25 years ago   Substance Use Topics    Alcohol use:  Yes     Alcohol/week: 2.0 standard drinks     Types: 2 Cans of beer per week     Comment: occ. Current Facility-Administered Medications   Medication Dose Route Frequency Provider Last Rate Last Dose    0.9% sodium chloride infusion  125 mL/hr IntraVENous CONTINUOUS Shanna Libman,  mL/hr at 09/15/20 1201 125 mL/hr at 09/15/20 1201    acetaminophen (TYLENOL) tablet 650 mg  650 mg Oral Q6H PRN Shanna Libman, MD        Or   [facility] acetaminophen (TYLENOL) suppository 650 mg  650 mg Rectal Q6H PRN Shanna Libman, MD        polyethylene glycol (MIRALAX) packet 17 g  17 g Oral DAILY PRN Shanna Libman, MD        ondansetron (ZOFRAN ODT) tablet 4 mg  4 mg Oral Q8H PRN Shanna Libman, MD        Or    ondansetron [facility]) injection 4 mg  4 mg IntraVENous Q6H PRN Shanna Libman, MD        morphine injection 2 mg  2 mg IntraVENous Q4H PRN Shanna Libman, MD        piperacillin-tazobactam (ZOSYN) 3.375 g in 0.9% sodium chloride (MBP/ADV) 100 mL MBP  3.375 g IntraVENous Q6H Shanna Libman,  mL/hr at 09/15/20 0640 3.375 g at 09/15/20 0640    insulin lispro (HUMALOG) injection   SubCUTAneous Q6H Shanna Libman, MD   Stopped at 09/15/20 1200    glucose chewable tablet 16 g  4 Tab Oral PRN Shanna Libman, MD        glucagon [facility]) injection 1 mg  1 mg IntraMUSCular PRN Shanna Libman, MD        dextrose (D50W) injection syrg 12.5-25 g  25-50 mL IntraVENous PRN Shanna Libman, MD        vancomycin (VANCOCIN) 1500 mg in  ml infusion  1,500 mg IntraVENous Q18H Shanna Libman, MD         Current Outpatient Medications   Medication Sig Dispense Refill    amoxicillin (AMOXIL) 500 mg capsule Take 1 Cap by mouth three (3) times daily. Indications: throat irritation 30 Cap 0    insulin lispro (HUMALOG) 100 unit/mL injection Use tid per sliding scale  Indications: type 2 diabetes mellitus 3 Vial 0    Syringe with Needle, Disp, (Allergy Syringe) 1 mL 27 x 1/2\" syrg FOR USE WITH INTRACAVERNOSAL INJECTIONS.  25 Pen Needle 2    Injector Device rah DISPENSE INJECT-EASE AUTO-INJECTOR FOR USE WITH BI-MIX INJECTIONS. 1 Each 1    tri mix compound 0.7-1 mL by IntraCAVernosal route as needed (FOR E.D.). DISPENSE COMPOUNDED TRI-MIX (PGE-1  5 mcg/ Papaverine 50 mg/  Phentolamine 6 mg/ml) 5 mL 5    tadalafiL (Cialis) 5 mg tablet Take 1 Tab by mouth nightly. Take 1 tab PO daily. 90 Tab 1    fluticasone propionate (Flonase Allergy Relief) 50 mcg/actuation nasal spray 2 Sprays by Both Nostrils route daily.  insulin glargine (LANTUS) 100 unit/mL injection 25 Units by SubCUTAneous route nightly. Indications: type 2 diabetes mellitus 1 Vial 0    testosterone 30 mg/actuation (1.5 mL) slpm APPLY 2 PUMPS (60MG) TO SKIN DAILY IN THE MORNING TO THE UNDERARM      sildenafil, pulm. hypertension, (REVATIO) 20 mg tablet Take 2-5 tablets as needed. 90 Tab 3      No Known Allergies     Review of Systems:  A comprehensive review of systems was negative except for that written in the History of Present Illness. Objective:     Patient Vitals for the past 8 hrs:   BP Temp Pulse Resp SpO2   09/15/20 0704 (!) 157/78 98.1 °F (36.7 °C) 73 17 98 %     Temp (24hrs), Av.4 °F (36.9 °C), Min:98.1 °F (36.7 °C), Max:98.6 °F (37 °C)      Physical Exam:    Bilateral MULU: palpable pedal pulses, CFT less than 3 seconds to distal digits, right great toe ulceration on plantar surface probing deep to bone, dusky appearance of distal tip of right great toe, no edema noted. No active drainage seen from ulcer site. Diminished protective sensation noted. Diffuse swelling noted to right ankle.      Lab Review:   Recent Results (from the past 24 hour(s))   CULTURE, BLOOD    Collection Time: 09/15/20  2:20 AM    Specimen: Blood   Result Value Ref Range    Special Requests: NO SPECIAL REQUESTS      Culture result: NO GROWTH AFTER 3 HOURS     CBC WITH AUTOMATED DIFF    Collection Time: 09/15/20  2:35 AM   Result Value Ref Range    WBC 12.7 4.6 - 13.2 K/uL    RBC 3.67 (L) 4.70 - 5.50 M/uL    HGB 11.0 (L) 13.0 - 16.0 g/dL    HCT 32.5 (L) 36.0 - 48.0 %    MCV 88.6 74.0 - 97.0 FL    MCH 30.0 24.0 - 34.0 PG    MCHC 33.8 31.0 - 37.0 g/dL    RDW 12.7 11.6 - 14.5 %    PLATELET 854 545 - 207 K/uL    MPV 9.6 9.2 - 11.8 FL    NEUTROPHILS 76 (H) 40 - 73 %    LYMPHOCYTES 16 (L) 21 - 52 %    MONOCYTES 8 3 - 10 %    EOSINOPHILS 0 0 - 5 %    BASOPHILS 0 0 - 2 %    ABS. NEUTROPHILS 9.6 (H) 1.8 - 8.0 K/UL    ABS. LYMPHOCYTES 2.0 0.9 - 3.6 K/UL    ABS. MONOCYTES 1.1 0.05 - 1.2 K/UL    ABS. EOSINOPHILS 0.0 0.0 - 0.4 K/UL    ABS.  BASOPHILS 0.0 0.0 - 0.1 K/UL    DF AUTOMATED     METABOLIC PANEL, BASIC    Collection Time: 09/15/20  2:35 AM   Result Value Ref Range    Sodium 136 136 - 145 mmol/L    Potassium 4.1 3.5 - 5.5 mmol/L    Chloride 104 100 - 111 mmol/L    CO2 28 21 - 32 mmol/L    Anion gap 4 3.0 - 18 mmol/L    Glucose 138 (H) 74 - 99 mg/dL    BUN 35 (H) 7.0 - 18 MG/DL    Creatinine 1.37 (H) 0.6 - 1.3 MG/DL    BUN/Creatinine ratio 26 (H) 12 - 20      GFR est AA >60 >60 ml/min/1.73m2    GFR est non-AA 54 (L) >60 ml/min/1.73m2    Calcium 8.8 8.5 - 10.1 MG/DL   CULTURE, BLOOD    Collection Time: 09/15/20  2:35 AM    Specimen: Blood   Result Value Ref Range    Special Requests: NO SPECIAL REQUESTS      Culture result: NO GROWTH AFTER 3 HOURS     HEMOGLOBIN A1C WITH EAG    Collection Time: 09/15/20  2:35 AM   Result Value Ref Range    Hemoglobin A1c 8.6 (H) 4.2 - 5.6 %    Est. average glucose 200 mg/dL   GLUCOSE, POC    Collection Time: 09/15/20  2:40 AM   Result Value Ref Range    Glucose (POC) 134 (H) 70 - 110 mg/dL   POC LACTIC ACID    Collection Time: 09/15/20  2:42 AM   Result Value Ref Range    Lactic Acid (POC) 0.57 0.40 - 2.00 mmol/L   GLUCOSE, POC    Collection Time: 09/15/20  6:45 AM   Result Value Ref Range    Glucose (POC) 219 (H) 70 - 110 mg/dL   GLUCOSE, POC    Collection Time: 09/15/20 11:59 AM   Result Value Ref Range    Glucose (POC) 117 (H) 70 - 110 mg/dL       Impression:     Diabetic foot ulcer with osteomyelitis, right ankle pain/swelling    Recommendation:     - His outpatient MRI reviewed. He noted to have osteomyelitis of 1st distal phalanx.   - Patient will need amputation of right great toe. He is requesting to have only partial toe removed. Will attempt to salvage as much toe as possible. Discussed with patient that if more tissue noted to be necrotic then he may end with entire great toe amputation. He agrees with plan. - Keep him NPO. Surgery added to OR schedule as an add-on case, likely go around 5 pm.   - Hold off on IV abxs. - Remain NWB to right forefoot. - Also ordered right ankle x-rays since patient complaining of pain and swelling.

## 2020-09-16 ENCOUNTER — APPOINTMENT (OUTPATIENT)
Dept: GENERAL RADIOLOGY | Age: 55
DRG: 314 | End: 2020-09-16
Attending: PODIATRIST
Payer: MEDICAID

## 2020-09-16 LAB
ALBUMIN SERPL-MCNC: 2.5 G/DL (ref 3.4–5)
ALBUMIN/GLOB SERPL: 0.6 {RATIO} (ref 0.8–1.7)
ALP SERPL-CCNC: 59 U/L (ref 45–117)
ALT SERPL-CCNC: 15 U/L (ref 16–61)
ANION GAP SERPL CALC-SCNC: 3 MMOL/L (ref 3–18)
AST SERPL-CCNC: 15 U/L (ref 10–38)
ATRIAL RATE: 66 BPM
BASOPHILS # BLD: 0 K/UL (ref 0–0.1)
BASOPHILS NFR BLD: 0 % (ref 0–2)
BILIRUB SERPL-MCNC: 0.6 MG/DL (ref 0.2–1)
BUN SERPL-MCNC: 22 MG/DL (ref 7–18)
BUN/CREAT SERPL: 17 (ref 12–20)
CALCIUM SERPL-MCNC: 7.5 MG/DL (ref 8.5–10.1)
CALCULATED P AXIS, ECG09: 58 DEGREES
CALCULATED R AXIS, ECG10: 34 DEGREES
CALCULATED T AXIS, ECG11: 54 DEGREES
CHLORIDE SERPL-SCNC: 109 MMOL/L (ref 100–111)
CO2 SERPL-SCNC: 28 MMOL/L (ref 21–32)
CREAT SERPL-MCNC: 1.3 MG/DL (ref 0.6–1.3)
DIAGNOSIS, 93000: NORMAL
DIFFERENTIAL METHOD BLD: ABNORMAL
EOSINOPHIL # BLD: 0.2 K/UL (ref 0–0.4)
EOSINOPHIL NFR BLD: 2 % (ref 0–5)
ERYTHROCYTE [DISTWIDTH] IN BLOOD BY AUTOMATED COUNT: 13 % (ref 11.6–14.5)
GLOBULIN SER CALC-MCNC: 4.4 G/DL (ref 2–4)
GLUCOSE BLD STRIP.AUTO-MCNC: 177 MG/DL (ref 70–110)
GLUCOSE BLD STRIP.AUTO-MCNC: 194 MG/DL (ref 70–110)
GLUCOSE BLD STRIP.AUTO-MCNC: 87 MG/DL (ref 70–110)
GLUCOSE SERPL-MCNC: 139 MG/DL (ref 74–99)
HCT VFR BLD AUTO: 30.5 % (ref 36–48)
HGB BLD-MCNC: 9.7 G/DL (ref 13–16)
LYMPHOCYTES # BLD: 2.2 K/UL (ref 0.9–3.6)
LYMPHOCYTES NFR BLD: 31 % (ref 21–52)
MCH RBC QN AUTO: 29.1 PG (ref 24–34)
MCHC RBC AUTO-ENTMCNC: 31.8 G/DL (ref 31–37)
MCV RBC AUTO: 91.6 FL (ref 74–97)
MONOCYTES # BLD: 0.5 K/UL (ref 0.05–1.2)
MONOCYTES NFR BLD: 6 % (ref 3–10)
NEUTS SEG # BLD: 4.3 K/UL (ref 1.8–8)
NEUTS SEG NFR BLD: 61 % (ref 40–73)
P-R INTERVAL, ECG05: 148 MS
PLATELET # BLD AUTO: 260 K/UL (ref 135–420)
PMV BLD AUTO: 9.8 FL (ref 9.2–11.8)
POTASSIUM SERPL-SCNC: 4.4 MMOL/L (ref 3.5–5.5)
PROT SERPL-MCNC: 6.9 G/DL (ref 6.4–8.2)
Q-T INTERVAL, ECG07: 394 MS
QRS DURATION, ECG06: 84 MS
QTC CALCULATION (BEZET), ECG08: 413 MS
RBC # BLD AUTO: 3.33 M/UL (ref 4.7–5.5)
SODIUM SERPL-SCNC: 140 MMOL/L (ref 136–145)
VENTRICULAR RATE, ECG03: 66 BPM
WBC # BLD AUTO: 7.2 K/UL (ref 4.6–13.2)

## 2020-09-16 PROCEDURE — 73600 X-RAY EXAM OF ANKLE: CPT

## 2020-09-16 PROCEDURE — 74011250637 HC RX REV CODE- 250/637: Performed by: HOSPITALIST

## 2020-09-16 PROCEDURE — 74011250636 HC RX REV CODE- 250/636: Performed by: HOSPITALIST

## 2020-09-16 PROCEDURE — 74011000258 HC RX REV CODE- 258: Performed by: HOSPITALIST

## 2020-09-16 PROCEDURE — 36415 COLL VENOUS BLD VENIPUNCTURE: CPT

## 2020-09-16 PROCEDURE — 77010033678 HC OXYGEN DAILY

## 2020-09-16 PROCEDURE — 74011636637 HC RX REV CODE- 636/637: Performed by: HOSPITALIST

## 2020-09-16 PROCEDURE — 80053 COMPREHEN METABOLIC PANEL: CPT

## 2020-09-16 PROCEDURE — 74011250637 HC RX REV CODE- 250/637: Performed by: INTERNAL MEDICINE

## 2020-09-16 PROCEDURE — 77030027138 HC INCENT SPIROMETER -A

## 2020-09-16 PROCEDURE — 74011636637 HC RX REV CODE- 636/637: Performed by: INTERNAL MEDICINE

## 2020-09-16 PROCEDURE — 85025 COMPLETE CBC W/AUTO DIFF WBC: CPT

## 2020-09-16 PROCEDURE — 2709999900 HC NON-CHARGEABLE SUPPLY

## 2020-09-16 PROCEDURE — 82962 GLUCOSE BLOOD TEST: CPT

## 2020-09-16 PROCEDURE — 65270000029 HC RM PRIVATE

## 2020-09-16 PROCEDURE — 73630 X-RAY EXAM OF FOOT: CPT

## 2020-09-16 RX ORDER — FAMOTIDINE 20 MG/1
20 TABLET, FILM COATED ORAL EVERY EVENING
Status: DISCONTINUED | OUTPATIENT
Start: 2020-09-16 | End: 2020-09-18 | Stop reason: HOSPADM

## 2020-09-16 RX ORDER — ACETAMINOPHEN 500 MG
1000 TABLET ORAL EVERY 8 HOURS
Status: DISCONTINUED | OUTPATIENT
Start: 2020-09-16 | End: 2020-09-18 | Stop reason: HOSPADM

## 2020-09-16 RX ADMIN — MORPHINE SULFATE 2 MG: 2 INJECTION, SOLUTION INTRAMUSCULAR; INTRAVENOUS at 14:25

## 2020-09-16 RX ADMIN — ACETAMINOPHEN 1000 MG: 500 TABLET, FILM COATED ORAL at 14:03

## 2020-09-16 RX ADMIN — AZELASTINE HYDROCHLORIDE 2 SPRAY: 137 SPRAY, METERED NASAL at 08:45

## 2020-09-16 RX ADMIN — INSULIN LISPRO 3 UNITS: 100 INJECTION, SOLUTION INTRAVENOUS; SUBCUTANEOUS at 12:06

## 2020-09-16 RX ADMIN — INSULIN GLARGINE 15 UNITS: 100 INJECTION, SOLUTION SUBCUTANEOUS at 22:00

## 2020-09-16 RX ADMIN — FLUTICASONE PROPIONATE 2 SPRAY: 50 SPRAY, METERED NASAL at 08:45

## 2020-09-16 RX ADMIN — INSULIN LISPRO 3 UNITS: 100 INJECTION, SOLUTION INTRAVENOUS; SUBCUTANEOUS at 17:07

## 2020-09-16 RX ADMIN — FAMOTIDINE 20 MG: 20 TABLET ORAL at 17:09

## 2020-09-16 RX ADMIN — PIPERACILLIN AND TAZOBACTAM 3.38 G: 3; .375 INJECTION, POWDER, LYOPHILIZED, FOR SOLUTION INTRAVENOUS at 11:57

## 2020-09-16 RX ADMIN — PIPERACILLIN AND TAZOBACTAM 3.38 G: 3; .375 INJECTION, POWDER, LYOPHILIZED, FOR SOLUTION INTRAVENOUS at 06:01

## 2020-09-16 RX ADMIN — MORPHINE SULFATE 2 MG: 2 INJECTION, SOLUTION INTRAMUSCULAR; INTRAVENOUS at 09:18

## 2020-09-16 RX ADMIN — PIPERACILLIN AND TAZOBACTAM 3.38 G: 3; .375 INJECTION, POWDER, LYOPHILIZED, FOR SOLUTION INTRAVENOUS at 17:09

## 2020-09-16 RX ADMIN — VANCOMYCIN HYDROCHLORIDE 1500 MG: 10 INJECTION, POWDER, LYOPHILIZED, FOR SOLUTION INTRAVENOUS at 14:17

## 2020-09-16 RX ADMIN — MORPHINE SULFATE 2 MG: 2 INJECTION, SOLUTION INTRAMUSCULAR; INTRAVENOUS at 20:21

## 2020-09-16 NOTE — PERIOP NOTES
TRANSFER - OUT REPORT:    Verbal report given to JUDE White on Rachel Sanabria  being transferred to (unit) for routine post - op       Report consisted of patients Situation, Background, Assessment and   Recommendations(SBAR). Information from the following report(s) SBAR, Kardex, Procedure Summary, Intake/Output, MAR and Recent Results was reviewed with the receiving nurse. Lines:   Peripheral IV 09/15/20 Left Antecubital (Active)   Site Assessment Clean, dry, & intact 09/15/20 1857   Phlebitis Assessment 0 09/15/20 1857   Infiltration Assessment 0 09/15/20 1857   Dressing Status Clean, dry, & intact 09/15/20 1857   Dressing Type Tape;Transparent 09/15/20 1857   Hub Color/Line Status Pink; Infusing 09/15/20 1857   Action Taken Blood drawn 09/15/20 0220      Visit Vitals  /67   Pulse 66   Temp 98.4 °F (36.9 °C)   Resp 11   Wt 103.4 kg (228 lb)   SpO2 100%   BMI 30.08 kg/m²       Opportunity for questions and clarification was provided.       Patient transported with:   O2 @ 3 liters  Registered Nurse

## 2020-09-16 NOTE — ROUTINE PROCESS
Patient remains alert and oriented times three with no signs or symptoms of distress. Foot dressing remains intact dry and clean and warm to touch.  Medicated patient for pain

## 2020-09-16 NOTE — CONSULTS
Infectious Disease Consultation Note        Reason: right great toe infection    Current abx Prior abx   Piperacillin/tazobactam, vancomycin since 9/15/2020      Lines:       Assessment :    55 y.o.   male with h/o uncontrolled type 2 DM (last hgbA1C 8.6 on 9/15/20) who presented to the ED on 9/15/2020 with concerns of infection of right great toe.     Hospitalization 12/2019 for diabetic left foot infection, acute on chronic osteomyelitis left great toe distal phalanx s/p amputation of left great toe distal phalanx on 12/28/19.      preop wound cultures 12/27 - few pseudomonas, many group b strep, moderate strep viridans, moderate cons. Intra op cultures 12/28 of left toe distal phalanx: group b strep, alpha strep. Clean margins - group b strep, alpha strep, staph capitis.     Clinical presentation consistent with diabetic right foot infection, acute on chronic osteomyelitis right great toe distal phalanx status post amputation of right great toe distal phalanx on 9/15/2020. Grossly clean margins achieved at surgery. Intraoperative cultures pending.        Recommendations:     1.continue pip/tazo, vancomycin  2. F/u intra op cultures  3. F/u bone biopsy of clean margins  4. Will finalize abx based on above test results      Above plan was discussed in details with patient,  and primary team. Please call me if any further questions or concerns. Will continue to participate in the care of this patient    Thank you for consultation request.     HPI:    60 y.o.   male with h/o uncontrolled type 2 DM (last hgbA1C 8.6 on 9/15/20) who presented to the ED on 9/15/2020 with concerns of infection of right great toe. Patient is known to me from prior inpatient consultation at [facility] in 2019 as mentioned above. He was subsequently doing well. Patient mentions that he has had a problem with ulceration on the toe for the last 2-1/2 to 3 months.   Initially there was a small ulcer which has slowly gotten bigger with time. He had seen the podiatrist and mentions that with time the ulcer had healed. He had gone back to work at the [employer] and mentions that the toe was exposed to moist situations and the ulcer opened up again. He went to see podiatry yesterday and was advised to go to the emergency room since he has also noted that his lower leg below the knee has also been swollen. ER evaluationpatient noted to have ulceration of the right great toe with possible osteomyelitis. Podiatry consulted by ER. Patient has been started on broad-spectrum IV antibiotics. Status post partial amputation of right great toe on 9/15/2020. I have been consulted for further recommendations. Patient denies any fever or chills throughout this time. He states that he thinks he is still shows at work rub against his foot and cause the problems in his toes        Past Medical History:   Diagnosis Date    Diabetes [facility])        Past Surgical History:   Procedure Laterality Date    COLONOSCOPY N/A 2/5/2020    COLONOSCOPY performed by Brenda Sullivan MD at [address] [place] [address] [address] TO[address] Left 12/2018    left great toe    HX MENISCUS REPAIR Right 2015       home Medication List    Details   amoxicillin (AMOXIL) 500 mg capsule Take 1 Cap by mouth three (3) times daily. Indications: throat irritation  Qty: 30 Cap, Refills: 0    Associated Diagnoses: Strep pharyngitis; Acute sinusitis, recurrence not specified, unspecified location      insulin lispro (HUMALOG) 100 unit/mL injection Use tid per sliding scale  Indications: type 2 diabetes mellitus  Qty: 3 Vial, Refills: 0    Comments: Pharmacy Assistance program      Syringe with Needle, Disp, (Allergy Syringe) 1 mL 27 x 1/2\" syrg FOR USE WITH INTRACAVERNOSAL INJECTIONS. Qty: 25 Pen Needle, Refills: 2      Injector Device rah DISPENSE INJECT-EASE AUTO-INJECTOR FOR USE WITH BI-MIX INJECTIONS.   Qty: 1 Each, Refills: 1      tadalafiL (Cialis) 5 mg tablet Take 1 Tab by mouth nightly. Take 1 tab PO daily. Qty: 90 Tab, Refills: 1      fluticasone propionate (Flonase Allergy Relief) 50 mcg/actuation nasal spray 2 Sprays by Both Nostrils route daily. insulin glargine (LANTUS) 100 unit/mL injection 25 Units by SubCUTAneous route nightly. Indications: type 2 diabetes mellitus  Qty: 1 Vial, Refills: 0    Comments: Pharmacy Assistance Program medication  Associated Diagnoses: Type 2 diabetes mellitus with microalbuminuria, with long-term current use of insulin (HCC)      tri mix compound 0.7-1 mL by IntraCAVernosal route as needed (FOR E.D.). DISPENSE COMPOUNDED TRI-MIX (PGE-1  5 mcg/ Papaverine 50 mg/  Phentolamine 6 mg/ml)  Qty: 5 mL, Refills: 5      testosterone 30 mg/actuation (1.5 mL) slpm APPLY 2 PUMPS (60MG) TO SKIN DAILY IN THE MORNING TO THE UNDERARM      sildenafil, pulm. hypertension, (REVATIO) 20 mg tablet Take 2-5 tablets as needed.   Qty: 90 Tab, Refills: 3             Current Facility-Administered Medications   Medication Dose Route Frequency    0.9% sodium chloride infusion  75 mL/hr IntraVENous CONTINUOUS    fluticasone propionate (FLONASE) 50 mcg/actuation nasal spray 2 Spray  2 Spray Both Nostrils DAILY    acetaminophen (TYLENOL) tablet 650 mg  650 mg Oral Q6H PRN    Or    acetaminophen (TYLENOL) suppository 650 mg  650 mg Rectal Q6H PRN    polyethylene glycol (MIRALAX) packet 17 g  17 g Oral DAILY PRN    ondansetron (ZOFRAN ODT) tablet 4 mg  4 mg Oral Q8H PRN    Or    ondansetron (ZOFRAN) injection 4 mg  4 mg IntraVENous Q6H PRN    morphine injection 2 mg  2 mg IntraVENous Q4H PRN    piperacillin-tazobactam (ZOSYN) 3.375 g in 0.9% sodium chloride (MBP/ADV) 100 mL MBP  3.375 g IntraVENous Q6H    insulin lispro (HUMALOG) injection   SubCUTAneous Q6H    glucose chewable tablet 16 g  4 Tab Oral PRN    glucagon (GLUCAGEN) injection 1 mg  1 mg IntraMUSCular PRN    dextrose (D50W) injection syrg 12.5-25 g  25-50 mL IntraVENous PRN    vancomycin (VANCOCIN) 1500 mg in  ml infusion  1,500 mg IntraVENous Q18H    azelastine (ASTELIN) 137mcg/spray nasal spray  2 Spray Both Nostrils BID    insulin glargine (LANTUS) injection 15 Units  15 Units SubCUTAneous QHS    hydrALAZINE (APRESOLINE) tablet 25 mg  25 mg Oral QID PRN       Allergies: Patient has no known allergies. Family History   Problem Relation Age of Onset    Seizures Mother     Hypertension Mother     No Known Problems Father     No Known Problems Sister     No Known Problems Brother     No Known Problems Sister     No Known Problems Brother      Social History     Socioeconomic History    Marital status: SINGLE     Spouse name: Not on file    Number of children: Not on file    Years of education: Not on file    Highest education level: Not on file   Occupational History    Not on file   Social Needs    Financial resource strain: Not on file    Food insecurity     Worry: Not on file     Inability: Not on file    Transportation needs     Medical: Not on file     Non-medical: Not on file   Tobacco Use    Smoking status: Former Smoker    Smokeless tobacco: Never Used    Tobacco comment: quit 0ver 25 years ago   Substance and Sexual Activity    Alcohol use: Yes     Alcohol/week: 2.0 standard drinks     Types: 2 Cans of beer per week     Comment: occ.     Drug use: No    Sexual activity: Yes     Partners: Female     Birth control/protection: Condom   Lifestyle    Physical activity     Days per week: Not on file     Minutes per session: Not on file    Stress: Not on file   Relationships    Social connections     Talks on phone: Not on file     Gets together: Not on file     Attends Advent service: Not on file     Active member of club or organization: Not on file     Attends meetings of clubs or organizations: Not on file     Relationship status: Not on file    Intimate partner violence     Fear of current or ex partner: Not on file     Emotionally abused: Not on file     Physically abused: Not on file     Forced sexual activity: Not on file   Other Topics Concern    Not on file   Social History Narrative    Not on file     Social History     Tobacco Use   Smoking Status Former Smoker   Smokeless Tobacco Never Used   Tobacco Comment    quit 0ver 25 years ago        Temp (24hrs), Av.2 °F (36.8 °C), Min:97.6 °F (36.4 °C), Max:99.5 °F (37.5 °C)    Visit Vitals  BP (!) 153/80   Pulse 78   Temp 98.1 °F (36.7 °C)   Resp 16   Wt 103.4 kg (228 lb)   SpO2 95%   BMI 30.08 kg/m²       ROS: 12 point ROS obtained in details. Pertinent positives as mentioned in HPI,   otherwise negative    Physical Exam:    Gen: In general, this is a well nourished male in no acute distress  HEENT: Sclerae nonicteric. Oral mucous membranes moist. Dentition normal  Neck: Supple with midline trachea. CV: RRR without murmur or rub appreciated. Resp:Respirations are unlabored without use of accessory muscles. Lung fields B/L without wheezes or rhonchi. Abd: Soft, nontender, nondistended. Extrem:  Right foot surgical dressing not removed, left foot surgical site well-healed. Skin: Warm, no visible rashes. Neuro: Patient is alert, oriented, and cooperative. No obvious focal defects. Moves all 4 extremities.     Labs: Results:   Chemistry Recent Labs     09/16/20  0311 09/15/20  0235   * 138*    136   K 4.4 4.1    104   CO2 28 28   BUN 22* 35*   CREA 1.30 1.37*   CA 7.5* 8.8   AGAP 3 4   BUCR 17 26*   AP 59  --    TP 6.9  --    ALB 2.5*  --    GLOB 4.4*  --    AGRAT 0.6*  --       CBC w/Diff Recent Labs     09/16/20  0311 09/15/20  0235   WBC 7.2 12.7   RBC 3.33* 3.67*   HGB 9.7* 11.0*   HCT 30.5* 32.5*    311   GRANS 61 76*   LYMPH 31 16*   EOS 2 0      Microbiology Recent Labs     09/15/20  1829 09/15/20  0503 09/15/20  0235 09/15/20  0220   CULT PENDING PENDING NO GROWTH 1 DAY NO GROWTH 1 DAY          RADIOLOGY:    All available imaging studies/reports in [facility] for this admission were reviewed    Dr. Ermelinda Whyte, Infectious Disease Specialist  [phone]  September 16, 2020  10:53 AM

## 2020-09-16 NOTE — OP NOTES
Operative Report     Patient: Lisseth Lux MRN: [id number]  SSN: [id number]    YOB: 1965  Age: 60 y.o. Sex: male       Indications: The patient was admitted to the hospital for right great toe osteomyelitis. Patient had an outpatient MRI confirming osteomyelitis of 1st distal phalanx. Patient has ulcer on right great toe probing deep to bone. Patient needed amputation of distal portion of right great toe. All risks, benefits, complications of procedure discussed in detail with patient. Possible complications include non-healing of surgical site, requirement of further proximal amputation, loss of limb. No guarantee made to outcome of procedure. Patient voiced understanding and signed consent. Date of Surgery: 9/15/2020     Preoperative Diagnosis:  OSTEOMYELITIS OF RIGHT FIRST DISTAL PHALANX    Postoperative Diagnosis: OSTEOMYELITIS OF  RIGHT FIRST DISTAL PHALANX     Surgeon(s) and Role:     * Lela Aaron DPM - Primary      Surgical Assistants: Operating Room Staff    Anesthesia: MAC with local    Procedure:  Procedure(s):  PARTIAL AMPUTATION RIGHT GREAT TOE    Procedure in Detail:     The patient was brought to the operative room and kept on hospital bed in supine position. After IV sedation from department of anesthesia, local block consisting of 20 cc of 1:1 mixture of 1% lidocaine plain and 0.5% marcaine plain administered as hallux block. Right foot prepped and draped in usual sterile fashion. Attention then directed to right great toe where fish-mouth incision performed at base of distal phalanx down to bone with # 10 blade. Distal phalanx grabbed with towel clamp and disarticulated at IPJ and placed on back paris table and sent to histology in formalin container. Swab wound culture also obtained from amputated toe. Extensor and flexor tendons transected as proximal as possible. Surgical site irrigated with normal saline solution.  Using bony rongeur, piece of bone resected from head of proximal phalanx and sent to histology as clean margin. Redundant tissue resected with # 15 blade as necessary. Subcutaneous tissue approximated with 3-0 vicryl. Skin was closed with 3-0 prolene using simple interrupted technique. The patient tolerated the procedure and anesthesia well. The patient was sent to the recovery room in apparent satisfactory condition. Normal color, turgor, and texture were present in all remaining digits. Findings:  1st distal phalanx noted to be brittle and soft, it fragmented while removing it.     Estimated Blood Loss:  20 cc    Drains: none           Specimens:   ID Type Source Tests Collected by Time Destination   1 : RIGHT GREAT TOE, CLEAN MARGIN Preservative Toe  [place] 9/15/2020 1822 Pathology   2 : RIGHT GREAT TOE  Preservative Toe  [place] 9/15/2020 1838 Pathology   1 : RIGHT GREAT TOE DISTAL PHALANX , AEROBIC, ANEROBIC C/S Wound  CULTURE, ANAEROBIC, CULTURE, WOUND W GRAM STAIN, CULTURE, FUNGUS [facility] [place], [facility] 9/15/2020 1829 Microbiology               Implants:  * No implants in log *           Complications:  None

## 2020-09-16 NOTE — PROGRESS NOTES
Hospitalist Progress Note    Patient: Ryann Schmitz Age: 60 y.o. : 1965 MR#: [id number] SSN: [id number]  Date/Time: 2020 9:05 AM    DOA: 9/15/2020  PCP: Fartun Chun MD    Subjective:     Still with right lower leg swelling and pain. Noted duplex of leg without DVT. No fever. Tolerated partial amputation right great toe 9/15/20. Still on Vanconycin and zosyn      Interval Hospital Course:        ROS:  No current fever/chills, no headache, no dizziness, no facial pain, no sinus congestion,   No swallowing pain, No chest pain, no palpitation, no shortness of breath, no abd pain,  No diarrhea, no urinary complaint, +right leg pain or swelling      Assessment/Plan:   1. Ulceration and Osteomyelitis of great toe of right foot associate with diabetes mellitus   2. Hyperglycemia with T2DM, HgbA1c 8.6%   3.  Acute renal insufficiency, resolved   4. Normocytic anemia of chronic disease   5. Right lower leg edema, likely from great toe infection. Negative DVT   6. Suspected ALEJANDRA per report   7. Hypertension. Cont IV antibiotics, culture follow up. Elevated legs, wound care and pain control   Resume home medications as tolerated. lantus and TAMARA   Spoke with him about his suspected ALEJANDRA, sleep on his side but needs OP sleep study for further care.    pepcid   Full code       Additional Notes:    Time spent >30 minutes    Case discussed with:  [x]Patient  []Family  [x]Nursing  [x]Case Management  DVT Prophylaxis:  []Lovenox  []Hep SQ  []SCDs  []Coumadin   []On Heparin gtt    Signed By: Ann Hou MD     2020 9:05 AM              Objective:   VS:   Visit Vitals  BP (!) 153/80   Pulse 78   Temp 98.1 °F (36.7 °C)   Resp 16   Wt 103.4 kg (228 lb)   SpO2 95%   BMI 30.08 kg/m²      Tmax/24hrs: Temp (24hrs), Av.2 °F (36.8 °C), Min:97.6 °F (36.4 °C), Max:99.5 °F (37.5 °C)      Intake/Output Summary (Last 24 hours) at 2020 0905  Last data filed at 2020 0601  Gross per 24 hour   Intake 3906.25 ml   Output 1400 ml   Net 2506.25 ml       Tele:   General:  Cooperative, Not in acute distress, speaks in full sentence while in bed  HEENT: PERRL, EOMI, supple neck, no JVD, dry oral mucosa  Cardiovascular: S1S2 regular, no rub/gallop   Pulmonary: Clear air entry bilaterally, no wheezing, no crackle  GI:  Soft, non tender, non distended, +bs, no guarding   Extremities:  +right pedal edema, +distal pulses appreciated   Right toe dress  Neuro: AOx3, moving all extremities, no gross deficit.      Additional:       Current Facility-Administered Medications   Medication Dose Route Frequency    0.9% sodium chloride infusion  75 mL/hr IntraVENous CONTINUOUS    fluticasone propionate (FLONASE) 50 mcg/actuation nasal spray 2 Spray  2 Spray Both Nostrils DAILY    acetaminophen (TYLENOL) tablet 650 mg  650 mg Oral Q6H PRN    Or    acetaminophen (TYLENOL) suppository 650 mg  650 mg Rectal Q6H PRN    polyethylene glycol (MIRALAX) packet 17 g  17 g Oral DAILY PRN    ondansetron (ZOFRAN ODT) tablet 4 mg  4 mg Oral Q8H PRN    Or    ondansetron (ZOFRAN) injection 4 mg  4 mg IntraVENous Q6H PRN    morphine injection 2 mg  2 mg IntraVENous Q4H PRN    piperacillin-tazobactam (ZOSYN) 3.375 g in 0.9% sodium chloride (MBP/ADV) 100 mL MBP  3.375 g IntraVENous Q6H    insulin lispro (HUMALOG) injection   SubCUTAneous Q6H    glucose chewable tablet 16 g  4 Tab Oral PRN    glucagon (GLUCAGEN) injection 1 mg  1 mg IntraMUSCular PRN    dextrose (D50W) injection syrg 12.5-25 g  25-50 mL IntraVENous PRN    vancomycin (VANCOCIN) 1500 mg in  ml infusion  1,500 mg IntraVENous Q18H    azelastine (ASTELIN) 137mcg/spray nasal spray  2 Spray Both Nostrils BID    insulin glargine (LANTUS) injection 15 Units  15 Units SubCUTAneous QHS    hydrALAZINE (APRESOLINE) tablet 25 mg  25 mg Oral QID PRN            Lab/Data Review:  Labs: Results:       Chemistry Recent Labs     09/16/20  0311 09/15/20  0235   GLU 139* 138*    136   K 4.4 4.1    104   CO2 28 28   BUN 22* 35*   CREA 1.30 1.37*   BUCR 17 26*   AGAP 3 4   CA 7.5* 8.8     Recent Labs     09/16/20 0311   ALT 15*   TP 6.9   ALB 2.5*   GLOB 4.4*   AGRAT 0.6*      CBC w/Diff Recent Labs     09/16/20  0311 09/15/20  0235   WBC 7.2 12.7   RBC 3.33* 3.67*   HGB 9.7* 11.0*   HCT 30.5* 32.5*   MCV 91.6 88.6   MCH 29.1 30.0   MCHC 31.8 33.8   RDW 13.0 12.7    311   GRANS 61 76*   LYMPH 31 16*   EOS 2 0      Coagulation No results for input(s): PTP, INR, APTT, INREXT in the last 72 hours.     Iron/Ferritin No results found for: IRON, FE, TIBC, IBCT, PSAT, FERR    BNP    Cardiac Enzymes Lab Results   Component Value Date/Time     08/23/2017 01:21 PM    CK - MB 1.2 08/23/2017 01:21 PM    CK-MB Index 0.7 08/23/2017 01:21 PM    Troponin-I, QT <0.02 11/03/2018 01:17 PM        Lactic Acid    Thyroid Studies          All Micro Results     Procedure Component Value Units Date/Time    CULTURE, BLOOD [[id number]] Collected:  09/15/20 0220    Order Status:  Completed Specimen:  Blood Updated:  09/16/20 0754     Special Requests: NO SPECIAL REQUESTS        Culture result: NO GROWTH 1 DAY       CULTURE, BLOOD [[id number]] Collected:  09/15/20 0235    Order Status:  Completed Specimen:  Blood Updated:  09/16/20 0754     Special Requests: NO SPECIAL REQUESTS        Culture result: NO GROWTH 1 DAY       CULTURE, Laurel Garner STAIN [[id number]] Collected:  09/15/20 1829    Order Status:  Completed Specimen:  Great Toe Updated:  09/16/20 0036     Special Requests: RIGHT GREAT TOE     [place] STAIN RARE WBCS SEEN               FEW GRAM POSITIVE COCCI IN CLUSTERS           Culture result: PENDING    CULTURE, ANAEROBIC [[id number]] Collected:  09/15/20 1829    Order Status:  Completed Updated:  09/16/20 0032    CULTURE, FUNGUS [[id number]] Collected:  09/15/20 1829    Order Status:  Completed Updated:  09/16/20 0032    CULTURE, Laurel Garner STAIN [[id number]] Collected: 09/15/20 0503    Order Status:  Completed Specimen:  Foot Updated:  09/15/20 1827     Special Requests: NO SPECIAL REQUESTS        GRAM STAIN RARE WBCS SEEN         NO ORGANISMS SEEN        Culture result: PENDING            Images:    MRI (Most Recent). MRI Results (most recent):  Results from [place] encounter on 09/12/20   MRI FOOT RT W WO CONT    Narrative Examination: MRI right forefoot with and without contrast.    HISTORY: 60year-old diabetic patient with osteomyelitis of the right foot,  pain, swelling. TECHNIQUE: MRI of the right forefoot was performed utilizing a local coil. Coronal and sagittal STIR and T1-weighted images as well as axial T1 and T2  fat-suppressed images are obtained before the uneventful intravenous  administration of 20 mL Dotarem intravenous gadolinium contrast. Postcontrast  imaging was performed in axial, coronal, and sagittal planes utilizing T1  fat-suppressed techniques. COMPARISON: No relevant prior imaging of the right foot is available for review. FINDINGS:    In keeping with the provided history, there is diffuse T1 marrow hypointensity  which involves the distal phalanx of the great toe. Parallel STIR signal  abnormality along with abnormal intramedullary enhancement demonstrated on  postcontrast imaging related to the distal phalanx of the great toe. There is no  evidence of necrotic or nonenhancing bone formation. Joint effusion and  synovitis at the IP joint of the great toe is noted. Adjacent proximal phalanx  of the great toe demonstrates normal marrow signal.    There is moderately severe chondrosis of the right first MTP joint with  additional involvement of the tibial greater than fibular sesamoids. MTP and IP  joints of the lesser toes appear within normal limits. Included flexor and extensor tendons appear intact.     Diffuse abnormalities of intrinsic muscle bulk and signal are noted, mild in  nature, and characterized by mild intrinsic atrophy with superimposed  intramuscular edema signal. There is no evidence of an organized or drainable  intramuscular or subcutaneous fluid collection. Moderate dorsal pedal edema is  present. Impression IMPRESSION:  1. Osteomyelitis involving the great toe distal phalanx without evidence of  necrotic or nonenhancing bone. No evidence of an organized or drainable fluid  collection. 2. Moderately severe chondrosis of the right first MTP and MTS joint. 3. Diffuse abnormalities of intrinsic muscle bulk and signal typical for changes  related to subacute denervation. 4. Moderate dorsal pedal edema. XRAY (Most Recent) XR Results (most recent):  Results from Hospital Encounter encounter on 09/15/20   XR KNEE RT 3 V    Narrative EXAM: Right Knee X-ray    INDICATION: knee pain    TECHNIQUE: 3 views of the right knee obtained. COMPARISON: 1/14/2015 and 9/14/2006    FINDINGS: The bone density is grossly unremarkable. Small joint effusion is  present. The alignment of the knee is unremarkable. No evidence of acute  fracture or dislocation. Tricompartmental osteophyte formation is noted. The  medial compartment demonstrates asymmetric joint space narrowing with mild  subchondral sclerosis. This is progressed since prior imaging. There is no  evidence of erosions or periostitis. No lytic or blastic focus appreciated. The  soft tissues are unremarkable. Impression IMPRESSION:  1. Moderate predominantly medial compartment osteoarthritis which is  progressed since prior imaging. 2.  Small joint effusion. 3.  No fracture or dislocation. EKG No results found for this or any previous visit.      2D ECHO

## 2020-09-16 NOTE — DIABETES MGMT
Diabetes Patient/Family Education Record  Factors That  May Influence Patients Ability  to Learn or  Comply with Recommendations   []   Language barrier    []   Cultural needs   []   Motivation    []   Cognitive limitation    []   Physical   []   Education    []   Physiological factors   []   Hearing/vision/speaking impairment   []   Restorationism beliefs    []   Financial factors   []  Other:   [x]  No factors identified at this time. Person Instructed:   [x]   Patient   []   Family   []  Other     Preference for Learning:   [x]   Verbal   [x]   Written - provided printed material for BG and A1c targets - portions and plate method   []  Demonstration     Level of Comprehension & Competence:    [x]  Good                                      [] Fair                                     []  Poor                             []  Needs Reinforcement   [x]  Teachback completed    Education Component:   See DM note    [x]  Medication management, including how to administer insulin (if appropriate) and potential medication interactions    [x]  Nutritional management - follows a Mediterranean diet at home with 2-3 meals/day     [x]  Exercise   [x]  Signs, symptoms, and treatment of hyperglycemia and hypoglycemia -  Reports symptoms of hypoglycemia in the 50's-60's.   Treats with juice or glucose tabs   [x] Prevention, recognition and treatment of hyperglycemia and hypoglycemia   [x]  Importance of blood glucose monitoring - monitors BG 2-3 times per day    []  Instruction on use of the blood glucose meter   [x]  Discuss the importance of HbA1C monitoring   Reviewed today - 8.6%   []  Sick day guidelines   []  Proper use and disposal of lancets, needles, syringes or insulin pens (if appropriate)   [x]  Potential long-term complications - admitted with DM foot wound - s/p partial amputation R great toe   [x] Information about whom to contact in case of emergency or for more information - to f/u with PCP -  Goal:  Patient/family will demonstrate understanding of Diabetes Self Management Skills by: (date) _______  Plan for post-discharge education or self-management support:    - Outpatient class schedule provided            - Patient Declined - provided printed materials     - Scheduled for outpatient classes (date) _______  Verify:  Does patient understand how diabetes medications work? _____yes___________  Does patient know what their most recent A1c is? _____yes - 8.6%___________  Does patient monitor glucose at home? ___________yes_____________  Does patient have difficulty obtaining diabetes medications or testing supplies?   ___no___         Francine Carranza MPH RN CDE  [phone]

## 2020-09-16 NOTE — ROUTINE PROCESS
Bedside shift change report given to von BOWEN (oncoming nurse) by Rebecca Jane (offgoing nurse). Report included the following information SBAR.

## 2020-09-16 NOTE — PROGRESS NOTES
Chart reviewed. Pt had toe amp yesterday. Has signed [address] for [facility] home health already. Plan is to return home with home health services. Will continue to monitor for changing discharge needs.   Amarilis Shelby RN - Outcomes Manager  [phone]

## 2020-09-16 NOTE — ROUTINE PROCESS
Pateint is alert and oriented times three with no signs or symptoms of distress.  Dressing is clean dry and intact and pulses palpable

## 2020-09-16 NOTE — PROGRESS NOTES
Bedside and Verbal shift change report given to Marek Munoz RN (oncoming nurse) by Ibrahima Valdes RN (offgoing nurse). Report included the following information SBAR and Kardex.

## 2020-09-16 NOTE — PROGRESS NOTES
Problem: Diabetes Self-Management  Goal: *Disease process and treatment process  Description: Define diabetes and identify own type of diabetes; list 3 options for treating diabetes. Outcome: Progressing Towards Goal  Goal: *Incorporating nutritional management into lifestyle  Description: Describe effect of type, amount and timing of food on blood glucose; list 3 methods for planning meals. Outcome: Progressing Towards Goal  Goal: *Incorporating physical activity into lifestyle  Description: State effect of exercise on blood glucose levels. Outcome: Progressing Towards Goal  Goal: *Developing strategies to promote health/change behavior  Description: Define the ABC's of diabetes; identify appropriate screenings, schedule and personal plan for screenings. Outcome: Progressing Towards Goal  Goal: *Using medications safely  Description: State effect of diabetes medications on diabetes; name diabetes medication taking, action and side effects. Outcome: Progressing Towards Goal  Goal: *Monitoring blood glucose, interpreting and using results  Description: Identify recommended blood glucose targets  and personal targets. Outcome: Progressing Towards Goal  Goal: *Prevention, detection, treatment of acute complications  Description: List symptoms of hyper- and hypoglycemia; describe how to treat low blood sugar and actions for lowering  high blood glucose level. Outcome: Progressing Towards Goal  Goal: *Prevention, detection and treatment of chronic complications  Description: Define the natural course of diabetes and describe the relationship of blood glucose levels to long term complications of diabetes.   Outcome: Progressing Towards Goal  Goal: *Developing strategies to address psychosocial issues  Description: Describe feelings about living with diabetes; identify support needed and support network  Outcome: Progressing Towards Goal  Goal: *Insulin pump training  Outcome: Progressing Towards Goal  Goal: *Sick day guidelines  Outcome: Progressing Towards Goal  Goal: *Patient Specific Goal (EDIT GOAL, INSERT TEXT)  Outcome: Progressing Towards Goal     Problem: Patient Education: Go to Patient Education Activity  Goal: Patient/Family Education  Outcome: Progressing Towards Goal     Problem: Falls - Risk of  Goal: *Absence of Falls  Description: Document Reese Pasquale Fall Risk and appropriate interventions in the flowsheet.   Outcome: Progressing Towards Goal  Note: Fall Risk Interventions:            Medication Interventions: Assess postural VS orthostatic hypotension, Evaluate medications/consider consulting pharmacy, Patient to call before getting OOB, Teach patient to arise slowly                   Problem: Patient Education: Go to Patient Education Activity  Goal: Patient/Family Education  Outcome: Progressing Towards Goal

## 2020-09-16 NOTE — ROUTINE PROCESS
Patient is alert and oriented times three with no signs or symptoms of distress.  Dressing is clean dry and intact foot is warm to touch and sensation present

## 2020-09-16 NOTE — DIABETES MGMT
Glycemic Control Plan of Care    Assessment:  Admitted for DM foot wound and s/p partial amputation of right great toe. Denies any HTN or hyperlipidemia, is a nonsmoker, follows a mediterranean diet. Works at [employer] and is on his feet all day - attributes this to his foot wound which he first noticed in June - states he was initially on oral antibiotics but wound progressed. States compliance with diet and medications. Reviewed proper foot care and footwear. Reviewed target A1c of 7% and importance of good BG control for wound healing. Reviewed target post-prandial BG and recommended he monitor BG 2 hours after a meal several times per week. Will continue to monitor -     Most recent blood glucose values:        Current A1C:  8.6% for average  mg/dl for previous 2-3 months  Lab Results   Component Value Date/Time    Hemoglobin A1c 8.6 (H) 09/15/2020 02:35 AM    Hemoglobin A1c (POC) 11.4 09/08/2017 10:26 AM   .      Current hospital diabetes medications:  lantus 15 units daily. Corrective lispro, very insulin resistant, 4 times per day. TTD 9/15/2020 - 23 units. 15 units lantus. 8 units lispro. Home diabetes medications:  lantus 25 units daily.   Mealtime humalog     Goals:  Blood glucose will be within target range of  mg/dL by 9/18/2020     Education:  _X__  Refer to Diabetes Education Record             ___  Education not indicated at this time      Rolinda Hatchet MPH RN CDE  [phone]

## 2020-09-17 ENCOUNTER — APPOINTMENT (OUTPATIENT)
Dept: VASCULAR SURGERY | Age: 55
DRG: 314 | End: 2020-09-17
Attending: INTERNAL MEDICINE
Payer: MEDICAID

## 2020-09-17 LAB
ALBUMIN SERPL-MCNC: 2.5 G/DL (ref 3.4–5)
ALBUMIN/GLOB SERPL: 0.6 {RATIO} (ref 0.8–1.7)
ALP SERPL-CCNC: 60 U/L (ref 45–117)
ALT SERPL-CCNC: 15 U/L (ref 16–61)
ANION GAP SERPL CALC-SCNC: 3 MMOL/L (ref 3–18)
AST SERPL-CCNC: 15 U/L (ref 10–38)
BACTERIA SPEC CULT: NORMAL
BASOPHILS # BLD: 0 K/UL (ref 0–0.1)
BASOPHILS NFR BLD: 0 % (ref 0–2)
BILIRUB SERPL-MCNC: 0.1 MG/DL (ref 0.2–1)
BUN SERPL-MCNC: 19 MG/DL (ref 7–18)
BUN/CREAT SERPL: 19 (ref 12–20)
CALCIUM SERPL-MCNC: 7.8 MG/DL (ref 8.5–10.1)
CHLORIDE SERPL-SCNC: 107 MMOL/L (ref 100–111)
CO2 SERPL-SCNC: 30 MMOL/L (ref 21–32)
CREAT SERPL-MCNC: 1.02 MG/DL (ref 0.6–1.3)
DATE LAST DOSE: ABNORMAL
DIFFERENTIAL METHOD BLD: ABNORMAL
EOSINOPHIL # BLD: 0.3 K/UL (ref 0–0.4)
EOSINOPHIL NFR BLD: 3 % (ref 0–5)
ERYTHROCYTE [DISTWIDTH] IN BLOOD BY AUTOMATED COUNT: 12.7 % (ref 11.6–14.5)
GLOBULIN SER CALC-MCNC: 4.4 G/DL (ref 2–4)
GLUCOSE BLD STRIP.AUTO-MCNC: 109 MG/DL (ref 70–110)
GLUCOSE BLD STRIP.AUTO-MCNC: 143 MG/DL (ref 70–110)
GLUCOSE BLD STRIP.AUTO-MCNC: 148 MG/DL (ref 70–110)
GLUCOSE BLD STRIP.AUTO-MCNC: 169 MG/DL (ref 70–110)
GLUCOSE BLD STRIP.AUTO-MCNC: 196 MG/DL (ref 70–110)
GLUCOSE SERPL-MCNC: 132 MG/DL (ref 74–99)
GRAM STN SPEC: NORMAL
GRAM STN SPEC: NORMAL
HCT VFR BLD AUTO: 30.6 % (ref 36–48)
HGB BLD-MCNC: 10 G/DL (ref 13–16)
LYMPHOCYTES # BLD: 2.2 K/UL (ref 0.9–3.6)
LYMPHOCYTES NFR BLD: 28 % (ref 21–52)
MCH RBC QN AUTO: 29.7 PG (ref 24–34)
MCHC RBC AUTO-ENTMCNC: 32.7 G/DL (ref 31–37)
MCV RBC AUTO: 90.8 FL (ref 74–97)
MONOCYTES # BLD: 0.7 K/UL (ref 0.05–1.2)
MONOCYTES NFR BLD: 9 % (ref 3–10)
NEUTS SEG # BLD: 4.7 K/UL (ref 1.8–8)
NEUTS SEG NFR BLD: 60 % (ref 40–73)
PLATELET # BLD AUTO: 243 K/UL (ref 135–420)
PMV BLD AUTO: 9.2 FL (ref 9.2–11.8)
POTASSIUM SERPL-SCNC: 4.3 MMOL/L (ref 3.5–5.5)
PROT SERPL-MCNC: 6.9 G/DL (ref 6.4–8.2)
RBC # BLD AUTO: 3.37 M/UL (ref 4.7–5.5)
REPORTED DOSE,DOSE: ABNORMAL UNITS
REPORTED DOSE/TIME,TMG: 1400
SERVICE CMNT-IMP: NORMAL
SODIUM SERPL-SCNC: 140 MMOL/L (ref 136–145)
VANCOMYCIN TROUGH SERPL-MCNC: 9 UG/ML (ref 10–20)
WBC # BLD AUTO: 8 K/UL (ref 4.6–13.2)

## 2020-09-17 PROCEDURE — 74011250636 HC RX REV CODE- 250/636: Performed by: INTERNAL MEDICINE

## 2020-09-17 PROCEDURE — 97161 PT EVAL LOW COMPLEX 20 MIN: CPT

## 2020-09-17 PROCEDURE — 80202 ASSAY OF VANCOMYCIN: CPT

## 2020-09-17 PROCEDURE — 65270000029 HC RM PRIVATE

## 2020-09-17 PROCEDURE — 74011250637 HC RX REV CODE- 250/637: Performed by: INTERNAL MEDICINE

## 2020-09-17 PROCEDURE — 74011250636 HC RX REV CODE- 250/636: Performed by: HOSPITALIST

## 2020-09-17 PROCEDURE — 80053 COMPREHEN METABOLIC PANEL: CPT

## 2020-09-17 PROCEDURE — 97165 OT EVAL LOW COMPLEX 30 MIN: CPT

## 2020-09-17 PROCEDURE — 2709999900 HC NON-CHARGEABLE SUPPLY

## 2020-09-17 PROCEDURE — 97535 SELF CARE MNGMENT TRAINING: CPT

## 2020-09-17 PROCEDURE — 36415 COLL VENOUS BLD VENIPUNCTURE: CPT

## 2020-09-17 PROCEDURE — 93923 UPR/LXTR ART STDY 3+ LVLS: CPT

## 2020-09-17 PROCEDURE — 74011000258 HC RX REV CODE- 258: Performed by: HOSPITALIST

## 2020-09-17 PROCEDURE — 74011636637 HC RX REV CODE- 636/637: Performed by: INTERNAL MEDICINE

## 2020-09-17 PROCEDURE — 82962 GLUCOSE BLOOD TEST: CPT

## 2020-09-17 PROCEDURE — 85025 COMPLETE CBC W/AUTO DIFF WBC: CPT

## 2020-09-17 PROCEDURE — 74011636637 HC RX REV CODE- 636/637: Performed by: HOSPITALIST

## 2020-09-17 RX ORDER — AMLODIPINE BESYLATE 5 MG/1
5 TABLET ORAL
Status: DISCONTINUED | OUTPATIENT
Start: 2020-09-17 | End: 2020-09-18 | Stop reason: HOSPADM

## 2020-09-17 RX ORDER — VANCOMYCIN/0.9 % SOD CHLORIDE 1.5G/250ML
1500 PLASTIC BAG, INJECTION (ML) INTRAVENOUS EVERY 12 HOURS
Status: DISCONTINUED | OUTPATIENT
Start: 2020-09-17 | End: 2020-09-18

## 2020-09-17 RX ADMIN — PIPERACILLIN AND TAZOBACTAM 3.38 G: 3; .375 INJECTION, POWDER, LYOPHILIZED, FOR SOLUTION INTRAVENOUS at 05:06

## 2020-09-17 RX ADMIN — ACETAMINOPHEN 1000 MG: 500 TABLET, FILM COATED ORAL at 14:00

## 2020-09-17 RX ADMIN — ACETAMINOPHEN 1000 MG: 500 TABLET, FILM COATED ORAL at 21:04

## 2020-09-17 RX ADMIN — VANCOMYCIN HYDROCHLORIDE 1500 MG: 10 INJECTION, POWDER, LYOPHILIZED, FOR SOLUTION INTRAVENOUS at 22:47

## 2020-09-17 RX ADMIN — MORPHINE SULFATE 2 MG: 2 INJECTION, SOLUTION INTRAMUSCULAR; INTRAVENOUS at 20:44

## 2020-09-17 RX ADMIN — MORPHINE SULFATE 2 MG: 2 INJECTION, SOLUTION INTRAMUSCULAR; INTRAVENOUS at 10:01

## 2020-09-17 RX ADMIN — AZELASTINE HYDROCHLORIDE 2 SPRAY: 137 SPRAY, METERED NASAL at 18:00

## 2020-09-17 RX ADMIN — INSULIN GLARGINE 15 UNITS: 100 INJECTION, SOLUTION SUBCUTANEOUS at 01:00

## 2020-09-17 RX ADMIN — PIPERACILLIN AND TAZOBACTAM 3.38 G: 3; .375 INJECTION, POWDER, LYOPHILIZED, FOR SOLUTION INTRAVENOUS at 18:00

## 2020-09-17 RX ADMIN — FLUTICASONE PROPIONATE 2 SPRAY: 50 SPRAY, METERED NASAL at 09:00

## 2020-09-17 RX ADMIN — VANCOMYCIN HYDROCHLORIDE 1500 MG: 10 INJECTION, POWDER, LYOPHILIZED, FOR SOLUTION INTRAVENOUS at 09:52

## 2020-09-17 RX ADMIN — AZELASTINE HYDROCHLORIDE 2 SPRAY: 137 SPRAY, METERED NASAL at 09:00

## 2020-09-17 RX ADMIN — FAMOTIDINE 20 MG: 20 TABLET ORAL at 18:22

## 2020-09-17 RX ADMIN — ACETAMINOPHEN 1000 MG: 500 TABLET, FILM COATED ORAL at 05:07

## 2020-09-17 RX ADMIN — INSULIN LISPRO 3 UNITS: 100 INJECTION, SOLUTION INTRAVENOUS; SUBCUTANEOUS at 12:55

## 2020-09-17 RX ADMIN — PIPERACILLIN AND TAZOBACTAM 3.38 G: 3; .375 INJECTION, POWDER, LYOPHILIZED, FOR SOLUTION INTRAVENOUS at 12:43

## 2020-09-17 RX ADMIN — ACETAMINOPHEN 1000 MG: 500 TABLET, FILM COATED ORAL at 00:19

## 2020-09-17 RX ADMIN — PIPERACILLIN AND TAZOBACTAM 3.38 G: 3; .375 INJECTION, POWDER, LYOPHILIZED, FOR SOLUTION INTRAVENOUS at 00:24

## 2020-09-17 NOTE — PROGRESS NOTES
Hospitalist Progress Note    Patient: Corwin Carrillo Age: 60 y.o. : 1965 MR#: [id number] SSN: [id number]  Date/Time: 2020 12:22 PM    DOA: 9/15/2020  PCP: Monika Luis MD    Subjective:     He stated that he does not understand why his right leg continue to swell up. He continues to have right knee pain but does not understand why he has osteoarthritis on his knee Xray. No fever. No sensation loss. Noted Duplex of leg without DVT on admission. Tolerated partial amputation right great toe 9/15/20. Still on Vanconycin and zosyn  Pending intraoperative culture. MRI foot reviewed with him. His BP remains elevated but he refuses new medication for BP control. Interval Hospital Course:        ROS:  No current fever/chills, no headache, no dizziness, no facial pain, no sinus congestion,   No swallowing pain, No chest pain, no palpitation, no shortness of breath, no abd pain,  No diarrhea, no urinary complaint, +right leg pain or swelling    Assessment/Plan:     1. Ulceration and Osteomyelitis of great toe of right foot associate with diabetes mellitus   2. Hyperglycemia with T2DM, HgbA1c 8.6%   3.  Acute renal insufficiency, resolved   4. Normocytic anemia of chronic disease   5. Right lower leg edema, likely from great toe infection. Negative DVT        Noted DJD on right knee and history of meniscus pathology  6. Suspected ALEJANDRA per report   7. Hypertension. Patient declined antihypertensive medication    Will get Duplex arterial with VALENTIN for further evaluation. Continue wound care and IV antibiotics. Elevated right leg. Will consult Ortho for further recommendation   ID follows  Hold of antihypertensive medications as he Does not want them  Resume home medications as tolerated. lantus and TAMARA   Spoke with him about his suspected ALEJANDRA, sleep on his side but needs OP sleep study for further care.    pepcid   Full code       Additional Notes:    Time spent >30 minutes    Case discussed with:  [x]Patient  []Family  [x]Nursing  [x]Case Management  DVT Prophylaxis:  []Lovenox  []Hep SQ  []SCDs  []Coumadin   []On Heparin gtt    Signed By: Renee Watkins MD     2020 12:22 PM              Objective:   VS:   Visit Vitals  BP (!) 167/82 Comment: notified Benedicto Farfan RN   Pulse 65   Temp 97.9 °F (36.6 °C)   Resp 12   Wt 103.4 kg (228 lb)   SpO2 97%   BMI 30.08 kg/m²      Tmax/24hrs: Temp (24hrs), Av.7 °F (36.5 °C), Min:97.4 °F (36.3 °C), Max:97.9 °F (36.6 °C)    No intake or output data in the 24 hours ending 20 1222    Tele:   General:  Cooperative, Not in acute distress, speaks in full sentence while in bed  HEENT: PERRL, EOMI, supple neck, no JVD, dry oral mucosa  Cardiovascular: S1S2 regular, no rub/gallop   Pulmonary: Clear air entry bilaterally, no wheezing, no crackle  GI:  Soft, non tender, non distended, +bs, no guarding   Extremities:  +right pedal edema, +distal pulses appreciated   Right toe dress, right knee without significant swelling, ROM intact   Neuro: AOx3, moving all extremities, no gross deficit.      Additional:       Current Facility-Administered Medications   Medication Dose Route Frequency    vancomycin (VANCOCIN) 1500 mg in  ml infusion  1,500 mg IntraVENous Q12H    famotidine (PEPCID) tablet 20 mg  20 mg Oral QPM    acetaminophen (TYLENOL) tablet 1,000 mg  1,000 mg Oral Q8H    fluticasone propionate (FLONASE) 50 mcg/actuation nasal spray 2 Spray  2 Spray Both Nostrils DAILY    acetaminophen (TYLENOL) tablet 650 mg  650 mg Oral Q6H PRN    Or    acetaminophen (TYLENOL) suppository 650 mg  650 mg Rectal Q6H PRN    polyethylene glycol (MIRALAX) packet 17 g  17 g Oral DAILY PRN    ondansetron (ZOFRAN ODT) tablet 4 mg  4 mg Oral Q8H PRN    Or    ondansetron (ZOFRAN) injection 4 mg  4 mg IntraVENous Q6H PRN    morphine injection 2 mg  2 mg IntraVENous Q4H PRN    piperacillin-tazobactam (ZOSYN) 3.375 g in 0.9% sodium chloride (MBP/ADV) 100 mL MBP  3.375 g IntraVENous Q6H    insulin lispro (HUMALOG) injection   SubCUTAneous Q6H    glucose chewable tablet 16 g  4 Tab Oral PRN    glucagon (GLUCAGEN) injection 1 mg  1 mg IntraMUSCular PRN    dextrose (D50W) injection syrg 12.5-25 g  25-50 mL IntraVENous PRN    azelastine (ASTELIN) 137mcg/spray nasal spray  2 Spray Both Nostrils BID    insulin glargine (LANTUS) injection 15 Units  15 Units SubCUTAneous QHS    hydrALAZINE (APRESOLINE) tablet 25 mg  25 mg Oral QID PRN            Lab/Data Review:  Labs: Results:       Chemistry Recent Labs     09/17/20  0125 09/16/20  0311 09/15/20  0235   * 139* 138*    140 136   K 4.3 4.4 4.1    109 104   CO2 30 28 28   BUN 19* 22* 35*   CREA 1.02 1.30 1.37*   BUCR 19 17 26*   AGAP 3 3 4   CA 7.8* 7.5* 8.8     Recent Labs     09/17/20 0125 09/16/20 0311   ALT 15* 15*   TP 6.9 6.9   ALB 2.5* 2.5*   GLOB 4.4* 4.4*   AGRAT 0.6* 0.6*      CBC w/Diff Recent Labs     09/17/20  0125 09/16/20  0311 09/15/20  0235   WBC 8.0 7.2 12.7   RBC 3.37* 3.33* 3.67*   HGB 10.0* 9.7* 11.0*   HCT 30.6* 30.5* 32.5*   MCV 90.8 91.6 88.6   MCH 29.7 29.1 30.0   MCHC 32.7 31.8 33.8   RDW 12.7 13.0 12.7    260 311   GRANS 60 61 76*   LYMPH 28 31 16*   EOS 3 2 0      Coagulation No results for input(s): PTP, INR, APTT, INREXT, INREXT in the last 72 hours.     Iron/Ferritin No results found for: IRON, FE, TIBC, IBCT, PSAT, FERR    BNP    Cardiac Enzymes Lab Results   Component Value Date/Time     08/23/2017 01:21 PM    CK - MB 1.2 08/23/2017 01:21 PM    CK-MB Index 0.7 08/23/2017 01:21 PM    Troponin-I, QT <0.02 11/03/2018 01:17 PM        Lactic Acid    Thyroid Studies          All Micro Results     Procedure Component Value Units Date/Time    CULTURE, BLOOD [[id number]] Collected:  09/15/20 0220    Order Status:  Completed Specimen:  Blood Updated:  09/17/20 0658     Special Requests: NO SPECIAL REQUESTS        Culture result: NO GROWTH 2 DAYS CULTURE, BLOOD [[id number]] Collected:  09/15/20 0235    Order Status:  Completed Specimen:  Blood Updated:  09/17/20 0658     Special Requests: NO SPECIAL REQUESTS        Culture result: NO GROWTH 2 DAYS       CULTURE, Erick Neville STAIN [[id number]] Collected:  09/15/20 1829    Order Status:  Completed Specimen:  Great Toe Updated:  09/16/20 1559     Special Requests: RIGHT GREAT TOE     [place] STAIN RARE WBCS SEEN               FEW GRAM POSITIVE COCCI IN CLUSTERS           Culture result:       CULTURE IN [facility] UPDATES TO FOLLOW          CULTURE, Erick Neville STAIN [[id number]] Collected:  09/15/20 0503    Order Status:  Completed Specimen:  Foot Updated:  09/16/20 1327     Special Requests: NO SPECIAL REQUESTS        GRAM STAIN RARE WBCS SEEN         NO ORGANISMS SEEN        Culture result: NO GROWTH AFTER 21 HOURS       CULTURE, ANAEROBIC [[id number]] Collected:  09/15/20 1829    Order Status:  Completed Updated:  09/16/20 0032    CULTURE, FUNGUS [[id number]] Collected:  09/15/20 1829    Order Status:  Completed Updated:  09/16/20 0032            Images:    MRI (Most Recent). MRI Results (most recent):  Results from [place] encounter on 09/12/20   MRI FOOT RT W WO CONT    Narrative Examination: MRI right forefoot with and without contrast.    HISTORY: 60year-old diabetic patient with osteomyelitis of the right foot,  pain, swelling. TECHNIQUE: MRI of the right forefoot was performed utilizing a local coil. Coronal and sagittal STIR and T1-weighted images as well as axial T1 and T2  fat-suppressed images are obtained before the uneventful intravenous  administration of 20 mL Dotarem intravenous gadolinium contrast. Postcontrast  imaging was performed in axial, coronal, and sagittal planes utilizing T1  fat-suppressed techniques. COMPARISON: No relevant prior imaging of the right foot is available for review.     FINDINGS:    In keeping with the provided history, there is diffuse T1 marrow hypointensity  which involves the distal phalanx of the great toe. Parallel STIR signal  abnormality along with abnormal intramedullary enhancement demonstrated on  postcontrast imaging related to the distal phalanx of the great toe. There is no  evidence of necrotic or nonenhancing bone formation. Joint effusion and  synovitis at the IP joint of the great toe is noted. Adjacent proximal phalanx  of the great toe demonstrates normal marrow signal.    There is moderately severe chondrosis of the right first MTP joint with  additional involvement of the tibial greater than fibular sesamoids. MTP and IP  joints of the lesser toes appear within normal limits. Included flexor and extensor tendons appear intact. Diffuse abnormalities of intrinsic muscle bulk and signal are noted, mild in  nature, and characterized by mild intrinsic atrophy with superimposed  intramuscular edema signal. There is no evidence of an organized or drainable  intramuscular or subcutaneous fluid collection. Moderate dorsal pedal edema is  present. Impression IMPRESSION:  1. Osteomyelitis involving the great toe distal phalanx without evidence of  necrotic or nonenhancing bone. No evidence of an organized or drainable fluid  collection. 2. Moderately severe chondrosis of the right first MTP and MTS joint. 3. Diffuse abnormalities of intrinsic muscle bulk and signal typical for changes  related to subacute denervation. 4. Moderate dorsal pedal edema. XRAY (Most Recent) XR Results (most recent):  Results from Hospital Encounter encounter on 09/15/20   XR ANKLE RT AP/LAT    Narrative EXAM: XR ANKLE RT AP/LAT    INDICATION: ankle pain/swelling    COMPARISON: 9/16/2020. FINDINGS: 2 views of the right ankle    No fracture or malalignment. Joint spaces are preserved. Soft tissue swelling. No soft tissue gas. Impression IMPRESSION:    Soft tissue swelling without osseous abnormality of the ankle.           EKG No results found for this or any previous visit.      2D ECHO

## 2020-09-17 NOTE — ADVANCED PRACTICE NURSE
Bedside and Verbal shift change report given to JUDE Boyd (oncoming nurse) by Yinka Mcdaniel RN (offgoing nurse). Report included the following information SBAR, Kardex, Procedure Summary, Intake/Output, MAR and Recent Results.

## 2020-09-17 NOTE — PROGRESS NOTES
Infectious Disease progress Note        Reason: right great toe infection    Current abx Prior abx   Piperacillin/tazobactam, vancomycin  9/15/2020      Lines:       Assessment :    60 y.o.   male with h/o uncontrolled type 2 DM (last hgbA1C 8.6 on 9/15/20) who presented to the ED on 9/15/2020 with concerns of infection of right great toe.     Hospitalization 12/2019 for diabetic left foot infection, acute on chronic osteomyelitis left great toe distal phalanx s/p amputation of left great toe distal phalanx on 12/28/19.      preop wound cultures 12/27 - few pseudomonas, many group b strep, moderate strep viridans, moderate cons. Intra op cultures 12/28 of left toe distal phalanx: group b strep, alpha strep. Clean margins - group b strep, alpha strep, staph capitis.     Clinical presentation consistent with diabetic right foot infection, acute on chronic osteomyelitis right great toe distal phalanx status post amputation of right great toe distal phalanx on 9/15/2020. Grossly clean margins achieved at surgery. Intraoperative cultures -christina-negative rods. Biopsy of clean margins 9/15/2020-negative for osteomyelitis     Recommendations:     1.continue pip/tazo, vancomycin  2. F/u intra op cultures  3. We will switch to p.o. antibiotics based on the culture results      Above plan was discussed in details with patient,  and primary team. Please call me if any further questions or concerns. Will continue to participate in the care of this patient        HPI:    No new complaints. Patient denies any fever or chills throughout this time. home Medication List    Details   amoxicillin (AMOXIL) 500 mg capsule Take 1 Cap by mouth three (3) times daily.  Indications: throat irritation  Qty: 30 Cap, Refills: 0    Associated Diagnoses: Strep pharyngitis; Acute sinusitis, recurrence not specified, unspecified location      insulin lispro (HUMALOG) 100 unit/mL injection Use tid per sliding scale Indications: type 2 diabetes mellitus  Qty: 3 Vial, Refills: 0    Comments: Pharmacy Assistance program      Syringe with Needle, Disp, (Allergy Syringe) 1 mL 27 x 1/2\" syrg FOR USE WITH INTRACAVERNOSAL INJECTIONS. Qty: 25 Pen Needle, Refills: 2      Injector Device rah DISPENSE INJECT-EASE AUTO-INJECTOR FOR USE WITH BI-MIX INJECTIONS. Qty: 1 Each, Refills: 1      tadalafiL (Cialis) 5 mg tablet Take 1 Tab by mouth nightly. Take 1 tab PO daily. Qty: 90 Tab, Refills: 1      fluticasone propionate (Flonase Allergy Relief) 50 mcg/actuation nasal spray 2 Sprays by Both Nostrils route daily. insulin glargine (LANTUS) 100 unit/mL injection 25 Units by SubCUTAneous route nightly. Indications: type 2 diabetes mellitus  Qty: 1 Vial, Refills: 0    Comments: Pharmacy Assistance Program medication  Associated Diagnoses: Type 2 diabetes mellitus with microalbuminuria, with long-term current use of insulin (HCC)      tri mix compound 0.7-1 mL by IntraCAVernosal route as needed (FOR E.D.). DISPENSE COMPOUNDED TRI-MIX (PGE-1  5 mcg/ Papaverine 50 mg/  Phentolamine 6 mg/ml)  Qty: 5 mL, Refills: 5      testosterone 30 mg/actuation (1.5 mL) slpm APPLY 2 PUMPS (60MG) TO SKIN DAILY IN THE MORNING TO THE UNDERARM      sildenafil, pulm. hypertension, (REVATIO) 20 mg tablet Take 2-5 tablets as needed.   Qty: 90 Tab, Refills: 3             Current Facility-Administered Medications   Medication Dose Route Frequency    vancomycin (VANCOCIN) 1500 mg in  ml infusion  1,500 mg IntraVENous Q12H    famotidine (PEPCID) tablet 20 mg  20 mg Oral QPM    acetaminophen (TYLENOL) tablet 1,000 mg  1,000 mg Oral Q8H    fluticasone propionate (FLONASE) 50 mcg/actuation nasal spray 2 Spray  2 Spray Both Nostrils DAILY    acetaminophen (TYLENOL) tablet 650 mg  650 mg Oral Q6H PRN    Or    acetaminophen (TYLENOL) suppository 650 mg  650 mg Rectal Q6H PRN    polyethylene glycol (MIRALAX) packet 17 g  17 g Oral DAILY PRN    ondansetron (ZOFRAN ODT) tablet 4 mg  4 mg Oral Q8H PRN    Or    ondansetron (ZOFRAN) injection 4 mg  4 mg IntraVENous Q6H PRN    morphine injection 2 mg  2 mg IntraVENous Q4H PRN    piperacillin-tazobactam (ZOSYN) 3.375 g in 0.9% sodium chloride (MBP/ADV) 100 mL MBP  3.375 g IntraVENous Q6H    insulin lispro (HUMALOG) injection   SubCUTAneous Q6H    glucose chewable tablet 16 g  4 Tab Oral PRN    glucagon (GLUCAGEN) injection 1 mg  1 mg IntraMUSCular PRN    dextrose (D50W) injection syrg 12.5-25 g  25-50 mL IntraVENous PRN    vancomycin (VANCOCIN) 1500 mg in  ml infusion  1,500 mg IntraVENous Q18H    azelastine (ASTELIN) 137mcg/spray nasal spray  2 Spray Both Nostrils BID    insulin glargine (LANTUS) injection 15 Units  15 Units SubCUTAneous QHS    hydrALAZINE (APRESOLINE) tablet 25 mg  25 mg Oral QID PRN       Allergies: Patient has no known allergies. Temp (24hrs), Av °F (36.7 °C), Min:97.4 °F (36.3 °C), Max:99.1 °F (37.3 °C)    Visit Vitals  BP (!) 167/82 Comment: notified Josette Peters RN   Pulse 65   Temp 97.9 °F (36.6 °C)   Resp 12   Wt 103.4 kg (228 lb)   SpO2 97%   BMI 30.08 kg/m²       ROS: 12 point ROS obtained in details. Pertinent positives as mentioned in HPI,   otherwise negative    Physical Exam:    Gen: In general, this is a well nourished male in no acute distress  HEENT: Sclerae nonicteric. Oral mucous membranes moist. Dentition normal  Neck: Supple with midline trachea. CV: RRR without murmur or rub appreciated. Resp:Respirations are unlabored without use of accessory muscles. Lung fields B/L without wheezes or rhonchi. Abd: Soft, nontender, nondistended. Extrem:  Right foot surgical dressing not removed, left foot surgical site well-healed. Skin: Warm, no visible rashes. Neuro: Patient is alert, oriented, and cooperative. No obvious focal defects. Moves all 4 extremities.     Labs: Results:   Chemistry Recent Labs     20  0125 20  0311 09/15/20  0235   * 139* 138*  140 136   K 4.3 4.4 4.1    109 104   CO2 30 28 28   BUN 19* 22* 35*   CREA 1.02 1.30 1.37*   CA 7.8* 7.5* 8.8   AGAP 3 3 4   BUCR 19 17 26*   AP 60 59  --    TP 6.9 6.9  --    ALB 2.5* 2.5*  --    GLOB 4.4* 4.4*  --    AGRAT 0.6* 0.6*  --       CBC w/Diff Recent Labs     09/17/20  0125 09/16/20  0311 09/15/20  0235   WBC 8.0 7.2 12.7   RBC 3.37* 3.33* 3.67*   HGB 10.0* 9.7* 11.0*   HCT 30.6* 30.5* 32.5*    260 311   GRANS 60 61 76*   LYMPH 28 31 16*   EOS 3 2 0      Microbiology Recent Labs     09/15/20  1829 09/15/20  0503 09/15/20  0235 09/15/20  0220   CULT CULTURE IN PROGRESS,FURTHER UPDATES TO FOLLOW NO GROWTH AFTER 21 HOURS NO GROWTH 2 DAYS NO GROWTH 2 DAYS          RADIOLOGY:    All available imaging studies/reports in [facility] for this admission were reviewed    Dr. Jesus Friend, Infectious Disease Specialist  [phone]  September 17, 2020  10:53 AM

## 2020-09-17 NOTE — PROGRESS NOTES
Problem: Self Care Deficits Care Plan (Adult)  Goal: *Acute Goals and Plan of Care (Insert Text)  Outcome: Resolved/Met   OCCUPATIONAL THERAPY EVALUATION/DISCHARGE    Patient: Rachel Sanabria (18 y.o. male)  Date: 9/17/2020  Primary Diagnosis: Osteomyelitis of great toe of right foot ([address] [M86.9]  Edema of right lower extremity [R60.0]  Osteomyelitis of great toe of right foot ([address] [M86.9]  Procedure(s) (LRB):  AMPUTATION RIGHT GREAT TOE (Right) 2 Days Post-Op   Precautions: NWB R forefoot     PLOF: Pt reports he was (I) with basic self-care/ADLs, IADLs, and functional mobility without AD PTA. Pt works at the [employer]. ASSESSMENT AND RECOMMENDATIONS:  Pt cleared to participate in OT evaluation by Rn. Pt seen in conjunction with PT to maximize safety of patient and staff members. Upon entering room, pt supine with HOB elevated, alert, and agreeable to therapy session. Based on the objective data described below, the patient presents he is Mod(I) with basic self-care/ADLs. Pt educated on w/b status of NWB R forefoot; pt acknowledged understanding. Sitting EOB, pt able to don robe over back and simulate donning sock on R foot, cross legs method Mod(I). Pt stood from bed and was able to transfer self to toilet Mod(I) while adhering to w/b precautions. No LOB noted. Pt reporting falls PTA; please see PT note for additional details; pt requesting an ortho consult. RN notified on post-op shoe. As pt presents he is Mod(I) with ADLs, skilled OT not indicated at this time, therefore OT to d/c pt from caseload.      Discharge Recommendations: None  Further Equipment Recommendations for Discharge: N/A     SUBJECTIVE:   Patient stated I had meniscus surgery\"    OBJECTIVE DATA SUMMARY:     Past Medical History:   Diagnosis Date    Diabetes [facility])      Past Surgical History:   Procedure Laterality Date    COLONOSCOPY N/A 2/5/2020    COLONOSCOPY performed by Brenda Sullivan MD at [address] HX AMPUTATION TOE Left 12/2018    left great toe    HX MENISCUS REPAIR Right 2015     Barriers to Learning/Limitations: None  Compensate with: visual, verbal, tactile, kinesthetic cues/model    Home Situation:   Home Situation  Home Environment: Other (comment)(condo )  One/Two Story Residence: One story  Living Alone: Yes  Support Systems: Family member(s)  Patient Expects to be Discharged to[de-identified] Private residence  Current DME Used/Available at Home: None  []     Right hand dominant   []     Left hand dominant    Cognitive/Behavioral Status:  Neurologic State: Alert  Orientation Level: Oriented X4  Cognition: Follows commands  Safety/Judgement: Fall prevention    Skin: Visible skin appeared intact  Edema: None noted      Coordination: BUE  Coordination: Within functional limits  Fine Motor Skills-Upper: Left Intact; Right Intact    Gross Motor Skills-Upper: Left Intact; Right Intact    Balance:  Sitting: Intact  Standing: Intact    Strength: BUE  Strength: Within functional limits    Tone & Sensation: BUE  Tone: Normal  Sensation: Intact    Range of Motion: BUE    Functional Mobility and Transfers for ADLs:  Bed Mobility:  Supine to Sit: Modified independent  Sit to Supine: Modified independent  Scooting: Modified independent  Transfers:  Sit to Stand: Modified independent  Stand to Sit: Modified independent   Toilet Transfer : Modified independent       ADL Assessment:  Feeding: Modified independent    Oral Facial Hygiene/Grooming: Modified Independent    Bathing: Modified independent    Upper Body Dressing: Modified independent    Lower Body Dressing: Modified independent    Toileting: Modified independent      ADL Intervention:  Pt able to simulate donning R sock using cross legs method, Mod(I) when sitting EOB.   Donning robe over back: Mod(I)  Cognitive Retraining  Safety/Judgement: Fall prevention    Pain:  Pain level pre-treatment: 3-4/10   Pain level post-treatment: 3-4/10   Pain Intervention(s): Medication (see MAR); Rest, Ice, Repositioning  Response to intervention: Nurse notified, See doc flow    Activity Tolerance:   Good    Please refer to the flowsheet for vital signs taken during this treatment. After treatment:   []  Patient left in no apparent distress sitting up in chair  [x]  Patient left in no apparent distress in bed  [x]  Call bell left within reach  [x]  Nursing notified  []  Caregiver present  []  Bed alarm activated    COMMUNICATION/EDUCATION:   [x]      Role of Occupational Therapy in the acute care setting  [x]      Home safety education was provided and the patient/caregiver indicated understanding. [x]      Patient/family have participated as able and agree with findings and recommendations. []      Patient is unable to participate in plan of care at this time. Thank you for this referral.  Zoila Jones MS, OTR/L  Time Calculation: 23 mins      Eval Complexity: History: MEDIUM Complexity : Expanded review of history including physical, cognitive and psychosocial  history ; Examination: LOW Complexity : 1-3 performance deficits relating to physical, cognitive , or psychosocial skils that result in activity limitations and / or participation restrictions ;    Decision Making:LOW Complexity : No comorbidities that affect functional and no verbal or physical assistance needed to complete eval tasks

## 2020-09-17 NOTE — PROGRESS NOTES
Comprehensive Nutrition Assessment    Type and Reason for Visit: Initial(Patient request)    Nutrition Recommendations/Plan: Allow breakfast meats with breakfast daily. Nutrition Assessment:  Admitted iwth ulceration and Osteomyelitis of great toe of right foot associate with diabetes mellitus,s/p amputation. Pt followed by glycemic control, education provided. Pt with concerns regarding diet and requesting breakfast meats on meal try, discussed current diet order and restrictions, pt receptive. Noted pt with some misunderstandings regarding carbohydrates, stating that he cannot consume cauliflower or broccoli because they are carbohydrates, provided education on this topic. Malnutrition Assessment:  Malnutrition Status:  No malnutrition      Estimated Daily Nutrient Needs:  Energy (kcal):  0593-4326  Protein (g):  124-134       Fluid (ml/day):  2686-7568    Nutrition Related Findings:  BM 9/16. 2+ edema. BG levels: 109-169 mg/dL today, receiving Lanuts 15 units and SSI. Wounds:    Surgical wound, Diabetic ulcer       Current Nutrition Therapies:   DIET DIABETIC CONSISTENT CARB Regular    Anthropometric Measures:  · Height:  6' 1\" (185.4 cm)  · Current Body Wt:  103.4 kg (227 lb 15.3 oz)   · Admission Body Wt:  227 lb 15.3 oz    · Usual Body Wt:  200 lb     · Ideal Body Wt:  184 lbs:  123.9 %   · BMI Category:  Obese class 1 (BMI 30.0-34. 9)       Nutrition Diagnosis:   · No nutrition diagnosis at this time related to   as evidenced by        Nutrition Interventions:   Food and/or Nutrient Delivery: Modify current diet  Nutrition Education and Counseling: Counseling completed(Education completed by glycemic control team)  Coordination of Nutrition Care: Continued inpatient monitoring    Goals:  PO nutrition intake will meet >75% of patient estimated nutritional needs within the next 7 days.        Nutrition Monitoring and Evaluation:   Behavioral-Environmental Outcomes: Knowledge or skill  Food/Nutrient Intake Outcomes: Food and nutrient intake  Physical Signs/Symptoms Outcomes: Meal time behavior, Nutrition focused physical findings    Discharge Planning:    No discharge needs at this time     Electronically signed by Greg Jordan RD on 9/17/2020 at 4:04 PM    Contact: [phone]

## 2020-09-17 NOTE — ROUTINE PROCESS
2020 patient in bed AAOx4. Patient complaining 8/10 right foot,knee pain, medicated with morphine 2 mg iv. Dressing to right foot dry and clean,wiggle toes,warm to touch,sensation present and brisk capillary refill. No acute distress noted. 0030 Patient resting quietly. 0430  Patient WB=674/88, pt refused hydralazine .

## 2020-09-17 NOTE — PROGRESS NOTES
Problem: Mobility Impaired (Adult and Pediatric)  Goal: *Acute Goals and Plan of Care (Insert Text)  Outcome: Resolved/Met     PHYSICAL THERAPY EVALUATION AND DISCHARGE    Patient: Markell Gonzales (61 y.o. male)  Date: 9/17/2020  Primary Diagnosis: Osteomyelitis of great toe of right foot (HCC) [M86.9]  Edema of right lower extremity [R60.0]  Osteomyelitis of great toe of right foot (HCC) [M86.9]  Procedure(s) (LRB):  AMPUTATION RIGHT GREAT TOE (Right) 2 Days Post-Op   Precautions:      WBAT      ASSESSMENT :  Based on the objective data described below, the patient presents in bed in NAD, is agreeable and aware of his NWB to R forefoot at this time. Pt reports being mod ind in the room today. Pt reports 3/4-10 for his amputation site on the R. Pt also reporting pain to his knee at medial joint line and to anterior shin where he has a cut from a fall at work 2 weeks ago. Pt with minimal visual edema on his R knee and is tender to palpation on his medial joint line, imaging reports negative for any acute fractures however pt shows full ROM. Pt does demonstrate mod ind for bed mobility, transfers and gait x 10 ft within room while maintaining his heel WBing at this time. Pt does not have an acute need for skilled PT at this time nor does he have any discharge needs other than a post-op shoe. Was left in room with all needs met and call bell within reach. Pt requesting to PT that he wants an ortho consult and informed him I will let his nurse know as they are they ones who place the consults at the discretion of the physicians. Nurse also informed that he requires a post-op shoe for his RLE. Patient does not require further skilled intervention at this level of care. PLAN :  Recommendations and Planned Interventions:   No formal PT needs identified at this time.   Discharge Recommendations: None  Further Equipment Recommendations for Discharge: surgical shoe      SUBJECTIVE:   Patient stated I want to figure out what is wrong with my knee.     OBJECTIVE DATA SUMMARY:     Past Medical History:   Diagnosis Date    Diabetes [facility])      Past Surgical History:   Procedure Laterality Date    COLONOSCOPY N/A 2/5/2020    COLONOSCOPY performed by Ousmane Villa MD at [facility]    HX AMPUTATION TOE Left 12/2018    left great toe    HX MENISCUS REPAIR Right 2015     Barriers to Learning/Limitations: None  Compensate with: N/A  Home Situation:   Home Situation  Home Environment: Other (comment)(condo )  One/Two Story Residence: One story  Living Alone: Yes  Support Systems: Family member(s)  Patient Expects to be Discharged to[de-identified] Private residence  Current DME Used/Available at Home: None  Critical Behavior:  Neurologic State: Alert  Orientation Level: Oriented X4  Cognition: Follows commands  Safety/Judgement: Fall prevention  Psychosocial  Patient Behaviors: Calm  Purposeful Interaction: Yes  Caring Interventions: Reassure  Skin Condition/Temp: Warm     Skin Integrity: Intact  Skin Integumentary  Skin Color: Appropriate for ethnicity  Skin Condition/Temp: Warm  Skin Integrity: Intact     Strength:    Strength:  Within functional limits       Tone & Sensation:   Tone: Normal    Sensation: Intact    Range Of Motion:  AROM: Within functional limits       Posture:  Posture (WDL): Within defined limits      Functional Mobility:  Bed Mobility:     Supine to Sit: Modified independent  Sit to Supine: Modified independent  Scooting: Modified independent  Transfers:  Sit to Stand: Modified independent  Stand to Sit: Modified independent    Balance:   Sitting: Intact  Standing: Intact    Ambulation/Gait Training:  Distance (ft): 10 Feet (ft)  Assistive Device: Other (comment)(none )  Ambulation - Level of Assistance: Modified independent        Gait Abnormalities: Decreased step clearance  Right Side Weight Bearing: Heel;Partial (%)        Stance: Right decreased  Speed/Elsa: Pace decreased (<100 feet/min)  Step Length: Left shortened    Pain:  Pain level pre-treatment: 4/10   Pain level post-treatment: 4/10  Pain Intervention(s): Medication (see MAR); Rest, Repositioning  Response to intervention: Nurse notified    Activity Tolerance:   Good    Please refer to the flowsheet for vital signs taken during this treatment. After treatment:   []         Patient left in no apparent distress sitting up in chair  [x]         Patient left in no apparent distress in bed  [x]         Call bell left within reach  [x]         Nursing notified  []         Caregiver present  []         Bed alarm activated  []         SCDs applied    COMMUNICATION/EDUCATION:   [x]         Role of Physical Therapy in the acute care setting. [x]         Fall prevention education was provided and the patient/caregiver indicated understanding. [x]         Patient/family have participated as able in goal setting and plan of care. [x]         Patient/family agree to work toward stated goals and plan of care. []         Patient understands intent and goals of therapy, but is neutral about his/her participation. []         Patient is unable to participate in goal setting/plan of care: ongoing with therapy staff.  []         Other:     Thank you for this referral.  Lemuel Luevano   Time Calculation: 19 mins      Eval Complexity: History: MEDIUM  Complexity : 1-2 comorbidities / personal factors will impact the outcome/ POC Exam:LOW Complexity : 1-2 Standardized tests and measures addressing body structure, function, activity limitation and / or participation in recreation  Presentation: LOW Complexity : Stable, uncomplicated  Clinical Decision Making:Low Complexity    Overall Complexity:LOW

## 2020-09-17 NOTE — PROGRESS NOTES
Problem: Diabetes Self-Management  Goal: *Disease process and treatment process  Description: Define diabetes and identify own type of diabetes; list 3 options for treating diabetes. Outcome: Progressing Towards Goal  Goal: *Incorporating nutritional management into lifestyle  Description: Describe effect of type, amount and timing of food on blood glucose; list 3 methods for planning meals. Outcome: Progressing Towards Goal  Goal: *Incorporating physical activity into lifestyle  Description: State effect of exercise on blood glucose levels. Outcome: Progressing Towards Goal  Goal: *Developing strategies to promote health/change behavior  Description: Define the ABC's of diabetes; identify appropriate screenings, schedule and personal plan for screenings. Outcome: Progressing Towards Goal  Goal: *Using medications safely  Description: State effect of diabetes medications on diabetes; name diabetes medication taking, action and side effects. Outcome: Progressing Towards Goal  Goal: *Monitoring blood glucose, interpreting and using results  Description: Identify recommended blood glucose targets  and personal targets. Outcome: Progressing Towards Goal  Goal: *Prevention, detection, treatment of acute complications  Description: List symptoms of hyper- and hypoglycemia; describe how to treat low blood sugar and actions for lowering  high blood glucose level. Outcome: Progressing Towards Goal  Goal: *Prevention, detection and treatment of chronic complications  Description: Define the natural course of diabetes and describe the relationship of blood glucose levels to long term complications of diabetes.   Outcome: Progressing Towards Goal  Goal: *Developing strategies to address psychosocial issues  Description: Describe feelings about living with diabetes; identify support needed and support network  Outcome: Progressing Towards Goal  Goal: *Insulin pump training  Outcome: Progressing Towards Goal  Goal: *Sick day guidelines  Outcome: Progressing Towards Goal  Goal: *Patient Specific Goal (EDIT GOAL, INSERT TEXT)  Outcome: Progressing Towards Goal     Problem: Patient Education: Go to Patient Education Activity  Goal: Patient/Family Education  Outcome: Progressing Towards Goal     Problem: Falls - Risk of  Goal: *Absence of Falls  Description: Document Fatuma [place] Fall Risk and appropriate interventions in the flowsheet.   Outcome: Progressing Towards Goal  Note: Fall Risk Interventions:  Mobility Interventions: Patient to call before getting OOB         Medication Interventions: Patient to call before getting OOB                   Problem: Patient Education: Go to Patient Education Activity  Goal: Patient/Family Education  Outcome: Progressing Towards Goal     Problem: Pain  Goal: *Control of Pain  Outcome: Progressing Towards Goal     Problem: Patient Education: Go to Patient Education Activity  Goal: Patient/Family Education  Outcome: Progressing Towards Goal

## 2020-09-17 NOTE — PROGRESS NOTES
Progress Note    Patient: Corwin Carrillo MRN: [id number]  SSN: [id number]    YOB: 1965  Age: 60 y.o. Sex: male      Admit Date: 9/15/2020  1 Day Post-Op     Procedure:   Procedure(s):  PARTIAL AMPUTATION RIGHT GREAT TOE    Subjective:     Patient seen resting quiet and comfortably and no apparent distress. Patient is complaining of right knee pain. Patient denies any pain to foot. He, however, complaints of swelling to right lower extremity. Denies N/V/C/F. Status post: osteomyelitis    Objective:     Visit Vitals  BP (!) 147/79 (BP 1 Location: Right arm, BP Patient Position: At rest)   Pulse 71   Temp 97.4 °F (36.3 °C)   Resp 16   Wt 103.4 kg (228 lb)   SpO2 96%   BMI 30.08 kg/m²        Physical Exam:  Right MULU: palpable 2/4 DP and PT pulsess, great toe partial amputation site healing well, flaps well approximated, no erythema or edema noted. No drainage noted. Skin sutures intact. Assessment:     Hospital Problems  Date Reviewed: 2/5/2020          Codes Class Noted POA    Edema of right lower extremity ICD-10-CM: R60.0  ICD-9-CM: 782.3  9/15/2020 Unknown        Osteomyelitis of great toe of right foot [facility]) ICD-10-CM: M86.9  ICD-9-CM: 730.27  9/15/2020 Unknown              Plan/Recommendations/Medical Decision Making:     - Remain NWB to right forefoot.   - PT/OT consulted. Please dispensed surgical shoe. - f/u OR histology and culture. ID on board. - Continue IV abxs per ID recommendation.  - Dressings changed with betadine soaked adaptic, dry gauze. - Patient is stable to discharge from foot standpoint once histology results returned.    - Medical management per hospitalist.

## 2020-09-18 ENCOUNTER — HOME HEALTH ADMISSION (OUTPATIENT)
Dept: HOME HEALTH SERVICES | Facility: HOME HEALTH | Age: 55
End: 2020-09-18
Payer: MEDICAID

## 2020-09-18 VITALS
BODY MASS INDEX: 30.22 KG/M2 | DIASTOLIC BLOOD PRESSURE: 100 MMHG | TEMPERATURE: 97.4 F | HEIGHT: 73 IN | HEART RATE: 73 BPM | SYSTOLIC BLOOD PRESSURE: 202 MMHG | RESPIRATION RATE: 20 BRPM | WEIGHT: 228 LBS | OXYGEN SATURATION: 100 %

## 2020-09-18 LAB
ALBUMIN SERPL-MCNC: 2.5 G/DL (ref 3.4–5)
ALBUMIN/GLOB SERPL: 0.6 {RATIO} (ref 0.8–1.7)
ALP SERPL-CCNC: 52 U/L (ref 45–117)
ALT SERPL-CCNC: 15 U/L (ref 16–61)
ANION GAP SERPL CALC-SCNC: 2 MMOL/L (ref 3–18)
AST SERPL-CCNC: 20 U/L (ref 10–38)
BACTERIA SPEC CULT: ABNORMAL
BASOPHILS # BLD: 0 K/UL (ref 0–0.1)
BASOPHILS NFR BLD: 0 % (ref 0–2)
BILIRUB SERPL-MCNC: 0.2 MG/DL (ref 0.2–1)
BUN SERPL-MCNC: 12 MG/DL (ref 7–18)
BUN/CREAT SERPL: 13 (ref 12–20)
CALCIUM SERPL-MCNC: 8.1 MG/DL (ref 8.5–10.1)
CHLORIDE SERPL-SCNC: 107 MMOL/L (ref 100–111)
CO2 SERPL-SCNC: 31 MMOL/L (ref 21–32)
CREAT SERPL-MCNC: 0.94 MG/DL (ref 0.6–1.3)
DIFFERENTIAL METHOD BLD: ABNORMAL
EOSINOPHIL # BLD: 0.2 K/UL (ref 0–0.4)
EOSINOPHIL NFR BLD: 3 % (ref 0–5)
ERYTHROCYTE [DISTWIDTH] IN BLOOD BY AUTOMATED COUNT: 12.5 % (ref 11.6–14.5)
GLOBULIN SER CALC-MCNC: 4.2 G/DL (ref 2–4)
GLUCOSE BLD STRIP.AUTO-MCNC: 100 MG/DL (ref 70–110)
GLUCOSE BLD STRIP.AUTO-MCNC: 119 MG/DL (ref 70–110)
GLUCOSE BLD STRIP.AUTO-MCNC: 74 MG/DL (ref 70–110)
GLUCOSE SERPL-MCNC: 68 MG/DL (ref 74–99)
GRAM STN SPEC: ABNORMAL
GRAM STN SPEC: ABNORMAL
HCT VFR BLD AUTO: 31.7 % (ref 36–48)
HGB BLD-MCNC: 10.3 G/DL (ref 13–16)
LEFT ABI: 1.18
LEFT ANTERIOR TIBIAL: 226 MMHG
LEFT ARM BP: 191 MMHG
LEFT CALF PRESSURE: 247 MMHG
LEFT POSTERIOR TIBIAL: 225 MMHG
LYMPHOCYTES # BLD: 1.7 K/UL (ref 0.9–3.6)
LYMPHOCYTES NFR BLD: 20 % (ref 21–52)
MCH RBC QN AUTO: 29 PG (ref 24–34)
MCHC RBC AUTO-ENTMCNC: 32.5 G/DL (ref 31–37)
MCV RBC AUTO: 89.3 FL (ref 74–97)
MONOCYTES # BLD: 0.7 K/UL (ref 0.05–1.2)
MONOCYTES NFR BLD: 9 % (ref 3–10)
NEUTS SEG # BLD: 5.6 K/UL (ref 1.8–8)
NEUTS SEG NFR BLD: 68 % (ref 40–73)
PLATELET # BLD AUTO: 287 K/UL (ref 135–420)
PMV BLD AUTO: 9.3 FL (ref 9.2–11.8)
POTASSIUM SERPL-SCNC: 4 MMOL/L (ref 3.5–5.5)
PROT SERPL-MCNC: 6.7 G/DL (ref 6.4–8.2)
RBC # BLD AUTO: 3.55 M/UL (ref 4.7–5.5)
RIGHT ABI: 1.21
RIGHT ANTERIOR TIBIAL: 226 MMHG
RIGHT ARM BP: 183 MMHG
RIGHT CALF PRESSURE: 236 MMHG
RIGHT POSTERIOR TIBIAL: 231 MMHG
SERVICE CMNT-IMP: ABNORMAL
SERVICE CMNT-IMP: ABNORMAL
SODIUM SERPL-SCNC: 140 MMOL/L (ref 136–145)
WBC # BLD AUTO: 8.3 K/UL (ref 4.6–13.2)

## 2020-09-18 PROCEDURE — 74011250636 HC RX REV CODE- 250/636: Performed by: HOSPITALIST

## 2020-09-18 PROCEDURE — 74011000258 HC RX REV CODE- 258: Performed by: HOSPITALIST

## 2020-09-18 PROCEDURE — 80053 COMPREHEN METABOLIC PANEL: CPT

## 2020-09-18 PROCEDURE — 82962 GLUCOSE BLOOD TEST: CPT

## 2020-09-18 PROCEDURE — 2709999900 HC NON-CHARGEABLE SUPPLY

## 2020-09-18 PROCEDURE — 36415 COLL VENOUS BLD VENIPUNCTURE: CPT

## 2020-09-18 PROCEDURE — 85025 COMPLETE CBC W/AUTO DIFF WBC: CPT

## 2020-09-18 PROCEDURE — 74011250637 HC RX REV CODE- 250/637: Performed by: INTERNAL MEDICINE

## 2020-09-18 RX ORDER — LEVOFLOXACIN 250 MG/1
500 TABLET ORAL EVERY 24 HOURS
Status: DISCONTINUED | OUTPATIENT
Start: 2020-09-18 | End: 2020-09-18 | Stop reason: HOSPADM

## 2020-09-18 RX ORDER — ACETAMINOPHEN 500 MG
1000 TABLET ORAL EVERY 8 HOURS
Qty: 90 TAB | Refills: 0 | Status: SHIPPED | OUTPATIENT
Start: 2020-09-18 | End: 2020-09-18 | Stop reason: SDUPTHER

## 2020-09-18 RX ORDER — AMOXICILLIN AND CLAVULANATE POTASSIUM 875; 125 MG/1; MG/1
1 TABLET, FILM COATED ORAL EVERY 12 HOURS
Qty: 20 TAB | Refills: 0 | Status: SHIPPED | OUTPATIENT
Start: 2020-09-18 | End: 2020-09-18 | Stop reason: SDUPTHER

## 2020-09-18 RX ORDER — LEVOFLOXACIN 500 MG/1
500 TABLET, FILM COATED ORAL DAILY
Qty: 10 TAB | Refills: 0 | Status: SHIPPED | OUTPATIENT
Start: 2020-09-18 | End: 2020-09-28

## 2020-09-18 RX ORDER — AMOXICILLIN AND CLAVULANATE POTASSIUM 875; 125 MG/1; MG/1
1 TABLET, FILM COATED ORAL EVERY 12 HOURS
Status: DISCONTINUED | OUTPATIENT
Start: 2020-09-18 | End: 2020-09-18 | Stop reason: HOSPADM

## 2020-09-18 RX ORDER — AMOXICILLIN AND CLAVULANATE POTASSIUM 875; 125 MG/1; MG/1
1 TABLET, FILM COATED ORAL EVERY 12 HOURS
Qty: 20 TAB | Refills: 0 | Status: SHIPPED | OUTPATIENT
Start: 2020-09-18 | End: 2020-09-28

## 2020-09-18 RX ORDER — INSULIN LISPRO 100 [IU]/ML
INJECTION, SOLUTION INTRAVENOUS; SUBCUTANEOUS
Status: DISCONTINUED | OUTPATIENT
Start: 2020-09-18 | End: 2020-09-18 | Stop reason: HOSPADM

## 2020-09-18 RX ORDER — LEVOFLOXACIN 500 MG/1
500 TABLET, FILM COATED ORAL DAILY
Qty: 10 TAB | Refills: 0 | Status: SHIPPED | OUTPATIENT
Start: 2020-09-18 | End: 2020-09-18 | Stop reason: SDUPTHER

## 2020-09-18 RX ORDER — ACETAMINOPHEN 500 MG
1000 TABLET ORAL EVERY 8 HOURS
Qty: 90 TAB | Refills: 0 | Status: SHIPPED | OUTPATIENT
Start: 2020-09-18 | End: 2020-10-03

## 2020-09-18 RX ADMIN — ACETAMINOPHEN 1000 MG: 500 TABLET, FILM COATED ORAL at 05:43

## 2020-09-18 RX ADMIN — PIPERACILLIN AND TAZOBACTAM 3.38 G: 3; .375 INJECTION, POWDER, LYOPHILIZED, FOR SOLUTION INTRAVENOUS at 01:03

## 2020-09-18 RX ADMIN — PIPERACILLIN AND TAZOBACTAM 3.38 G: 3; .375 INJECTION, POWDER, LYOPHILIZED, FOR SOLUTION INTRAVENOUS at 05:40

## 2020-09-18 RX ADMIN — PIPERACILLIN AND TAZOBACTAM 3.38 G: 3; .375 INJECTION, POWDER, LYOPHILIZED, FOR SOLUTION INTRAVENOUS at 11:38

## 2020-09-18 RX ADMIN — AZELASTINE HYDROCHLORIDE 2 SPRAY: 137 SPRAY, METERED NASAL at 09:14

## 2020-09-18 NOTE — CONSULTS
Ortho    Pt seen and evaluated for right knee pain. Has a history of right knee scope for meniscal tear. He did well after surgery and was back at work  He stepped into a hole, approx 3 feet down re-injuring his right knee  He has had episodes of pain, locking, giving way since the injury    Past Medical History:   Diagnosis Date    Diabetes ([facility] [address]      Current Outpatient Medications   Medication Instructions    amoxicillin (AMOXIL) 500 mg, Oral, 3 TIMES DAILY    fluticasone propionate (Flonase Allergy Relief) 50 mcg/actuation nasal spray 2 Sprays, Both Nostrils, DAILY    Injector Device rah DISPENSE INJECT-EASE AUTO-INJECTOR FOR USE WITH BI-MIX INJECTIONS.  insulin glargine (LANTUS) 25 Units, SubCUTAneous, EVERY BEDTIME    insulin lispro (HUMALOG) 100 unit/mL injection Use tid per sliding scale    sildenafil, pulm. hypertension, (REVATIO) 20 mg tablet Take 2-5 tablets as needed.  Syringe with Needle, Disp, (Allergy Syringe) 1 mL 27 x 1/2\" syrg FOR USE WITH INTRACAVERNOSAL INJECTIONS.  tadalafiL (CIALIS) 5 mg, Oral, EVERY BEDTIME, Take 1 tab PO daily.  testosterone 30 mg/actuation (1.5 mL) slpm APPLY 2 PUMPS (60MG) TO SKIN DAILY IN THE MORNING TO THE UNDERARM    tri mix compound 0.7-1 mL, IntraCAVernosal, AS NEEDED, DISPENSE COMPOUNDED TRI-MIX (PGE-1  5 mcg/ Papaverine 50 mg/  Phentolamine 6 mg/ml)     No Known Allergies    PE:  General: A&Ox3, nad  Visit Vitals  BP (!) 144/73   Pulse 68   Temp 97.1 °F (36.2 °C)   Resp 20   Ht 6' 1\" (1.854 m)   Wt 228 lb (103.4 kg)   SpO2 99%   BMI 30.08 kg/m²     Right LE  No pain with hip ROM  Right knee full ROM  No erythema, neg effusion  Mild crepitus anterior with ROM  Tenderness to palpate the medial joint line  No signs for infection or cellulitis noted. X-ray:  Right knee with moderate OA noted    A: right knee OA with possible re-injury to medial meniscus    P:  Continue current treatment plan. F/u in office.   Will likely need MRI of knee as an out patient.

## 2020-09-18 NOTE — PROGRESS NOTES
Discharge order noted for today. Pt has been accepted to [facility] agency. Met with patient and he is agreeable to the transition plan today. Transport has been arranged through a friend. Patient's discharge summary and home health  orders have been forwarded to [facility] [place] home health  agency via Sam [facility]. Updated bedside RN, ,  to the transition plan. Discharge information has been documented on the AVS.   [place] [facility] [place] [facility] [address] [facility] [place]       Dennis Vicente RN - Outcomes Manager  [phone]

## 2020-09-18 NOTE — PROGRESS NOTES
Problem: Diabetes Self-Management  Goal: *Disease process and treatment process  Description: Define diabetes and identify own type of diabetes; list 3 options for treating diabetes. Outcome: Progressing Towards Goal  Goal: *Incorporating nutritional management into lifestyle  Description: Describe effect of type, amount and timing of food on blood glucose; list 3 methods for planning meals. Outcome: Progressing Towards Goal  Goal: *Incorporating physical activity into lifestyle  Description: State effect of exercise on blood glucose levels. Outcome: Progressing Towards Goal  Goal: *Developing strategies to promote health/change behavior  Description: Define the ABC's of diabetes; identify appropriate screenings, schedule and personal plan for screenings. Outcome: Progressing Towards Goal  Goal: *Using medications safely  Description: State effect of diabetes medications on diabetes; name diabetes medication taking, action and side effects. Outcome: Progressing Towards Goal  Goal: *Monitoring blood glucose, interpreting and using results  Description: Identify recommended blood glucose targets  and personal targets. Outcome: Progressing Towards Goal  Goal: *Prevention, detection, treatment of acute complications  Description: List symptoms of hyper- and hypoglycemia; describe how to treat low blood sugar and actions for lowering  high blood glucose level. Outcome: Progressing Towards Goal  Goal: *Prevention, detection and treatment of chronic complications  Description: Define the natural course of diabetes and describe the relationship of blood glucose levels to long term complications of diabetes.   Outcome: Progressing Towards Goal  Goal: *Developing strategies to address psychosocial issues  Description: Describe feelings about living with diabetes; identify support needed and support network  Outcome: Progressing Towards Goal  Goal: *Insulin pump training  Outcome: Progressing Towards Goal  Goal: *Sick day guidelines  Outcome: Progressing Towards Goal  Goal: *Patient Specific Goal (EDIT GOAL, INSERT TEXT)  Outcome: Progressing Towards Goal     Problem: Patient Education: Go to Patient Education Activity  Goal: Patient/Family Education  Outcome: Progressing Towards Goal     Problem: Falls - Risk of  Goal: *Absence of Falls  Description: Document Gurinder Parksncer Fall Risk and appropriate interventions in the flowsheet.   Outcome: Progressing Towards Goal  Note: Fall Risk Interventions:  Mobility Interventions: Patient to call before getting OOB         Medication Interventions: Patient to call before getting OOB                   Problem: Patient Education: Go to Patient Education Activity  Goal: Patient/Family Education  Outcome: Progressing Towards Goal     Problem: Pain  Goal: *Control of Pain  Outcome: Progressing Towards Goal     Problem: Patient Education: Go to Patient Education Activity  Goal: Patient/Family Education  Outcome: Progressing Towards Goal     Problem: Patient Education: Go to Patient Education Activity  Goal: Patient/Family Education  Outcome: Progressing Towards Goal     Problem: Patient Education: Go to Patient Education Activity  Goal: Patient/Family Education  Outcome: Progressing Towards Goal

## 2020-09-18 NOTE — PROGRESS NOTES
conducted a Follow up consultation and Spiritual Assessment for Yady Agustin, who is a 60 y. o.,male. The  provided the following Interventions:  Continued the relationship of care and support. Listened empathically. The patient eloisa through his ingrid in [email or web address]. Offered prayer and assurance of continued prayer on patients behalf. Chart reviewed. The following outcomes were achieved:  Patient expressed gratitude for 's visit. Assessment:  There are no further spiritual or Confucianist issues which require Spiritual Care Services interventions at this time. Plan:  Chaplains will continue to follow and will provide pastoral care on an as needed/requested basis.  recommends bedside caregivers page  on duty if patient shows signs of acute spiritual or emotional distress. [address] [place] [address].   Spiritual Care   [phone]

## 2020-09-18 NOTE — ROUTINE PROCESS
Bedside and Verbal shift change report given to Cyn Fair (oncoming nurse) by William Scott RN (offgoing nurse). Report included the following information SBAR, Kardex, Procedure Summary, Intake/Output and Recent Results.

## 2020-09-18 NOTE — ROUTINE PROCESS
0 Accuchek BS=74 no s/s of hypoglycemia noted, gave orange juice 8onz, and crackers .   [id number] AK=274 patient resting quietly;

## 2020-09-18 NOTE — DIABETES MGMT
Glycemic Control Plan of Care    Follow up for continued DM education. He is to discharge home today with [place] for wound care. Reviewed his A1c of 8.6% and target of 7%. Reviewed DM diet and meal planning. Discussed importance of diet, medication and exercise for optimal BG control and wound healing. He will resume his home diabetes medications at d/c; lantus 25 units daily and mealtime humalog sliding scale.            Jorge Gold MPH RN CDE  [phone]

## 2020-09-18 NOTE — DISCHARGE INSTRUCTIONS
DISCHARGE SUMMARY from Nurse    PATIENT INSTRUCTIONS:    After general anesthesia or intravenous sedation, for 24 hours or while taking prescription Narcotics:  · Limit your activities  · Do not drive and operate hazardous machinery  · Do not make important personal or business decisions  · Do  not drink alcoholic beverages  · If you have not urinated within 8 hours after discharge, please contact your surgeon on call. Report the following to your surgeon:  · Excessive pain, swelling, redness or odor of or around the surgical area  · Temperature over 100.5  · Nausea and vomiting lasting longer than 4 hours or if unable to take medications  · Any signs of decreased circulation or nerve impairment to extremity: change in color, persistent  numbness, tingling, coldness or increase pain  · Any questions    What to do at Home:  Recommended activity: Activity as tolerated, follow instructions given by PT.        *  Please give a list of your current medications to your Primary Care Provider. *  Please update this list whenever your medications are discontinued, doses are      changed, or new medications (including over-the-counter products) are added. *  Please carry medication information at all times in case of emergency situations. These are general instructions for a healthy lifestyle:    No smoking/ No tobacco products/ Avoid exposure to second hand smoke  Surgeon General's Warning:  Quitting smoking now greatly reduces serious risk to your health.     Obesity, smoking, and sedentary lifestyle greatly increases your risk for illness    A healthy diet, regular physical exercise & weight monitoring are important for maintaining a healthy lifestyle    You may be retaining fluid if you have a history of heart failure or if you experience any of the following symptoms:  Weight gain of 3 pounds or more overnight or 5 pounds in a week, increased swelling in our hands or feet or shortness of breath while lying flat in bed. Please call your doctor as soon as you notice any of these symptoms; do not wait until your next office visit. The discharge information has been reviewed with the patient. The patient verbalized understanding. Discharge medications reviewed with the patient and appropriate educational materials and side effects teaching were provided. ___________________________________________________________________________________________________________________________________  Discharge Instructions    Patient: Duncan Reeder MRN: [id number]  CSN: [id number]    YOB: 1965  Age: 60 y.o. Sex: male    DOA: 9/15/2020 LOS:  LOS: 3 days   Discharge Date:      ACUTE DIAGNOSES:  1. Ulceration and Osteomyelitis of great toe of right foot associate with diabetes mellitus   2. Hyperglycemia with T2DM, HgbA1c 8.6%   3.  Acute renal insufficiency, resolved   4. Normocytic anemia of chronic disease   5. Right lower leg edema, likely from great toe infection. Negative DVT        Noted DJD on right knee and history of meniscus pathology  6. Suspected ALEJANDRA, needs outpatient sleep study   7. Hypertension. He declined antihypertensive medication        DISCHARGE MEDICATIONS:       · It is important that you take the medication exactly as they are prescribed. · Keep your medication in the bottles provided by the pharmacist and keep a list of the medication names, dosages, and times to be taken in your wallet. · Do not take other medications without consulting your doctor. DIET:  Cardiac Diet, Diabetic Diet and Low fat, Low cholesterol    ACTIVITY: Activity as tolerated  Speak with your primary care physician for sleep study  Speak with your primary care physician for cholesterol treatment while on diabetic medications  Speak with your primary care physician for blood pressure medication treatment   Wound care: Dressings changed with betadine soaked adaptic, dry gauze.      ADDITIONAL INFORMATION: If you experience any of the following symptoms then please call your primary care physician or return to the emergency room if you cannot get hold of your doctor: Fever, chills, nausea, vomiting, diarrhea, change in mentation, falling, bleeding, shortness of breath. FOLLOW UP CARE:  Dr. Clarence Dumont, Jie Drake MD  you are to call and set up an appointment to see them in 1-2 weeks. Follow-up with Dr. Avila Huerta orthopaedic in 2-4 weeks       Information obtained by :  I understand that if any problems occur once I am at home I am to contact my physician. I understand and acknowledge receipt of the instructions indicated above. Patient armband removed and shredded        MyChart Activation    Thank you for requesting access to [employer]. Please follow the instructions below to securely access and download your online medical record. [employer] allows you to send messages to your doctor, view your test results, renew your prescriptions, schedule appointments, and more. How Do I Sign Up? 1. In your internet browser, go to [email or web address]. 2. Click on the First Time User? Click Here link in the Sign In box. You will see the New Member Sign Up page. 3. Enter your [employer] Access Code exactly as it appears below. You will not need to use this code after youve completed the sign-up process. If you do not sign up before the expiration date, you must request a new code. [employer] Access Code: [email or web address]  Expires: 2020 12:50 PM (This is the date your [employer] access code will )    4. Enter the last four digits of your Social Security Number (xxxx) and Date of Birth (mm/dd/yyyy) as indicated and click Submit. You will be taken to the next sign-up page. 5. Create a Allihubt ID. This will be your [employer] login ID and cannot be changed, so think of one that is secure and easy to remember. 6. Create a [employer] password.  You can change your password at any time.  7. Enter your Password Reset Question and Answer. This can be used at a later time if you forget your password. 8. Enter your e-mail address. You will receive e-mail notification when new information is available in [address] 9. Click Sign Up. You can now view and download portions of your medical record. 10. Click the Download Summary menu link to download a portable copy of your medical information. Additional Information    If you have questions, please visit the Frequently Asked Questions section of the [employer] website at [email or web address] Remember, [employer] is NOT to be used for urgent needs. For medical emergencies, dial 911.                                                                                                                                     Physician's or R.N.'s Signature                                                                  Date/Time                                                                                                                                              Patient or Representative Signature                                                          Date/Time    Ronni Alonso MD  9/18/2020  12:13 PM

## 2020-09-18 NOTE — HOME CARE
Received [facility] referral; Discharge order noted for today , [facility] will follow for SN for wound care, pt states his friend Daniel Kohlering) will be willing to learn and help him with wound care; pt states he will [address] staying at [address] [place] [address], [facility], [facility], [address] ; Patient explained [facility] services and answered all questions;  [facility] referral processed to [facility] central intake. HAKAN OTERO.

## 2020-09-18 NOTE — PROGRESS NOTES
Infectious Disease progress Note        Reason: right great toe infection    Current abx Prior abx   Piperacillin/tazobactam, vancomycin  9/15/2020      Lines:       Assessment :    60 y.o.   male with h/o uncontrolled type 2 DM (last hgbA1C 8.6 on 9/15/20) who presented to the ED on 9/15/2020 with concerns of infection of right great toe.     Hospitalization 12/2019 for diabetic left foot infection, acute on chronic osteomyelitis left great toe distal phalanx s/p amputation of left great toe distal phalanx on 12/28/19.      preop wound cultures 12/27 - few pseudomonas, many group b strep, moderate strep viridans, moderate cons. Intra op cultures 12/28 of left toe distal phalanx: group b strep, alpha strep. Clean margins - group b strep, alpha strep, staph capitis.     Clinical presentation consistent with diabetic right foot infection, acute on chronic osteomyelitis right great toe distal phalanx status post amputation of right great toe distal phalanx on 9/15/2020. Grossly clean margins achieved at surgery. Intraoperative cultures -Citrobacter  Biopsy of clean margins 9/15/2020-negative for osteomyelitis     Recommendations:     1.discontinue pip/tazo, d/c vancomycin. Start oral levofloxacin, Augmentin for 10 more days  2. F/u podiatry recommendations regarding weightbearing status, wound care  3. Probiotics while on antibiotics  4. Follow-up with Ortho surgery as outpatient for right knee pain      Above plan was discussed in details with patient,  and primary team. Please call me if any further questions or concerns. Will continue to participate in the care of this patient        HPI:    No new complaints. Patient denies any fever or chills throughout this time. home Medication List    Details   amoxicillin (AMOXIL) 500 mg capsule Take 1 Cap by mouth three (3) times daily.  Indications: throat irritation  Qty: 30 Cap, Refills: 0    Associated Diagnoses: Strep pharyngitis; Acute sinusitis, recurrence not specified, unspecified location      insulin lispro (HUMALOG) 100 unit/mL injection Use tid per sliding scale  Indications: type 2 diabetes mellitus  Qty: 3 Vial, Refills: 0    Comments: Pharmacy Assistance program      Syringe with Needle, Disp, (Allergy Syringe) 1 mL 27 x 1/2\" syrg FOR USE WITH INTRACAVERNOSAL INJECTIONS. Qty: 25 Pen Needle, Refills: 2      Injector Device rah DISPENSE INJECT-EASE AUTO-INJECTOR FOR USE WITH BI-MIX INJECTIONS. Qty: 1 Each, Refills: 1      tadalafiL (Cialis) 5 mg tablet Take 1 Tab by mouth nightly. Take 1 tab PO daily. Qty: 90 Tab, Refills: 1      fluticasone propionate (Flonase Allergy Relief) 50 mcg/actuation nasal spray 2 Sprays by Both Nostrils route daily. insulin glargine (LANTUS) 100 unit/mL injection 25 Units by SubCUTAneous route nightly. Indications: type 2 diabetes mellitus  Qty: 1 Vial, Refills: 0    Comments: Pharmacy Assistance Program medication  Associated Diagnoses: Type 2 diabetes mellitus with microalbuminuria, with long-term current use of insulin (HCC)      tri mix compound 0.7-1 mL by IntraCAVernosal route as needed (FOR E.D.). DISPENSE COMPOUNDED TRI-MIX (PGE-1  5 mcg/ Papaverine 50 mg/  Phentolamine 6 mg/ml)  Qty: 5 mL, Refills: 5      testosterone 30 mg/actuation (1.5 mL) slpm APPLY 2 PUMPS (60MG) TO SKIN DAILY IN THE MORNING TO THE UNDERARM      sildenafil, pulm. hypertension, (REVATIO) 20 mg tablet Take 2-5 tablets as needed.   Qty: 90 Tab, Refills: 3             Current Facility-Administered Medications   Medication Dose Route Frequency    vancomycin (VANCOCIN) 1500 mg in  ml infusion  1,500 mg IntraVENous Q12H    amLODIPine (NORVASC) tablet 5 mg  5 mg Oral QHS    famotidine (PEPCID) tablet 20 mg  20 mg Oral QPM    acetaminophen (TYLENOL) tablet 1,000 mg  1,000 mg Oral Q8H    fluticasone propionate (FLONASE) 50 mcg/actuation nasal spray 2 Spray  2 Spray Both Nostrils DAILY    acetaminophen (TYLENOL) tablet 650 mg  650 mg Oral Q6H PRN    Or    acetaminophen (TYLENOL) suppository 650 mg  650 mg Rectal Q6H PRN    polyethylene glycol (MIRALAX) packet 17 g  17 g Oral DAILY PRN    ondansetron (ZOFRAN ODT) tablet 4 mg  4 mg Oral Q8H PRN    Or    ondansetron (ZOFRAN) injection 4 mg  4 mg IntraVENous Q6H PRN    morphine injection 2 mg  2 mg IntraVENous Q4H PRN    piperacillin-tazobactam (ZOSYN) 3.375 g in 0.9% sodium chloride (MBP/ADV) 100 mL MBP  3.375 g IntraVENous Q6H    insulin lispro (HUMALOG) injection   SubCUTAneous Q6H    glucose chewable tablet 16 g  4 Tab Oral PRN    glucagon (GLUCAGEN) injection 1 mg  1 mg IntraMUSCular PRN    dextrose (D50W) injection syrg 12.5-25 g  25-50 mL IntraVENous PRN    azelastine (ASTELIN) 137mcg/spray nasal spray  2 Spray Both Nostrils BID    insulin glargine (LANTUS) injection 15 Units  15 Units SubCUTAneous QHS    hydrALAZINE (APRESOLINE) tablet 25 mg  25 mg Oral QID PRN       Allergies: Patient has no known allergies. Temp (24hrs), Av.4 °F (36.3 °C), Min:97 °F (36.1 °C), Max:98.2 °F (36.8 °C)    Visit Vitals  BP (!) 144/73   Pulse 68   Temp 97.1 °F (36.2 °C)   Resp 20   Ht 6' 1\" (1.854 m)   Wt 103.4 kg (228 lb)   SpO2 99%   BMI 30.08 kg/m²       ROS: 12 point ROS obtained in details. Pertinent positives as mentioned in HPI,   otherwise negative    Physical Exam:    Gen: In general, this is a well nourished male in no acute distress  HEENT: Sclerae nonicteric. Oral mucous membranes moist. Dentition normal  Neck: Supple with midline trachea. CV: RRR without murmur or rub appreciated. Resp:Respirations are unlabored without use of accessory muscles. Lung fields B/L without wheezes or rhonchi. Abd: Soft, nontender, nondistended. Extrem:  Right foot surgical dressing not removed, left foot surgical site well-healed. Skin: Warm, no visible rashes. Neuro: Patient is alert, oriented, and cooperative. No obvious focal defects. Moves all 4 extremities.     Labs: Results: Chemistry Recent Labs     09/18/20 0528 09/17/20  0125 09/16/20  0311   GLU 68* 132* 139*    140 140   K 4.0 4.3 4.4    107 109   CO2 31 30 28   BUN 12 19* 22*   CREA 0.94 1.02 1.30   CA 8.1* 7.8* 7.5*   AGAP 2* 3 3   BUCR 13 19 17   AP 52 60 59   TP 6.7 6.9 6.9   ALB 2.5* 2.5* 2.5*   GLOB 4.2* 4.4* 4.4*   AGRAT 0.6* 0.6* 0.6*      CBC w/Diff Recent Labs     09/18/20 0528 09/17/20 0125 09/16/20 0311   WBC 8.3 8.0 7.2   RBC 3.55* 3.37* 3.33*   HGB 10.3* 10.0* 9.7*   HCT 31.7* 30.6* 30.5*    243 260   GRANS 68 60 61   LYMPH 20* 28 31   EOS 3 3 2      Microbiology Recent Labs     09/15/20  1829   CULT SCANT GRAM NEGATIVE RODS IDENTIFICATION AND SUSCEPTIBILITY TO FOLLOW*  LIGHT DIPHTHEROIDS*  . Leatha Whipple REFER TO [id number] FOR POSSIBLE ANAEROBES          RADIOLOGY:    All available imaging studies/reports in [facility] for this admission were reviewed    Dr. Sedrick Spangler, Infectious Disease Specialist  [phone]  September 18, 2020  10:53 AM

## 2020-09-20 ENCOUNTER — HOME CARE VISIT (OUTPATIENT)
Dept: SCHEDULING | Facility: HOME HEALTH | Age: 55
End: 2020-09-20
Payer: MEDICAID

## 2020-09-20 VITALS
RESPIRATION RATE: 16 BRPM | DIASTOLIC BLOOD PRESSURE: 70 MMHG | HEART RATE: 75 BPM | TEMPERATURE: 97.5 F | OXYGEN SATURATION: 97 % | SYSTOLIC BLOOD PRESSURE: 158 MMHG

## 2020-09-20 PROCEDURE — 400013 HH SOC

## 2020-09-20 PROCEDURE — G0299 HHS/HOSPICE OF RN EA 15 MIN: HCPCS

## 2020-09-21 LAB
BACTERIA SPEC CULT: NORMAL
BACTERIA SPEC CULT: NORMAL
SERVICE CMNT-IMP: NORMAL
SERVICE CMNT-IMP: NORMAL

## 2020-09-22 ENCOUNTER — HOME CARE VISIT (OUTPATIENT)
Dept: HOME HEALTH SERVICES | Facility: HOME HEALTH | Age: 55
End: 2020-09-22
Payer: MEDICAID

## 2020-09-29 ENCOUNTER — HOME CARE VISIT (OUTPATIENT)
Dept: SCHEDULING | Facility: HOME HEALTH | Age: 55
End: 2020-09-29
Payer: MEDICAID

## 2020-09-29 PROCEDURE — G0299 HHS/HOSPICE OF RN EA 15 MIN: HCPCS

## 2020-09-29 PROCEDURE — A6196 ALGINATE DRESSING <=16 SQ IN: HCPCS

## 2020-09-29 PROCEDURE — A4649 SURGICAL SUPPLIES: HCPCS

## 2020-10-03 ENCOUNTER — HOME CARE VISIT (OUTPATIENT)
Dept: SCHEDULING | Facility: HOME HEALTH | Age: 55
End: 2020-10-03
Payer: MEDICAID

## 2020-10-03 VITALS
OXYGEN SATURATION: 98 % | SYSTOLIC BLOOD PRESSURE: 124 MMHG | HEART RATE: 78 BPM | DIASTOLIC BLOOD PRESSURE: 78 MMHG | TEMPERATURE: 98.2 F | RESPIRATION RATE: 18 BRPM

## 2020-10-05 ENCOUNTER — HOME CARE VISIT (OUTPATIENT)
Dept: SCHEDULING | Facility: HOME HEALTH | Age: 55
End: 2020-10-05
Payer: MEDICAID

## 2020-10-07 ENCOUNTER — HOME CARE VISIT (OUTPATIENT)
Dept: SCHEDULING | Facility: HOME HEALTH | Age: 55
End: 2020-10-07
Payer: MEDICAID

## 2020-10-08 ENCOUNTER — OFFICE VISIT (OUTPATIENT)
Dept: ORTHOPEDIC SURGERY | Age: 55
End: 2020-10-08
Payer: MEDICAID

## 2020-10-08 ENCOUNTER — HOME CARE VISIT (OUTPATIENT)
Dept: SCHEDULING | Facility: HOME HEALTH | Age: 55
End: 2020-10-08
Payer: MEDICAID

## 2020-10-08 VITALS
BODY MASS INDEX: 30.06 KG/M2 | WEIGHT: 226.8 LBS | TEMPERATURE: 96.5 F | HEART RATE: 74 BPM | RESPIRATION RATE: 16 BRPM | DIASTOLIC BLOOD PRESSURE: 65 MMHG | SYSTOLIC BLOOD PRESSURE: 124 MMHG | HEIGHT: 73 IN | OXYGEN SATURATION: 100 %

## 2020-10-08 DIAGNOSIS — M25.561 RIGHT KNEE PAIN, UNSPECIFIED CHRONICITY: ICD-10-CM

## 2020-10-08 DIAGNOSIS — M25.562 LEFT KNEE PAIN, UNSPECIFIED CHRONICITY: ICD-10-CM

## 2020-10-08 DIAGNOSIS — M17.12 ARTHRITIS OF LEFT KNEE: ICD-10-CM

## 2020-10-08 DIAGNOSIS — M17.11 PRIMARY OSTEOARTHRITIS OF RIGHT KNEE: Primary | ICD-10-CM

## 2020-10-08 PROCEDURE — 99214 OFFICE O/P EST MOD 30 MIN: CPT | Performed by: PHYSICIAN ASSISTANT

## 2020-10-08 PROCEDURE — G0300 HHS/HOSPICE OF LPN EA 15 MIN: HCPCS

## 2020-10-08 NOTE — PROGRESS NOTES
[address] [place] [address]  [phone]           Patient: Raymond Grace                MRN: [id number]       SSN: [id number]  YOB: 1965        AGE: 60 y.o. SEX: male  Body mass index is 29.92 kg/m². PCP: Claribel Lovett MD  10/08/20      This office note has been dictated. REVIEW OF SYSTEMS:  Constitutional: Negative for fever, chills, weight loss and malaise/fatigue. HENT: Negative. Eyes: Negative. Respiratory: Negative. Cardiovascular: Negative. Gastrointestinal: No bowel incontinence or constipation. Genitourinary: No bladder incontinence or saddle anesthesia. Skin: Negative. Neurological: Negative. Endo/Heme/Allergies: Negative. Psychiatric/Behavioral: Negative. Musculoskeletal: As per HPI above. Past Medical History:   Diagnosis Date    Diabetes ([facility] [address]          Current Outpatient Medications:     insulin lispro (HUMALOG) 100 unit/mL injection, Use tid per sliding scale  Indications: type 2 diabetes mellitus, Disp: 3 Vial, Rfl: 0    Syringe with Needle, Disp, (Allergy Syringe) 1 mL 27 x 1/2\" syrg, FOR USE WITH INTRACAVERNOSAL INJECTIONS., Disp: 25 Pen Needle, Rfl: 2    Injector Device rah, DISPENSE INJECT-EASE AUTO-INJECTOR FOR USE WITH BI-MIX INJECTIONS., Disp: 1 Each, Rfl: 1    insulin glargine (LANTUS) 100 unit/mL injection, 25 Units by SubCUTAneous route nightly. Indications: type 2 diabetes mellitus, Disp: 1 Vial, Rfl: 0    testosterone 30 mg/actuation (1.5 mL) slpm, APPLY 2 PUMPS (60MG) TO SKIN DAILY IN THE MORNING TO THE UNDERARM, Disp: , Rfl:     sildenafil, pulm. hypertension, (REVATIO) 20 mg tablet, Take 2-5 tablets as needed. , Disp: 90 Tab, Rfl: 3    No Known Allergies    Social History     Socioeconomic History    Marital status: SINGLE     Spouse name: Not on file    Number of children: Not on file    Years of education: Not on file    Highest education level: Not on file   Occupational History    Not on file   Social Needs    Financial resource strain: Not on file    Food insecurity     Worry: Not on file     Inability: Not on file    Transportation needs     Medical: Not on file     Non-medical: Not on file   Tobacco Use    Smoking status: Former Smoker    Smokeless tobacco: Never Used    Tobacco comment: quit 0ver 25 years ago   Substance and Sexual Activity    Alcohol use: Yes     Alcohol/week: 2.0 standard drinks     Types: 2 Cans of beer per week     Comment: occ.  Drug use: No    Sexual activity: Yes     Partners: Female     Birth control/protection: Condom   Lifestyle    Physical activity     Days per week: Not on file     Minutes per session: Not on file    Stress: Not on file   Relationships    Social connections     Talks on phone: Not on file     Gets together: Not on file     Attends [employer] service: Not on file     Active member of club or organization: Not on file     Attends meetings of clubs or organizations: Not on file     Relationship status: Not on file    Intimate partner violence     Fear of current or ex partner: Not on file     Emotionally abused: Not on file     Physically abused: Not on file     Forced sexual activity: Not on file   Other Topics Concern    Not on file   Social History Narrative    Not on file       Past Surgical History:   Procedure Laterality Date    COLONOSCOPY N/A 2/5/2020    COLONOSCOPY performed by Orvin Boas, MD at [address] [place] [address] [address] TOE Left 12/2018    left great toe    HX MENISCUS REPAIR Right 2015             Patient seen and evaluated today for bilateral knees. He had a consult done on the hospital a couple weeks ago. He does have a history of meniscal tear status post arthroscopy. He was walking at work and stepped in a hole about 3 feet down which aggravated his knee. He has had increased discomfort in his knee after the injury at work.   At this point he is having discomfort in each of his knees. Has trouble getting from a chair. He has decreased walking tolerance. He has pain at night. He does have symptoms where his knee wants to buckle on him. He denies radiating pain down the lower extremities. No change in bowel or bladder habits. Patient denies recent fevers, chills, chest pain, SOB, or injuries. No recent systemic changes noted. A 12-point review of systems is performed today. Pertinent positives are noted. All other systems reviewed and otherwise are negative. Physical exam: General: Alert and oriented x3, nad.  well-developed, well nourished. normal affect, AF. NC/AT, EOMI, neck supple, trachea midline, no JVD present. Breathing is non-labored. Examination of lower extremities reveals pain-free range of motion the hips. There is no pain to palpation the trochanteric bursa. Negative straight leg raise. Negative calf tenderness. Negative Homans. No signs of DVT present. Each of the knees reveal skin intact. There is no erythema, ecchymosis, warmth. There are no signs of infection or cellulitis present. He does have pain to palpation medial joint line of each of his knees as well as patellofemoral grind. There is crepitus anteriorly with range of motion activities noted. He does have discomfort Sean's test bilaterally with a palpable click present medially. Review of previous radiographs shows advancing arthritis of each of his knees to the medial and patellofemoral articulation. Assessment: Right knee pain status post fall, left knee pain    Plan: At this point, we will get a move forth an MRI of each of his knees. Certainly he either aggravated the arthritis which was pre-existing or reinjured his meniscus in his right knee after the injury at work. The left knee subsequently has had increasing discomfort as well. We will see him back in the office after the MRIs for review.   He will call with any questions or concerns that shall arise.           JR Rudy DORSEY, PASIRENA, ATC

## 2020-10-09 VITALS
DIASTOLIC BLOOD PRESSURE: 90 MMHG | OXYGEN SATURATION: 99 % | HEART RATE: 74 BPM | SYSTOLIC BLOOD PRESSURE: 138 MMHG | TEMPERATURE: 97.4 F

## 2020-10-12 ENCOUNTER — HOME CARE VISIT (OUTPATIENT)
Dept: SCHEDULING | Facility: HOME HEALTH | Age: 55
End: 2020-10-12
Payer: MEDICAID

## 2020-10-12 VITALS
DIASTOLIC BLOOD PRESSURE: 70 MMHG | TEMPERATURE: 97.7 F | SYSTOLIC BLOOD PRESSURE: 134 MMHG | HEART RATE: 77 BPM | RESPIRATION RATE: 16 BRPM | OXYGEN SATURATION: 98 %

## 2020-10-12 PROCEDURE — G0299 HHS/HOSPICE OF RN EA 15 MIN: HCPCS

## 2020-10-15 DIAGNOSIS — M25.561 RIGHT KNEE PAIN, UNSPECIFIED CHRONICITY: ICD-10-CM

## 2020-10-15 DIAGNOSIS — M25.562 LEFT KNEE PAIN, UNSPECIFIED CHRONICITY: ICD-10-CM

## 2020-10-19 LAB
BACTERIA SPEC CULT: NORMAL
SERVICE CMNT-IMP: NORMAL

## 2020-11-04 ENCOUNTER — TELEPHONE (OUTPATIENT)
Dept: ORTHOPEDIC SURGERY | Age: 55
End: 2020-11-04

## 2020-11-04 NOTE — TELEPHONE ENCOUNTER
Patient called to inquire why his MRI was not scheduled yet. He states he did not choose a facility nor has he spoken to anyone in regards to scheduling since the order was written on 10/8. Patient was offered to be transferred to our central scheduling department and he declined. He's asking to speak to provider instead. Please contact him at [phone].

## 2020-11-05 ENCOUNTER — TELEPHONE (OUTPATIENT)
Dept: ORTHOPEDIC SURGERY | Age: 55
End: 2020-11-05

## 2020-11-05 NOTE — TELEPHONE ENCOUNTER
Patient is scheduled for 11/06/20. I guess he was scheduled through [address] [place] [address] as he was scheduled when me and  was looking at his chart. BS Auth dept needs to get his authorization.

## 2020-11-06 ENCOUNTER — HOSPITAL ENCOUNTER (OUTPATIENT)
Dept: MRI IMAGING | Age: 55
Discharge: HOME | End: 2020-11-06
Attending: PHYSICIAN ASSISTANT
Payer: MEDICAID

## 2020-11-06 PROCEDURE — 73721 MRI JNT OF LWR EXTRE W/O DYE: CPT

## 2020-11-09 ENCOUNTER — TELEPHONE (OUTPATIENT)
Dept: ORTHOPEDIC SURGERY | Age: 55
End: 2020-11-09

## 2020-11-09 NOTE — TELEPHONE ENCOUNTER
PT WANTS HIS MRI RESULTS TODAY NO LATER THAN TOMORROW SO HE CAN GET BACK TO WORK. PT STATES WE DROPPED THE BALL WITH THE AUTHORIZATIONS AND HE NEEDS HIS RESULTS NOW.       PT U#[phone]

## 2020-11-09 NOTE — TELEPHONE ENCOUNTER
Left knee mild OA  No cartilage tears  Mild mcl sprain    Right knee  Medical meniscus tear  Mild OA      Refer to Dr. Eric Lal for possible scope.

## 2020-11-09 NOTE — LETTER
NOTIFICATION FOR WORK 
 
11/10/2020 Mr. Shannon Anne [address] [facility] [phone] To Whom It May Concern: Shannon Anne is currently under the care of [address] [place] [address] Felipe Godoy. Please allow patient to be out of work until his follow up appointment on Thursday 11/12/2020. If there are questions or concerns please have the patient contact our office. Sincerely, Leopoldo Kim MD

## 2020-11-09 NOTE — TELEPHONE ENCOUNTER
Patient states that he previously had MMT repair on the right knee and he fell in a hole at work after that. He needs something in writing stating that this fall caused the re-tear so he can re-file this with work [employer]. He also needs a note for work stating wether he can return to work or not. They do not have light duty there. He states that he needs you to tell him wether he can work or not.

## 2020-11-10 NOTE — TELEPHONE ENCOUNTER
Spoke with patient and notified him of ARACELIS Grant's message. Patient requests to pick the letter up from [facility]. Patient also notified he will need to discuss his work status at his appointment with Dr. Marco Antonio Maradiaga on 11/12/2020. Patient verbalized understanding. Copy of last office note and work note placed up front at [facility].

## 2020-11-12 ENCOUNTER — OFFICE VISIT (OUTPATIENT)
Dept: ORTHOPEDIC SURGERY | Age: 55
End: 2020-11-12
Payer: MEDICAID

## 2020-11-12 VITALS
DIASTOLIC BLOOD PRESSURE: 79 MMHG | WEIGHT: 230 LBS | SYSTOLIC BLOOD PRESSURE: 149 MMHG | TEMPERATURE: 97.5 F | BODY MASS INDEX: 30.48 KG/M2 | HEIGHT: 73 IN | HEART RATE: 74 BPM | RESPIRATION RATE: 16 BRPM

## 2020-11-12 DIAGNOSIS — M17.11 PRIMARY OSTEOARTHRITIS OF RIGHT KNEE: ICD-10-CM

## 2020-11-12 DIAGNOSIS — S83.241A TEAR OF MEDIAL MENISCUS OF RIGHT KNEE, CURRENT, UNSPECIFIED TEAR TYPE, INITIAL ENCOUNTER: Primary | ICD-10-CM

## 2020-11-12 PROCEDURE — 99214 OFFICE O/P EST MOD 30 MIN: CPT | Performed by: ORTHOPAEDIC SURGERY

## 2020-11-12 RX ORDER — CELECOXIB 200 MG/1
200 CAPSULE ORAL DAILY
Qty: 60 CAP | Refills: 0 | Status: CANCELLED | OUTPATIENT
Start: 2020-11-12

## 2020-11-12 NOTE — LETTER
NOTIFICATION RETURN TO WORK / SCHOOL    11/12/2020 10:24 AM    Mr. Breanna Parekh  [address] [facility] [address]      To Whom It May Concern:    Breanna Parekh is currently under the care of [address] [place]. He will remain out of work for the next 2 weeks and may return on Monday, November 30. If there are questions or concerns please have the patient contact our office.         Sincerely,      Crescencio Johnson MD

## 2020-11-12 NOTE — PROGRESS NOTES
Abhay Sherman  1965   Chief Complaint   Patient presents with    Knee Pain     bilateral, mostly the right knee. HISTORY OF PRESENT ILLNESS  Abhay Sherman is a 60 y.o. male who presents today for evaluation of b/l knee pain. R>L. He rates his pain 0/10 today. Pain has been present for 3 months. Pt notes he fell in a hole while at work, works at [email or web address]. Pt states he had an ulcer on a toe on his right foot prior to this fall at work. He states the ulcer opened back up after this fall. His podiatrist recommended that he follow up with an orthopedic doctor regarding his knee swelling. Pt reports hx of prior surgery on the right meniscus. Pt notes swelling in the knee, did not have swelling prior to the injury at work. Had an increase in pain after this injury. Pain with steps, sitting. Patient describes the pain as aching that is Constant in nature. Symptoms are worse with stairs, walking, Activity and is better with  Rest. Associated symptoms include Swelling. Since problem started, it: is unchanged. Pain does not wake patient up at night. Has taken no meds for the problem. Has tried following treatments: Injections:NO; Brace:NO; Therapy:NO; Cane/Crutch:NO       No Known Allergies     Past Medical History:   Diagnosis Date    Diabetes ([facility] [address]       Social History     Socioeconomic History    Marital status: SINGLE     Spouse name: Not on file    Number of children: Not on file    Years of education: Not on file    Highest education level: Not on file   Occupational History    Not on file   Social Needs    Financial resource strain: Not on file    Food insecurity     Worry: Not on file     Inability: Not on file    Transportation needs     Medical: Not on file     Non-medical: Not on file   Tobacco Use    Smoking status: Former Smoker    Smokeless tobacco: Never Used    Tobacco comment: quit 0ver 25 years ago   Substance and Sexual Activity    Alcohol use:  Yes Alcohol/week: 2.0 standard drinks     Types: 2 Cans of beer per week     Comment: occ.  Drug use: No    Sexual activity: Yes     Partners: Female     Birth control/protection: Condom   Lifestyle    Physical activity     Days per week: Not on file     Minutes per session: Not on file    Stress: Not on file   Relationships    Social connections     Talks on phone: Not on file     Gets together: Not on file     Attends [employer] service: Not on file     Active member of club or organization: Not on file     Attends meetings of clubs or organizations: Not on file     Relationship status: Not on file    Intimate partner violence     Fear of current or ex partner: Not on file     Emotionally abused: Not on file     Physically abused: Not on file     Forced sexual activity: Not on file   Other Topics Concern    Not on file   Social History Narrative    Not on file      Past Surgical History:   Procedure Laterality Date    COLONOSCOPY N/A 2/5/2020    COLONOSCOPY performed by Mary Ann Doss MD at [address] HX AMPUTATION TOE Left 12/2018    left great toe    HX MENISCUS REPAIR Right 2015      Family History   Problem Relation Age of Onset    Seizures Mother     Hypertension Mother     No Known Problems Father     No Known Problems Sister     No Known Problems Brother     No Known Problems Sister     No Known Problems Brother       Current Outpatient Medications   Medication Sig    insulin lispro (HUMALOG) 100 unit/mL injection Use tid per sliding scale  Indications: type 2 diabetes mellitus (Patient taking differently: Use tid per sliding scale:>>  Blood sugar>  141-180 give 2 units. 181-220 give 4 units. 221-260 give 6 units. 261-300 give 8 units. 301-350 give 10 units. 351-400 give 12 units. >400 give 14 units and call MD.  Indications: type 2 diabetes mellitus)    Syringe with Needle, Disp, (Allergy Syringe) 1 mL 27 x 1/2\" syrg FOR USE WITH INTRACAVERNOSAL INJECTIONS.     Injector Device rah DISPENSE INJECT-EASE AUTO-INJECTOR FOR USE WITH BI-MIX INJECTIONS.  insulin glargine (LANTUS) 100 unit/mL injection 25 Units by SubCUTAneous route nightly. Indications: type 2 diabetes mellitus    testosterone 30 mg/actuation (1.5 mL) slpm APPLY 2 PUMPS (60MG) TO SKIN DAILY IN THE MORNING TO THE UNDERARM    sildenafil, pulm. hypertension, (REVATIO) 20 mg tablet Take 2-5 tablets as needed. No current facility-administered medications for this visit. REVIEW OF SYSTEM   Patient denies: Weight loss, Fever/Chills, HA, Visual changes, Fatigue, Chest pain, SOB, Abdominal pain, N/V/D/C, Blood in stool or urine, Edema. Pertinent positive as above in HPI. All others were negative    PHYSICAL EXAM:   Visit Vitals  BP (!) 149/79 (BP 1 Location: Right arm, BP Patient Position: Sitting)   Pulse 74   Temp 97.5 °F (36.4 °C)   Resp 16   Ht 6' 1\" (1.854 m)   Wt 230 lb (104.3 kg)   BMI 30.34 kg/m²     The patient is a well-developed, well-nourished male   in no acute distress. The patient is alert and oriented times three. The patient is alert and oriented times three. Mood and affect are normal.  LYMPHATIC: lymph nodes are not enlarged and are within normal limits  SKIN: normal in color and non tender to palpation. There are no bruises or abrasions noted. NEUROLOGICAL: Motor sensory exam is within normal limits. Reflexes are equal bilaterally.  There is normal sensation to pinprick and light touch  MUSCULOSKELETAL:  Examination Left knee Right knee   Skin Intact Intact   Range of motion 0-130 0-130   Effusion - +   Medial joint line tenderness + +   Lateral joint line tenderness - -   Tenderness Pes Bursa - -   Tenderness insertion MCL - -   Tenderness insertion LCL - -   Seans - -   Patella crepitus - +   Patella grind - -   Lachman - -   Pivot shift - -   Anterior drawer - -   Posterior drawer - -   Varus stress - -   Valgus stress - -   Neurovascular Intact Intact   Calf Swelling and Tenderness to Palpation - -   Gabriel's Test - -   Hamstring Cord Tightness - -         IMAGING: MRI of left knee dated 11/06/2020 was reviewed and read by Dr. Catie Kelsey:   IMPRESSION:  1. Grade 1 MCL abnormality. No medial meniscus tear. 2. Mild tricompartmental degenerative osteoarthropathy with scattered cartilage  loss, most prominent over the mid/inferior aspect of the medial patellar facet. 3. Large distal quadriceps insertional tendinosis. MRI of right knee dated 11/06/2020 was reviewed and read by Dr. Catie Kelsey:   IMPRESSION:  1. Tiny oblique tear is seen focally within the superior articular aspect of the  medial meniscus posterior horn. Overlying, probably related grade 1 MCL  abnormality. 2. Mild tricompartmental degenerative osteoarthropathy with asymmetric joint  space narrowing and mild partial-thickness cartilage loss. 3. Mild distal quadriceps insertional tendinosis. 4. Small right knee joint effusion. XR of right knee from [facility] dated 9/15/2020 was reviewed and read by Dr. Catie Kelsey:   IMPRESSION:  1. Moderate predominantly medial compartment osteoarthritis which is  progressed since prior imaging. 2.  Small joint effusion. 3.  No fracture or dislocation. IMPRESSION:      ICD-10-CM ICD-9-CM    1. Tear of medial meniscus of right knee, current, unspecified tear type, initial encounter  S83.241A 836.0 celecoxib (CeleBREX) 200 mg capsule   2. Primary osteoarthritis of right knee  M17.11 715.16 celecoxib (CeleBREX) 200 mg capsule        PLAN:  1. Pt presents today with right knee pain due to an MRI-documented medial meniscus tear and primary OA. Surgery and a cortisone injection were discussed with the patient today. Pt would like to try Celebrex to see what impact this has on his pain. Patient's main concern today is regarding his work status. The patient has had a previous history of knee issues.  He had done well from his knee surgery up until he had a work-related injury where he twisted knee after falling in a hole. Since then, he has had recurrent pain and swelling which have limited his capacity to work and with his activites. He has changes consistent with a medial meniscus tear due to this  work-related injury. Risk factors include: dm   2. No ultrasound exam indicated today  3. No cortisone injection indicated today   4. No Physical/Occupational Therapy indicated today  5. No diagnostic test indicated today:   6. No durable medical equipment indicated today  7. No referral indicated today   8. Yes medications indicated today: CELEBREX  9. No Narcotic indicated today      RTC prn      Scribed by [address]) as dictated by Jaycob Galindo MD    I, Dr. Jaycob Galindo, confirm that all documentation is accurate.     Jaycob Galindo M.D.   [facility]

## 2020-11-16 NOTE — TELEPHONE ENCOUNTER
Received Humalog vials from [address] [place] [address] patient assistance program.  Order routed to provider and letter mailed to patient for .

## 2020-11-16 NOTE — LETTER
11/16/2020 1:58 PM 
 
Mr. Mona Vaz [address] [facility] [phone] Thank you for participating in the [address] [place] [address] Patient Assistance Program. 
 
This is notification that your item(s) was delivered to us. Please  your item(s) between 11:00 am and 1:00 pm, at one of our [place] sites as soon as possible When you arrive, you will need to register inside as a \"nurse visit\" to  your medication. Notify the registrar that you are there to  medication and they will register you as soon as possible. There may be a short wait but we try to make the process as fast as possible. It is important to see you regularly to make sure your insulin is at the correct dosage. If it has been more than 3 months since you saw the doctor, please come early and plan to stay for an office visit on the day you  your medicine. If you fail to follow-up for regular appointments, the [address] [place] [address] may discontinue providing your medication. To see when the [address] will be in your neighborhood, go to 
[email or web address] 
or call [phone], select your language (1 for english or 2 for Icelandic), and then option 2. Sincerely, Marek Sherman MD

## 2020-11-17 RX ORDER — INSULIN LISPRO 100 [IU]/ML
INJECTION, SOLUTION INTRAVENOUS; SUBCUTANEOUS
Qty: 3 VIAL | Refills: 0 | Status: SHIPPED | COMMUNITY
Start: 2020-11-17

## 2020-11-17 NOTE — TELEPHONE ENCOUNTER
Med list and chart notes reviewed. Pharmacy Assistance Medication approved for pickup.     Lispro tid per sliding scale

## 2020-11-18 ENCOUNTER — TELEPHONE (OUTPATIENT)
Dept: ORTHOPEDIC SURGERY | Age: 55
End: 2020-11-18

## 2020-11-18 RX ORDER — CELECOXIB 200 MG/1
200 CAPSULE ORAL 2 TIMES DAILY WITH MEALS
Qty: 60 CAP | Refills: 2 | Status: SHIPPED | OUTPATIENT
Start: 2020-11-18 | End: 2021-05-06

## 2020-11-18 NOTE — TELEPHONE ENCOUNTER
Patient was seen last seek 11/12/2020:    Patient was supposed to get a prescription for Celebrex but it was never sent to his pharmacy.      vadim: kiran sot[facility]    Please call the patient when done

## 2020-11-18 NOTE — TELEPHONE ENCOUNTER
Spoke with patient hasn't found a new PCP yet has had surgery on one of his toes by his podiatrist and just released and now scheduled to have knee surgery

## 2020-11-19 ENCOUNTER — TELEPHONE (OUTPATIENT)
Dept: ORTHOPEDIC SURGERY | Age: 55
End: 2020-11-19

## 2020-11-19 NOTE — TELEPHONE ENCOUNTER
Patient called stating the [address] told him they need more information from the provider in order to give him his prescription celecoxib (CeleBREX) 200 mg capsule. He isn't sure what other information they need but he is frustrated saying he's been trying to get this medication for over a week. He is requesting a call back regarding this at [phone] before this afternoon.

## 2020-11-19 NOTE — TELEPHONE ENCOUNTER
Submitted prior auth through [employer]. Received prior auth form from [employer] to be completed. I completed and faxed prior auth form. Waiting for response from insurance.

## 2020-11-19 NOTE — TELEPHONE ENCOUNTER
I confirmed with Carmen Chauhan this med does require a PA    Please contact the patient's plan to initiate a prior authorization for Celecoxib 200mg or contact the pharmacy to change the medication.      Bin # - [id number]  N # - [id number]  ID # - [id number]  Group # - [id number]

## 2020-11-20 RX ORDER — MELOXICAM 15 MG/1
15 TABLET ORAL
Qty: 30 TAB | Refills: 2 | Status: SHIPPED | OUTPATIENT
Start: 2020-11-20 | End: 2021-05-06

## 2020-11-24 ENCOUNTER — OFFICE VISIT (OUTPATIENT)
Dept: ORTHOPEDIC SURGERY | Age: 55
End: 2020-11-24
Payer: MEDICAID

## 2020-11-24 VITALS
BODY MASS INDEX: 31.14 KG/M2 | TEMPERATURE: 96 F | SYSTOLIC BLOOD PRESSURE: 162 MMHG | HEIGHT: 73 IN | HEART RATE: 70 BPM | DIASTOLIC BLOOD PRESSURE: 83 MMHG | RESPIRATION RATE: 16 BRPM | WEIGHT: 235 LBS | OXYGEN SATURATION: 99 %

## 2020-11-24 DIAGNOSIS — S83.241D TEAR OF MEDIAL MENISCUS OF RIGHT KNEE, CURRENT, UNSPECIFIED TEAR TYPE, SUBSEQUENT ENCOUNTER: Primary | ICD-10-CM

## 2020-11-24 PROCEDURE — 99214 OFFICE O/P EST MOD 30 MIN: CPT | Performed by: ORTHOPAEDIC SURGERY

## 2020-11-24 NOTE — LETTER
NOTIFICATION RETURN TO WORK / SCHOOL    11/24/2020 10:02 AM    Mr. Breanna Parekh  [address] [facility] [address]      To Whom It May Concern:    Breanna Parekh is currently under the care of [facility] Felipe Godoy. The patient  has a right knee medial meniscus tear from a work-related injury. I feel that he will be limited in his capacity to work. These limitations will be for sedentary work only. If there are questions or concerns please have the patient contact our office.         Sincerely,      Crescencio Johnson MD

## 2020-11-24 NOTE — PROGRESS NOTES
Martín Padilla  1965   Chief Complaint   Patient presents with    Knee Pain     right knee f/u         HISTORY OF PRESENT ILLNESS  Martín Padilla is a 60 y.o. male who presents today for reevaluation of right knee. Patient rates pain as 5/10 today. Pain has been present for over 3 months. Pt notes he fell in a hole while at work, works at [employer]. Pt states he had an ulcer on a toe on his right foot prior to this fall at work. He states the ulcer opened back up after this fall. His podiatrist recommended that he follow up with an orthopedic doctor regarding his knee swelling. Pt reports hx of prior surgery on the right meniscus. Pt notes swelling in the knee, did not have swelling prior to the injury at work. Had an increase in pain after this injury. Pain with steps, sitting. Still notes swelling today. Patient denies any fever, chills, chest pain, shortness of breath or calf pain. The remainder of the review of systems is negative. There are no new illness or injuries to report since last seen in the office. There are no changes to medications, allergies, family or social history. Pain Assessment  11/24/2020   Location of Pain Knee   Location Modifiers Right   Severity of Pain 5   Quality of Pain Dull;Aching   Quality of Pain Comment -   Duration of Pain Persistent   Frequency of Pain Constant   Date Pain First Started -   Aggravating Factors Walking;Standing   Aggravating Factors Comment -   Limiting Behavior Some   Relieving Factors -   Result of Injury Yes   Work-Related Injury Yes   Type of Injury Other (Comment)       PHYSICAL EXAM:   Visit Vitals  BP (!) 162/83   Pulse 70   Temp (!) 96 °F (35.6 °C) (Temporal)   Resp 16   Ht 6' 1\" (1.854 m)   Wt 235 lb (106.6 kg)   SpO2 99%   BMI 31.00 kg/m²     The patient is a well-developed, well-nourished male   in no acute distress. The patient is alert and oriented times three. The patient is alert and oriented times three.  Mood and affect are normal.  LYMPHATIC: lymph nodes are not enlarged and are within normal limits  SKIN: normal in color and non tender to palpation. There are no bruises or abrasions noted. NEUROLOGICAL: Motor sensory exam is within normal limits. Reflexes are equal bilaterally. There is normal sensation to pinprick and light touch  MUSCULOSKELETAL:  Examination Right knee   Skin Intact   Range of motion 0-130   Effusion +   Medial joint line tenderness +   Lateral joint line tenderness -   Tenderness Pes Bursa -   Tenderness insertion MCL -   Tenderness insertion LCL -   Seans -   Patella crepitus +   Patella grind -   Lachman -   Pivot shift -   Anterior drawer -   Posterior drawer -   Varus stress -   Valgus stress -   Neurovascular Intact   Calf Swelling and Tenderness to Palpation -   Gabriel's Test -   Hamstring Cord Tightness -         IMAGING:  MRI of right knee dated 11/06/2020 was reviewed and read by Dr. Alicia Badillo:   IMPRESSION:  1. Tiny oblique tear is seen focally within the superior articular aspect of the  medial meniscus posterior horn. Overlying, probably related grade 1 MCL abnormality. 2. Mild tricompartmental degenerative osteoarthropathy with asymmetric joint  space narrowing and mild partial-thickness cartilage loss. 3. Mild distal quadriceps insertional tendinosis. 4. Small right knee joint effusion.           XR of right knee from [place] dated 9/15/2020 was reviewed and read by Dr. Alicia Badillo:   IMPRESSION:  1.   Moderate predominantly medial compartment osteoarthritis which is  progressed since prior imaging. 2.  Small joint effusion. 3.  No fracture or dislocation. IMPRESSION:      ICD-10-CM ICD-9-CM    1. Tear of medial meniscus of right knee, current, unspecified tear type, subsequent encounter  S83.241D V58.89      836.0         PLAN:   1. Pt presents today with right knee pain due to an MRI-documented medial meniscus tear and primary OA.  Surgery was discussed with the patient today and he would like to proceed with scheduling surgery for the right knee. Was also given a work note today stating: The patient  has a right knee medial meniscus tear from a work-related injury. I feel that he will be limited in his capacity to work. These limitations will be for sedentary work only. I discussed the risks and benefits and potential adverse outcomes of both operative vs non operative treatment of right knee medial meniscus tear with the patient and patient wishes to proceed with arthroscopic right partial medial meniscectomy. Risks of operative intervention include but not limited to bleeding, infection, deep vein thrombosis, pulmonary embolism, death, limb length discrepancy, reflexive sympathetic dystrophy, fat embolism syndrome,damage to blood vessels and nerves, malunion, non-union, delayed union, failure of hardware, post traumatic arthritis, stroke, heart attack, and death. Patient understands that infection may arise and may require numerous surgeries. The patient was counseled at length about the risks of axel Covid-19 during their perioperative period and any recovery window from their procedure. The patient was made aware that axel Covid-19  may worsen their prognosis for recovering from their procedure and lend to a higher morbidity and/or mortality risk. All material risks, benefits, and reasonable alternatives including postponing the procedure were discussed. The patient does  wish to proceed with the procedure at this time. History and physical exam to be preformed at a later date. Risk factors include: dm  2. No ultrasound exam indicated today  3. No cortisone injection indicated today   4. No Physical/Occupational Therapy indicated today  5. No diagnostic test indicated today:   6. No durable medical equipment indicated today  7. No referral indicated today   8. No medications indicated today:   9.  No Narcotic indicated today      RTC H&P      Scribed by Sammi Bland [address]) as dictated by Armida Javier MD    I, Dr. Armida Javier, confirm that all documentation is accurate.     Armida Javier M.D.   [facility]

## 2020-11-25 ENCOUNTER — TELEPHONE (OUTPATIENT)
Dept: FAMILY MEDICINE CLINIC | Facility: CLINIC | Age: 55
End: 2020-11-25

## 2020-11-25 NOTE — TELEPHONE ENCOUNTER
Patient advised of appointment with new PCP Dr [facility] 2/1/2021 1:00 pm [address] [facility] [address]

## 2020-12-23 ENCOUNTER — TELEPHONE (OUTPATIENT)
Dept: FAMILY MEDICINE CLINIC | Facility: CLINIC | Age: 55
End: 2020-12-23

## 2020-12-23 NOTE — TELEPHONE ENCOUNTER
Per provider  Pharmacy Assistance Program medication delivered to patient. Medication: Humalog  :Rossi  Lot  [id number]         Exp 3/2023    Patient verified understanding of medication and directions. Medication list and understanding of medications reviewed with patient. OTC and herbal medications reviewed and added to med list if applicable  Barriers to adherence assessed. Guidance given regarding new medications this visit, including reason for taking this medicine, and common side effects.

## 2021-01-21 ENCOUNTER — HOSPITAL ENCOUNTER (OUTPATIENT)
Dept: PREADMISSION TESTING | Age: 56
Discharge: HOME | End: 2021-01-21
Payer: MEDICAID

## 2021-01-21 ENCOUNTER — TRANSCRIBE ORDER (OUTPATIENT)
Dept: REGISTRATION | Age: 56
End: 2021-01-21

## 2021-01-21 DIAGNOSIS — M25.561 RIGHT KNEE PAIN: ICD-10-CM

## 2021-01-21 DIAGNOSIS — M25.561 RIGHT KNEE PAIN: Primary | ICD-10-CM

## 2021-01-21 LAB
ALBUMIN SERPL-MCNC: 3.4 G/DL (ref 3.4–5)
ALBUMIN/GLOB SERPL: 0.9 {RATIO} (ref 0.8–1.7)
ALP SERPL-CCNC: 68 U/L (ref 45–117)
ALT SERPL-CCNC: 24 U/L (ref 16–61)
ANION GAP SERPL CALC-SCNC: ABNORMAL MMOL/L (ref 3–18)
AST SERPL-CCNC: 18 U/L (ref 10–38)
BASOPHILS # BLD: 0 K/UL (ref 0–0.1)
BASOPHILS NFR BLD: 1 % (ref 0–2)
BILIRUB SERPL-MCNC: 0.2 MG/DL (ref 0.2–1)
BUN SERPL-MCNC: 15 MG/DL (ref 7–18)
BUN/CREAT SERPL: 12 (ref 12–20)
CALCIUM SERPL-MCNC: 8.4 MG/DL (ref 8.5–10.1)
CHLORIDE SERPL-SCNC: 106 MMOL/L (ref 100–111)
CO2 SERPL-SCNC: 36 MMOL/L (ref 21–32)
CREAT SERPL-MCNC: 1.28 MG/DL (ref 0.6–1.3)
DIFFERENTIAL METHOD BLD: ABNORMAL
EOSINOPHIL # BLD: 0.1 K/UL (ref 0–0.4)
EOSINOPHIL NFR BLD: 2 % (ref 0–5)
ERYTHROCYTE [DISTWIDTH] IN BLOOD BY AUTOMATED COUNT: 13 % (ref 11.6–14.5)
EST. AVERAGE GLUCOSE BLD GHB EST-MCNC: 186 MG/DL
GLOBULIN SER CALC-MCNC: 3.6 G/DL (ref 2–4)
GLUCOSE SERPL-MCNC: 140 MG/DL (ref 74–99)
HBA1C MFR BLD: 8.1 % (ref 4.2–5.6)
HCT VFR BLD AUTO: 36.2 % (ref 36–48)
HGB BLD-MCNC: 11.8 G/DL (ref 13–16)
LYMPHOCYTES # BLD: 1.6 K/UL (ref 0.9–3.6)
LYMPHOCYTES NFR BLD: 25 % (ref 21–52)
MCH RBC QN AUTO: 29.6 PG (ref 24–34)
MCHC RBC AUTO-ENTMCNC: 32.6 G/DL (ref 31–37)
MCV RBC AUTO: 91 FL (ref 74–97)
MONOCYTES # BLD: 0.4 K/UL (ref 0.05–1.2)
MONOCYTES NFR BLD: 7 % (ref 3–10)
NEUTS SEG # BLD: 4.2 K/UL (ref 1.8–8)
NEUTS SEG NFR BLD: 65 % (ref 40–73)
PLATELET # BLD AUTO: 239 K/UL (ref 135–420)
PMV BLD AUTO: 10.2 FL (ref 9.2–11.8)
POTASSIUM SERPL-SCNC: 5.1 MMOL/L (ref 3.5–5.5)
PROT SERPL-MCNC: 7 G/DL (ref 6.4–8.2)
RBC # BLD AUTO: 3.98 M/UL (ref 4.7–5.5)
SODIUM SERPL-SCNC: 138 MMOL/L (ref 136–145)
WBC # BLD AUTO: 6.4 K/UL (ref 4.6–13.2)

## 2021-01-21 PROCEDURE — 83036 HEMOGLOBIN GLYCOSYLATED A1C: CPT

## 2021-01-21 PROCEDURE — 93005 ELECTROCARDIOGRAM TRACING: CPT

## 2021-01-21 PROCEDURE — 85025 COMPLETE CBC W/AUTO DIFF WBC: CPT

## 2021-01-21 PROCEDURE — 80053 COMPREHEN METABOLIC PANEL: CPT

## 2021-01-21 PROCEDURE — 36415 COLL VENOUS BLD VENIPUNCTURE: CPT

## 2021-01-22 LAB
ATRIAL RATE: 66 BPM
CALCULATED P AXIS, ECG09: 80 DEGREES
CALCULATED R AXIS, ECG10: 81 DEGREES
CALCULATED T AXIS, ECG11: 65 DEGREES
DIAGNOSIS, 93000: NORMAL
P-R INTERVAL, ECG05: 148 MS
Q-T INTERVAL, ECG07: 382 MS
QRS DURATION, ECG06: 84 MS
QTC CALCULATION (BEZET), ECG08: 400 MS
VENTRICULAR RATE, ECG03: 66 BPM

## 2021-04-27 ENCOUNTER — OFFICE VISIT (OUTPATIENT)
Dept: ORTHOPEDIC SURGERY | Age: 56
End: 2021-04-27

## 2021-04-27 VITALS
OXYGEN SATURATION: 98 % | HEIGHT: 73 IN | WEIGHT: 232 LBS | HEART RATE: 75 BPM | BODY MASS INDEX: 30.75 KG/M2 | RESPIRATION RATE: 16 BRPM

## 2021-04-27 DIAGNOSIS — S83.241D TEAR OF MEDIAL MENISCUS OF RIGHT KNEE, CURRENT, UNSPECIFIED TEAR TYPE, SUBSEQUENT ENCOUNTER: Primary | ICD-10-CM

## 2021-04-27 NOTE — PROGRESS NOTES
Lyla Evans 1965 Chief Complaint Patient presents with  Knee Pain  
  right knee HISTORY OF PRESENT ILLNESS Lyla Evans is a 60 y.o. male who presents today for reevaluation of right knee. Patient rates pain as 8/10 today. Was last seen here for this in November 2020. Pt notes he fell in a hole while at work, works at [employer]. Pt states he had an ulcer on a toe on his right foot prior to this fall at work. He states the ulcer opened back up after this fall. His podiatrist recommended that he follow up with an orthopedic doctor regarding his knee swelling. Pt reports hx of prior surgery on the right meniscus. Pt notes swelling in the knee, did not have swelling prior to the injury at work. Had an increase in pain after this injury. Pain with steps, sitting. Still notes swelling today. Patient denies any fever, chills, chest pain, shortness of breath or calf pain. The remainder of the review of systems is negative. There are no new illness or injuries to report since last seen in the office. There are no changes to medications, allergies, family or social history. Pain Assessment  4/27/2021 Location of Pain Knee Location Modifiers Right Severity of Pain 8 Quality of Pain Other (Comment) Quality of Pain Comment swelling Duration of Pain Persistent Frequency of Pain Constant Date Pain First Started -  
Date Pain First Started Comment - Aggravating Factors Bending;Stretching;Walking;Standing Aggravating Factors Comment - Limiting Behavior Yes Relieving Factors Rest  
Result of Injury - Work-Related Injury - Type of Injury -  
 
 
PHYSICAL EXAM:  
There were no vitals taken for this visit. The patient is a well-developed, well-nourished male   in no acute distress. The patient is alert and oriented times three. The patient is alert and oriented times three.  Mood and affect are normal. 
LYMPHATIC: lymph nodes are not enlarged and are within normal limits SKIN: normal in color and non tender to palpation. There are no bruises or abrasions noted. NEUROLOGICAL: Motor sensory exam is within normal limits. Reflexes are equal bilaterally. There is normal sensation to pinprick and light touch MUSCULOSKELETAL: 
Examination Right knee Skin Intact Range of motion 0-130 Effusion + Medial joint line tenderness + Lateral joint line tenderness - Tenderness Pes Bursa - Tenderness insertion MCL - Tenderness insertion LCL - Seans -  
Patella crepitus + Patella grind - Lachman - Pivot shift - Anterior drawer - Posterior drawer -  
Varus stress -  
Valgus stress - Neurovascular Intact Calf Swelling and Tenderness to Palpation -  
Gabriel's Test -  
Hamstring Cord Tightness -  
  
  
PROCEDURE: *** IMAGING:  MRI of right knee dated 11/06/2020 was reviewed and read by Dr. Fields Shell:  
IMPRESSION: 
1. Tiny oblique tear is seen focally within the superior articular aspect of the 
medial meniscus posterior horn. Overlying, probably related grade 1 MCL abnormality. 2. Mild tricompartmental degenerative osteoarthropathy with asymmetric joint 
space narrowing and mild partial-thickness cartilage loss. 3. Mild distal quadriceps insertional tendinosis. 4. Small right knee joint effusion. 
  
  
  
XR of right knee from [facility] dated 9/15/2020 was reviewed and read by Dr. Fields Shell:  
IMPRESSION: 1.   Moderate predominantly medial compartment osteoarthritis which is 
progressed since prior imaging. 2.  Small joint effusion. 3.  No fracture or dislocation. IMPRESSION:   
  ICD-10-CM ICD-9-CM 1. Tear of medial meniscus of right knee, current, unspecified tear type, subsequent encounter  S83.241D V58.89   
  836.0 PLAN:  
1. Pt presents today with right knee pain due to an MRI-documented medial meniscus tear and primary OA.  Surgery was discussed with the patient today and he would like to proceed with scheduling surgery for the right knee. Was also given a work note today stating: The patient  has a right knee medial meniscus tear from a work-related injury. I feel that he will be limited in his capacity to work. These limitations will be for sedentary work only. *** I discussed the risks and benefits and potential adverse outcomes of both operative vs non operative treatment of right knee medial meniscus tear with the patient and patient wishes to proceed with arthroscopic right partial medial meniscectomy. Risks of operative intervention include but not limited to bleeding, infection, deep vein thrombosis, pulmonary embolism, death, limb length discrepancy, reflexive sympathetic dystrophy, fat embolism syndrome,damage to blood vessels and nerves, malunion, non-union, delayed union, failure of hardware, post traumatic arthritis, stroke, heart attack, and death. Patient understands that infection may arise and may require numerous surgeries. The patient was counseled at length about the risks of axel Covid-19 during their perioperative period and any recovery window from their procedure. The patient was made aware that axel Covid-19  may worsen their prognosis for recovering from their procedure and lend to a higher morbidity and/or mortality risk. All material risks, benefits, and reasonable alternatives including postponing the procedure were discussed. The patient does  wish to proceed with the procedure at this time. History and physical exam to be preformed at a later date. Risk factors include: dm 
2. No ultrasound exam indicated today 3. No cortisone injection indicated today 4. No Physical/Occupational Therapy indicated today 5. No diagnostic test indicated today: 6. No durable medical equipment indicated today 7. No referral indicated today 8. No medications indicated today: 9. No Narcotic indicated today RTC H&P *** Scribed by Kathe Laws ([address]) as dictated by Isela Tellez MD 
 
I, Dr. Isela Tellez, confirm that all documentation is accurate.  
 
Isela Tellez M.D.  
[facility]

## 2021-05-04 ENCOUNTER — DOCUMENTATION ONLY (OUTPATIENT)
Dept: ORTHOPEDIC SURGERY | Age: 56
End: 2021-05-04

## 2021-05-04 NOTE — PROGRESS NOTES
Spoke with the adjustor at [address] [facility]. She confirmed this patient IS NOT APPROVED to be seen by Dr Payal Allan under [facility] comp at this time. However he can be seen under his private health insurance plan. (FYI-- pt had knee sx by another physician in February). Patient seeking take over care by Dr Payal Allan. Pt understands all records/imaging will be required prior to appt( if accepted/scheduled)    Pt has been informed/understood of the above conversation. Pt understands he can be seen under his Medicaid, however his claim would then be denied once Medicaid sees the workers comp claim. Pt advised he would need to sign a waiver of financial responsibility and agree to a pymnt plan of 50$ a month if that's all he can afford. Patient commented he has an 's appt next week regarding his claim. Pt to contact us once advised how to proceed.

## 2021-05-06 ENCOUNTER — HOSPITAL ENCOUNTER (EMERGENCY)
Age: 56
Discharge: HOME | End: 2021-05-06
Attending: EMERGENCY MEDICINE
Payer: MEDICAID

## 2021-05-06 VITALS
RESPIRATION RATE: 18 BRPM | OXYGEN SATURATION: 100 % | HEART RATE: 75 BPM | TEMPERATURE: 98.3 F | BODY MASS INDEX: 29.95 KG/M2 | SYSTOLIC BLOOD PRESSURE: 146 MMHG | WEIGHT: 226 LBS | HEIGHT: 73 IN | DIASTOLIC BLOOD PRESSURE: 75 MMHG

## 2021-05-06 DIAGNOSIS — M54.50 ACUTE LEFT-SIDED LOW BACK PAIN WITHOUT SCIATICA: Primary | ICD-10-CM

## 2021-05-06 LAB
ANION GAP SERPL CALC-SCNC: 5 MMOL/L (ref 3–18)
APPEARANCE UR: CLEAR
BACTERIA URNS QL MICRO: NEGATIVE /HPF
BASOPHILS # BLD: 0 K/UL (ref 0–0.1)
BASOPHILS NFR BLD: 0 % (ref 0–2)
BILIRUB UR QL: NEGATIVE
BUN SERPL-MCNC: 24 MG/DL (ref 7–18)
BUN/CREAT SERPL: 18 (ref 12–20)
CALCIUM SERPL-MCNC: 8.1 MG/DL (ref 8.5–10.1)
CHLORIDE SERPL-SCNC: 107 MMOL/L (ref 100–111)
CO2 SERPL-SCNC: 27 MMOL/L (ref 21–32)
COLOR UR: YELLOW
CREAT SERPL-MCNC: 1.31 MG/DL (ref 0.6–1.3)
DIFFERENTIAL METHOD BLD: ABNORMAL
EOSINOPHIL # BLD: 0.1 K/UL (ref 0–0.4)
EOSINOPHIL NFR BLD: 2 % (ref 0–5)
EPITH CASTS URNS QL MICRO: ABNORMAL /LPF (ref 0–5)
ERYTHROCYTE [DISTWIDTH] IN BLOOD BY AUTOMATED COUNT: 12.8 % (ref 11.6–14.5)
GLUCOSE SERPL-MCNC: 160 MG/DL (ref 74–99)
GLUCOSE UR STRIP.AUTO-MCNC: 500 MG/DL
HCT VFR BLD AUTO: 32.4 % (ref 36–48)
HGB BLD-MCNC: 11.3 G/DL (ref 13–16)
HGB UR QL STRIP: ABNORMAL
HYALINE CASTS URNS QL MICRO: ABNORMAL /LPF (ref 0–2)
KETONES UR QL STRIP.AUTO: NEGATIVE MG/DL
LEUKOCYTE ESTERASE UR QL STRIP.AUTO: NEGATIVE
LYMPHOCYTES # BLD: 1.6 K/UL (ref 0.9–3.6)
LYMPHOCYTES NFR BLD: 20 % (ref 21–52)
MCH RBC QN AUTO: 31.1 PG (ref 24–34)
MCHC RBC AUTO-ENTMCNC: 34.9 G/DL (ref 31–37)
MCV RBC AUTO: 89.3 FL (ref 74–97)
MONOCYTES # BLD: 0.6 K/UL (ref 0.05–1.2)
MONOCYTES NFR BLD: 8 % (ref 3–10)
MUCOUS THREADS URNS QL MICRO: ABNORMAL /LPF
NEUTS SEG # BLD: 5.9 K/UL (ref 1.8–8)
NEUTS SEG NFR BLD: 70 % (ref 40–73)
NITRITE UR QL STRIP.AUTO: NEGATIVE
PH UR STRIP: 5 [PH] (ref 5–8)
PLATELET # BLD AUTO: 245 K/UL (ref 135–420)
PMV BLD AUTO: 10.1 FL (ref 9.2–11.8)
POTASSIUM SERPL-SCNC: 4.3 MMOL/L (ref 3.5–5.5)
PROT UR STRIP-MCNC: 300 MG/DL
RBC # BLD AUTO: 3.63 M/UL (ref 4.35–5.65)
RBC #/AREA URNS HPF: ABNORMAL /HPF (ref 0–5)
SODIUM SERPL-SCNC: 139 MMOL/L (ref 136–145)
SP GR UR REFRACTOMETRY: 1.02 (ref 1–1.03)
UROBILINOGEN UR QL STRIP.AUTO: 0.2 EU/DL (ref 0.2–1)
WBC # BLD AUTO: 8.3 K/UL (ref 4.6–13.2)
WBC URNS QL MICRO: ABNORMAL /HPF (ref 0–4)

## 2021-05-06 PROCEDURE — 99283 EMERGENCY DEPT VISIT LOW MDM: CPT

## 2021-05-06 PROCEDURE — 80048 BASIC METABOLIC PNL TOTAL CA: CPT

## 2021-05-06 PROCEDURE — 85025 COMPLETE CBC W/AUTO DIFF WBC: CPT

## 2021-05-06 PROCEDURE — 74011000250 HC RX REV CODE- 250: Performed by: EMERGENCY MEDICINE

## 2021-05-06 PROCEDURE — 74011250637 HC RX REV CODE- 250/637: Performed by: EMERGENCY MEDICINE

## 2021-05-06 PROCEDURE — 81001 URINALYSIS AUTO W/SCOPE: CPT

## 2021-05-06 RX ORDER — LIDOCAINE 4 G/100G
1 PATCH TOPICAL EVERY 24 HOURS
Status: DISCONTINUED | OUTPATIENT
Start: 2021-05-06 | End: 2021-05-06 | Stop reason: HOSPADM

## 2021-05-06 RX ORDER — NAPROXEN 250 MG/1
500 TABLET ORAL ONCE
Status: COMPLETED | OUTPATIENT
Start: 2021-05-06 | End: 2021-05-06

## 2021-05-06 RX ORDER — NAPROXEN 500 MG/1
500 TABLET ORAL 2 TIMES DAILY WITH MEALS
Qty: 6 TAB | Refills: 0 | Status: SHIPPED | OUTPATIENT
Start: 2021-05-06 | End: 2021-05-09

## 2021-05-06 RX ORDER — NABUMETONE 750 MG/1
TABLET, FILM COATED ORAL
COMMUNITY
Start: 2021-04-21 | End: 2021-07-10

## 2021-05-06 RX ORDER — LIDOCAINE 4 G/100G
PATCH TOPICAL
Qty: 10 PATCH | Refills: 0 | Status: SHIPPED | OUTPATIENT
Start: 2021-05-06 | End: 2021-07-10

## 2021-05-06 RX ORDER — CYCLOBENZAPRINE HCL 5 MG
10 TABLET ORAL 3 TIMES DAILY
Qty: 18 TAB | Refills: 0 | Status: SHIPPED | OUTPATIENT
Start: 2021-05-06 | End: 2021-05-09

## 2021-05-06 RX ORDER — NAPROXEN 250 MG/1
500 TABLET ORAL 2 TIMES DAILY WITH MEALS
Status: DISCONTINUED | OUTPATIENT
Start: 2021-05-06 | End: 2021-05-06

## 2021-05-06 RX ADMIN — NAPROXEN 500 MG: 250 TABLET ORAL at 12:22

## 2021-05-06 NOTE — ED PROVIDER NOTES
EMERGENCY DEPARTMENT HISTORY AND PHYSICAL EXAM    11:13 AM  Date: 5/6/2021  Patient Name: Mercedes Velasquez    History of Presenting Illness       History Provided By:     HPI: Mercedes Velasquez is a 60 y.o. male with past medical history of diabetes, right knee arthroscopic surgery presents with left back pain. Pain is moderate severity, constant worse with movement radiates to the back of his left leg. Patient denies any injury. No weakness, numbness, paresthesias. No spinal injections. No fever or chills. Patient denies any fever. Patient would like to have his renal function checked because of his diabetes. Social History former smoker   PCP: Fabian Pettit MD    Past History     Past Medical History:  Past Medical History:   Diagnosis Date    Arthritis of left knee     Diabetes ([address]     Erectile dysfunction     Knee pain     Osteoarthritis of right knee     [facility] [address]     Sinusitis        Past Surgical History:  Past Surgical History:   Procedure Laterality Date    COLONOSCOPY N/A 2/5/2020    COLONOSCOPY performed by Zainab Love MD at [address] HX AMPUTATION TOE Left 12/2018    left great toe    HX MENISCUS REPAIR Right 2015       Family History:  Family History   Problem Relation Age of Onset    Seizures Mother     Hypertension Mother     No Known Problems Father     No Known Problems Sister     No Known Problems Brother     No Known Problems Sister     No Known Problems Brother        Social History:  Social History     Tobacco Use    Smoking status: Former Smoker    Smokeless tobacco: Never Used    Tobacco comment: quit 0ver 25 years ago   Substance Use Topics    Alcohol use: Yes     Alcohol/week: 2.0 standard drinks     Types: 2 Cans of beer per week     Comment: occ.  Drug use: No       Allergies:   Allergies   Allergen Reactions    Voltaren [Diclofenac Sodium] Nausea Only       Review of Systems   Review of Systems   Constitutional: Negative for activity change, appetite change and chills. HENT: Negative for congestion, ear discharge, ear pain and sore throat. Eyes: Negative for photophobia and pain. Respiratory: Negative for cough and choking. Cardiovascular: Negative for palpitations and leg swelling. Gastrointestinal: Negative for anal bleeding and rectal pain. Endocrine: Negative for polydipsia and polyuria. Genitourinary: Negative for genital sores and urgency. Musculoskeletal: Positive for back pain. Negative for arthralgias and myalgias. Neurological: Negative for dizziness, seizures and speech difficulty. Psychiatric/Behavioral: Negative for hallucinations, self-injury and suicidal ideas. Physical Exam     Patient Vitals for the past 12 hrs:   Temp Pulse Resp BP SpO2   05/06/21 1047 98.3 °F (36.8 °C) 75 18 (!) 146/75 100 %       Physical Exam  Vitals signs and nursing note reviewed. Constitutional:       Appearance: He is well-developed. HENT:      Head: Normocephalic and atraumatic. Eyes:      General:         Right eye: No discharge. Left eye: No discharge. Neck:      Musculoskeletal: Normal range of motion and neck supple. Cardiovascular:      Rate and Rhythm: Normal rate and regular rhythm. Heart sounds: Normal heart sounds. No murmur. Pulmonary:      Effort: Pulmonary effort is normal. No respiratory distress. Breath sounds: Normal breath sounds. No stridor. No wheezing or rales. Chest:      Chest wall: No tenderness. Abdominal:      General: Bowel sounds are normal. There is no distension. Palpations: Abdomen is soft. Tenderness: There is no abdominal tenderness. There is no guarding or rebound. Musculoskeletal: Normal range of motion. Comments: Left paravertebral muscle tenderness  Straight leg raise negative  Right knee swelling, patient states that is baseline. Skin:     General: Skin is warm and dry.    Neurological:      Mental Status: He is alert and oriented to person, place, and time. Sensory: No sensory deficit. Motor: No weakness. Coordination: Coordination normal.         Diagnostic Study Results     Labs -  No results found for this or any previous visit (from the past 12 hour(s)). Radiologic Studies -   No results found. Medical Decision Making     ED Course: Progress Notes, Reevaluation, and Consults:    11:13 AM Initial assessment performed. The patients presenting problems have been discussed, and they/their family are in agreement with the care plan formulated and outlined with them. I have encouraged them to ask questions as they arise throughout their visit. Provider Notes (Medical Decision Making):   Patient presents with left paravertebral muscle tenderness  Straight leg raise negative  No neurological deficit  Patient wants to check his kidney function because of his diabetes. Normal creatinine  Suspect that the back pain is musculoskeletal secondary to poor posture  Patient states that he has been putting more weight on his left side since right side is painful due to his knee surgery. Will be discharged with PMD and orthopedic follow-up      Vital Signs-Reviewed the patient's vital signs. Reviewed pt's pulse ox reading. Records Reviewed: old medical records  -I am the first provider for this patient.  -I reviewed the vital signs, available nursing notes, past medical history, past surgical history, family history and social history. Current Outpatient Medications   Medication Sig Dispense Refill    insulin lispro (HUMALOG) 100 unit/mL injection Use tid per sliding scale  Indications: type 2 diabetes mellitus 3 Vial 0    insulin glargine (LANTUS) 100 unit/mL injection 25 Units by SubCUTAneous route nightly.  Indications: type 2 diabetes mellitus 1 Vial 0    nabumetone (RELAFEN) 750 mg tablet       Syringe with Needle, Disp, (Allergy Syringe) 1 mL 27 x 1/2\" syrg FOR USE WITH INTRACAVERNOSAL INJECTIONS. 25 Pen Needle 2    Injector Device rah DISPENSE INJECT-EASE AUTO-INJECTOR FOR USE WITH BI-MIX INJECTIONS. 1 Each 1        Clinical Impression     Clinical Impression: No diagnosis found. Disposition:      Pt has been reexamined. Patient has no new complaints, changes, or physical findings. Care plan outlined and precautions discussed. Results were reviewed with the patient. All medications were reviewed with the patient; will d/c home with PMD f/u. All of pt's questions and concerns were addressed. Patient was instructed and agrees to follow up with PMD, as well as to return to the ED upon further deterioration. Patient is ready to go home. This note was dictated utilizing voice recognition software which may lead to typographical errors. I apologize in advance if the situation occurs. If questions arise please do not hesitate to contact me or call our department. This note was dictated utilizing voice recognition software which may lead to typographical errors. I apologize in advance if the situation occurs. If questions arise please do not hesitate to contact me or call our department.     Lance Newman MD  11:13 AM

## 2021-05-06 NOTE — LETTER
NOTIFICATION RETURN TO WORK / SCHOOL 
 
5/6/2021 12:24 PM 
 
Mr. Lillian Kwon [address] [id number] To Whom It May Concern: Lillian Kwon is currently under the care of [id number] EMERGENCY DEPT. He will return to work/school on: 5/7/2021 Lillian Kwon may return to work/school with the following restrictions: None If there are questions or concerns please have the patient contact our office. Sincerely, Laura Espinal RN

## 2021-05-06 NOTE — ED NOTES
I have reviewed discharge instructions with the patient. The patient verbalized understanding. Patient armband removed and shredded. Current Discharge Medication List      START taking these medications    Details   lidocaine 4 % patch Apply to area of pain one every 12 hours  Qty: 10 Patch, Refills: 0      naproxen (NAPROSYN) 500 mg tablet Take 1 Tab by mouth two (2) times daily (with meals) for 3 days. Qty: 6 Tab, Refills: 0      cyclobenzaprine (FLEXERIL) 5 mg tablet Take 2 Tabs by mouth three (3) times daily for 3 days.   Qty: 18 Tab, Refills: 0

## 2021-05-06 NOTE — ED TRIAGE NOTES
Patient c/o pain to left lower back and left hip x 3 days. He states having right knee surgery in February and is currently going through physical therapy since knee surgery. Denies any fall or known injury. Voices concerns that it may be his kidney causing the pain related to Diabetes hx.

## 2021-05-06 NOTE — PROGRESS NOTES
conducted an initial consultation and Spiritual Assessment for Quique Schaeffer, who is a 60 y. o.,male. Patient's Primary Language is: [place]. According to the patient's EMR Voodoo Affiliation is: [facility].     The reason the Patient came to the hospital is:   Patient Active Problem List    Diagnosis Date Noted    Edema of right lower extremity 09/15/2020    Osteomyelitis of great toe of right foot ([facility] 09/15/2020    Diabetic foot ulcer ([facility] 12/27/2018    Neuropathy 02/24/2017    Pain due to abscess 09/20/2016    Type 2 diabetes mellitus without complication ([facility] 46/04/6861        The  provided the following Interventions:  Visited Mr. Lakshmi Nice during my rounds. Initiated a relationship of care and support. Listened empathically as patient shared his current medical challenges. He's concerned about his knee as the recovery is taking too long. Provided information about Spiritual Care Services. The following outcomes where achieved:  Patient shared limited information about both their medical narrative and spiritual journey/beliefs. Patient processed feeling about current hospitalization. Patient expressed gratitude for 's visit. Plan:  Chaplains will continue to follow and will provide pastoral care on an as needed/requested basis.  recommends bedside caregivers page  on duty if patient shows signs of acute spiritual or emotional distress.     [address] [place] [address]   [phone]

## 2021-07-10 ENCOUNTER — HOSPITAL ENCOUNTER (EMERGENCY)
Age: 56
Discharge: HOME | End: 2021-07-10
Attending: EMERGENCY MEDICINE
Payer: MEDICAID

## 2021-07-10 ENCOUNTER — APPOINTMENT (OUTPATIENT)
Dept: GENERAL RADIOLOGY | Age: 56
End: 2021-07-10
Attending: EMERGENCY MEDICINE
Payer: MEDICAID

## 2021-07-10 VITALS
HEART RATE: 71 BPM | HEIGHT: 73 IN | BODY MASS INDEX: 30.48 KG/M2 | OXYGEN SATURATION: 96 % | RESPIRATION RATE: 18 BRPM | WEIGHT: 230 LBS | TEMPERATURE: 98.2 F | DIASTOLIC BLOOD PRESSURE: 97 MMHG | SYSTOLIC BLOOD PRESSURE: 174 MMHG

## 2021-07-10 DIAGNOSIS — E11.628 DIABETIC FOOT INFECTION (HCC): Primary | ICD-10-CM

## 2021-07-10 DIAGNOSIS — L08.9 DIABETIC FOOT INFECTION (HCC): Primary | ICD-10-CM

## 2021-07-10 LAB
ANION GAP SERPL CALC-SCNC: 5 MMOL/L (ref 3–18)
BASOPHILS # BLD: 0 K/UL (ref 0–0.1)
BASOPHILS NFR BLD: 0 % (ref 0–2)
BUN SERPL-MCNC: 21 MG/DL (ref 7–18)
BUN/CREAT SERPL: 15 (ref 12–20)
CALCIUM SERPL-MCNC: 8.4 MG/DL (ref 8.5–10.1)
CHLORIDE SERPL-SCNC: 105 MMOL/L (ref 100–111)
CO2 SERPL-SCNC: 30 MMOL/L (ref 21–32)
CREAT SERPL-MCNC: 1.39 MG/DL (ref 0.6–1.3)
DIFFERENTIAL METHOD BLD: ABNORMAL
EOSINOPHIL # BLD: 0.3 K/UL (ref 0–0.4)
EOSINOPHIL NFR BLD: 3 % (ref 0–5)
ERYTHROCYTE [DISTWIDTH] IN BLOOD BY AUTOMATED COUNT: 12.7 % (ref 11.6–14.5)
GLUCOSE SERPL-MCNC: 162 MG/DL (ref 74–99)
HCT VFR BLD AUTO: 34.6 % (ref 36–48)
HGB BLD-MCNC: 11.3 G/DL (ref 13–16)
LYMPHOCYTES # BLD: 1.5 K/UL (ref 0.9–3.6)
LYMPHOCYTES NFR BLD: 17 % (ref 21–52)
MCH RBC QN AUTO: 30.2 PG (ref 24–34)
MCHC RBC AUTO-ENTMCNC: 32.7 G/DL (ref 31–37)
MCV RBC AUTO: 92.5 FL (ref 74–97)
MONOCYTES # BLD: 0.8 K/UL (ref 0.05–1.2)
MONOCYTES NFR BLD: 9 % (ref 3–10)
NEUTS SEG # BLD: 6.1 K/UL (ref 1.8–8)
NEUTS SEG NFR BLD: 70 % (ref 40–73)
PLATELET # BLD AUTO: 264 K/UL (ref 135–420)
PMV BLD AUTO: 10.1 FL (ref 9.2–11.8)
POTASSIUM SERPL-SCNC: 4.8 MMOL/L (ref 3.5–5.5)
RBC # BLD AUTO: 3.74 M/UL (ref 4.35–5.65)
SODIUM SERPL-SCNC: 140 MMOL/L (ref 136–145)
WBC # BLD AUTO: 8.7 K/UL (ref 4.6–13.2)

## 2021-07-10 PROCEDURE — 85025 COMPLETE CBC W/AUTO DIFF WBC: CPT

## 2021-07-10 PROCEDURE — 73630 X-RAY EXAM OF FOOT: CPT

## 2021-07-10 PROCEDURE — 96366 THER/PROPH/DIAG IV INF ADDON: CPT

## 2021-07-10 PROCEDURE — 87070 CULTURE OTHR SPECIMN AEROBIC: CPT

## 2021-07-10 PROCEDURE — 96365 THER/PROPH/DIAG IV INF INIT: CPT

## 2021-07-10 PROCEDURE — 87186 SC STD MICRODIL/AGAR DIL: CPT

## 2021-07-10 PROCEDURE — 87147 CULTURE TYPE IMMUNOLOGIC: CPT

## 2021-07-10 PROCEDURE — 74011000258 HC RX REV CODE- 258: Performed by: EMERGENCY MEDICINE

## 2021-07-10 PROCEDURE — 80048 BASIC METABOLIC PNL TOTAL CA: CPT

## 2021-07-10 PROCEDURE — 99284 EMERGENCY DEPT VISIT MOD MDM: CPT

## 2021-07-10 PROCEDURE — 87077 CULTURE AEROBIC IDENTIFY: CPT

## 2021-07-10 PROCEDURE — 87040 BLOOD CULTURE FOR BACTERIA: CPT

## 2021-07-10 PROCEDURE — 74011250636 HC RX REV CODE- 250/636: Performed by: EMERGENCY MEDICINE

## 2021-07-10 PROCEDURE — 96375 TX/PRO/DX INJ NEW DRUG ADDON: CPT

## 2021-07-10 RX ORDER — VANCOMYCIN 2 GRAM/500 ML IN 0.9 % SODIUM CHLORIDE INTRAVENOUS
2000 ONCE
Status: COMPLETED | OUTPATIENT
Start: 2021-07-10 | End: 2021-07-10

## 2021-07-10 RX ORDER — AMOXICILLIN AND CLAVULANATE POTASSIUM 875; 125 MG/1; MG/1
1 TABLET, FILM COATED ORAL 2 TIMES DAILY
Qty: 14 TABLET | Refills: 0 | Status: SHIPPED | OUTPATIENT
Start: 2021-07-10 | End: 2021-07-22

## 2021-07-10 RX ORDER — DOXYCYCLINE 100 MG/1
100 CAPSULE ORAL 2 TIMES DAILY
Qty: 14 CAPSULE | Refills: 0 | Status: SHIPPED | OUTPATIENT
Start: 2021-07-10 | End: 2021-07-22

## 2021-07-10 RX ORDER — ONDANSETRON 2 MG/ML
4 INJECTION INTRAMUSCULAR; INTRAVENOUS
Status: COMPLETED | OUTPATIENT
Start: 2021-07-10 | End: 2021-07-10

## 2021-07-10 RX ORDER — VANCOMYCIN HYDROCHLORIDE
1250 EVERY 12 HOURS
Status: DISCONTINUED | OUTPATIENT
Start: 2021-07-11 | End: 2021-07-11 | Stop reason: HOSPADM

## 2021-07-10 RX ADMIN — PIPERACILLIN AND TAZOBACTAM 4.5 G: 4; .5 INJECTION, POWDER, LYOPHILIZED, FOR SOLUTION INTRAVENOUS; PARENTERAL at 20:01

## 2021-07-10 RX ADMIN — ONDANSETRON 4 MG: 2 INJECTION INTRAMUSCULAR; INTRAVENOUS at 20:08

## 2021-07-10 RX ADMIN — VANCOMYCIN HYDROCHLORIDE 2000 MG: 10 INJECTION, POWDER, LYOPHILIZED, FOR SOLUTION INTRAVENOUS at 20:47

## 2021-07-10 NOTE — ED PROVIDER NOTES
EMERGENCY DEPARTMENT HISTORY AND PHYSICAL EXAM    Date: 7/10/2021  Patient Name: Yamila Bear    History of Presenting Illness     Chief Complaint   Patient presents with    Foot Pain         History Provided By: Patient    6:26 PM  Yamila Bear is a 60 y.o. male with PMHX of diabetes, prior partial great toe amputation on the right who presents to the emergency department C/O wound on right little toe. Patient reports the wound has been there for many months and has been following with his podiatrist at [facility] but a few days ago it opened up and started to drain. He reports the foot has become more swollen and painful and he had a fever 2 days ago 100.8. He denies any chest pain, shortness of breath, vomiting, sick contacts, current antibiotics, other complaints. He reports he was able to get an appoint with his podiatrist Monday but was concerned it may be infected in the bone so came to the ED today. PCP: Derrick Pereira MD    Current Facility-Administered Medications   Medication Dose Route Frequency Provider Last Rate Last Admin    piperacillin-tazobactam (ZOSYN) 4.5 g in 0.9% sodium chloride (MBP/ADV) 100 mL MBP  4.5 g IntraVENous Q6H Flash Rodriguez MD   IV Completed at 07/10/21 2009    vancomycin (VANCOCIN) 2000 mg in  ml infusion  2,000 mg IntraVENous ONCE Lena Rodriguez  mL/hr at 07/10/21 2047 2,000 mg at 07/10/21 2047    [START ON 7/11/2021] vancomycin (VANCOCIN) 1250 mg in  ml infusion  1,250 mg IntraVENous Q12H Flash Rodriguez MD         Current Outpatient Medications   Medication Sig Dispense Refill    celecoxib (CELEBREX PO) Take  by mouth.  amoxicillin-clavulanate (Augmentin) 875-125 mg per tablet Take 1 Tablet by mouth two (2) times a day for 7 days. 14 Tablet 0    doxycycline (MONODOX) 100 mg capsule Take 1 Capsule by mouth two (2) times a day for 7 days.  14 Capsule 0    insulin lispro (HUMALOG) 100 unit/mL injection Use tid per sliding scale  Indications: type 2 diabetes mellitus 3 Vial 0    insulin glargine (LANTUS) 100 unit/mL injection 25 Units by SubCUTAneous route nightly. Indications: type 2 diabetes mellitus 1 Vial 0    Syringe with Needle, Disp, (Allergy Syringe) 1 mL 27 x 1/2\" syrg FOR USE WITH INTRACAVERNOSAL INJECTIONS. 25 Pen Needle 2    Injector Device rah DISPENSE INJECT-EASE AUTO-INJECTOR FOR USE WITH BI-MIX INJECTIONS. 1 Each 1       Past History     Past Medical History:  Past Medical History:   Diagnosis Date    Arthritis of left knee     Diabetes [facility] [address]     Erectile dysfunction     Knee pain     Osteoarthritis of right knee     Osteomyelitis ([facility] [address]     Sinusitis        Past Surgical History:  Past Surgical History:   Procedure Laterality Date    COLONOSCOPY N/A 2/5/2020    COLONOSCOPY performed by Bárbara Osorio MD at [facility]    HX AMPUTATION TOE Left 12/2018    left great toe    HX MENISCUS REPAIR Right 2015       Family History:  Family History   Problem Relation Age of Onset    Seizures Mother     Hypertension Mother     No Known Problems Father     No Known Problems Sister     No Known Problems Brother     No Known Problems Sister     No Known Problems Brother        Social History:  Social History     Tobacco Use    Smoking status: Former Smoker    Smokeless tobacco: Never Used    Tobacco comment: quit 0ver 25 years ago   Substance Use Topics    Alcohol use: Yes     Alcohol/week: 2.0 standard drinks     Types: 2 Cans of beer per week     Comment: occ.  Drug use: No       Allergies: Allergies   Allergen Reactions    Voltaren [Diclofenac Sodium] Nausea Only    Zosyn [Piperacillin-Tazobactam] Nausea and Vomiting         Review of Systems   Review of Systems   Constitutional: Positive for fever. Respiratory: Negative for shortness of breath. Cardiovascular: Negative for chest pain. Skin: Positive for wound. All other systems reviewed and are negative.         Physical Exam     Vitals: 07/10/21 1816 07/10/21 2041   BP: (!) 162/87 (!) 164/86   Pulse: 71    Resp: 18    Temp: 98.2 °F (36.8 °C)    SpO2: 100% 99%   Weight: 104.3 kg (230 lb)    Height: 6' 1\" (1.854 m)      Physical Exam    Nursing notes and vital signs reviewed    Constitutional: Non toxic appearing, moderate distress  Head: Normocephalic, Atraumatic  Eyes: EOMI  Neck: Supple  Cardiovascular: Regular rate and rhythm, no murmurs, rubs, or gallops  Chest: Normal work of breathing and chest excursion bilaterally  Lungs: Clear to ausculation bilaterally  Back: No evidence of trauma or deformity  Extremities: Wound over the lateral aspect of the right small toe with purulent drainage and erythema and edema extending up into the foot that is mildly tender to palpation  Skin: Warm and dry, normal cap refill  Neuro: Alert and appropriate  Psychiatric: Normal mood and affect      Diagnostic Study Results     Labs -     Recent Results (from the past 12 hour(s))   CBC WITH AUTOMATED DIFF    Collection Time: 07/10/21  6:30 PM   Result Value Ref Range    WBC 8.7 4.6 - 13.2 K/uL    RBC 3.74 (L) 4.35 - 5.65 M/uL    HGB 11.3 (L) 13.0 - 16.0 g/dL    HCT 34.6 (L) 36.0 - 48.0 %    MCV 92.5 74.0 - 97.0 FL    MCH 30.2 24.0 - 34.0 PG    MCHC 32.7 31.0 - 37.0 g/dL    RDW 12.7 11.6 - 14.5 %    PLATELET 069 216 - 623 K/uL    MPV 10.1 9.2 - 11.8 FL    NEUTROPHILS 70 40 - 73 %    LYMPHOCYTES 17 (L) 21 - 52 %    MONOCYTES 9 3 - 10 %    EOSINOPHILS 3 0 - 5 %    BASOPHILS 0 0 - 2 %    ABS. NEUTROPHILS 6.1 1.8 - 8.0 K/UL    ABS. LYMPHOCYTES 1.5 0.9 - 3.6 K/UL    ABS. MONOCYTES 0.8 0.05 - 1.2 K/UL    ABS. EOSINOPHILS 0.3 0.0 - 0.4 K/UL    ABS.  BASOPHILS 0.0 0.0 - 0.1 K/UL    DF AUTOMATED     METABOLIC PANEL, BASIC    Collection Time: 07/10/21  6:30 PM   Result Value Ref Range    Sodium 140 136 - 145 mmol/L    Potassium 4.8 3.5 - 5.5 mmol/L    Chloride 105 100 - 111 mmol/L    CO2 30 21 - 32 mmol/L    Anion gap 5 3.0 - 18 mmol/L    Glucose 162 (H) 74 - 99 mg/dL BUN 21 (H) 7.0 - 18 MG/DL    Creatinine 1.39 (H) 0.6 - 1.3 MG/DL    BUN/Creatinine ratio 15 12 - 20      GFR est AA >60 >60 ml/min/1.73m2    GFR est non-AA 53 (L) >60 ml/min/1.73m2    Calcium 8.4 (L) 8.5 - 10.1 MG/DL       Radiologic Studies -   XR FOOT RT MIN 3 V   Final Result   No acute fractures or subluxation. No erosions seen to suggest   osteomyelitis. Postsurgical changes and old trauma as described. Soft tissue swelling of the fifth toe and first toe. Punctate densities are seen   in the soft tissues of the fifth toe questionable for radiopaque foreign bodies. CT Results  (Last 48 hours)    None        CXR Results  (Last 48 hours)    None          Medications given in the ED-  Medications   piperacillin-tazobactam (ZOSYN) 4.5 g in 0.9% sodium chloride (MBP/ADV) 100 mL MBP (0 g IntraVENous IV Completed 7/10/21 2009)   vancomycin (VANCOCIN) 2000 mg in  ml infusion (2,000 mg IntraVENous New Bag 7/10/21 2047)   vancomycin (VANCOCIN) 1250 mg in  ml infusion (has no administration in time range)   ondansetron (ZOFRAN) injection 4 mg (4 mg IntraVENous Given 7/10/21 2008)         Medical Decision Making   I am the first provider for this patient. I reviewed the vital signs, available nursing notes, past medical history, past surgical history, family history and social history. Vital Signs-Reviewed the patient's vital signs. Pulse Oximetry Analysis - 99% on room air, not hypoxic     Records Reviewed: Nursing Notes    Provider Notes (Medical Decision Making): Tita Saleh is a 60 y.o. male presenting with history, exam consistent with diabetic foot impression infection. Labs are benign and no evidence of osteo on x-ray. Patient has podiatry follow-up scheduled on Monday. Given initial dose of IV antibiotics here and will cover with continued p.o. antibiotics at home with plan for podiatry follow-up as scheduled with strict return precautions.   Patient understands and agrees with this plan. Procedures:  Procedures    ED Course:   8:07 PM  When zosyn was initiated and patient started to have nausea and vomiting. Reported he has never had an allergic reaction to any antibiotics before. No hives, itching, shortness of breath, other complaints. Zosyn stopped and will monitor closely. 8:55 PM  Updated patient on all results and plan. All questions answered. Diagnosis and Disposition     Critical Care: None    DISCHARGE NOTE:    [place]  results have been reviewed with him. He has been counseled regarding his diagnosis, treatment, and plan. He verbally conveys understanding and agreement of the signs, symptoms, diagnosis, treatment and prognosis and additionally agrees to follow up as discussed. He also agrees with the care-plan and conveys that all of his questions have been answered. I have also provided discharge instructions for him that include: educational information regarding their diagnosis and treatment, and list of reasons why they would want to return to the ED prior to their follow-up appointment, should his condition change. He has been provided with education for proper emergency department utilization. CLINICAL IMPRESSION:    1. Diabetic foot infection ([facility] [address]        PLAN:  1. D/C Home  2. Current Discharge Medication List      START taking these medications    Details   amoxicillin-clavulanate (Augmentin) 875-125 mg per tablet Take 1 Tablet by mouth two (2) times a day for 7 days. Qty: 14 Tablet, Refills: 0  Start date: 7/10/2021, End date: 7/17/2021      doxycycline (MONODOX) 100 mg capsule Take 1 Capsule by mouth two (2) times a day for 7 days. Qty: 14 Capsule, Refills: 0  Start date: 7/10/2021, End date: 7/17/2021           3.    Follow-up Information     Follow up With Specialties Details Why Contact Info    Alona Fritz MD Family Medicine Schedule an appointment as soon as possible for a visit   [address]  [facility] [address]  [phone]      Sarah Kemp DPM Podiatry Schedule an appointment as soon as possible for a visit  Or Your Podiatrist [facility] [address]  [phone]          _______________________________      Please note that this dictation was completed with Wikets, the computer voice recognition software. Quite often unanticipated grammatical, syntax, homophones, and other interpretive errors are inadvertently transcribed by the computer software. Please disregard these errors. Please excuse any errors that have escaped final proofreading.

## 2021-07-10 NOTE — ED NOTES
[facility] Patient in NAD. VSS. Easy WOB. AOX4. Bed low and locked. Call bell within reach.

## 2021-07-10 NOTE — ED TRIAGE NOTES
Pt states \" I am type 2 diabetic and I have a corn on the bottom of my foot and I am here because it opened up and now my foot is really swollen and I am here to make sure my bone isn't infected. \"

## 2021-07-13 ENCOUNTER — TRANSCRIBE ORDER (OUTPATIENT)
Dept: SCHEDULING | Age: 56
End: 2021-07-13

## 2021-07-13 DIAGNOSIS — M86.171 ACUTE OSTEOMYELITIS OF RIGHT ANKLE OR FOOT (HCC): ICD-10-CM

## 2021-07-13 DIAGNOSIS — L97.512 RIGHT FOOT ULCER, WITH FAT LAYER EXPOSED (HCC): Primary | ICD-10-CM

## 2021-07-13 NOTE — CALL BACK NOTE
3:06 PM  07/13/21        Spoke with patient regarding wound culture results. Klebsiella and staph aureus grew out. Patient was sent home on Augmentin and doxycycline. Patient states the wound appears to be improving. He did follow-up with his podiatrist already and has an MRI scheduled for tomorrow.       Yuridia Beckett PA-C

## 2021-07-14 ENCOUNTER — HOSPITAL ENCOUNTER (OUTPATIENT)
Age: 56
Discharge: HOME | End: 2021-07-14
Attending: PODIATRIST
Payer: MEDICAID

## 2021-07-14 DIAGNOSIS — L97.512 RIGHT FOOT ULCER, WITH FAT LAYER EXPOSED (HCC): ICD-10-CM

## 2021-07-14 DIAGNOSIS — M86.171 ACUTE OSTEOMYELITIS OF RIGHT ANKLE OR FOOT (HCC): ICD-10-CM

## 2021-07-14 LAB
BACTERIA SPEC CULT: ABNORMAL
GRAM STN SPEC: ABNORMAL
SERVICE CMNT-IMP: ABNORMAL

## 2021-07-14 PROCEDURE — 74011636320 HC RX REV CODE- 636/320: Performed by: PODIATRIST

## 2021-07-14 PROCEDURE — 73720 MRI LWR EXTREMITY W/O&W/DYE: CPT

## 2021-07-14 PROCEDURE — A9575 INJ GADOTERATE MEGLUMI 0.1ML: HCPCS | Performed by: PODIATRIST

## 2021-07-14 RX ADMIN — GADOTERATE MEGLUMINE 20 ML: 376.9 INJECTION INTRAVENOUS at 09:42

## 2021-07-18 ENCOUNTER — HOSPITAL ENCOUNTER (INPATIENT)
Age: 56
LOS: 4 days | Discharge: HOME HEALTH | DRG: 314 | End: 2021-07-22
Attending: STUDENT IN AN ORGANIZED HEALTH CARE EDUCATION/TRAINING PROGRAM | Admitting: INTERNAL MEDICINE
Payer: MEDICAID

## 2021-07-18 DIAGNOSIS — L97.509 DIABETIC FOOT ULCER ASSOCIATED WITH TYPE 2 DIABETES MELLITUS, UNSPECIFIED LATERALITY, UNSPECIFIED PART OF FOOT, UNSPECIFIED ULCER STAGE (HCC): ICD-10-CM

## 2021-07-18 DIAGNOSIS — M86.171 OTHER ACUTE OSTEOMYELITIS OF RIGHT FOOT (HCC): Primary | ICD-10-CM

## 2021-07-18 DIAGNOSIS — G62.9 NEUROPATHY: ICD-10-CM

## 2021-07-18 DIAGNOSIS — E11.621 DIABETIC FOOT ULCER ASSOCIATED WITH TYPE 2 DIABETES MELLITUS, UNSPECIFIED LATERALITY, UNSPECIFIED PART OF FOOT, UNSPECIFIED ULCER STAGE (HCC): ICD-10-CM

## 2021-07-18 PROBLEM — M86.9 OSTEOMYELITIS (HCC): Status: ACTIVE | Noted: 2021-07-18

## 2021-07-18 LAB
BASOPHILS # BLD: 0 K/UL (ref 0–0.1)
BASOPHILS NFR BLD: 0 % (ref 0–2)
DIFFERENTIAL METHOD BLD: ABNORMAL
EOSINOPHIL # BLD: 0.2 K/UL (ref 0–0.4)
EOSINOPHIL NFR BLD: 3 % (ref 0–5)
ERYTHROCYTE [DISTWIDTH] IN BLOOD BY AUTOMATED COUNT: 12.7 % (ref 11.6–14.5)
ERYTHROCYTE [SEDIMENTATION RATE] IN BLOOD: 100 MM/HR (ref 0–20)
GLUCOSE BLD STRIP.AUTO-MCNC: 184 MG/DL (ref 70–110)
GLUCOSE BLD STRIP.AUTO-MCNC: 200 MG/DL (ref 70–110)
GLUCOSE BLD STRIP.AUTO-MCNC: 207 MG/DL (ref 70–110)
HCT VFR BLD AUTO: 35.2 % (ref 36–48)
HGB BLD-MCNC: 11.6 G/DL (ref 13–16)
LYMPHOCYTES # BLD: 2.1 K/UL (ref 0.9–3.6)
LYMPHOCYTES NFR BLD: 21 % (ref 21–52)
MCH RBC QN AUTO: 29.8 PG (ref 24–34)
MCHC RBC AUTO-ENTMCNC: 33 G/DL (ref 31–37)
MCV RBC AUTO: 90.5 FL (ref 74–97)
MONOCYTES # BLD: 0.6 K/UL (ref 0.05–1.2)
MONOCYTES NFR BLD: 6 % (ref 3–10)
NEUTS SEG # BLD: 6.7 K/UL (ref 1.8–8)
NEUTS SEG NFR BLD: 69 % (ref 40–73)
PLATELET # BLD AUTO: 347 K/UL (ref 135–420)
PMV BLD AUTO: 9.3 FL (ref 9.2–11.8)
RBC # BLD AUTO: 3.89 M/UL (ref 4.35–5.65)
WBC # BLD AUTO: 9.7 K/UL (ref 4.6–13.2)

## 2021-07-18 PROCEDURE — 74011250636 HC RX REV CODE- 250/636: Performed by: INTERNAL MEDICINE

## 2021-07-18 PROCEDURE — 85025 COMPLETE CBC W/AUTO DIFF WBC: CPT

## 2021-07-18 PROCEDURE — 2709999900 HC NON-CHARGEABLE SUPPLY

## 2021-07-18 PROCEDURE — 99223 1ST HOSP IP/OBS HIGH 75: CPT | Performed by: INTERNAL MEDICINE

## 2021-07-18 PROCEDURE — 99283 EMERGENCY DEPT VISIT LOW MDM: CPT

## 2021-07-18 PROCEDURE — 74011636637 HC RX REV CODE- 636/637: Performed by: INTERNAL MEDICINE

## 2021-07-18 PROCEDURE — 82962 GLUCOSE BLOOD TEST: CPT

## 2021-07-18 PROCEDURE — 65270000029 HC RM PRIVATE

## 2021-07-18 PROCEDURE — 85652 RBC SED RATE AUTOMATED: CPT

## 2021-07-18 RX ORDER — ONDANSETRON 2 MG/ML
4 INJECTION INTRAMUSCULAR; INTRAVENOUS
Status: DISCONTINUED | OUTPATIENT
Start: 2021-07-18 | End: 2021-07-22 | Stop reason: HOSPADM

## 2021-07-18 RX ORDER — ACETAMINOPHEN 650 MG/1
650 SUPPOSITORY RECTAL
Status: DISCONTINUED | OUTPATIENT
Start: 2021-07-18 | End: 2021-07-22 | Stop reason: HOSPADM

## 2021-07-18 RX ORDER — SODIUM CHLORIDE 0.9 % (FLUSH) 0.9 %
5-40 SYRINGE (ML) INJECTION AS NEEDED
Status: DISCONTINUED | OUTPATIENT
Start: 2021-07-18 | End: 2021-07-22 | Stop reason: HOSPADM

## 2021-07-18 RX ORDER — HEPARIN SODIUM 5000 [USP'U]/ML
5000 INJECTION, SOLUTION INTRAVENOUS; SUBCUTANEOUS EVERY 8 HOURS
Status: DISCONTINUED | OUTPATIENT
Start: 2021-07-18 | End: 2021-07-22 | Stop reason: HOSPADM

## 2021-07-18 RX ORDER — SODIUM CHLORIDE 0.9 % (FLUSH) 0.9 %
5-40 SYRINGE (ML) INJECTION EVERY 8 HOURS
Status: DISCONTINUED | OUTPATIENT
Start: 2021-07-18 | End: 2021-07-22 | Stop reason: HOSPADM

## 2021-07-18 RX ORDER — ONDANSETRON 4 MG/1
4 TABLET, ORALLY DISINTEGRATING ORAL
Status: DISCONTINUED | OUTPATIENT
Start: 2021-07-18 | End: 2021-07-22 | Stop reason: HOSPADM

## 2021-07-18 RX ORDER — INSULIN LISPRO 100 [IU]/ML
INJECTION, SOLUTION INTRAVENOUS; SUBCUTANEOUS
Status: DISCONTINUED | OUTPATIENT
Start: 2021-07-18 | End: 2021-07-22 | Stop reason: HOSPADM

## 2021-07-18 RX ORDER — INSULIN GLARGINE 100 [IU]/ML
25 INJECTION, SOLUTION SUBCUTANEOUS
Status: DISCONTINUED | OUTPATIENT
Start: 2021-07-18 | End: 2021-07-22 | Stop reason: HOSPADM

## 2021-07-18 RX ORDER — MAGNESIUM SULFATE 100 %
4 CRYSTALS MISCELLANEOUS AS NEEDED
Status: DISCONTINUED | OUTPATIENT
Start: 2021-07-18 | End: 2021-07-22 | Stop reason: HOSPADM

## 2021-07-18 RX ORDER — POLYETHYLENE GLYCOL 3350 17 G/17G
17 POWDER, FOR SOLUTION ORAL DAILY PRN
Status: DISCONTINUED | OUTPATIENT
Start: 2021-07-18 | End: 2021-07-22 | Stop reason: HOSPADM

## 2021-07-18 RX ORDER — INSULIN LISPRO 100 [IU]/ML
INJECTION, SOLUTION INTRAVENOUS; SUBCUTANEOUS
Status: DISCONTINUED | OUTPATIENT
Start: 2021-07-18 | End: 2021-07-18

## 2021-07-18 RX ORDER — DEXTROSE 50 % IN WATER (D50W) INTRAVENOUS SYRINGE
25-50 AS NEEDED
Status: DISCONTINUED | OUTPATIENT
Start: 2021-07-18 | End: 2021-07-22 | Stop reason: HOSPADM

## 2021-07-18 RX ORDER — CELECOXIB 100 MG/1
100 CAPSULE ORAL DAILY
Status: DISCONTINUED | OUTPATIENT
Start: 2021-07-19 | End: 2021-07-22

## 2021-07-18 RX ORDER — ACETAMINOPHEN 325 MG/1
650 TABLET ORAL
Status: DISCONTINUED | OUTPATIENT
Start: 2021-07-18 | End: 2021-07-22 | Stop reason: HOSPADM

## 2021-07-18 RX ADMIN — HEPARIN SODIUM 5000 UNITS: 5000 INJECTION INTRAVENOUS; SUBCUTANEOUS at 23:23

## 2021-07-18 RX ADMIN — Medication 10 ML: at 21:48

## 2021-07-18 RX ADMIN — INSULIN GLARGINE 25 UNITS: 100 INJECTION, SOLUTION SUBCUTANEOUS at 21:49

## 2021-07-18 RX ADMIN — HEPARIN SODIUM 5000 UNITS: 5000 INJECTION INTRAVENOUS; SUBCUTANEOUS at 16:24

## 2021-07-18 RX ADMIN — INSULIN LISPRO 4 UNITS: 100 INJECTION, SOLUTION INTRAVENOUS; SUBCUTANEOUS at 16:50

## 2021-07-18 RX ADMIN — INSULIN LISPRO 4 UNITS: 100 INJECTION, SOLUTION INTRAVENOUS; SUBCUTANEOUS at 22:08

## 2021-07-18 NOTE — ED NOTES
Patient was given a turkey sandwich after talking to Dr. Camille Perez. Patient is alert and oriented x3, no signs of distress noted.

## 2021-07-18 NOTE — H&P
Date of Admission: 7/18/2021      Assessment:   1.  Ulceration and Osteomyelitis of 5th toe of right foot associate with diabetes mellitus   - 7/10 x-ray with soft tissue swelling of 5th and 1st toes, possible foreing body on 5th toe   - 7/14 MRI 5th toe ulcer, phlegmon, sinus tract and OM, mild dorsal and deep plantar soft tissue edema (unchanged)  2.  Hyperglycemia with T2DM, HgbA1c 8.1%   3.  CKD stage 3  4.  Normocytic anemia of chronic disease   5.  Suspected ALEJANDRA per report   6.  Hypertension: not taking any medications    Plan:   NPO after MD for surgery in am  Podiatry (Dr. Gema Hewitt) following  Sliding scale insulin, Accu-Cheks, tight glucose control for improved wound healing  PT PTT INR CBC in a.m. BMP in a.m. Hold antibiotics for now. May initiate antibiotics postoperatively pending cultures. If infected portion of toe is completely removed patient will only need 3 to 5 days of oral outpatient antibiotics. -Recommend Ancef initially based off of recent culture data from 7/10/2021. Discussed with wife at bedside. Antoine Blanco D.O. Internal Medicine and Infectious Diseases      Subjective:    Patient is a 60 y. o.male who is being evaluated for right fifth toe osteomyelitis. Mr. Suha Wood is a 75-year-old gentleman with a history of diabetes mellitus type 2 osteoarthritis of the knee, chronic kidney disease, hypertension who is presenting to the emergency department for admission per request of podiatry. Patient has had ongoing difficulties with intermittent nonhealing diabetic wounds to his right foot. He was previously admitted to [facility] for a right great toe infection and osteomyelitis for which he underwent partial amputation. He has ongoing right fifth toe swelling and infection. Recent x-ray on July 10 reveals soft tissue swelling with tracking and osteomyelitis.   7/14/2021 MRI also supports osteomyelitis of the fifth toe with underlying dorsal as well as plantar soft tissue swelling and infection. He was told to come to the emergency department by Dr. Benedict Isaacs for admission for planned amputation of the toe tomorrow morning. Wound cultures from 7/10/2021 are growing Klebsiella pneumonia, beta-hemolytic group B strep, moderate staph aureus. Past Medical History:   Diagnosis Date    Arthritis of left knee     Diabetes ([facility] [address]     Erectile dysfunction     Knee pain     Osteoarthritis of right knee     [facility]omyelitis ([facility] [address]     Sinusitis      Past Surgical History:   Procedure Laterality Date    COLONOSCOPY N/A 2/5/2020    COLONOSCOPY performed by Preston Vickers MD at [facility]    HX AMPUTATION TOE Left 12/2018    left great toe    HX MENISCUS REPAIR Right 2015     Family History   Problem Relation Age of Onset    Seizures Mother     Hypertension Mother     No Known Problems Father     No Known Problems Sister     No Known Problems Brother     No Known Problems Sister     No Known Problems Brother      Medications reviewed as below:   Current Outpatient Medications   Medication Sig Dispense Refill    celecoxib (CELEBREX PO) Take  by mouth.  insulin lispro (HUMALOG) 100 unit/mL injection Use tid per sliding scale  Indications: type 2 diabetes mellitus 3 Vial 0    Syringe with Needle, Disp, (Allergy Syringe) 1 mL 27 x 1/2\" syrg FOR USE WITH INTRACAVERNOSAL INJECTIONS. 25 Pen Needle 2    Injector Device rah DISPENSE INJECT-EASE AUTO-INJECTOR FOR USE WITH BI-MIX INJECTIONS. 1 Each 1    insulin glargine (LANTUS) 100 unit/mL injection 25 Units by SubCUTAneous route nightly.  Indications: type 2 diabetes mellitus 1 Vial 0     Allergies   Allergen Reactions    Voltaren [Diclofenac Sodium] Nausea Only    Zosyn [Piperacillin-Tazobactam] Nausea and Vomiting     Social History     Socioeconomic History    Marital status: SINGLE     Spouse name: Not on file    Number of children: Not on file    Years of education: Not on file    Highest education level: Not on file   Occupational History    Not on file   Tobacco Use    Smoking status: Former Smoker    Smokeless tobacco: Never Used    Tobacco comment: quit 0ver 25 years ago   Substance and Sexual Activity    Alcohol use: Yes     Alcohol/week: 2.0 standard drinks     Types: 2 Cans of beer per week     Comment: occ.  Drug use: No    Sexual activity: Yes     Partners: Female     Birth control/protection: Condom   Other Topics Concern    Not on file   Social History Narrative    Not on file     Social Determinants of Health     Financial Resource Strain:     Difficulty of Paying Living Expenses:    Food Insecurity:     Worried About Running Out of Food in the Last Year:     [address] in the Last Year:    Transportation Needs:     Lack of Transportation (Medical):  Lack of Transportation (Non-Medical):    Physical Activity:     Days of Exercise per Week:     Minutes of Exercise per Session:    Stress:     Feeling of Stress :    Social Connections:     Frequency of Communication with Friends and Family:     Frequency of Social Gatherings with Friends and Family:     Attends Baptism Services:     Active Member of Clubs or Organizations:     Attends Club or Organization Meetings:     Marital Status:    Intimate Partner Violence:     Fear of Current or Ex-Partner:     Emotionally Abused:     Physically Abused:     Sexually Abused:         Review of Systems    Negative Unless BOLDED    General: fevers, chills, myalgias, arthralgias, unexplained weight loss, malaise, fatigue. HEENT:  headaches,sinus pain or presure, recent URI, recent dental procedures;  tinnitus, hearing loss , visual changes, catarats, dizziness or blurred vision  PUlMONARY:  cough , shortness of breath, sputum production, hx of asthma or COPD. previous treatement for TB or PPD.   Cardiovascular: chest pain, previous CAD/MI, vavlular heart disease,  murmurs  GI:   nausea, vomiting, diarrhea, abdominal pain, prior C.diff  :  urinary frequency, dysuria, hematuria, bladder incontinence. Neurologic:  seizures, syncope or prior CVA/TIA, confusion, memory impairment, neuropathy  Musculoskeletal:  myalgias arthralgias, joint pain/ swelling,  back pain  Skin:  Purities,  recurrent cellulitis,  chronic stasis ulcer, diabetic foot ulcers  Endocrine: polyuria, polydipsia, hair loss, weight gain  Psych: Denies depression or treatment by a psychiatrist/psycologist  Heme-Onc: prior DVT, easy bruising, fatigue, malignancy        Objective:        Visit Vitals  /75   Pulse 77   Temp 98.4 °F (36.9 °C)   Resp 18   Ht 6' 1\" (1.854 m)   Wt 103.4 kg (228 lb)   SpO2 99%   BMI 30.08 kg/m²     Temp (24hrs), Av.4 °F (36.9 °C), Min:98.4 °F (36.9 °C), Max:98.4 °F (36.9 °C)        General:   awake alert and oriented   Skin:   no rashes or skin lesions noted on limited exam   HEENT:  Normocephalic, atraumatic, PERRL, EOMI, no scleral icterus or pallor; no conjunctival hemmohage;  nasal and oral mucous are moist and without evidence of lesions. No thrush. Dentition good. Neck supple, no bruits. Lymph Nodes:   no cervical, axillary or inguinal adenopathy   Lungs:   non-labored, bilaterally clear to aspiration- no crackles wheezes rales or rhonchi   Heart:  RRR, s1 and s2; no murmurs rubs or gallops, no edema, + pedal pulses   Abdomen:  soft, non-distended, active bowel sounds, no hepatomegaly, no splenomegaly. Non-tender   Genitourinary:  deferred   Extremities:   no clubbing, cyanosis; no joint effusions or swelling; Full ROM of all large joints to the upper and lower extremities; muscle mass appropriate for age; right foot in clean dressing from podiatry placement earlier this afternoon- did not remove. Neurologic:  No gross focal sensory abnormalities; 5/5 muscle strength to upper and lower extremities. Speech appropirate. Cranial nerves intact   Psychiatric:   appropriate and interactive.        Labs: Results:   Chemistry No results for input(s): GLU, NA, K, CL, CO2, BUN, CREA, CA, AGAP, BUCR, TBIL, AP, TP, ALB, GLOB, AGRAT in the last 72 hours. No lab exists for component: GPT   CBC w/Diff Recent Labs     07/18/21  1130   WBC 9.7   RBC 3.89*   HGB 11.6*   HCT 35.2*      GRANS 69   LYMPH 21   EOS 3            Lab Results   Component Value Date/Time    Specimen Description: BLOOD 02/25/2014 05:10 PM    Specimen Description: BLOOD 02/25/2014 05:00 PM    Lab Results   Component Value Date/Time    Culture result: MODERATE KLEBSIELLA PNEUMONIAE (A) 07/10/2021 07:15 PM    Culture result: MODERATE STAPHYLOCOCCUS AUREUS (A) 07/10/2021 07:15 PM    Culture result: (A) 07/10/2021 07:15 PM     LIGHT STREPTOCOCCI, BETA HEMOLYTIC GROUP B Penicillin and ampicillin are drugs of choice for treatment of beta-hemolytic streptococcal infections. Susceptibility testing of penicillins and beta-lactams approved by the FDA for treatment of beta-hemolytic streptococcal infections need not be performed routinely, because nonsusceptible isolates are extremely rare. CLSI 2012    Culture result: NO GROWTH 6 DAYS 07/10/2021 06:45 PM    Culture result: NO GROWTH 6 DAYS 07/10/2021 06:30 PM          Imaging:      All imaging reviewed from Admission to present as per radiology interpretation in [address] [place] [address]

## 2021-07-18 NOTE — ED NOTES
Called the floor to inform them that the patient will be coming up shortly. Gabi Son will be taking the patient. Will call me if she requires more of a report.

## 2021-07-18 NOTE — ED NOTES
Patient sitting up on stretcher. He is alert and oriented x3, no signs of distress noted. He is aware of his impending admission. He did request that he be allowed to sit in the waiting room, but I explained that I need to have him back here in case the admitting physician comes in looking for him. He acknowledged understanding.

## 2021-07-18 NOTE — ED TRIAGE NOTES
Pt states he was seen by Dr. Agueda Bahena on 7/14 and was given a rx paper reading\" Please admit to hospitalist service, patient has osteomyelitis of right 5th toe. He will need amputation of 5th toe\". Pt states he has a corn on the right 5th toe times 1 month and then a wound appeared.

## 2021-07-18 NOTE — Clinical Note
Status[de-identified] INPATIENT [101]   Type of Bed: Medical [8]   Cardiac Monitoring Required?: No   Inpatient Hospitalization Certified Necessary for the Following Reasons: 2.  Patient admitted for Inpatient Only Procedure (Surgical)   Admitting Diagnosis: Osteomyelitis [facility]) [[id number]]   Admitting Physician: Emmanuelle Rasmussen   Attending Physician: Emmanuelle Rasmussen   Estimated Length of Stay: 3-4 Midnights   Discharge Plan[de-identified] Home with Office Follow-up

## 2021-07-18 NOTE — ROUTINE PROCESS
Bedside shift change report given to Kenyon Saavedra RN (oncoming nurse) by [address] [place], RN (offgoing nurse). Report included the following information SBAR, Kardex, MAR and Cardiac Rhythm NSR.

## 2021-07-18 NOTE — ED PROVIDER NOTES
EMERGENCY DEPARTMENT HISTORY AND PHYSICAL EXAM    12:02 PM      Date: 7/18/2021  Patient Name: Shaan Valdivia    History of Presenting Illness     Chief Complaint   Patient presents with    Toe Pain     right 5th     Skin Problem     right 5th toe          History Provided By: Patient  Location/Duration/Severity/Modifying factors   The history is provided by the patient and medical records. Toe Pain   This is a new problem. The current episode started more than 2 days ago. The problem occurs constantly. The problem has not changed since onset. The pain is present in the right toes. The pain is at a severity of 0/10. The pain is mild. He has tried nothing for the symptoms. The treatment provided no relief. There has been no history of extremity trauma.        PCP: Naa San MD    Current Facility-Administered Medications   Medication Dose Route Frequency Provider Last Rate Last Admin    [START ON 7/19/2021] celecoxib (CELEBREX) capsule 100 mg  100 mg Oral DAILY Sandro Hassan MD        insulin glargine (LANTUS) injection 25 Units  25 Units SubCUTAneous QHS Sandro Hassan MD        sodium chloride (NS) flush 5-40 mL  5-40 mL IntraVENous Q8H Sandro Hassan MD        sodium chloride (NS) flush 5-40 mL  5-40 mL IntraVENous PRN Sandro Hassan MD        acetaminophen (TYLENOL) tablet 650 mg  650 mg Oral Q6H PRN Sandro Hassan MD        Or   Deshaun Daniel acetaminophen (TYLENOL) suppository 650 mg  650 mg Rectal Q6H PRN Sandro Hassan MD        polyethylene glycol (MIRALAX) packet 17 g  17 g Oral DAILY PRN Sandro Hassan MD        ondansetron (ZOFRAN ODT) tablet 4 mg  4 mg Oral Q8H PRN Sandro Hassan MD        Or    ondansetron [facility]) injection 4 mg  4 mg IntraVENous Q6H PRN Sandro Hassan MD        heparin (porcine) injection 5,000 Units  5,000 Units SubCUTAneous Q8H Sandro Hassan MD   5,000 Units at 07/18/21 1624    insulin lispro (HUMALOG) injection   SubCUTAneous AC&HS Sandro Hassan MD   4 Units at 07/18/21 1650    glucose chewable tablet 16 g  4 Tablet Oral PRN Una Jacob MD        dextrose (D50W) injection syrg 12.5-25 g  25-50 mL IntraVENous PRN Uan Jacob MD        glucagon [facility]) injection 1 mg  1 mg IntraMUSCular PRN Una Jacob MD           Past History     Past Medical History:  Past Medical History:   Diagnosis Date    Arthritis of left knee     Diabetes ([address]     Erectile dysfunction     Knee pain     Osteoarthritis of right knee     Osteomyelitis ([address]     Sinusitis        Past Surgical History:  Past Surgical History:   Procedure Laterality Date    COLONOSCOPY N/A 2/5/2020    COLONOSCOPY performed by Jun Swift MD at [address] HX AMPUTATION TOE Left 12/2018    left great toe    HX MENISCUS REPAIR Right 2015       Family History:  Family History   Problem Relation Age of Onset    Seizures Mother     Hypertension Mother     No Known Problems Father     No Known Problems Sister     No Known Problems Brother     No Known Problems Sister     No Known Problems Brother        Social History:  Social History     Tobacco Use    Smoking status: Former Smoker    Smokeless tobacco: Never Used    Tobacco comment: quit 0ver 25 years ago   Substance Use Topics    Alcohol use: Yes     Alcohol/week: 2.0 standard drinks     Types: 2 Cans of beer per week     Comment: occ.  Drug use: No       Allergies: Allergies   Allergen Reactions    Voltaren [Diclofenac Sodium] Nausea Only    Zosyn [Piperacillin-Tazobactam] Nausea and Vomiting         Review of Systems       Review of Systems   Constitutional: Positive for chills. Negative for activity change, appetite change, diaphoresis and fever. HENT: Negative for drooling, facial swelling, sore throat, trouble swallowing and voice change. Eyes: Negative for photophobia, pain, discharge and visual disturbance. Respiratory: Negative for apnea, cough, choking and shortness of breath.     Cardiovascular: Negative for chest pain, palpitations and leg swelling. Gastrointestinal: Negative for abdominal pain, blood in stool, diarrhea, nausea, rectal pain and vomiting. Endocrine: Negative for polydipsia and polyphagia. Genitourinary: Negative for dysuria, frequency, genital sores and hematuria. Musculoskeletal: Negative for gait problem, joint swelling, myalgias and neck stiffness. Skin: Positive for color change, rash and wound. Allergic/Immunologic: Negative for environmental allergies. Neurological: Negative for tremors, syncope and light-headedness. Hematological: Negative for adenopathy. Does not bruise/bleed easily. Psychiatric/Behavioral: Negative for agitation, behavioral problems, confusion and decreased concentration. Physical Exam     Visit Vitals  BP (!) 158/78   Pulse 68   Temp 97.7 °F (36.5 °C)   Resp 20   Ht 6' 1\" (1.854 m)   Wt 103.4 kg (228 lb)   SpO2 99%   BMI 30.08 kg/m²         Physical Exam  Vitals and nursing note reviewed. Constitutional:       General: He is not in acute distress. Appearance: Normal appearance. He is well-developed. He is not ill-appearing or toxic-appearing. HENT:      Head: Normocephalic and atraumatic. Nose: Nose normal.      Mouth/Throat:      Mouth: Mucous membranes are moist.      Pharynx: Oropharynx is clear. Eyes:      General: No scleral icterus. Extraocular Movements: Extraocular movements intact. Right eye: Normal extraocular motion and no nystagmus. Left eye: Normal extraocular motion and no nystagmus. Pupils: Pupils are equal, round, and reactive to light. Neck:      Meningeal: Kernig's sign absent. Cardiovascular:      Rate and Rhythm: Normal rate and regular rhythm. Pulses: Normal pulses. Heart sounds: Normal heart sounds. Pulmonary:      Effort: Pulmonary effort is normal. No respiratory distress. Breath sounds: Normal breath sounds. Abdominal:      Palpations: Abdomen is soft.       Tenderness: There is no abdominal tenderness. Musculoskeletal:         General: No swelling or tenderness. Normal range of motion. Cervical back: Normal range of motion. No rigidity. Feet:    Skin:     General: Skin is warm and dry. Capillary Refill: Capillary refill takes less than 2 seconds. Coloration: Skin is not cyanotic or pale. Findings: Lesion present. Neurological:      General: No focal deficit present. Mental Status: He is alert and oriented to person, place, and time. Cranial Nerves: No cranial nerve deficit, dysarthria or facial asymmetry. Sensory: No sensory deficit. Motor: No weakness. Psychiatric:         Mood and Affect: Mood normal.         Speech: Speech normal.         Behavior: Behavior normal.           Diagnostic Study Results     Labs -  Recent Results (from the past 12 hour(s))   CBC WITH AUTOMATED DIFF    Collection Time: 07/18/21 11:30 AM   Result Value Ref Range    WBC 9.7 4.6 - 13.2 K/uL    RBC 3.89 (L) 4.35 - 5.65 M/uL    HGB 11.6 (L) 13.0 - 16.0 g/dL    HCT 35.2 (L) 36.0 - 48.0 %    MCV 90.5 74.0 - 97.0 FL    MCH 29.8 24.0 - 34.0 PG    MCHC 33.0 31.0 - 37.0 g/dL    RDW 12.7 11.6 - 14.5 %    PLATELET 335 519 - 280 K/uL    MPV 9.3 9.2 - 11.8 FL    NEUTROPHILS 69 40 - 73 %    LYMPHOCYTES 21 21 - 52 %    MONOCYTES 6 3 - 10 %    EOSINOPHILS 3 0 - 5 %    BASOPHILS 0 0 - 2 %    ABS. NEUTROPHILS 6.7 1.8 - 8.0 K/UL    ABS. LYMPHOCYTES 2.1 0.9 - 3.6 K/UL    ABS. MONOCYTES 0.6 0.05 - 1.2 K/UL    ABS. EOSINOPHILS 0.2 0.0 - 0.4 K/UL    ABS.  BASOPHILS 0.0 0.0 - 0.1 K/UL    DF AUTOMATED     SED RATE (ESR)    Collection Time: 07/18/21 11:30 AM   Result Value Ref Range    Sed rate, automated 100 (H) 0 - 20 mm/hr   GLUCOSE, POC    Collection Time: 07/18/21  2:00 PM   Result Value Ref Range    Glucose (POC) 184 (H) 70 - 110 mg/dL   GLUCOSE, POC    Collection Time: 07/18/21  3:29 PM   Result Value Ref Range    Glucose (POC) 207 (H) 70 - 110 mg/dL       Radiologic Studies -   No orders to display         Medical Decision Making   I am the first provider for this patient. I reviewed the vital signs, available nursing notes, past medical history, past surgical history, family history and social history. Vital Signs-Reviewed the patient's vital signs. EKG: N/A    Records Reviewed: Nursing Notes, Old Medical Records, Previous Radiology Studies and Previous Laboratory Studies (Time of Review: 12:02 PM)    ED Course: Progress Notes, Reevaluation, and Consults:    ED Course as of Jul 18 1843   Sun Jul 18, 2021   152 47-year-old male history of type 2 diabetes, diabetic neuropathy, previous partial amputation of the right great toe secondary to osteomyelitis was seen on July 10 for right fifth toe infection. At that visit patient was discharged home with follow-up with his podiatrist Dr. Yuri Mayo at which time on 14 July an MRI of the foot was obtained which demonstrated osteomyelitis of the right fifth phalanx with sinus tract and patient was subsequently instructed by his podiatrist to proceed to the emergency room for likely inpatient admission with plan for amputation. Patient well-appearing in no acute distress. DP and PT pulses palpable bilaterally. Examination of the right fifth phalanx demonstrates area of ulceration with slight purulent discharge in the dorsal aspect of the toe. Will obtain basic laboratory evaluation as well as consult podiatry to review plan for likely inpatient admission with possible plan for amputation. [CM]   [address], podiatry, Dr. Yuri Mayo. The patient´s clinical condition to include vital signs, pertinent physical exam findings, significant medical and surgical history, and abnormal diagnostic tests were reviewed and discussed. Consultant agreed to evaluate the patient in the ER in agreement with plan admit patient for plan for surgical procedure tomorrow.   Requested we consult hospitalist for medical management of inpatient mission. [CM]   1226 [facility], hospitalist, Dr. Anshu Camara. The patient´s clinical condition to include vital signs, pertinent physical exam findings, significant medical and surgical history, and abnormal diagnostic tests were reviewed and discussed. Consultant agreed with plan for likely inpatient mission once confirms patient is to go to their service. [CM]      ED Course User Index  [CM] Augustin Alcaraz MD       Provider Notes (Medical Decision Making): MDM    Procedures    Critical Care Time: N/A    This note was originally written by ED resident Mike Buck MD, [facility] PGY-2 and later edited by me. Татьяна Andrade DO        Diagnosis     Clinical Impression:   Other acute osteomyelitis of right foot  Neuropathy      Disposition: Admitted    Follow-up Information    None          Current Discharge Medication List      CONTINUE these medications which have NOT CHANGED    Details   celecoxib (CELEBREX PO) Take  by mouth. insulin lispro (HUMALOG) 100 unit/mL injection Use tid per sliding scale  Indications: type 2 diabetes mellitus  Qty: 3 Vial, Refills: 0    Comments: Pharmacy Assistance program      Syringe with Needle, Disp, (Allergy Syringe) 1 mL 27 x 1/2\" syrg FOR USE WITH INTRACAVERNOSAL INJECTIONS. Qty: 25 Pen Needle, Refills: 2      Injector Device rah DISPENSE INJECT-EASE AUTO-INJECTOR FOR USE WITH BI-MIX INJECTIONS. Qty: 1 Each, Refills: 1      insulin glargine (LANTUS) 100 unit/mL injection 25 Units by SubCUTAneous route nightly.  Indications: type 2 diabetes mellitus  Qty: 1 Vial, Refills: 0    Comments: Pharmacy Assistance Program medication  Associated Diagnoses: Type 2 diabetes mellitus with microalbuminuria, with long-term current use of insulin (HCC)         STOP taking these medications       amoxicillin-clavulanate (Augmentin) 875-125 mg per tablet Comments:   Reason for Stopping:         doxycycline (MONODOX) 100 mg capsule Comments:   Reason for Stopping:             Disclaimer: Sections of this note are dictated using utilizing voice recognition software. Minor typographical errors may be present. If questions arise, please do not hesitate to contact me or call our department.

## 2021-07-19 ENCOUNTER — ANESTHESIA EVENT (OUTPATIENT)
Dept: SURGERY | Age: 56
DRG: 314 | End: 2021-07-19
Payer: MEDICAID

## 2021-07-19 ENCOUNTER — ANESTHESIA (OUTPATIENT)
Dept: SURGERY | Age: 56
DRG: 314 | End: 2021-07-19
Payer: MEDICAID

## 2021-07-19 LAB
ANION GAP SERPL CALC-SCNC: 5 MMOL/L (ref 3–18)
BUN SERPL-MCNC: 20 MG/DL (ref 7–18)
BUN/CREAT SERPL: 18 (ref 12–20)
CALCIUM SERPL-MCNC: 8.3 MG/DL (ref 8.5–10.1)
CHLORIDE SERPL-SCNC: 105 MMOL/L (ref 100–111)
CO2 SERPL-SCNC: 30 MMOL/L (ref 21–32)
COVID-19 RAPID TEST, COVR: NOT DETECTED
CREAT SERPL-MCNC: 1.09 MG/DL (ref 0.6–1.3)
ERYTHROCYTE [DISTWIDTH] IN BLOOD BY AUTOMATED COUNT: 12.4 % (ref 11.6–14.5)
EST. AVERAGE GLUCOSE BLD GHB EST-MCNC: 192 MG/DL
GLUCOSE BLD STRIP.AUTO-MCNC: 132 MG/DL (ref 70–110)
GLUCOSE BLD STRIP.AUTO-MCNC: 149 MG/DL (ref 70–110)
GLUCOSE BLD STRIP.AUTO-MCNC: 150 MG/DL (ref 70–110)
GLUCOSE BLD STRIP.AUTO-MCNC: 81 MG/DL (ref 70–110)
GLUCOSE BLD STRIP.AUTO-MCNC: 90 MG/DL (ref 70–110)
GLUCOSE SERPL-MCNC: 189 MG/DL (ref 74–99)
HBA1C MFR BLD: 8.3 % (ref 4.2–5.6)
HCT VFR BLD AUTO: 32.4 % (ref 36–48)
HGB BLD-MCNC: 10.7 G/DL (ref 13–16)
INR PPP: 1 (ref 0.8–1.2)
MCH RBC QN AUTO: 29.6 PG (ref 24–34)
MCHC RBC AUTO-ENTMCNC: 33 G/DL (ref 31–37)
MCV RBC AUTO: 89.8 FL (ref 74–97)
PLATELET # BLD AUTO: 328 K/UL (ref 135–420)
PMV BLD AUTO: 9.8 FL (ref 9.2–11.8)
POTASSIUM SERPL-SCNC: 4.3 MMOL/L (ref 3.5–5.5)
PROTHROMBIN TIME: 12.7 SEC (ref 11.5–15.2)
RBC # BLD AUTO: 3.61 M/UL (ref 4.35–5.65)
SARS-COV-2, COV2: NORMAL
SODIUM SERPL-SCNC: 140 MMOL/L (ref 136–145)
SOURCE, COVRS: NORMAL
WBC # BLD AUTO: 7.4 K/UL (ref 4.6–13.2)

## 2021-07-19 PROCEDURE — 77030040922 HC BLNKT HYPOTHRM STRY -A: Performed by: PODIATRIST

## 2021-07-19 PROCEDURE — 74011250636 HC RX REV CODE- 250/636: Performed by: ANESTHESIOLOGY

## 2021-07-19 PROCEDURE — 87205 SMEAR GRAM STAIN: CPT

## 2021-07-19 PROCEDURE — 74011250636 HC RX REV CODE- 250/636: Performed by: INTERNAL MEDICINE

## 2021-07-19 PROCEDURE — 77030011265 HC ELECTRD BLD HEX COVD -A: Performed by: PODIATRIST

## 2021-07-19 PROCEDURE — 82962 GLUCOSE BLOOD TEST: CPT

## 2021-07-19 PROCEDURE — 77030013708 HC HNDPC SUC IRR PULS STRY –B: Performed by: PODIATRIST

## 2021-07-19 PROCEDURE — 87077 CULTURE AEROBIC IDENTIFY: CPT

## 2021-07-19 PROCEDURE — 0Y6X0Z1 DETACHMENT AT RIGHT 5TH TOE, HIGH, OPEN APPROACH: ICD-10-PCS | Performed by: PODIATRIST

## 2021-07-19 PROCEDURE — 80048 BASIC METABOLIC PNL TOTAL CA: CPT

## 2021-07-19 PROCEDURE — 74011250637 HC RX REV CODE- 250/637: Performed by: ANESTHESIOLOGY

## 2021-07-19 PROCEDURE — 87186 SC STD MICRODIL/AGAR DIL: CPT

## 2021-07-19 PROCEDURE — 74011000250 HC RX REV CODE- 250: Performed by: INTERNAL MEDICINE

## 2021-07-19 PROCEDURE — 36415 COLL VENOUS BLD VENIPUNCTURE: CPT

## 2021-07-19 PROCEDURE — 74011636637 HC RX REV CODE- 636/637: Performed by: INTERNAL MEDICINE

## 2021-07-19 PROCEDURE — 74011000250 HC RX REV CODE- 250: Performed by: PODIATRIST

## 2021-07-19 PROCEDURE — 74011250637 HC RX REV CODE- 250/637: Performed by: HOSPITALIST

## 2021-07-19 PROCEDURE — 76210000063 HC OR PH I REC FIRST 0.5 HR: Performed by: PODIATRIST

## 2021-07-19 PROCEDURE — 74011250637 HC RX REV CODE- 250/637: Performed by: INTERNAL MEDICINE

## 2021-07-19 PROCEDURE — 87635 SARS-COV-2 COVID-19 AMP PRB: CPT

## 2021-07-19 PROCEDURE — 77030040361 HC SLV COMPR DVT MDII -B: Performed by: PODIATRIST

## 2021-07-19 PROCEDURE — 88305 TISSUE EXAM BY PATHOLOGIST: CPT

## 2021-07-19 PROCEDURE — 77030006773 HC BLD SAW OSC BRSM -A: Performed by: PODIATRIST

## 2021-07-19 PROCEDURE — 99232 SBSQ HOSP IP/OBS MODERATE 35: CPT | Performed by: EMERGENCY MEDICINE

## 2021-07-19 PROCEDURE — 76010000149 HC OR TIME 1 TO 1.5 HR: Performed by: PODIATRIST

## 2021-07-19 PROCEDURE — 85027 COMPLETE CBC AUTOMATED: CPT

## 2021-07-19 PROCEDURE — 2709999900 HC NON-CHARGEABLE SUPPLY: Performed by: PODIATRIST

## 2021-07-19 PROCEDURE — 74011250637 HC RX REV CODE- 250/637: Performed by: EMERGENCY MEDICINE

## 2021-07-19 PROCEDURE — 83036 HEMOGLOBIN GLYCOSYLATED A1C: CPT

## 2021-07-19 PROCEDURE — 65270000029 HC RM PRIVATE

## 2021-07-19 PROCEDURE — 85610 PROTHROMBIN TIME: CPT

## 2021-07-19 PROCEDURE — 2709999900 HC NON-CHARGEABLE SUPPLY

## 2021-07-19 PROCEDURE — 88311 DECALCIFY TISSUE: CPT

## 2021-07-19 PROCEDURE — 87075 CULTR BACTERIA EXCEPT BLOOD: CPT

## 2021-07-19 PROCEDURE — 76060000033 HC ANESTHESIA 1 TO 1.5 HR: Performed by: PODIATRIST

## 2021-07-19 RX ORDER — FAMOTIDINE 20 MG/1
20 TABLET, FILM COATED ORAL ONCE
Status: COMPLETED | OUTPATIENT
Start: 2021-07-19 | End: 2021-07-19

## 2021-07-19 RX ORDER — KETAMINE HCL 50MG/ML(1)
SYRINGE (ML) INTRAVENOUS AS NEEDED
Status: DISCONTINUED | OUTPATIENT
Start: 2021-07-19 | End: 2021-07-19 | Stop reason: HOSPADM

## 2021-07-19 RX ORDER — INSULIN LISPRO 100 [IU]/ML
INJECTION, SOLUTION INTRAVENOUS; SUBCUTANEOUS ONCE
Status: DISCONTINUED | OUTPATIENT
Start: 2021-07-19 | End: 2021-07-19 | Stop reason: HOSPADM

## 2021-07-19 RX ORDER — PROPOFOL 10 MG/ML
VIAL (ML) INTRAVENOUS
Status: DISCONTINUED | OUTPATIENT
Start: 2021-07-19 | End: 2021-07-19 | Stop reason: HOSPADM

## 2021-07-19 RX ORDER — MIDAZOLAM HYDROCHLORIDE 1 MG/ML
INJECTION, SOLUTION INTRAMUSCULAR; INTRAVENOUS AS NEEDED
Status: DISCONTINUED | OUTPATIENT
Start: 2021-07-19 | End: 2021-07-19 | Stop reason: HOSPADM

## 2021-07-19 RX ORDER — HYDROCODONE BITARTRATE AND ACETAMINOPHEN 5; 325 MG/1; MG/1
1 TABLET ORAL
Status: DISCONTINUED | OUTPATIENT
Start: 2021-07-19 | End: 2021-07-22 | Stop reason: HOSPADM

## 2021-07-19 RX ORDER — HYDROMORPHONE HYDROCHLORIDE 1 MG/ML
0.5 INJECTION, SOLUTION INTRAMUSCULAR; INTRAVENOUS; SUBCUTANEOUS
Status: COMPLETED | OUTPATIENT
Start: 2021-07-19 | End: 2021-07-19

## 2021-07-19 RX ORDER — PROPOFOL 10 MG/ML
INJECTION, EMULSION INTRAVENOUS AS NEEDED
Status: DISCONTINUED | OUTPATIENT
Start: 2021-07-19 | End: 2021-07-19 | Stop reason: HOSPADM

## 2021-07-19 RX ORDER — LIDOCAINE HYDROCHLORIDE 20 MG/ML
INJECTION, SOLUTION EPIDURAL; INFILTRATION; INTRACAUDAL; PERINEURAL AS NEEDED
Status: DISCONTINUED | OUTPATIENT
Start: 2021-07-19 | End: 2021-07-19 | Stop reason: HOSPADM

## 2021-07-19 RX ORDER — DOCUSATE SODIUM 100 MG/1
100 CAPSULE, LIQUID FILLED ORAL 2 TIMES DAILY
Status: DISCONTINUED | OUTPATIENT
Start: 2021-07-19 | End: 2021-07-22 | Stop reason: HOSPADM

## 2021-07-19 RX ORDER — SODIUM CHLORIDE 0.9 % (FLUSH) 0.9 %
5-40 SYRINGE (ML) INJECTION AS NEEDED
Status: DISCONTINUED | OUTPATIENT
Start: 2021-07-19 | End: 2021-07-19 | Stop reason: HOSPADM

## 2021-07-19 RX ORDER — SODIUM CHLORIDE 0.9 % (FLUSH) 0.9 %
5-40 SYRINGE (ML) INJECTION EVERY 8 HOURS
Status: DISCONTINUED | OUTPATIENT
Start: 2021-07-19 | End: 2021-07-19 | Stop reason: HOSPADM

## 2021-07-19 RX ORDER — MAGNESIUM SULFATE 100 %
4 CRYSTALS MISCELLANEOUS AS NEEDED
Status: DISCONTINUED | OUTPATIENT
Start: 2021-07-19 | End: 2021-07-19 | Stop reason: HOSPADM

## 2021-07-19 RX ORDER — FENTANYL CITRATE 50 UG/ML
INJECTION, SOLUTION INTRAMUSCULAR; INTRAVENOUS AS NEEDED
Status: DISCONTINUED | OUTPATIENT
Start: 2021-07-19 | End: 2021-07-19 | Stop reason: HOSPADM

## 2021-07-19 RX ORDER — DEXTROSE 50 % IN WATER (D50W) INTRAVENOUS SYRINGE
25-50 AS NEEDED
Status: DISCONTINUED | OUTPATIENT
Start: 2021-07-19 | End: 2021-07-19 | Stop reason: HOSPADM

## 2021-07-19 RX ORDER — SODIUM CHLORIDE, SODIUM LACTATE, POTASSIUM CHLORIDE, CALCIUM CHLORIDE 600; 310; 30; 20 MG/100ML; MG/100ML; MG/100ML; MG/100ML
75 INJECTION, SOLUTION INTRAVENOUS CONTINUOUS
Status: DISCONTINUED | OUTPATIENT
Start: 2021-07-19 | End: 2021-07-19 | Stop reason: HOSPADM

## 2021-07-19 RX ADMIN — HYDROMORPHONE HYDROCHLORIDE 0.5 MG: 1 INJECTION, SOLUTION INTRAMUSCULAR; INTRAVENOUS; SUBCUTANEOUS at 20:56

## 2021-07-19 RX ADMIN — Medication 25 MG: at 19:08

## 2021-07-19 RX ADMIN — Medication 10 ML: at 13:51

## 2021-07-19 RX ADMIN — FENTANYL CITRATE 50 MCG: 50 INJECTION, SOLUTION INTRAMUSCULAR; INTRAVENOUS at 18:58

## 2021-07-19 RX ADMIN — Medication 25 MG: at 19:00

## 2021-07-19 RX ADMIN — FAMOTIDINE 20 MG: 20 TABLET, FILM COATED ORAL at 16:55

## 2021-07-19 RX ADMIN — INSULIN GLARGINE 25 UNITS: 100 INJECTION, SOLUTION SUBCUTANEOUS at 22:07

## 2021-07-19 RX ADMIN — Medication 140 MCG/KG/MIN: at 19:05

## 2021-07-19 RX ADMIN — LIDOCAINE HYDROCHLORIDE 40 MG: 20 INJECTION, SOLUTION EPIDURAL; INFILTRATION; INTRACAUDAL; PERINEURAL at 18:58

## 2021-07-19 RX ADMIN — FENTANYL CITRATE 50 MCG: 50 INJECTION, SOLUTION INTRAMUSCULAR; INTRAVENOUS at 19:05

## 2021-07-19 RX ADMIN — ONDANSETRON 4 MG: 4 TABLET, ORALLY DISINTEGRATING ORAL at 22:19

## 2021-07-19 RX ADMIN — HYDROCODONE BITARTRATE AND ACETAMINOPHEN 1 TABLET: 5; 325 TABLET ORAL at 23:38

## 2021-07-19 RX ADMIN — Medication 10 ML: at 23:39

## 2021-07-19 RX ADMIN — Medication 10 ML: at 05:38

## 2021-07-19 RX ADMIN — HEPARIN SODIUM 5000 UNITS: 5000 INJECTION INTRAVENOUS; SUBCUTANEOUS at 23:38

## 2021-07-19 RX ADMIN — PROPOFOL 50 MG: 10 INJECTION, EMULSION INTRAVENOUS at 19:05

## 2021-07-19 RX ADMIN — CEFEPIME HYDROCHLORIDE 2 G: 2 INJECTION, POWDER, FOR SOLUTION INTRAVENOUS at 22:11

## 2021-07-19 RX ADMIN — MIDAZOLAM HYDROCHLORIDE 2 MG: 1 INJECTION, SOLUTION INTRAMUSCULAR; INTRAVENOUS at 18:53

## 2021-07-19 RX ADMIN — DOCUSATE SODIUM 100 MG: 100 CAPSULE, LIQUID FILLED ORAL at 22:07

## 2021-07-19 RX ADMIN — PROPOFOL 50 MG: 10 INJECTION, EMULSION INTRAVENOUS at 18:59

## 2021-07-19 RX ADMIN — SODIUM CHLORIDE, SODIUM LACTATE, POTASSIUM CHLORIDE, AND CALCIUM CHLORIDE 75 ML/HR: 600; 310; 30; 20 INJECTION, SOLUTION INTRAVENOUS at 16:54

## 2021-07-19 RX ADMIN — CELECOXIB 100 MG: 100 CAPSULE ORAL at 08:26

## 2021-07-19 RX ADMIN — HYDROMORPHONE HYDROCHLORIDE 0.5 MG: 1 INJECTION, SOLUTION INTRAMUSCULAR; INTRAVENOUS; SUBCUTANEOUS at 20:47

## 2021-07-19 RX ADMIN — INSULIN LISPRO 2 UNITS: 100 INJECTION, SOLUTION INTRAVENOUS; SUBCUTANEOUS at 08:26

## 2021-07-19 NOTE — PROGRESS NOTES
conducted an initial consultation and Spiritual Assessment for Zhang Botello, who is a 60 y. o.,male. Patient's Primary Language is: [place]. According to the patient's EMR Mu-ism Affiliation is: [facility].     The reason the Patient came to the hospital is:   Patient Active Problem List    Diagnosis Date Noted    Osteomyelitis ([facility] 07/18/2021    Edema of right lower extremity 09/15/2020    [facility] foot [facility] 09/15/2020    Diabetic foot ulcer ([facility] 12/27/2018    Neuropathy 02/24/2017    Pain due to abscess 09/20/2016    Type 2 diabetes mellitus without complication ([facility] 23/16/1753        The  provided the following Interventions:  Initiated a relationship of care and support. Explored issues of ingrid, belief, spirituality and Tenriism/ritual needs while hospitalized. Listened empathically. Provided chaplaincy education. Provided information about Spiritual Care Services. Offered prayer and assurance of continued prayers on patient's behalf. Chart reviewed. The following outcomes where achieved:  Patient shared limited information about both their medical narrative and spiritual journey/beliefs.  confirmed Patient's Mu-ism Affiliation. Patient processed feeling about current hospitalization. Patient expressed gratitude for 's visit. Assessment:  Patient does not have any Tenriism/cultural needs that will affect patient's preferences in health care. There are no spiritual or Tenriism issues which require intervention at this time. Plan:  Chaplains will continue to follow and will provide pastoral care on an as needed/requested basis.  recommends bedside caregivers page  on duty if patient shows signs of acute spiritual or emotional distress.     [address]   [phone]

## 2021-07-19 NOTE — DIABETES MGMT
Diabetes Plan of Care    Assessment:  Known from admission Sept. 2020  Consult received  Here with Osteomyelitis of right 5th toe   He states compliance with insulins and BG monitoring   Reviewed his current A1c and target of 7% or less  Provided printed materials for targets and healthy meal planning  He has been monitoring post-prandial BG since last admission   Awaiting surgery   Will follow     Most recent blood glucose values:       Within target range   -   No    Current A1c  Lab Results   Component Value Date/Time    Hemoglobin A1c 8.3 (H) 07/19/2021 03:42 AM    Hemoglobin A1c (POC) 11.4 09/08/2017 10:26 AM     Adequate glycemic control PTA:     No     Current hospital diabetes medications:  Lantus 25 units daily  Corrective lispro, normal insulin sensitivity, 4 times daily    Diet -     TDD previous day = 33 units   25 units lantus  8 units lispro   Lab  mg/dl this am     Home diabetes medications;  25 units lantus nightly  SS humalog with meals     Goals: Blood glucose will be within target of 70 - 180 mg/dl by:  7/22/2021     Education:  __X___ Refer to Diabetes Education Record                       _____ Education not indicated at this time         Figueroa Schmidt MPH RN [facility] [employer]  Pager [phone]  Office [phone]

## 2021-07-19 NOTE — ANESTHESIA PREPROCEDURE EVALUATION
Relevant Problems   ENDOCRINE   (+) Type 2 diabetes mellitus without complication (HCC)      HEMATOLOGY   (+) Osteomyelitis (HCC)   (+) Osteomyelitis of great toe of right foot (HCC)       Anesthetic History   No history of anesthetic complications            Review of Systems / Medical History  Patient summary reviewed and pertinent labs reviewed    Pulmonary  Within defined limits                 Neuro/Psych   Within defined limits           Cardiovascular                  Exercise tolerance: >4 METS     GI/Hepatic/Renal  Within defined limits              Endo/Other    Diabetes: well controlled, type 2    Arthritis     Other Findings              Physical Exam    Airway  Mallampati: II  TM Distance: 4 - 6 cm  Neck ROM: normal range of motion   Mouth opening: Normal     Cardiovascular  Regular rate and rhythm,  S1 and S2 normal,  no murmur, click, rub, or gallop             Dental  No notable dental hx       Pulmonary  Breath sounds clear to auscultation               Abdominal  GI exam deferred       Other Findings            Anesthetic Plan    ASA: 3  Anesthesia type: MAC          Induction: Intravenous  Anesthetic plan and risks discussed with: Patient

## 2021-07-19 NOTE — CONSULTS
Infectious Disease Consultation Note        Reason: Right fifth toe infection    Current abx Prior abx         Lines:       Assessment :      53-year-old gentleman with a history of uncontrolled diabetes mellitus type 2 (last hgbA1C 8.3 on 7/19/21),  osteoarthritis of the knee, chronic kidney disease, hypertension who presented  to the emergency department for admission on 7/18/21 per request of podiatry. Wound culture 7/10- MSSA, klebsiella s/p ciprofloxacin, augmentin    Now with swelling right fifth toe, nonhealing ulcer, x-ray changes suggestive of osteomyelitis    Clinical presentation consistent with partially treated acute on chronic osteomyelitis right fifth toe. Podiatry follow-up appreciated. Plans for amputation noted    Recommendations:    1. Recommend to initiate cefepime after surgery today  2. F/u intra op cultures, bone biopsy of clean margins, Intra-Op findings to determine duration and type of antibiotics. Will likely be able to switch to appropriate oral antibiotics in 48 hours    Thank you for consultation request. Above plan was discussed in details with patient, dr. Surinder Martinez and dr Lashay Felipe. Please call me if any further questions or concerns. Will continue to participate in the care of this patient. HPI:    53-year-old gentleman with a history of uncontrolled diabetes mellitus type 2 (last hgbA1C 8.3 on 7/19/21),  osteoarthritis of the knee, chronic kidney disease, hypertension who presented  to the emergency department for admission on 7/18/21 per request of podiatry. Patient has had ongoing difficulties with intermittent nonhealing diabetic wounds to his right foot. [facility] and osteomyelitis for which he underwent partial amputation. He has ongoing right fifth toe swelling and infection for about 4 weeks. He took outpatient ciprofloxacin, Augmentin for 2 weeks due to concerns for infection.   Recent x-ray on July 10 reveals soft tissue swelling with tracking and osteomyelitis. 7/14/2021 MRI also supports osteomyelitis of the fifth toe with underlying dorsal as well as plantar soft tissue swelling and infection. He was told to come to the emergency department by Dr. Kiki Davies for admission for planned amputation of the toe.      Wound cultures from 7/10/2021 are growing Klebsiella pneumonia, beta-hemolytic group B strep, moderate staph aureus. Patient is currently not on any antibiotics. He denies any subjective fever or chills at home. Denies noticing any redness of the right foot. He had a right knee meniscus repair in February 2021. Has had pain in the right knee and subsequent pain in the left back for which she follows with orthopedic surgeon at [facility]. Past Medical History:   Diagnosis Date    Arthritis of left knee     Diabetes ([facility] [address]     Erectile dysfunction     Knee pain     Osteoarthritis of right knee     Osteomyelitis ([facility] [address]     Sinusitis        Past Surgical History:   Procedure Laterality Date    COLONOSCOPY N/A 2/5/2020    COLONOSCOPY performed by Jennifer Condon MD at [address] [place] [address] [address] TO[address] Left 12/2018    left great toe    HX MENISCUS REPAIR Right 2015       home Medication List    Details   celecoxib (CELEBREX PO) Take  by mouth. insulin lispro (HUMALOG) 100 unit/mL injection Use tid per sliding scale  Indications: type 2 diabetes mellitus  Qty: 3 Vial, Refills: 0    Comments: Pharmacy Assistance program      Syringe with Needle, Disp, (Allergy Syringe) 1 mL 27 x 1/2\" syrg FOR USE WITH INTRACAVERNOSAL INJECTIONS. Qty: 25 Pen Needle, Refills: 2      Injector Device rah DISPENSE INJECT-EASE AUTO-INJECTOR FOR USE WITH BI-MIX INJECTIONS. Qty: 1 Each, Refills: 1      insulin glargine (LANTUS) 100 unit/mL injection 25 Units by SubCUTAneous route nightly.  Indications: type 2 diabetes mellitus  Qty: 1 Vial, Refills: 0    Comments: Pharmacy Assistance Program medication  Associated Diagnoses: Type 2 diabetes mellitus with microalbuminuria, with long-term current use of insulin (HCC)          Current Facility-Administered Medications   Medication Dose Route Frequency    celecoxib (CELEBREX) capsule 100 mg  100 mg Oral DAILY    insulin glargine (LANTUS) injection 25 Units  25 Units SubCUTAneous QHS    sodium chloride (NS) flush 5-40 mL  5-40 mL IntraVENous Q8H    sodium chloride (NS) flush 5-40 mL  5-40 mL IntraVENous PRN    acetaminophen (TYLENOL) tablet 650 mg  650 mg Oral Q6H PRN    Or    acetaminophen (TYLENOL) suppository 650 mg  650 mg Rectal Q6H PRN    polyethylene glycol (MIRALAX) packet 17 g  17 g Oral DAILY PRN    ondansetron (ZOFRAN ODT) tablet 4 mg  4 mg Oral Q8H PRN    Or    ondansetron (ZOFRAN) injection 4 mg  4 mg IntraVENous Q6H PRN    heparin (porcine) injection 5,000 Units  5,000 Units SubCUTAneous Q8H    insulin lispro (HUMALOG) injection   SubCUTAneous AC&HS    glucose chewable tablet 16 g  4 Tablet Oral PRN    dextrose (D50W) injection syrg 12.5-25 g  25-50 mL IntraVENous PRN    glucagon (GLUCAGEN) injection 1 mg  1 mg IntraMUSCular PRN       Allergies: Voltaren [diclofenac sodium] and Zosyn [piperacillin-tazobactam]    Family History   Problem Relation Age of Onset    Seizures Mother     Hypertension Mother     No Known Problems Father     No Known Problems Sister     No Known Problems Brother     No Known Problems Sister     No Known Problems Brother      Social History     Socioeconomic History    Marital status: SINGLE     Spouse name: Not on file    Number of children: Not on file    Years of education: Not on file    Highest education level: Not on file   Occupational History    Not on file   Tobacco Use    Smoking status: Former Smoker    Smokeless tobacco: Never Used    Tobacco comment: quit 0ver 25 years ago   Substance and Sexual Activity    Alcohol use:  Yes     Alcohol/week: 2.0 standard drinks     Types: 2 Cans of beer per week     Comment: occ.  Drug use: No    Sexual activity: Yes     Partners: Female     Birth control/protection: Condom   Other Topics Concern    Not on file   Social History Narrative    Not on file     Social Determinants of Health     Financial Resource Strain:     Difficulty of Paying Living Expenses:    Food Insecurity:     Worried About Running Out of Food in the Last Year:     [address] in the Last Year:    Transportation Needs:     Lack of Transportation (Medical):  Lack of Transportation (Non-Medical):    Physical Activity:     Days of Exercise per Week:     Minutes of Exercise per Session:    Stress:     Feeling of Stress :    Social Connections:     Frequency of Communication with Friends and Family:     Frequency of Social Gatherings with Friends and Family:     Attends Jainism Services:     Active Member of Clubs or Organizations:     Attends Club or Organization Meetings:     Marital Status:    Intimate Partner Violence:     Fear of Current or Ex-Partner:     Emotionally Abused:     Physically Abused:     Sexually Abused:      Social History     Tobacco Use   Smoking Status Former Smoker   Smokeless Tobacco Never Used   Tobacco Comment    quit 0ver 25 years ago        Temp (24hrs), Av °F (36.7 °C), Min:97.7 °F (36.5 °C), Max:98.5 °F (36.9 °C)    Visit Vitals  BP (!) 147/76 (BP 1 Location: Left upper arm, BP Patient Position: At rest)   Pulse 67   Temp 97.9 °F (36.6 °C)   Resp 16   Ht 6' 1\" (1.854 m)   Wt 103.4 kg (228 lb)   SpO2 95%   BMI 30.08 kg/m²       ROS: 12 point ROS obtained in details. Pertinent positives as mentioned in HPI,   otherwise negative    Physical Exam:    General:   awake alert and oriented   Skin:   no rashes or skin lesions noted on limited exam   HEENT:  Normocephalic, atraumatic, EOMI, no scleral icterus or pallor; no conjunctival hemmohage;  Neck supple, no bruits.    Lymph Nodes:   no cervical, axillary or inguinal adenopathy Lungs:   non-labored, bilaterally clear to aspiration- no crackles wheezes rales or rhonchi   Heart:  RRR, s1 and s2; no edema, + pedal pulses   Abdomen:  soft, non-distended, active bowel sounds, no hepatomegaly, no splenomegaly. Non-tender   Genitourinary:  deferred   Extremities:   no clubbing, cyanosis; decreased range of movement of right knee due to pain. No erythema/swelling right knee; muscle mass appropriate for age; swelling of the right fifth toe. Ulceration present on the right fifth toe. No erythema right fifth toe or right foot. Neurologic:  No gross focal sensory abnormalities; 5/5 muscle strength to upper and lower extremities. Speech appropirate. Cranial nerves intact   Psychiatric:   appropriate and interactive. Labs: Results:   Chemistry Recent Labs     07/19/21  0342   *      K 4.3      CO2 30   BUN 20*   CREA 1.09   CA 8.3*   AGAP 5   BUCR 18      CBC w/Diff Recent Labs     07/19/21  0342 07/18/21  1130   WBC 7.4 9.7   RBC 3.61* 3.89*   HGB 10.7* 11.6*   HCT 32.4* 35.2*    347   GRANS  --  69   LYMPH  --  21   EOS  --  3      Microbiology No results for input(s): CULT in the last 72 hours. RADIOLOGY:    All available imaging studies/reports in [facility] for this admission were reviewed      Disclaimer: Sections of this note are dictated utilizing voice recognition software, which may have resulted in some phonetic based errors in grammar and contents. Even though attempts were made to correct all the mistakes, some may have been missed, and remained in the body of the document. If questions arise, please contact our department.     Dr. Ave Arora, Infectious Disease Specialist  [phone]  July 19, 2021  11:20 AM

## 2021-07-19 NOTE — ROUTINE PROCESS
TRANSFER - IN REPORT:    Verbal report received from 4th floor on Roland Cluster  being received from 4th floor for ordered procedure      Report consisted of patients Situation, Background, Assessment and   Recommendations(SBAR). Information from the following report(s) Intake/Output, MAR, Pre Procedure Checklist and Procedure Verification was reviewed with the receiving nurse. Opportunity for questions and clarification was provided. Assessment completed upon patients arrival to unit and care assumed.

## 2021-07-19 NOTE — PROGRESS NOTES
Reason for Admission:  Osteomyelitis ([facility] [address] [M86.9]                 RUR Score:    12            Plan for utilizing home health: If needed                      Likelihood of Readmission:   LOW                         Transition of Care Plan:              Initial assessment completed with patient. Cognitive status of patient: oriented to time, place, person and situation. Face sheet information confirmed:  yes. The patient designates Girlfriend to participate in his discharge plan and to receive any needed information. This patient lives in a single family home with patient and girlfriend. Patient is able to navigate steps as needed. Prior to hospitalization, patient was considered to be independent with ADLs/IADLS : yes . Patient has a current ACP document on file: no      Healthcare Decision Maker:     Click here to complete [address] including selection of the Healthcare Decision Maker Relationship (ie \"Primary\")    The patient and girlfriend will be available to transport patient home upon discharge. The patient already has crutches,  medical equipment available in the home. Patient is not currently active with home health. Patient has not stayed in a skilled nursing facility or rehab. This patient is on dialysis :no    List of available Home Health agencies were provided and reviewed with the patient prior to discharge. Freedom of choice signed: yes, for [employer] or any accepting agency. . Currently, the discharge plan is Home with [address] [place] [address] Jonel Porter. The patient states that he can obtain his medications from the pharmacy, and take his medications as directed. Patient's current insurance is [facility] Medicaid       Care Management Interventions  PCP Verified by CM: Yes  Mode of Transport at Discharge: Self  Current Support Network:  Other (Girlfriend)  Confirm Follow Up Transport: Family  The Plan for Transition of Care is Related to the Following Treatment Goals : Home health  The Patient and/or Patient Representative was Provided with a Choice of Provider and Agrees with the Discharge Plan?: Yes  Freedom of Choice List was Provided with Basic Dialogue that Supports the Patient's Individualized Plan of Care/Goals, Treatment Preferences and Shares the Quality Data Associated with the Providers?: Yes  Discharge Location  Discharge Placement: Home with home health        Ramon Gutierrez RN BSN  Care Manager  [phone]

## 2021-07-19 NOTE — PROGRESS NOTES
07/19/21 0800   Wound Foot Right; Lower Right pinky toe 07/19/21   Date First Assessed/Time First Assessed: 07/19/21 0800   Present on Hospital Admission: Yes  Primary Wound Type: Diabetic Ulcer  Location: Foot  Wound Location Orientation: Right; Lower  Wound Description: Right pinky toe  Date of First Observation: 07... Wound Etiology Diabetic   Dressing Status New dressing applied;Clean;Dry; Intact   Cleansed Betadine/Povidone Iodine;Cleansed with saline   Dressing/Treatment Gauze dressing/dressing sponge;Tape change   Dressing Change Due 07/20/21   Wound Assessment Dry   Drainage Amount Scant   Drainage Description Serosanguinous   Wound Odor None   Tyra-Wound/Incision Assessment Warm;Dry/flaky   Edges Unattached edges   Wound Thickness Description Partial thickness   Wound Care Charges   $$ Wound Care 1

## 2021-07-19 NOTE — DIABETES MGMT
Diabetes Patient/Family Education Record    Factors That May Influence Patients Ability to Learn or Comply with Recommendations   []   Language barrier    []   Cultural needs   []   Motivation    []   Cognitive limitation    []   Physical   []   Education    []   Physiological factors   []   Hearing/vision/speaking impairment   []   Synagogue beliefs    []   Financial factors   []  Other:   [x]  No factors identified at this time. Person Instructed:   [x]   Patient   []   Family   []  Other     Preference for Learning:   [x]   Verbal   [x]   Written   []  Demonstration     Level of Comprehension & Competence:    [x]  Good                                      [] Fair                                     []  Poor                             []  Needs Reinforcement   [x]  Teach back completed    Education Component:  See DM note    _  Medication management   - states compliance with insulins and BG monitoring    [x]  Nutritional management - [x] Obtained usual meal pattern   []   Basic carbohydrate counting  []  Plate method  [x]  Limit concentrated sweets and avoid sweetened beverages  []  Portion control  []    Avoid skipping meals  Consumes 3 meals daily with SS humalog - follows a Mediterranean diet     [x]  Exercise   [x]  Signs, symptoms, and treatment of hyperglycemia and hypoglycemia   [x] Prevention, recognition and treatment of hyperglycemia and hypoglycemia   [x]  Importance of blood glucose monitoring  [] Blood Glucose targets   []   Provided patient with blood glucose meter  [x]  Has glucometer and supplies at home   - Monitors BG 3-4 times daily - has been checking post-prandial since last admission    []  Instruction on use of the blood glucose meter and recommended monitoring schedule   [x]  Discuss the importance of HbA1C monitoring. Patients A1c is 8.3 %.  This is equivalent to average glucose of   192 mg/dl for the past 2-3 months.   []  Sick day guidelines   []  Proper use and disposal of lancets, needles, syringes or insulin pens (if appropriate)   [x]  Potential long-term complications (retinopathy, kidney disease, neuropathy, foot care) - here with Osteomyelitis of right 5th toe - reviewed proper foot care and properly fitting shoes    [] Information about whom to contact in case of emergency or for more information    []  Goal:  Patient/family will demonstrate understanding of Diabetes Self- Management Skills by: (date) _______  Plan for post-discharge education or self-management support:    [] Outpatient class schedule provided            [] Patient Declined    [] Scheduled for outpatient classes (date) _______    [x] Written information provided  Verify: [x] Prior to admission Diabetes medications    Does patient understand how diabetes medications work? _____yes_______________________  Does patient have difficulty obtaining diabetes medications or testing supplies? ____no_____________       Eric Call  Pager [phone]  Office [phone]

## 2021-07-19 NOTE — PROGRESS NOTES
[facility] Hospitalist Group  Progress Note    Patient: Marilee Marie Age: 60 y.o. : 1965 MR#: [id number] SSN: [id number]  Date/Time: 2021    Subjective:     Patient is sitting in bed in no apparent distress, awake and alert. Denies any pain or discomfort    Assessment/Plan:     Left foot fifth toe diabetic ulcer with osteomyelitis  Type 2 diabetes  Chronic kidney disease stage II  Elevated blood pressures  Chronic anemia    Plan  Podiatry is planning debridement today, follow cultures  Continue antibiotics.   ID consulted  Continue Lantus, sliding scale insulin  Monitor blood pressures, as needed hydralazine  Discussed with patient, discussed with ID    Case discussed with:  [x]Patient  []Family  []Nursing  []Case Management  DVT Prophylaxis:  []Lovenox  [x]Hep SQ  []SCDs  []Coumadin   []On Heparin gtt    Objective:   VS:   Visit Vitals  BP (!) 154/85 (BP 1 Location: Left upper arm, BP Patient Position: At rest)   Pulse 62   Temp 98.1 °F (36.7 °C)   Resp 20   Ht 6' 1\" (1.854 m)   Wt 103.4 kg (228 lb)   SpO2 99%   BMI 30.08 kg/m²      Tmax/24hrs: Temp (24hrs), Av.2 °F (36.8 °C), Min:97.9 °F (36.6 °C), Max:98.5 °F (36.9 °C)    Input/Output:     Intake/Output Summary (Last 24 hours) at 2021 1821  Last data filed at 2021 0473  Gross per 24 hour   Intake 290 ml   Output    Net 290 ml       General:  Awake, alert  Cardiovascular:  S1S2+, RRR  Pulmonary:  CTA b/l  GI:  Soft, BS+, NT, ND  Extremities: Left foot fifth toe with ulcer      Labs:    Recent Results (from the past 24 hour(s))   GLUCOSE, POC    Collection Time: 21 10:06 PM   Result Value Ref Range    Glucose (POC) 200 (H) 70 - 110 mg/dL   PROTHROMBIN TIME + INR    Collection Time: 21  3:42 AM   Result Value Ref Range    Prothrombin time 12.7 11.5 - 15.2 sec    INR 1.0 0.8 - 1.2     CBC W/O DIFF    Collection Time: 21  3:42 AM   Result Value Ref Range    WBC 7.4 4.6 - 13.2 K/uL    RBC 3.61 (L) 4.35 - 5.65 M/uL    HGB 10.7 (L) 13.0 - 16.0 g/dL    HCT 32.4 (L) 36.0 - 48.0 %    MCV 89.8 74.0 - 97.0 FL    MCH 29.6 24.0 - 34.0 PG    MCHC 33.0 31.0 - 37.0 g/dL    RDW 12.4 11.6 - 14.5 %    PLATELET 216 446 - 417 K/uL    MPV 9.8 9.2 - 93.3 FL   METABOLIC PANEL, BASIC    Collection Time: 07/19/21  3:42 AM   Result Value Ref Range    Sodium 140 136 - 145 mmol/L    Potassium 4.3 3.5 - 5.5 mmol/L    Chloride 105 100 - 111 mmol/L    CO2 30 21 - 32 mmol/L    Anion gap 5 3.0 - 18 mmol/L    Glucose 189 (H) 74 - 99 mg/dL    BUN 20 (H) 7.0 - 18 MG/DL    Creatinine 1.09 0.6 - 1.3 MG/DL    BUN/Creatinine ratio 18 12 - 20      GFR est AA >60 >60 ml/min/1.73m2    GFR est non-AA >60 >60 ml/min/1.73m2    Calcium 8.3 (L) 8.5 - 10.1 MG/DL   HEMOGLOBIN A1C WITH EAG    Collection Time: 07/19/21  3:42 AM   Result Value Ref Range    Hemoglobin A1c 8.3 (H) 4.2 - 5.6 %    Est. average glucose 192 mg/dL   GLUCOSE, POC    Collection Time: 07/19/21  7:38 AM   Result Value Ref Range    Glucose (POC) 150 (H) 70 - 110 mg/dL   GLUCOSE, POC    Collection Time: 07/19/21 11:22 AM   Result Value Ref Range    Glucose (POC) 149 (H) 70 - 110 mg/dL   SARS-COV-2    Collection Time: 07/19/21  1:19 PM   Result Value Ref Range    SARS-CoV-2 Please find results under separate order     COVID-19 RAPID TEST    Collection Time: 07/19/21  1:19 PM   Result Value Ref Range    Specimen source Nasopharyngeal      COVID-19 rapid test Not detected NOTD     GLUCOSE, POC    Collection Time: 07/19/21  3:52 PM   Result Value Ref Range    Glucose (POC) 132 (H) 70 - 110 mg/dL     Additional Data Reviewed:      Signed By: Dena Bellamy MD     July 19, 2021

## 2021-07-19 NOTE — CONSULTS
Podiatry History and Physical    Subjective:         Date of Consultation:  July 19, 2021    Patient is a 60 y.o. male with PMHx noted below who was seen in office for right fifth toe ulcer. Patient had healed ulcer to right fifth toe which opened up recently after attending wedding. On exam, it probed to bone. He had an outpatient MRI confirming osteomyelitis of fifth toe. Patient was sent from office for hospital admission for amputation of toe. He also has right knee pain from meniscus tear. He is being seen by orthopedic surgeon outpatient for it. Patient denies N/V/C/F. Patient Active Problem List    Diagnosis Date Noted    Osteomyelitis ([address] 07/18/2021    Edema of right lower extremity 09/15/2020    Osteomyelitis of great toe of right foot ([address] 09/15/2020    Diabetic foot ulcer ([address] 12/27/2018    Neuropathy 02/24/2017    Pain due to abscess 09/20/2016    Type 2 diabetes mellitus without complication ([address] 51/09/1154     Past Medical History:   Diagnosis Date    Arthritis of left knee     Diabetes ([address]     Erectile dysfunction     Knee pain     Osteoarthritis of right knee     Osteomyelitis ([address]     Sinusitis       Family History   Problem Relation Age of Onset    Seizures Mother     Hypertension Mother     No Known Problems Father     No Known Problems Sister     No Known Problems Brother     No Known Problems Sister     No Known Problems Brother       Social History     Tobacco Use    Smoking status: Former Smoker    Smokeless tobacco: Never Used    Tobacco comment: quit 0ver 25 years ago   Substance Use Topics    Alcohol use: Yes     Alcohol/week: 2.0 standard drinks     Types: 2 Cans of beer per week     Comment: occ.      Past Surgical History:   Procedure Laterality Date    COLONOSCOPY N/A 2/5/2020    COLONOSCOPY performed by Armin Tarango MD at [address] [place] [address] [address] TOE Left 12/2018    left great toe    HX MENISCUS REPAIR Right 2015      Prior to Admission medications    Medication Sig Start Date End Date Taking? Authorizing Provider   celecoxib (CELEBREX PO) Take  by mouth. Other, MD Amber   insulin lispro (HUMALOG) 100 unit/mL injection Use tid per sliding scale  Indications: type 2 diabetes mellitus 20   Aubrey Payne MD   Syringe with Needle, Disp, (Allergy Syringe) 1 mL 27 x 1/2\" syrg FOR USE WITH INTRACAVERNOSAL INJECTIONS. 20   Karan Aldana MD   Injector Device rah DISPENSE INJECT-EASE AUTO-INJECTOR FOR USE WITH BI-MIX INJECTIONS. 20   Karan Aldana MD   insulin glargine (LANTUS) 100 unit/mL injection 25 Units by SubCUTAneous route nightly. Indications: type 2 diabetes mellitus 3/19/20   Aubrey Payne MD     Allergies   Allergen Reactions    Voltaren [Diclofenac Sodium] Nausea Only    Zosyn [Piperacillin-Tazobactam] Nausea and Vomiting        Review of Systems:  A comprehensive review of systems was negative except for that written in the HPI. Objective:     Patient Vitals for the past 8 hrs:   BP Temp Pulse Resp SpO2   21 1628 (!) 154/85 98.1 °F (36.7 °C) 62 20 99 %   21 1147 (!) 147/90 98.1 °F (36.7 °C) 66 18 97 %     Temp (24hrs), Av.2 °F (36.8 °C), Min:97.9 °F (36.6 °C), Max:98.5 °F (36.9 °C)    Bilateral MULU:     Musculoskeletal: Muscle strength is 5/5 for all extrinsic muscle groups of the feet bilaterally. Partial great toe amputation bilateral. Lesser toes dorsally contracted with varus rotation of fifth toes bilateral.  Vascular: DP and PT pulses are 2/4 bilaterally. Sparse hair growth noticed on the dorsal foot and digits. There is nonpitting edema noted to bilateral lower extremities. Capillary refill time is less than 3 seconds to distal digits bilaterally. No erythema noted.   Neurological: Protective sensation is diminished to 5/10 sites of the feet bilaterally upon testing with a 5.07 Laughlin-Trace monofilament test. Vibratory sensation is diminished to the level of medial malleolus bilaterally. Sharp and dull sensation is diminished with 2-point discrimination. Achilles and patellar deep tendon reflexes are within normal limits bilaterally. Paresthesia symptoms are present to bilateral lower extremities. Dermatological: Skin is noted to have mild atrophy with xerosis. Web spaces 1-4 bilaterally are clean, dry, and intact. Small wound of 0.7 cm in diameter noted to dorsal right fifth toe with surrounding hyperkeratotic tissue, probes deep down to bone, mild purulent discharge noted.      Data Review:   Recent Results (from the past 24 hour(s))   GLUCOSE, POC    Collection Time: 07/18/21 10:06 PM   Result Value Ref Range    Glucose (POC) 200 (H) 70 - 110 mg/dL   PROTHROMBIN TIME + INR    Collection Time: 07/19/21  3:42 AM   Result Value Ref Range    Prothrombin time 12.7 11.5 - 15.2 sec    INR 1.0 0.8 - 1.2     CBC W/O DIFF    Collection Time: 07/19/21  3:42 AM   Result Value Ref Range    WBC 7.4 4.6 - 13.2 K/uL    RBC 3.61 (L) 4.35 - 5.65 M/uL    HGB 10.7 (L) 13.0 - 16.0 g/dL    HCT 32.4 (L) 36.0 - 48.0 %    MCV 89.8 74.0 - 97.0 FL    MCH 29.6 24.0 - 34.0 PG    MCHC 33.0 31.0 - 37.0 g/dL    RDW 12.4 11.6 - 14.5 %    PLATELET 423 974 - 568 K/uL    MPV 9.8 9.2 - 02.9 FL   METABOLIC PANEL, BASIC    Collection Time: 07/19/21  3:42 AM   Result Value Ref Range    Sodium 140 136 - 145 mmol/L    Potassium 4.3 3.5 - 5.5 mmol/L    Chloride 105 100 - 111 mmol/L    CO2 30 21 - 32 mmol/L    Anion gap 5 3.0 - 18 mmol/L    Glucose 189 (H) 74 - 99 mg/dL    BUN 20 (H) 7.0 - 18 MG/DL    Creatinine 1.09 0.6 - 1.3 MG/DL    BUN/Creatinine ratio 18 12 - 20      GFR est AA >60 >60 ml/min/1.73m2    GFR est non-AA >60 >60 ml/min/1.73m2    Calcium 8.3 (L) 8.5 - 10.1 MG/DL   HEMOGLOBIN A1C WITH EAG    Collection Time: 07/19/21  3:42 AM   Result Value Ref Range    Hemoglobin A1c 8.3 (H) 4.2 - 5.6 %    Est. average glucose 192 mg/dL   GLUCOSE, POC    Collection Time: 07/19/21  7:38 AM   Result Value Ref Range    Glucose (POC) 150 (H) 70 - 110 mg/dL   GLUCOSE, POC    Collection Time: 07/19/21 11:22 AM   Result Value Ref Range    Glucose (POC) 149 (H) 70 - 110 mg/dL   SARS-COV-2    Collection Time: 07/19/21  1:19 PM   Result Value Ref Range    SARS-CoV-2 Please find results under separate order     COVID-19 RAPID TEST    Collection Time: 07/19/21  1:19 PM   Result Value Ref Range    Specimen source Nasopharyngeal      COVID-19 rapid test Not detected NOTD     GLUCOSE, POC    Collection Time: 07/19/21  3:52 PM   Result Value Ref Range    Glucose (POC) 132 (H) 70 - 110 mg/dL         Impression:     Diabetic neuropathy, right fifth toe ulcer with osteomyelitis of toe, uncontrolled DM    Recommendation:     - Patient will need right fifth toe amputation. Patient is scheduled for procedure later tonight. Please keep patient NPO.   - Hold off on antibiotics. Start antibiotic after surgery per ID recommendation.   - Remain NWB to right forefoot.    - Medical management per primary team.

## 2021-07-20 ENCOUNTER — APPOINTMENT (OUTPATIENT)
Dept: GENERAL RADIOLOGY | Age: 56
DRG: 314 | End: 2021-07-20
Attending: PODIATRIST
Payer: MEDICAID

## 2021-07-20 LAB
ANION GAP SERPL CALC-SCNC: 2 MMOL/L (ref 3–18)
BASOPHILS # BLD: 0 K/UL (ref 0–0.1)
BASOPHILS NFR BLD: 1 % (ref 0–2)
BUN SERPL-MCNC: 16 MG/DL (ref 7–18)
BUN/CREAT SERPL: 15 (ref 12–20)
CALCIUM SERPL-MCNC: 8.3 MG/DL (ref 8.5–10.1)
CHLORIDE SERPL-SCNC: 104 MMOL/L (ref 100–111)
CO2 SERPL-SCNC: 31 MMOL/L (ref 21–32)
CREAT SERPL-MCNC: 1.06 MG/DL (ref 0.6–1.3)
DIFFERENTIAL METHOD BLD: ABNORMAL
EOSINOPHIL # BLD: 0.3 K/UL (ref 0–0.4)
EOSINOPHIL NFR BLD: 3 % (ref 0–5)
ERYTHROCYTE [DISTWIDTH] IN BLOOD BY AUTOMATED COUNT: 12.4 % (ref 11.6–14.5)
GLUCOSE BLD STRIP.AUTO-MCNC: 108 MG/DL (ref 70–110)
GLUCOSE BLD STRIP.AUTO-MCNC: 169 MG/DL (ref 70–110)
GLUCOSE BLD STRIP.AUTO-MCNC: 198 MG/DL (ref 70–110)
GLUCOSE BLD STRIP.AUTO-MCNC: 216 MG/DL (ref 70–110)
GLUCOSE SERPL-MCNC: 174 MG/DL (ref 74–99)
HCT VFR BLD AUTO: 34.6 % (ref 36–48)
HGB BLD-MCNC: 11.7 G/DL (ref 13–16)
LYMPHOCYTES # BLD: 2 K/UL (ref 0.9–3.6)
LYMPHOCYTES NFR BLD: 23 % (ref 21–52)
MAGNESIUM SERPL-MCNC: 2.3 MG/DL (ref 1.6–2.6)
MCH RBC QN AUTO: 30.1 PG (ref 24–34)
MCHC RBC AUTO-ENTMCNC: 33.8 G/DL (ref 31–37)
MCV RBC AUTO: 88.9 FL (ref 74–97)
MONOCYTES # BLD: 0.6 K/UL (ref 0.05–1.2)
MONOCYTES NFR BLD: 7 % (ref 3–10)
NEUTS SEG # BLD: 5.7 K/UL (ref 1.8–8)
NEUTS SEG NFR BLD: 66 % (ref 40–73)
PLATELET # BLD AUTO: 318 K/UL (ref 135–420)
PMV BLD AUTO: 9.1 FL (ref 9.2–11.8)
POTASSIUM SERPL-SCNC: 4.3 MMOL/L (ref 3.5–5.5)
RBC # BLD AUTO: 3.89 M/UL (ref 4.35–5.65)
SODIUM SERPL-SCNC: 137 MMOL/L (ref 136–145)
WBC # BLD AUTO: 8.5 K/UL (ref 4.6–13.2)

## 2021-07-20 PROCEDURE — 74011636637 HC RX REV CODE- 636/637: Performed by: INTERNAL MEDICINE

## 2021-07-20 PROCEDURE — 99232 SBSQ HOSP IP/OBS MODERATE 35: CPT | Performed by: EMERGENCY MEDICINE

## 2021-07-20 PROCEDURE — 74011000250 HC RX REV CODE- 250: Performed by: INTERNAL MEDICINE

## 2021-07-20 PROCEDURE — 82962 GLUCOSE BLOOD TEST: CPT

## 2021-07-20 PROCEDURE — 74011250636 HC RX REV CODE- 250/636: Performed by: INTERNAL MEDICINE

## 2021-07-20 PROCEDURE — 80048 BASIC METABOLIC PNL TOTAL CA: CPT

## 2021-07-20 PROCEDURE — 85025 COMPLETE CBC W/AUTO DIFF WBC: CPT

## 2021-07-20 PROCEDURE — 73620 X-RAY EXAM OF FOOT: CPT

## 2021-07-20 PROCEDURE — 74011250637 HC RX REV CODE- 250/637: Performed by: INTERNAL MEDICINE

## 2021-07-20 PROCEDURE — 36415 COLL VENOUS BLD VENIPUNCTURE: CPT

## 2021-07-20 PROCEDURE — 83735 ASSAY OF MAGNESIUM: CPT

## 2021-07-20 PROCEDURE — 74011250637 HC RX REV CODE- 250/637: Performed by: HOSPITALIST

## 2021-07-20 PROCEDURE — 74011250637 HC RX REV CODE- 250/637: Performed by: EMERGENCY MEDICINE

## 2021-07-20 PROCEDURE — 65270000029 HC RM PRIVATE

## 2021-07-20 PROCEDURE — 97165 OT EVAL LOW COMPLEX 30 MIN: CPT

## 2021-07-20 PROCEDURE — 97161 PT EVAL LOW COMPLEX 20 MIN: CPT

## 2021-07-20 RX ADMIN — INSULIN LISPRO 4 UNITS: 100 INJECTION, SOLUTION INTRAVENOUS; SUBCUTANEOUS at 12:23

## 2021-07-20 RX ADMIN — POLYETHYLENE GLYCOL 3350 17 G: 17 POWDER, FOR SOLUTION ORAL at 22:31

## 2021-07-20 RX ADMIN — CEFEPIME HYDROCHLORIDE 2 G: 2 INJECTION, POWDER, FOR SOLUTION INTRAVENOUS at 12:01

## 2021-07-20 RX ADMIN — HYDROCODONE BITARTRATE AND ACETAMINOPHEN 1 TABLET: 5; 325 TABLET ORAL at 12:08

## 2021-07-20 RX ADMIN — Medication 1 CAPSULE: at 08:26

## 2021-07-20 RX ADMIN — ONDANSETRON 4 MG: 2 INJECTION INTRAMUSCULAR; INTRAVENOUS at 04:38

## 2021-07-20 RX ADMIN — CELECOXIB 100 MG: 100 CAPSULE ORAL at 08:26

## 2021-07-20 RX ADMIN — DOCUSATE SODIUM 100 MG: 100 CAPSULE, LIQUID FILLED ORAL at 17:38

## 2021-07-20 RX ADMIN — HYDROCODONE BITARTRATE AND ACETAMINOPHEN 1 TABLET: 5; 325 TABLET ORAL at 04:40

## 2021-07-20 RX ADMIN — ONDANSETRON 4 MG: 2 INJECTION INTRAMUSCULAR; INTRAVENOUS at 22:32

## 2021-07-20 RX ADMIN — CEFEPIME HYDROCHLORIDE 2 G: 2 INJECTION, POWDER, FOR SOLUTION INTRAVENOUS at 21:48

## 2021-07-20 RX ADMIN — Medication 10 ML: at 21:54

## 2021-07-20 RX ADMIN — Medication 10 ML: at 04:46

## 2021-07-20 RX ADMIN — HEPARIN SODIUM 5000 UNITS: 5000 INJECTION INTRAVENOUS; SUBCUTANEOUS at 17:38

## 2021-07-20 RX ADMIN — INSULIN GLARGINE 25 UNITS: 100 INJECTION, SOLUTION SUBCUTANEOUS at 21:51

## 2021-07-20 RX ADMIN — ONDANSETRON 4 MG: 2 INJECTION INTRAMUSCULAR; INTRAVENOUS at 17:55

## 2021-07-20 RX ADMIN — CEFEPIME HYDROCHLORIDE 2 G: 2 INJECTION, POWDER, FOR SOLUTION INTRAVENOUS at 04:38

## 2021-07-20 RX ADMIN — ONDANSETRON 4 MG: 2 INJECTION INTRAMUSCULAR; INTRAVENOUS at 08:36

## 2021-07-20 RX ADMIN — Medication 10 ML: at 15:05

## 2021-07-20 RX ADMIN — INSULIN LISPRO 2 UNITS: 100 INJECTION, SOLUTION INTRAVENOUS; SUBCUTANEOUS at 21:52

## 2021-07-20 RX ADMIN — DOCUSATE SODIUM 100 MG: 100 CAPSULE, LIQUID FILLED ORAL at 08:26

## 2021-07-20 RX ADMIN — INSULIN LISPRO 2 UNITS: 100 INJECTION, SOLUTION INTRAVENOUS; SUBCUTANEOUS at 08:25

## 2021-07-20 RX ADMIN — HEPARIN SODIUM 5000 UNITS: 5000 INJECTION INTRAVENOUS; SUBCUTANEOUS at 08:25

## 2021-07-20 RX ADMIN — INSULIN LISPRO 2 UNITS: 100 INJECTION, SOLUTION INTRAVENOUS; SUBCUTANEOUS at 17:38

## 2021-07-20 NOTE — PROGRESS NOTES
Problem: Amputation  Goal: *Progressive mobility and function  Outcome: Progressing Towards Goal  Goal: *Optimal pain control at patient's stated goal  Outcome: Progressing Towards Goal  Goal: *Absence of infection signs and symptoms  Outcome: Progressing Towards Goal

## 2021-07-20 NOTE — PROGRESS NOTES
Problem: Self Care Deficits Care Plan (Adult)  Goal: *Acute Goals and Plan of Care (Insert Text)  Outcome: Resolved/Met     Problem: Patient Education: Go to Patient Education Activity  Goal: Patient/Family Education  Outcome: Resolved/Met   OCCUPATIONAL THERAPY EVALUATION/DISCHARGE    Patient: Melanie Selby (38 y.o. male)  Date: 7/20/2021  Primary Diagnosis: Osteomyelitis ([facility] [M86.9]  Procedure(s) (LRB):  RIGHT FOOT FIFTH TOE AMPUTATION (N/A) 1 Day Post-Op   Precautions: Fall, NWB (NWB R forefoot)  PLOF: Patient lives with girlfriend and was independent in all ADLs and IADLs PTA. ASSESSMENT AND RECOMMENDATIONS:  Based on the objective data described below, the patient presents with functional AROM and strength of BUEs, good static standing balance, and good ability to maintain NWB precaution on R forefoot. Patient sat EOB Independently and donned/doffed sock independently. Patient required Supervision assistance for simulated LE dressing and bathroom mobility for dynamic standing balance only. Will defer to PT. Patient has good strategies in order to complete ADL tasks and has Supervision assistance at home. Skilled occupational therapy is not indicated at this time. Discharge Recommendations: None  Further Equipment Recommendations for Discharge: shower chair      SUBJECTIVE:   Patient stated I don't use any equipment. I might need a walker.  (Defer to PT)    OBJECTIVE DATA SUMMARY:     Past Medical History:   Diagnosis Date    Arthritis of left knee     Diabetes ([facility]     Erectile dysfunction     Knee pain     Osteoarthritis of right knee     Osteomyelitis ([facility]     Sinusitis      Past Surgical History:   Procedure Laterality Date    COLONOSCOPY N/A 2/5/2020    COLONOSCOPY performed by Tessa Vasquez MD at [address] TOE Left 12/2018    left great toe    HX MENISCUS REPAIR Right 2015     Barriers to Learning/Limitations: None  N/A    Home Situation:   [address] [facility] Environment: Private residence  # Steps to Enter: 0  One/Two Story Residence: Two story  Living Alone: No  Support Systems: Spouse/Significant Other/Partner  Patient Expects to be Discharged toVF Cor[de-identified]ration  Current DME Used/Available at Home: None  Tub or Shower Type: Tub/Shower combination  [x]     Right hand dominant   []     Left hand dominant    Cognitive/Behavioral Status:  Neurologic State: Alert  Orientation Level: Oriented X4  Cognition: Follows commands  Safety/Judgement: Fall prevention;Home safety    Skin: Intact in BUEs  Edema: No edema noted in BUEs    Vision/Perceptual:    Tracking: Able to track stimulus in all quadrants w/o difficulty      Coordination: BUE  Coordination: Within functional limits    Balance:  Sitting: Intact  Standing: Impaired; Without support  Static: Good  Dynamic: Fair. 1 LOB during simulated bathroom mobility which patient self-corrected. Strength: BUE  Strength:  Within functional limits    Tone & Sensation: BUE  Tone: Normal  Sensation: Intact    Range of Motion: BUE  AROM: Within functional limits    Functional Mobility and Transfers for ADLs:  Bed Mobility:  Supine to Sit: Independent  Sit to Supine: Independent     Transfers:  Sit to Stand: Stand-by assistance  Stand to Sit: Stand-by assistance   Toilet Transfer : Stand-by assistance   Bathroom Mobility: Supervision/set up    ADL Assessment:  Feeding: Modified independent;Setup    Oral Facial Hygiene/Grooming: Modified Independent;Setup    Bathing: Supervision;Stand-by assistance    Upper Body Dressing: Modified independent    Lower Body Dressing: Supervision    Toileting: Supervision    ADL Intervention:  Educated on use of shower chair to increase safety until NWB precaution lifted (once cleared for showering)  Educated patient on role of OT in acute care setting    Cognitive Retraining  Safety/Judgement: Fall prevention;Home safety    Pain:  Pain level pre-treatment: 5/10   Pain level post-treatment: 5/10   Pain Intervention(s): Nurse aware/ Patient received pain medications just prior to OT Evaluation    Activity Tolerance:   Good  Please refer to the flowsheet for vital signs taken during this treatment. After treatment:   []  Patient left in no apparent distress sitting up in chair  [x]  Patient left in no apparent distress in bed  [x]  Call bell left within reach  []  Nursing notified  [x]  Caregiver present  []  Bed alarm activated    COMMUNICATION/EDUCATION:   [x]      Role of Occupational Therapy in the acute care setting  [x]      Home safety education was provided and the patient/caregiver indicated understanding. [x]      Patient/family have participated as able and agree with findings and recommendations. []      Patient is unable to participate in plan of care at this time. Thank you for this referral.  [facility], OTR/L  Time Calculation: 11 mins      Eval Complexity: History: LOW Complexity : Brief history review ; Examination: LOW Complexity : 1-3 performance deficits relating to physical, cognitive , or psychosocial skils that result in activity limitations and / or participation restrictions ;    Decision Making:LOW Complexity : No comorbidities that affect functional and no verbal or physical assistance needed to complete eval tasks

## 2021-07-20 NOTE — PROGRESS NOTES
Infectious Disease  Note        Reason: Right fifth toe infection    Current abx Prior abx   Cefepime since 7/19/2021      Lines:       Assessment :      47-year-old gentleman with a history of uncontrolled diabetes mellitus type 2 (last hgbA1C 8.3 on 7/19/21),  osteoarthritis of the knee, chronic kidney disease, hypertension who presented  to the emergency department for admission on 7/18/21 per request of podiatry. Wound culture 7/10- MSSA, klebsiella s/p ciprofloxacin, augmentin    Now with swelling right fifth toe, nonhealing ulcer, x-ray changes suggestive of osteomyelitis    Clinical presentation consistent with partially treated acute on chronic osteomyelitis right fifth toe. S/p right fifth toe amputation on 7/19/21. Podiatry follow-up appreciated. Intra-Op cultures pending    Recommendations:    1. Continue cefepime  2. F/u intra op cultures, bone biopsy of clean margins, Intra-Op findings to determine duration and type of antibiotics. Above plan was discussed in details with patient, dr. Omkar Nunez and dr Sharlene Johns. Please call me if any further questions or concerns. Will continue to participate in the care of this patient. HPI:    Feels fine. No new complaints      home Medication List    Details   celecoxib (CELEBREX PO) Take  by mouth. insulin lispro (HUMALOG) 100 unit/mL injection Use tid per sliding scale  Indications: type 2 diabetes mellitus  Qty: 3 Vial, Refills: 0    Comments: Pharmacy Assistance program      Syringe with Needle, Disp, (Allergy Syringe) 1 mL 27 x 1/2\" syrg FOR USE WITH INTRACAVERNOSAL INJECTIONS. Qty: 25 Pen Needle, Refills: 2      Injector Device rah DISPENSE INJECT-EASE AUTO-INJECTOR FOR USE WITH BI-MIX INJECTIONS. Qty: 1 Each, Refills: 1      insulin glargine (LANTUS) 100 unit/mL injection 25 Units by SubCUTAneous route nightly.  Indications: type 2 diabetes mellitus  Qty: 1 Vial, Refills: 0    Comments: Pharmacy Assistance Program medication  Associated Diagnoses: Type 2 diabetes mellitus with microalbuminuria, with long-term current use of insulin (HCC)          Current Facility-Administered Medications   Medication Dose Route Frequency    cefepime (MAXIPIME) 2 g in sterile water (preservative free) 10 mL IV syringe  2 g IntraVENous Q8H    docusate sodium (COLACE) capsule 100 mg  100 mg Oral BID    L. acidophilus,casei,rhamnosus (BIO-K PLUS) capsule 1 Capsule  1 Capsule Oral DAILY    HYDROcodone-acetaminophen (NORCO) 5-325 mg per tablet 1 Tablet  1 Tablet Oral Q4H PRN    celecoxib (CELEBREX) capsule 100 mg  100 mg Oral DAILY    insulin glargine (LANTUS) injection 25 Units  25 Units SubCUTAneous QHS    sodium chloride (NS) flush 5-40 mL  5-40 mL IntraVENous Q8H    sodium chloride (NS) flush 5-40 mL  5-40 mL IntraVENous PRN    acetaminophen (TYLENOL) tablet 650 mg  650 mg Oral Q6H PRN    Or    acetaminophen (TYLENOL) suppository 650 mg  650 mg Rectal Q6H PRN    polyethylene glycol (MIRALAX) packet 17 g  17 g Oral DAILY PRN    ondansetron (ZOFRAN ODT) tablet 4 mg  4 mg Oral Q8H PRN    Or    ondansetron (ZOFRAN) injection 4 mg  4 mg IntraVENous Q6H PRN    heparin (porcine) injection 5,000 Units  5,000 Units SubCUTAneous Q8H    insulin lispro (HUMALOG) injection   SubCUTAneous AC&HS    glucose chewable tablet 16 g  4 Tablet Oral PRN    dextrose (D50W) injection syrg 12.5-25 g  25-50 mL IntraVENous PRN    glucagon (GLUCAGEN) injection 1 mg  1 mg IntraMUSCular PRN       Allergies: Voltaren [diclofenac sodium] and Zosyn [piperacillin-tazobactam]    Temp (24hrs), Av.7 °F (36.5 °C), Min:97.2 °F (36.2 °C), Max:98.2 °F (36.8 °C)    Visit Vitals  BP (!) 155/81   Pulse 64   Temp 97.6 °F (36.4 °C)   Resp 17   Ht 6' 1\" (1.854 m)   Wt 103.4 kg (228 lb)   SpO2 95%   BMI 30.08 kg/m²       ROS: 12 point ROS obtained in details.  Pertinent positives as mentioned in HPI,   otherwise negative    Physical Exam:    General:   awake alert and oriented   Skin:   no rashes or skin lesions noted on limited exam   HEENT:  Normocephalic, atraumatic, EOMI, no scleral icterus or pallor; no conjunctival hemmohage;  Neck supple, no bruits. Lymph Nodes:   no cervical, axillary or inguinal adenopathy   Lungs:   non-labored, bilaterally clear to aspiration- no crackles wheezes rales or rhonchi   Heart:  RRR, s1 and s2; no edema, + pedal pulses   Abdomen:  soft, non-distended, active bowel sounds, no hepatomegaly, no splenomegaly. Non-tender   Genitourinary:  deferred   Extremities:   no clubbing, cyanosis; decreased range of movement of right knee due to pain. No erythema/swelling right knee; muscle mass appropriate for age; swelling of the right fifth toe. Ulceration present on the right fifth toe. No erythema right fifth toe or right foot. Neurologic:  No gross focal sensory abnormalities; 5/5 muscle strength to upper and lower extremities. Speech appropirate. Cranial nerves intact   Psychiatric:   appropriate and interactive. Labs: Results:   Chemistry Recent Labs     07/20/21  0648 07/19/21  0342   * 189*    140   K 4.3 4.3    105   CO2 31 30   BUN 16 20*   CREA 1.06 1.09   CA 8.3* 8.3*   AGAP 2* 5   BUCR 15 18      CBC w/Diff Recent Labs     07/20/21  0648 07/19/21  0342 07/18/21  1130   WBC 8.5 7.4 9.7   RBC 3.89* 3.61* 3.89*   HGB 11.7* 10.7* 11.6*   HCT 34.6* 32.4* 35.2*    328 347   GRANS 66  --  69   LYMPH 23  --  21   EOS 3  --  3      Microbiology No results for input(s): CULT in the last 72 hours. RADIOLOGY:    All available imaging studies/reports in [facility] for this admission were reviewed      Disclaimer: Sections of this note are dictated utilizing voice recognition software, which may have resulted in some phonetic based errors in grammar and contents. Even though attempts were made to correct all the mistakes, some may have been missed, and remained in the body of the document.  If questions arise, please contact our department.     Dr. Carlette Ormond, Infectious Disease Specialist  [phone]  July 20, 2021  11:20 AM

## 2021-07-20 NOTE — PROGRESS NOTES
[facility] Hospitalist Group  Progress Note    Patient: Joselyn Manzano Age: 60 y.o. : 1965 MR#: [id number] SSN: [id number]  Date/Time: 2021    Subjective:     Patient is sitting in bed in no apparent distress, awake and alert    Assessment/Plan:     Left foot fifth toe diabetic ulcer with osteomyelitis  Type 2 diabetes  Chronic kidney disease stage II  Elevated blood pressures  Chronic anemia    Plan  Status post debridement, follow cultures  On antibiotics, ID is following  Continue Lantus, sliding scale insulin  Monitor blood pressures, as needed hydralazine  Discussed with patient, discussed with ID physician  Discussed with   Dispositionhome soon    Case discussed with:  [x]Patient  []Family  []Nursing  []Case Management  DVT Prophylaxis:  []Lovenox  [x]Hep SQ  []SCDs  []Coumadin   []On Heparin gtt    Objective:   VS:   Visit Vitals  BP (!) 149/93   Pulse 62   Temp 98 °F (36.7 °C)   Resp 17   Ht 6' 1\" (1.854 m)   Wt 103.4 kg (228 lb)   SpO2 98%   BMI 30.08 kg/m²      Tmax/24hrs: Temp (24hrs), Av.7 °F (36.5 °C), Min:97.2 °F (36.2 °C), Max:98.2 °F (36.8 °C)    Input/Output:     Intake/Output Summary (Last 24 hours) at 2021 1815  Last data filed at 2021 1203  Gross per 24 hour   Intake 270 ml   Output 400 ml   Net -130 ml       General:  Awake, alert  Cardiovascular:  S1S2+, RRR  Pulmonary:  CTA b/l  GI:  Soft, BS+, NT, ND  Extremities: Left foot with dressing        Labs:    Recent Results (from the past 24 hour(s))   CULTURE, WOUND Fatimah Speed STAIN    Collection Time: 21  7:29 PM    Specimen: Foot, Right   Result Value Ref Range    Special Requests: RIGHT FOOT  5TH TOE        GRAM STAIN NO WBC'S SEEN      GRAM STAIN NO ORGANISMS SEEN      Culture result: PENDING    GLUCOSE, POC    Collection Time: 21  8:31 PM   Result Value Ref Range    Glucose (POC) 81 70 - 110 mg/dL   GLUCOSE, POC    Collection Time: 21  9:38 PM   Result Value Ref Range    Glucose (POC) 90 70 - 110 mg/dL   CBC WITH AUTOMATED DIFF    Collection Time: 07/20/21  6:48 AM   Result Value Ref Range    WBC 8.5 4.6 - 13.2 K/uL    RBC 3.89 (L) 4.35 - 5.65 M/uL    HGB 11.7 (L) 13.0 - 16.0 g/dL    HCT 34.6 (L) 36.0 - 48.0 %    MCV 88.9 74.0 - 97.0 FL    MCH 30.1 24.0 - 34.0 PG    MCHC 33.8 31.0 - 37.0 g/dL    RDW 12.4 11.6 - 14.5 %    PLATELET 779 224 - 070 K/uL    MPV 9.1 (L) 9.2 - 11.8 FL    NEUTROPHILS 66 40 - 73 %    LYMPHOCYTES 23 21 - 52 %    MONOCYTES 7 3 - 10 %    EOSINOPHILS 3 0 - 5 %    BASOPHILS 1 0 - 2 %    ABS. NEUTROPHILS 5.7 1.8 - 8.0 K/UL    ABS. LYMPHOCYTES 2.0 0.9 - 3.6 K/UL    ABS. MONOCYTES 0.6 0.05 - 1.2 K/UL    ABS. EOSINOPHILS 0.3 0.0 - 0.4 K/UL    ABS.  BASOPHILS 0.0 0.0 - 0.1 K/UL    DF AUTOMATED     METABOLIC PANEL, BASIC    Collection Time: 07/20/21  6:48 AM   Result Value Ref Range    Sodium 137 136 - 145 mmol/L    Potassium 4.3 3.5 - 5.5 mmol/L    Chloride 104 100 - 111 mmol/L    CO2 31 21 - 32 mmol/L    Anion gap 2 (L) 3.0 - 18 mmol/L    Glucose 174 (H) 74 - 99 mg/dL    BUN 16 7.0 - 18 MG/DL    Creatinine 1.06 0.6 - 1.3 MG/DL    BUN/Creatinine ratio 15 12 - 20      GFR est AA >60 >60 ml/min/1.73m2    GFR est non-AA >60 >60 ml/min/1.73m2    Calcium 8.3 (L) 8.5 - 10.1 MG/DL   MAGNESIUM    Collection Time: 07/20/21  6:48 AM   Result Value Ref Range    Magnesium 2.3 1.6 - 2.6 mg/dL   GLUCOSE, POC    Collection Time: 07/20/21  6:50 AM   Result Value Ref Range    Glucose (POC) 169 (H) 70 - 110 mg/dL   GLUCOSE, POC    Collection Time: 07/20/21 11:39 AM   Result Value Ref Range    Glucose (POC) 216 (H) 70 - 110 mg/dL   GLUCOSE, POC    Collection Time: 07/20/21  4:13 PM   Result Value Ref Range    Glucose (POC) 108 70 - 110 mg/dL     Additional Data Reviewed:      Signed By: Ofe Ugarte MD     July 20, 2021

## 2021-07-20 NOTE — OP NOTES
Operative Report     Patient: Yamila Bear MRN: [id number]  SSN: [id number]    YOB: 1965  Age: 60 y.o. Sex: male       Indications: The patient was admitted to hospital for right fifth toe ulcer with osteomyelitis. Patient had an MRI confirming osteomyelitis. Patient noted to have swollen toe with purulence drainage from toe and it probed deep down to bone. Patient needed amputation to prevent sepsis and more proximal progression of infection. All risks, benefits, complications of procedure discussed in detail with patient. Possible complications discussed including but not limited to delayed healing, non-healing, requirement of additional surgery, more proximal amputation, phantom pain, loss of limb or death. No guarantee made to outcome of procedure. Patient voiced understanding and signed consent. Date of Surgery: 7/19/2021     Preoperative Diagnosis: Right foot fifth toe diabetic ulcer with osteomyelitis     Postoperative Diagnosis: Same    Surgeon(s) and Role:     * Cheryle Proud, DPM - Primary      Surgical Assistants: Operating Room Staff    Anesthesia: MAC with Local    Procedure:  Procedure(s):  RIGHT FOOT FIFTH TOE [address] [facility]    Procedure in Detail:     The patient was brought to the operative suite and secured in the supine position on the operating room table. After IV sedation from department of anesthesia local block consisting of 20 cc of 1:1 mixture of 1% lidocaine plain and 0.5% marcaine plain administered proximal to surgical area. Right foot prepped and draped in usual sterile fashion. Attention directed to right fifth toe where racket shaped incision performed at base of toe down to bone with # 10 blade. Toe was disarticulated at PIPJ. Significant bone destruction noted. Distal portion of proximal phalanx also resected with bone cutter. Amputated toe sent to pathology. Surgical site irrigated with normal saline solution.  Clean margin obtained from remaining stump of proximal phalanx and sent to micro and pathology. Bony rasp used to smoothened out rough bone edges. Visible tendons transected as proximal as possible to prevent tendon sheath infection. Devitalized, redundant tissue excised as necessary. Surgical site closed with 3-0 prolene suture with simple interrupted technique. Dressed with betadine soaked adaptic and dry gauze. The patient tolerated the procedure and anesthesia well. The patient was sent to the recovery room in apparent satisfactory condition.      Findings:  Right fifth toe noted to be dystrophic, brittle up to base of proximal phalanx    Estimated Blood Loss:  10 cc    Drains: none           Specimens:   ID Type Source Tests Collected by Time Destination   1 : RIGHT fifth toe Preservative Foot, Right  Peg Nereyda, [facility] 7/19/2021 1928 Pathology   2 : RIGHT fifth toe (Clean margin) Preservative Foot, Right  Swati OAKES [facility] 7/19/2021 1928 Pathology   1 : RIGHT fifth toe Wound Foot, Right CULTURE, ANAEROBIC AND AEROBIC Swati OAKES [facility] 7/19/2021 1929 Microbiology               Implants:  * No implants in log *           Complications:  None

## 2021-07-20 NOTE — ROUTINE PROCESS
TRANSFER - IN REPORT:    Verbal report received from Dean 33  (name) on Woody Turcios  being received from PACU(unit) for routine progression of care      Report consisted of patients Situation, Background, Assessment and   Recommendations(SBAR). Information from the following report(s) OR Summary was reviewed with the receiving nurse. Opportunity for questions and clarification was provided. Assessment completed upon patients arrival to unit and care assumed. arrived on floor 2130    Primary Nurse Aaron Vegas RN and JUDE BOSTON performed a dual skin   assessment on this patient Impairment noted- see wound doc flow sheet  Jose score is 22    SURGICAL SITE RIGHT FOOT DRESSING DRY AND INTACT    2330-- PATIENT GOT UP TO BATHROOM AND FOOT DRESSING CAME OFF -- REDRESSED WITH GAUZE, ABD PAD AND KURLEX     Bedside and Verbal shift change report given to JESSICA ROQUE (oncoming nurse) by Danelle Carrera RN (offgoing nurse). Report given with SBAR, Kardex, Intake/Output, MAR, Accordion and Recent Results.

## 2021-07-20 NOTE — PROGRESS NOTES
HOSPITALIST PROGRESS NOTE:    Follow-up for:      Massive left pleural effusion with mediastinal shift s/p chest tube placement  Recent Left hilar and suprahilar lung  mass with adenopathy  Recently found multiple liver lesions suspicious for metastases  Essential hypertension  Hyperlipidemia  CAD s/p MI  Chronic back pain  History of tobacco abuse  afib with RVR      *Please note the patient's  past surgical, family and social histories have all been reviewed.    Subjective:   Had afib with RVR converted to NSR    No chest pain, vomiting, fever. Sob better.  Chest tube removed today    PMH:     Heart disease                                                 Essential (primary) hypertension                              Hypercholesterolemia                                          Inflammatory disease of prostate                              Low back pain                                                 Myocardial infarction (CMS/HCC)                                 Comment: 2004    Objective/Physical Exam     Vital 24 Hour Range Last Value   Temperature Temp  Min: 96.9 °F (36.1 °C)  Max: 98.4 °F (36.9 °C) 96.9 °F (36.1 °C) (11/10/18 1444)   Pulse Pulse  Min: 72  Max: 141 76 (11/10/18 1444)   Respiratory Resp  Min: 18  Max: 24 22 (11/10/18 1444)   Non-Invasive  Blood Pressure BP  Min: 94/53  Max: 137/77 123/69 (11/10/18 1444)   Pulse Oximetry SpO2  Min: 90 %  Max: 98 % 94 % (11/10/18 1444)       Physical Exam:  GEN: NAD. AOx3  SKIN: No rashes or lesions   HEENT: Normocephalic, atraumatic. Hearing is intact and dentition is good  NECK: Full ROM. No thyromegaly or palpable masses. Trachea is midline  LYMPH: No anterior cervical or supraclavicular lymphadenopathy  CV: RRR. S1, S2 normal. No S3, S4. No murmurs, rubs, gallops  CHEST: Decreased breath sounds left lung.  ABDOMEN: Soft, non-tender and non-distended. No rebound or guarding. No masses or hepatosplenomegaly  EXT: No clubbing, cyanosis or edema. No joint  Problem: Amputation  Goal: *Progressive mobility and function  Outcome: Progressing Towards Goal  Goal: *Optimal pain control at patient's stated goal  Outcome: Progressing Towards Goal  Goal: *Absence of infection signs and symptoms  Outcome: Progressing Towards Goal     Problem: Falls - Risk of  Goal: *Absence of Falls  Description: Document Jose Amaya Fall Risk and appropriate interventions in the flowsheet. Outcome: Progressing Towards Goal  Note: Fall Risk Interventions:  Mobility Interventions: Patient to call before getting OOB, PT Consult for mobility concerns         Medication Interventions: Patient to call before getting OOB, Teach patient to arise slowly                   Problem: Patient Education: Go to Patient Education Activity  Goal: Patient/Family Education  Outcome: Progressing Towards Goal     Problem: Diabetes Self-Management  Goal: *Disease process and treatment process  Description: Define diabetes and identify own type of diabetes; list 3 options for treating diabetes. Outcome: Progressing Towards Goal  Goal: *Incorporating nutritional management into lifestyle  Description: Describe effect of type, amount and timing of food on blood glucose; list 3 methods for planning meals. Outcome: Progressing Towards Goal  Goal: *Incorporating physical activity into lifestyle  Description: State effect of exercise on blood glucose levels. Outcome: Progressing Towards Goal  Goal: *Developing strategies to promote health/change behavior  Description: Define the ABC's of diabetes; identify appropriate screenings, schedule and personal plan for screenings. Outcome: Progressing Towards Goal  Goal: *Using medications safely  Description: State effect of diabetes medications on diabetes; name diabetes medication taking, action and side effects.   Outcome: Progressing Towards Goal  Goal: *Monitoring blood glucose, interpreting and using results  Description: Identify recommended blood glucose targets  and effusion  NEURO: CNs II-XII grossly intact. No gross motor or sensory deficits  PSYCH: Affect appropriate. Mood stable. Memory intact    Laboratory Results:    Recent Labs   Lab  11/10/18   0135 11/09/18   1359  11/09/18   0608  11/08/18   0458   11/05/18   1026   SODIUM  147*   --   146*  141   < >  140   POTASSIUM  4.1  4.3  3.8  3.9   < >  4.4   CHLORIDE  109*   --   108*  107   < >  104   CO2  31   --   30  28   < >  29   BUN  24*   --   22*  35*   < >  28*   CREATININE  0.80   --   0.75  0.81   < >  0.86   GLUCOSE  124*   --   101*  115*   < >  115*   WBC  18.7*   --   17.3*  17.2*   < >  19.0*   HGB  15.6   --   16.0  15.1   < >  16.0   HCT  47.7   --   49.7  47.2   < >  48.2   PLT  136*   --   155  185   < >  244   PT   --    --    --    --    --   10.9   INR   --    --    --    --    --   1.1    < > = values in this interval not displayed.       Recent Labs   Lab  11/10/18   0135  11/09/18   2003   RAPDTR  0.02  <0.02           Medications/Infusions:  Scheduled:   • amiodarone  200 mg Oral 2 times per day   • metoPROLOL succinate  25 mg Oral BID   • [START ON 11/12/2018] fentanyl  1 patch Transdermal Q3 Days   • fentaNYL  1 patch Transdermal Q3 Days   • tamsulosin  0.4 mg Oral Nightly   • famotidine  20 mg Oral 2 times per day   • sodium chloride (PF)  2 mL Injection 2 times per day   • amoxicillin-clavulanate  1 tablet Oral BID       Continuous Infusions:   • sodium chloride 0.9% infusion Stopped (11/07/18 1525)   • sodium chloride 0.9% infusion Stopped (11/08/18 9709)       DIAGNOSTIC STUDIES      XR CHEST AP OR PA   Final Result   IMPRESSION:   Residual left apical pneumothorax after chest tube removal.                  XR CHEST AP OR PA   Final Result   Findings/impression:      Tiny left apical pneumothorax is smaller compared to the prior radiograph.   The patient has an inferiorly placed chest tube projecting above the left   hemidiaphragm. Groundglass and infiltrative opacities of the left lung are  personal targets. Outcome: Progressing Towards Goal  Goal: *Prevention, detection, treatment of acute complications  Description: List symptoms of hyper- and hypoglycemia; describe how to treat low blood sugar and actions for lowering  high blood glucose level. Outcome: Progressing Towards Goal  Goal: *Prevention, detection and treatment of chronic complications  Description: Define the natural course of diabetes and describe the relationship of blood glucose levels to long term complications of diabetes.   Outcome: Progressing Towards Goal  Goal: *Developing strategies to address psychosocial issues  Description: Describe feelings about living with diabetes; identify support needed and support network  Outcome: Progressing Towards Goal  Goal: *Insulin pump training  Outcome: Progressing Towards Goal  Goal: *Sick day guidelines  Outcome: Progressing Towards Goal  Goal: *Patient Specific Goal (EDIT GOAL, INSERT TEXT)  Outcome: Progressing Towards Goal     Problem: Patient Education: Go to Patient Education Activity  Goal: Patient/Family Education  Outcome: Progressing Towards Goal     Problem: Injury - Risk of, Adverse Drug Event  Goal: *Absence of adverse drug events  Outcome: Progressing Towards Goal  Goal: *Absence of medication errors  Outcome: Progressing Towards Goal  Goal: *Knowledge of prescribed medications  Outcome: Progressing Towards Goal     Problem: Patient Education: Go to Patient Education Activity  Goal: Patient/Family Education  Outcome: Progressing Towards Goal     Problem: Pain  Goal: *Control of Pain  Outcome: Progressing Towards Goal  Goal: *PALLIATIVE CARE:  Alleviation of Pain  Outcome: Progressing Towards Goal     Problem: Patient Education: Go to Patient Education Activity  Goal: Patient/Family Education  Outcome: Progressing Towards Goal     Problem: Surgical Wound Care  Goal: *Non-infected Wound: Absence of infection signs and symptoms  Description: Infection control procedures (eg: clean   stable or mildly improved compared to prior study.      There is no contralateral mediastinal shift. There is no tension   physiology.         CT CHEST PE IMAGING   Final Result   IMPRESSION:      1. No evidence of pulmonary embolus   2. Left hilar and mediastinal adenopathy   3. Left chest tube in place with small left pleural effusion and small left   pneumothorax   4. Some slight consolidation is seen in the left lung         IR Guided Biopsy   Final Result   IMPRESSION:      Ultrasound-guided biopsy of liver mass.                  XR CHEST AP OR PA   Final Result   IMPRESSION:    Stable chest radiograph, with left basilar chest tube, small left apical   pneumothorax, and left mid to lower lung field alveolar opacities.         XR CHEST AP OR PA   Final Result   IMPRESSION: Small left apical pneumothorax.      Diffuse left lung infiltrate.      Chest tube tip at the level of the left lung base.      Results were phoned to Alexandre, the patient's nurse on 11/8/2018 7:39 AM         IR Guided Biopsy    (Results Pending)   XR CHEST AP OR PA    (Results Pending)   XR CHEST AP OR PA    (Results Pending)         ASSESSMENT AND PLAN:  Massive left pleural effusion with mediastinal shift s/p chest tube placement  Recent Left hilar and suprahilar lung  mass with adenopathy  Recently found multiple liver lesions suspicious for metastases  Elevated LFT due to liver metastases  Acute hypoxic respiratory failure secondary to  Above  afib with RVR-new  Essential hypertension  Hyperlipidemia  CAD s/p MI  Chronic back pain  History of tobacco abuse   Sinus tachycardia         Plan:     Admited to medical surgical unit   Oncology consult-appreciate recommendations  IR consult-appreciate recommendations. Chest tube management per IR and pulmonary  Significant amount of the pleural fluid removed   pleural fluid cytology negative on a first sample. Subsequent sample pending   S/p  liver biopsy today. Chest tube clamped. And removed  dressings, clean gloves, hand washing, precautions to isolate wound from contamination, sterile instruments used for wound debridement) should be implemented. Outcome: Progressing Towards Goal  Goal: *Infected Wound: Prevention of further infection and promotion of healing  Description: Consider the use of systemic antibiotics in patients with cellulitis, osteomyelitis, bacteremia, or sepsis if there are no contraindications.   Outcome: Progressing Towards Goal  Goal: *Improvement of existing wound and maintenance of skin integrity  Outcome: Progressing Towards Goal     Problem: Patient Education: Go to Patient Education Activity  Goal: Patient/Family Education  Outcome: Progressing Towards Goal this morning.  Repeat CXR today    Pleural fluid  Analysis -elevated nucleated cell. suspected reactive leukocytosis  Continue Augmentin  Serial chest x-ray. Chest x-ray this morning shows small left apical pneumothorax persist but pleural effusion resolved  Eventually patient will need a Pleurx catheter as pleural effusion suspected due to malignancy   Patient noted tachycardic worse with activity .noted Afib with RVR.Continue amiodarone and Toprol-XL  ECHOcardiogram today  Anticoagulation per cardiology  Continue home medication  Routine telemetry monitoring  Started on a fentanyl patch and discontinue morphine due to hallucination       GI/DVT prophylaxis:Pepcid/no pharmacological DVT prophylaxis due to procedure    Code status: Full Resuscitation        -------------------------------------------------------------------------------------------------------------------    Best Practice:            Quality Indicators     completed    Case discussed with:  Patient, RN and MD    Reviewed Pertinent: Allergies, Medications, Radiology and Labs    Hospital Day #: Hospital Day: 4    Tentative discharge Disposition: To be determined   Echo  Chest tube removed  May need home o2          Signed:  Neil Mahan MD  11/10/2018  2:53 PM

## 2021-07-20 NOTE — ROUTINE PROCESS
Bedside and Verbal shift change report given to [address] (oncoming nurse) by Wilfrid Powell (offgoing nurse). Report included the following information SBAR, Kardex, Intake/Output and MAR.

## 2021-07-20 NOTE — ANESTHESIA POSTPROCEDURE EVALUATION
Procedure(s):  RIGHT FOOT FIFTH TOE AMPUTATION. MAC    Anesthesia Post Evaluation      Multimodal analgesia: multimodal analgesia not used between 6 hours prior to anesthesia start to PACU discharge  Patient location during evaluation: PACU  Level of consciousness: awake  Pain score: 1  Pain management: satisfactory to patient  Airway patency: patent  Anesthetic complications: no  Cardiovascular status: acceptable  Respiratory status: acceptable  Hydration status: acceptable  Post anesthesia nausea and vomiting:  none  Final Post Anesthesia Temperature Assessment:  Normothermia (36.0-37.5 degrees C)      INITIAL Post-op Vital signs:   Vitals Value Taken Time   /79 07/19/21 2017   Temp 36.8 °C (98.2 °F) 07/19/21 1956   Pulse 61 07/19/21 2025   Resp 15 07/19/21 2025   SpO2 100 % 07/19/21 2025   Vitals shown include unvalidated device data.

## 2021-07-20 NOTE — PROGRESS NOTES
PHYSICAL THERAPY EVALUATION AND DISCHARGE    Patient: Jose Alfredo Martin (78 y.o. male)  Date: 7/20/2021  Primary Diagnosis: Osteomyelitis ([facility] [address] [M86.9]  Procedure(s) (LRB):  RIGHT FOOT FIFTH TOE AMPUTATION (N/A) 1 Day Post-Op   Precautions:    (NWB right forefoot)  PLOF: Patient was independent with self care and functional mobility. He reports recent right knee injury. ASSESSMENT :  Based on the objective data described below, the patient is independent with functional mobility. He is aware of weight bearing precaution of right foot and is able to maintain when ambulating. Dispensed post-op shoe and pt needs assistance to velcro straps. Patient has steady, though antalgic gait due to right knee and right foot pain. Patient does not require further skilled intervention at this level of care and will sign off at his time        PLAN :  Recommendations and Planned Interventions:   No formal PT needs identified at this time. Discharge Recommendations: None  Further Equipment Recommendations for Discharge: N/A     SUBJECTIVE:   Patient stated [facility] orthopedic doctor had me on crutches for alittle bit.     OBJECTIVE DATA SUMMARY:     Past Medical History:   Diagnosis Date    Arthritis of left knee     Diabetes ([facility] [address]     Erectile dysfunction     Knee pain     Osteoarthritis of right knee     Osteomyelitis ([facility] [address]     Sinusitis      Past Surgical History:   Procedure Laterality Date    COLONOSCOPY N/A 2/5/2020    COLONOSCOPY performed by Gabriella Michel MD at [address] TOE Left 12/2018    left great toe    HX MENISCUS REPAIR Right 2015     Barriers to Learning/Limitations: None  Compensate with: N/A  Home Situation:   Home Situation  Home Environment: Private residence  # Steps to Enter: 0  One/Two Story Residence: Two story  Living Alone: No  Support Systems: Spouse/Significant Other/Partner  Patient Expects to be Discharged to[de-identified] House  Current DME Used/Available at Home: None  Tub or Shower Type: Tub/Shower combination  Critical Behavior:  Neurologic State: Alert  Orientation Level: Oriented X4  Cognition: Follows commands  Safety/Judgement: Fall prevention;Home safety  Psychosocial  Patient Behaviors: Calm; Cooperative  Purposeful Interaction: Yes  Pt Identified Daily Priority: Clinical issues (comment)  Caritas Process: Nurture loving kindness; Attend basic human needs; Teaching/learning  Caring Interventions: Reassure  Reassure: Caring rounds  Skin Condition/Temp: Warm;Dry     Skin Integrity: Wound (add Wound LDA) (amputated right toe)  Skin Integumentary  Skin Color: Appropriate for ethnicity  Skin Condition/Temp: Warm;Dry  Skin Integrity: Wound (add Wound LDA) (amputated right toe)     Strength:    Strength: Within functional limits     Tone & Sensation:   Tone: Normal  Sensation: Intact      Range Of Motion:  AROM: Within functional limits           Functional Mobility:  Bed Mobility:     Supine to Sit: Independent  Sit to Supine: Independent     Transfers:  Sit to Stand: Modified independent  Stand to Sit: Modified independent    Balance:   Sitting: Intact  Standing: Intact; With support  Standing - Static: Good  Standing - Dynamic : Good    Ambulation/Gait Training:  Distance (ft): 15 Feet (ft)  Ambulation - Level of Assistance: Modified independent  Gait Abnormalities: Antalgic      Pain:  Pain level pre-treatment: 6/10   Pain level post-treatment: 6/10  Pain Intervention(s): Medication (see MAR); Rest, Ice, Repositioning   Response to intervention: Nurse notified, See doc flow    Activity Tolerance:   Fair +  Please refer to the flowsheet for vital signs taken during this treatment.   After treatment:   []         Patient left in no apparent distress sitting up in chair  [x]         Patient left in no apparent distress in bed  [x]         Call bell left within reach  []         Nursing notified  []         Caregiver present  []         Bed alarm activated  []         SCDs applied    COMMUNICATION/EDUCATION:   [x]         Role of Physical Therapy in the acute care setting. [x]         Fall prevention education was provided and the patient/caregiver indicated understanding. []         Patient/family have participated as able in goal setting and plan of care. []         Patient/family agree to work toward stated goals and plan of care. []         Patient understands intent and goals of therapy, but is neutral about his/her participation. []         Patient is unable to participate in goal setting/plan of care: ongoing with therapy staff.  []         Other:     Thank you for this referral.  Destiny Brown, PT   Time Calculation: 8 mins      Eval Complexity: History: LOW Complexity : Zero comorbidities / personal factors that will impact the outcome / POCExam:LOW Complexity : 1-2 Standardized tests and measures addressing body structure, function, activity limitation and / or participation in recreation  Presentation: LOW Complexity : Stable, uncomplicated  Clinical Decision Making:Low Complexity    Overall Complexity:LOW

## 2021-07-20 NOTE — PERIOP NOTES
Unable to call report because nurse involved in RRT. 2030 Patient provided with diet pepsi and lucy belle  2045 Patient has been pain free. Now states pain in right foot 8/10 See MAR for intervention. 2100 Patient resting comfortably now. See flow sheet.

## 2021-07-20 NOTE — ROUTINE PROCESS
Upon entering pt room a discussion ensued with pt asking questions about his IV that was removed from his left ac yesterday when he was off the unit for surgery. Pt stated the nurse had flushed his IV yesterday with the medication before surgery to make him sleepy. He stated the nurse asked him if he was still awake multiple times and he said yes. Pt stated the nurse removed his IV and started another IV in the right ac. Pt stated to nurse this morning he felt nauseous but was firm that it was not a reaction to the anesthesia or the antibiotics that were recently started. Pt has received zofran multiple times and stated it is not working. Pt expressed concern regarding this situation and wanted someone to be aware.

## 2021-07-20 NOTE — ROUTINE PROCESS
0700-/Bedside and Verbal shift change report given to Tomy (oncoming nurse) by Joe Henry (offgoing nurse). Report included the following information SBAR, MAR and Recent Results. 0839-shift assessment complete. Morning medication given. Patient denies pain medication at this time. 1039-pt gone to X-Ray. 1058- pt returned to the floor.

## 2021-07-21 ENCOUNTER — HOME HEALTH ADMISSION (OUTPATIENT)
Dept: HOME HEALTH SERVICES | Facility: HOME HEALTH | Age: 56
End: 2021-07-21
Payer: MEDICAID

## 2021-07-21 LAB
GLUCOSE BLD STRIP.AUTO-MCNC: 123 MG/DL (ref 70–110)
GLUCOSE BLD STRIP.AUTO-MCNC: 143 MG/DL (ref 70–110)
GLUCOSE BLD STRIP.AUTO-MCNC: 166 MG/DL (ref 70–110)
GLUCOSE BLD STRIP.AUTO-MCNC: 173 MG/DL (ref 70–110)
GLUCOSE BLD STRIP.AUTO-MCNC: 183 MG/DL (ref 70–110)

## 2021-07-21 PROCEDURE — 74011250636 HC RX REV CODE- 250/636: Performed by: INTERNAL MEDICINE

## 2021-07-21 PROCEDURE — 99232 SBSQ HOSP IP/OBS MODERATE 35: CPT | Performed by: EMERGENCY MEDICINE

## 2021-07-21 PROCEDURE — 74011250637 HC RX REV CODE- 250/637: Performed by: HOSPITALIST

## 2021-07-21 PROCEDURE — 74011636637 HC RX REV CODE- 636/637: Performed by: INTERNAL MEDICINE

## 2021-07-21 PROCEDURE — 74011250637 HC RX REV CODE- 250/637: Performed by: INTERNAL MEDICINE

## 2021-07-21 PROCEDURE — 65270000029 HC RM PRIVATE

## 2021-07-21 PROCEDURE — 74011250637 HC RX REV CODE- 250/637: Performed by: EMERGENCY MEDICINE

## 2021-07-21 PROCEDURE — 74011000250 HC RX REV CODE- 250: Performed by: INTERNAL MEDICINE

## 2021-07-21 PROCEDURE — 82962 GLUCOSE BLOOD TEST: CPT

## 2021-07-21 RX ORDER — AMLODIPINE BESYLATE 2.5 MG/1
2.5 TABLET ORAL DAILY
Status: DISCONTINUED | OUTPATIENT
Start: 2021-07-21 | End: 2021-07-22 | Stop reason: HOSPADM

## 2021-07-21 RX ADMIN — Medication 1 CAPSULE: at 08:43

## 2021-07-21 RX ADMIN — CEFEPIME HYDROCHLORIDE 2 G: 2 INJECTION, POWDER, FOR SOLUTION INTRAVENOUS at 13:20

## 2021-07-21 RX ADMIN — HYDROCODONE BITARTRATE AND ACETAMINOPHEN 1 TABLET: 5; 325 TABLET ORAL at 23:25

## 2021-07-21 RX ADMIN — Medication 10 ML: at 05:44

## 2021-07-21 RX ADMIN — CELECOXIB 100 MG: 100 CAPSULE ORAL at 08:44

## 2021-07-21 RX ADMIN — DOCUSATE SODIUM 100 MG: 100 CAPSULE, LIQUID FILLED ORAL at 17:30

## 2021-07-21 RX ADMIN — Medication 10 ML: at 21:45

## 2021-07-21 RX ADMIN — HEPARIN SODIUM 5000 UNITS: 5000 INJECTION INTRAVENOUS; SUBCUTANEOUS at 08:49

## 2021-07-21 RX ADMIN — POLYETHYLENE GLYCOL 3350 17 G: 17 POWDER, FOR SOLUTION ORAL at 17:34

## 2021-07-21 RX ADMIN — HEPARIN SODIUM 5000 UNITS: 5000 INJECTION INTRAVENOUS; SUBCUTANEOUS at 16:37

## 2021-07-21 RX ADMIN — CEFEPIME HYDROCHLORIDE 2 G: 2 INJECTION, POWDER, FOR SOLUTION INTRAVENOUS at 05:43

## 2021-07-21 RX ADMIN — HYDROCODONE BITARTRATE AND ACETAMINOPHEN 1 TABLET: 5; 325 TABLET ORAL at 17:30

## 2021-07-21 RX ADMIN — HEPARIN SODIUM 5000 UNITS: 5000 INJECTION INTRAVENOUS; SUBCUTANEOUS at 23:22

## 2021-07-21 RX ADMIN — CEFEPIME HYDROCHLORIDE 2 G: 2 INJECTION, POWDER, FOR SOLUTION INTRAVENOUS at 20:56

## 2021-07-21 RX ADMIN — DOCUSATE SODIUM 100 MG: 100 CAPSULE, LIQUID FILLED ORAL at 08:45

## 2021-07-21 RX ADMIN — INSULIN GLARGINE 25 UNITS: 100 INJECTION, SOLUTION SUBCUTANEOUS at 21:45

## 2021-07-21 RX ADMIN — HEPARIN SODIUM 5000 UNITS: 5000 INJECTION INTRAVENOUS; SUBCUTANEOUS at 00:00

## 2021-07-21 RX ADMIN — INSULIN LISPRO 2 UNITS: 100 INJECTION, SOLUTION INTRAVENOUS; SUBCUTANEOUS at 21:45

## 2021-07-21 RX ADMIN — INSULIN LISPRO 2 UNITS: 100 INJECTION, SOLUTION INTRAVENOUS; SUBCUTANEOUS at 16:35

## 2021-07-21 RX ADMIN — HYDROCODONE BITARTRATE AND ACETAMINOPHEN 1 TABLET: 5; 325 TABLET ORAL at 08:44

## 2021-07-21 RX ADMIN — Medication 10 ML: at 13:21

## 2021-07-21 NOTE — PROGRESS NOTES
Home health orders received. Office called, patient put in que with Steve at [facility].      Willie Bourne RN BSN  Care Manager  [phone]

## 2021-07-21 NOTE — ROUTINE PROCESS
Bedside and Verbal shift change report given to [address] Ninfa (oncoming nurse) by Mejia Kwan (offgoing nurse). Report included the following information SBAR, Kardex, Intake/Output and MAR.

## 2021-07-21 NOTE — HOME CARE
Received home health referral for [facility] for SN. Spoke with patient via phone patient identifiers verified. Explained home care services and routines. Demographics verified including insurance and address confirmed to a new address: [address] [place] [address]. Which has been updated in system. Preferred phone number [phone]. Patient has the following DME: shower chair per OT. Patient is requesting a cane. Updated CM. Caregivers available. Orders initiated awaiting for d/c orders to be completed.

## 2021-07-21 NOTE — WOUND CARE
Physical Exam   Room 409: discussed with primary nurse Valentine ROQUE. Orders written for topical care. Will turn over care to nursing staff at this time.   Radha LEIGHN, RN, [facility], [address]

## 2021-07-21 NOTE — PROGRESS NOTES
Bedside and Verbal shift change report given to Brittny Henriquez RN (oncoming nurse) by Ford Lewis RN (offgoing nurse). Report included the following information SBAR, Intake/Output and MAR.

## 2021-07-21 NOTE — PROGRESS NOTES
Progress Note    Patient: Fritz Norton MRN: [id number]  SSN: [id number]    YOB: 1965  Age: 60 y.o. Sex: male      Admit Date: 7/18/2021  1 Day Post-Op     Procedure:   Procedure(s):  RIGHT FOOT PARTIAL FIFTH TOE AMPUTATION    Subjective:     Patient seen resting quiet and comfortably and no apparent distress. Patient denies any pain to foot. Denies N/V/C/F. Status post: osteomyelitis    Objective:     Visit Vitals  BP (!) 163/83 (BP 1 Location: Left upper arm, BP Patient Position: Lying right side)   Pulse 70   Temp 98.2 °F (36.8 °C)   Resp 16   Ht 6' 1\" (1.854 m)   Wt 103.4 kg (228 lb)   SpO2 96%   BMI 30.08 kg/m²        Physical Exam:  Right fifth toe amputation site skin edges well approximated, skin sutures intact. No bleeding noted, no clinical signs of infection noted. Labs/Radiology Review: images and reports reviewed    Assessment:     Hospital Problems  Date Reviewed: 11/24/2020        Codes Class Noted POA    Osteomyelitis [facility]) ICD-10-CM: M86.9  ICD-9-CM: 730.20  7/18/2021 Unknown              Plan/Recommendations/Medical Decision Making:     - post-op x-rays reviewed. Stable fifth toe amputation through proximal phalanx.   - f/u OR micro/path. Abx per ID recommendation.   - Remain NWB to right forefoot. Surgical shoe noted at bedside.   - Medical management per primary team.   - Patient is stable to d/c from foot stand point. Will nee home health to change dressings every 2 days with betadine and dry gauze. - Patient will f/u outpatient in 1 week.

## 2021-07-21 NOTE — PROGRESS NOTES
Problem: Amputation  Goal: *Progressive mobility and function  Outcome: Progressing Towards Goal  Goal: *Optimal pain control at patient's stated goal  Outcome: Progressing Towards Goal  Goal: *Absence of infection signs and symptoms  Outcome: Progressing Towards Goal     Problem: Falls - Risk of  Goal: *Absence of Falls  Description: Document [place] Fall Risk and appropriate interventions in the flowsheet. Outcome: Progressing Towards Goal  Note: Fall Risk Interventions:  Mobility Interventions: Patient to call before getting OOB         Medication Interventions: Patient to call before getting OOB                   Problem: Patient Education: Go to Patient Education Activity  Goal: Patient/Family Education  Outcome: Progressing Towards Goal     Problem: Diabetes Self-Management  Goal: *Disease process and treatment process  Description: Define diabetes and identify own type of diabetes; list 3 options for treating diabetes. Outcome: Progressing Towards Goal  Goal: *Incorporating nutritional management into lifestyle  Description: Describe effect of type, amount and timing of food on blood glucose; list 3 methods for planning meals. Outcome: Progressing Towards Goal  Goal: *Incorporating physical activity into lifestyle  Description: State effect of exercise on blood glucose levels. Outcome: Progressing Towards Goal  Goal: *Developing strategies to promote health/change behavior  Description: Define the ABC's of diabetes; identify appropriate screenings, schedule and personal plan for screenings. Outcome: Progressing Towards Goal  Goal: *Using medications safely  Description: State effect of diabetes medications on diabetes; name diabetes medication taking, action and side effects. Outcome: Progressing Towards Goal  Goal: *Monitoring blood glucose, interpreting and using results  Description: Identify recommended blood glucose targets  and personal targets.   Outcome: Progressing Towards Goal  Goal: *Prevention, detection, treatment of acute complications  Description: List symptoms of hyper- and hypoglycemia; describe how to treat low blood sugar and actions for lowering  high blood glucose level. Outcome: Progressing Towards Goal  Goal: *Prevention, detection and treatment of chronic complications  Description: Define the natural course of diabetes and describe the relationship of blood glucose levels to long term complications of diabetes.   Outcome: Progressing Towards Goal  Goal: *Developing strategies to address psychosocial issues  Description: Describe feelings about living with diabetes; identify support needed and support network  Outcome: Progressing Towards Goal  Goal: *Insulin pump training  Outcome: Progressing Towards Goal  Goal: *Sick day guidelines  Outcome: Progressing Towards Goal  Goal: *Patient Specific Goal (EDIT GOAL, INSERT TEXT)  Outcome: Progressing Towards Goal     Problem: Patient Education: Go to Patient Education Activity  Goal: Patient/Family Education  Outcome: Progressing Towards Goal     Problem: Injury - Risk of, Adverse Drug Event  Goal: *Absence of adverse drug events  Outcome: Progressing Towards Goal  Goal: *Absence of medication errors  Outcome: Progressing Towards Goal  Goal: *Knowledge of prescribed medications  Outcome: Progressing Towards Goal     Problem: Patient Education: Go to Patient Education Activity  Goal: Patient/Family Education  Outcome: Progressing Towards Goal     Problem: Pain  Goal: *Control of Pain  Outcome: Progressing Towards Goal  Goal: *PALLIATIVE CARE:  Alleviation of Pain  Outcome: Progressing Towards Goal     Problem: Patient Education: Go to Patient Education Activity  Goal: Patient/Family Education  Outcome: Progressing Towards Goal     Problem: Surgical Wound Care  Goal: *Non-infected Wound: Absence of infection signs and symptoms  Description: Infection control procedures (eg: clean dressings, clean gloves, hand washing, precautions to isolate wound from contamination, sterile instruments used for wound debridement) should be implemented. Outcome: Progressing Towards Goal  Goal: *Infected Wound: Prevention of further infection and promotion of healing  Description: Consider the use of systemic antibiotics in patients with cellulitis, osteomyelitis, bacteremia, or sepsis if there are no contraindications.   Outcome: Progressing Towards Goal  Goal: *Improvement of existing wound and maintenance of skin integrity  Outcome: Progressing Towards Goal     Problem: Patient Education: Go to Patient Education Activity  Goal: Patient/Family Education  Outcome: Progressing Towards Goal     Problem: Patient Education: Go to Patient Education Activity  Goal: Patient/Family Education  Outcome: Progressing Towards Goal

## 2021-07-21 NOTE — ROUTINE PROCESS
PATIENT USES THE BATHROOM NOT THE URINAL  PATIENT WALKS TO BATHROOM AND  WITHOUT SURGICAL SHOE EITHER    Bedside and Verbal shift change report given to Kristopher Krishnan (oncoming nurse) by Masoud Blevins RN (offgoing nurse). Report given with SBAR, Kardex, Intake/Output, MAR, Accordion and Recent Results.

## 2021-07-21 NOTE — PROGRESS NOTES
[facility] Hospitalist Group  Progress Note    Patient: Meghann Madrigal Age: 60 y.o. : 1965 MR#: [id number] SSN: [id number]  Date/Time: 2021    Subjective:     Patient is sitting in bed in no apparent distress, awake and alert.   Denies any pain    Assessment/Plan:     Left foot fifth toe diabetic ulcer with osteomyelitis  Type 2 diabetes  Chronic kidney disease stage II  Elevated blood pressures  Chronic anemia    Plan  Status post debridement, cultures still pending  On antibiotics, discussed with ID and she will finalize antibiotics tomorrow  Lantus, sliding scale insulin  Will add low-dose amlodipine, as needed hydralazine  Discussed with patient  Discussed with   Dispositionhome soon    Case discussed with:  [x]Patient  []Family  []Nursing  []Case Management  DVT Prophylaxis:  []Lovenox  [x]Hep SQ  []SCDs  []Coumadin   []On Heparin gtt    Objective:   VS:   Visit Vitals  BP (!) 162/76   Pulse 64   Temp 97.9 °F (36.6 °C)   Resp 16   Ht 6' 1\" (1.854 m)   Wt 103.4 kg (228 lb)   SpO2 98%   BMI 30.08 kg/m²      Tmax/24hrs: Temp (24hrs), Av.4 °F (36.9 °C), Min:97.9 °F (36.6 °C), Max:98.8 °F (37.1 °C)    Input/Output:     Intake/Output Summary (Last 24 hours) at 2021 1843  Last data filed at 2021 2148  Gross per 24 hour   Intake 20 ml   Output    Net 20 ml       General:  Awake, alert  Cardiovascular:  S1S2+, RRR  Pulmonary:  CTA b/l  GI:  Soft, BS+, NT, ND  Extremities: Left foot with dressing      Labs:    Recent Results (from the past 24 hour(s))   GLUCOSE, POC    Collection Time: 21  9:34 PM   Result Value Ref Range    Glucose (POC) 198 (H) 70 - 110 mg/dL   GLUCOSE, POC    Collection Time: 21  6:10 AM   Result Value Ref Range    Glucose (POC) 123 (H) 70 - 110 mg/dL   GLUCOSE, POC    Collection Time: 21  1:11 PM   Result Value Ref Range    Glucose (POC) 143 (H) 70 - 110 mg/dL   GLUCOSE, POC    Collection Time: 21  3:54 PM Result Value Ref Range    Glucose (POC) 166 (H) 70 - 110 mg/dL   GLUCOSE, POC    Collection Time: 07/21/21  4:23 PM   Result Value Ref Range    Glucose (POC) 173 (H) 70 - 110 mg/dL     Additional Data Reviewed:      Signed By: Marv Barrera MD     July 21, 2021

## 2021-07-22 VITALS
DIASTOLIC BLOOD PRESSURE: 77 MMHG | WEIGHT: 228 LBS | HEIGHT: 73 IN | TEMPERATURE: 98.2 F | BODY MASS INDEX: 30.22 KG/M2 | OXYGEN SATURATION: 93 % | RESPIRATION RATE: 19 BRPM | SYSTOLIC BLOOD PRESSURE: 152 MMHG | HEART RATE: 74 BPM

## 2021-07-22 LAB
BACTERIA SPEC CULT: NORMAL
GLUCOSE BLD STRIP.AUTO-MCNC: 134 MG/DL (ref 70–110)
GLUCOSE BLD STRIP.AUTO-MCNC: 87 MG/DL (ref 70–110)
SERVICE CMNT-IMP: NORMAL

## 2021-07-22 PROCEDURE — 74011250637 HC RX REV CODE- 250/637: Performed by: HOSPITALIST

## 2021-07-22 PROCEDURE — 74011250636 HC RX REV CODE- 250/636: Performed by: INTERNAL MEDICINE

## 2021-07-22 PROCEDURE — 82962 GLUCOSE BLOOD TEST: CPT

## 2021-07-22 PROCEDURE — 99239 HOSP IP/OBS DSCHRG MGMT >30: CPT | Performed by: EMERGENCY MEDICINE

## 2021-07-22 PROCEDURE — 74011250637 HC RX REV CODE- 250/637: Performed by: INTERNAL MEDICINE

## 2021-07-22 PROCEDURE — 74011000250 HC RX REV CODE- 250: Performed by: INTERNAL MEDICINE

## 2021-07-22 PROCEDURE — 74011250637 HC RX REV CODE- 250/637: Performed by: EMERGENCY MEDICINE

## 2021-07-22 RX ORDER — HYDROCODONE BITARTRATE AND ACETAMINOPHEN 5; 325 MG/1; MG/1
1 TABLET ORAL
Qty: 10 TABLET | Refills: 0 | Status: SHIPPED | OUTPATIENT
Start: 2021-07-22 | End: 2021-07-22

## 2021-07-22 RX ORDER — DOCUSATE SODIUM 100 MG/1
100 CAPSULE, LIQUID FILLED ORAL 2 TIMES DAILY
Qty: 60 CAPSULE | Refills: 0 | Status: SHIPPED | OUTPATIENT
Start: 2021-07-22

## 2021-07-22 RX ORDER — LEVOFLOXACIN 500 MG/1
500 TABLET, FILM COATED ORAL EVERY 24 HOURS
Status: DISCONTINUED | OUTPATIENT
Start: 2021-07-22 | End: 2021-07-22 | Stop reason: HOSPADM

## 2021-07-22 RX ORDER — FAMOTIDINE 20 MG/1
20 TABLET, FILM COATED ORAL DAILY
Status: DISCONTINUED | OUTPATIENT
Start: 2021-07-22 | End: 2021-07-22 | Stop reason: HOSPADM

## 2021-07-22 RX ORDER — FAMOTIDINE 20 MG/1
20 TABLET, FILM COATED ORAL DAILY
Qty: 14 TABLET | Refills: 0 | Status: SHIPPED | OUTPATIENT
Start: 2021-07-22

## 2021-07-22 RX ORDER — LEVOFLOXACIN 500 MG/1
500 TABLET, FILM COATED ORAL EVERY 24 HOURS
Qty: 7 TABLET | Refills: 0 | Status: SHIPPED | OUTPATIENT
Start: 2021-07-23 | End: 2021-07-30

## 2021-07-22 RX ORDER — OXYCODONE AND ACETAMINOPHEN 5; 325 MG/1; MG/1
1 TABLET ORAL
Qty: 12 TABLET | Refills: 0 | Status: SHIPPED | OUTPATIENT
Start: 2021-07-22 | End: 2021-07-27

## 2021-07-22 RX ORDER — AMLODIPINE BESYLATE 2.5 MG/1
2.5 TABLET ORAL DAILY
Qty: 30 TABLET | Refills: 0 | Status: SHIPPED | OUTPATIENT
Start: 2021-07-23

## 2021-07-22 RX ORDER — ONDANSETRON 4 MG/1
4 TABLET, ORALLY DISINTEGRATING ORAL
Qty: 10 TABLET | Refills: 0 | Status: SHIPPED | OUTPATIENT
Start: 2021-07-22

## 2021-07-22 RX ADMIN — CELECOXIB 100 MG: 100 CAPSULE ORAL at 08:45

## 2021-07-22 RX ADMIN — HEPARIN SODIUM 5000 UNITS: 5000 INJECTION INTRAVENOUS; SUBCUTANEOUS at 08:45

## 2021-07-22 RX ADMIN — FAMOTIDINE 20 MG: 20 TABLET ORAL at 12:03

## 2021-07-22 RX ADMIN — DOCUSATE SODIUM 100 MG: 100 CAPSULE, LIQUID FILLED ORAL at 08:46

## 2021-07-22 RX ADMIN — Medication 1 CAPSULE: at 09:00

## 2021-07-22 RX ADMIN — AMLODIPINE BESYLATE 2.5 MG: 2.5 TABLET ORAL at 08:45

## 2021-07-22 RX ADMIN — CEFEPIME HYDROCHLORIDE 2 G: 2 INJECTION, POWDER, FOR SOLUTION INTRAVENOUS at 05:24

## 2021-07-22 RX ADMIN — LEVOFLOXACIN 500 MG: 500 TABLET, FILM COATED ORAL at 12:02

## 2021-07-22 RX ADMIN — Medication 10 ML: at 05:24

## 2021-07-22 RX ADMIN — HYDROCODONE BITARTRATE AND ACETAMINOPHEN 1 TABLET: 5; 325 TABLET ORAL at 08:46

## 2021-07-22 NOTE — PROGRESS NOTES
Spoke with patient at length regarding his concerns about his perceived IV malfunction in the OR 2 days ago, \"it wasn't in the vein\". He felt this was the reason for his continued dizziness. Explained to patient that his arm was neither red, warm nor painful and unlikely the cause. This RN contacted Dr Dilia Petit to confirm he was aware of the patient's concerns. I informed the patient, that per Dr. Dilia Petit, we would observe him overnight if he did not feel comfortable going home. After more discussion, patient recognized other factors that could possibly be causing his dizziness, ie his history of vertigo, not using his nasal spray and not eating as much. Pt decided that he was ready to go home and get back to his normal routine. Pt escorted off the unit via wheelchair.

## 2021-07-22 NOTE — PROGRESS NOTES
Discharge order noted for today. Pt has been accepted to [facility] agency. Met with patient and he is agreeable to the transition plan today. CM spoke with patient, he said he will be driving himself home today at time of discharge. Patient's home health  orders have been forwarded to [facility] home health  agency via [address]   Discharge information has been documented on the AVS.         Shari Barton RN  Case Management [phone]

## 2021-07-22 NOTE — HOME CARE
This referral has been initiated on yesterday 7/21/21. Referral updated and processed to central intake.

## 2021-07-22 NOTE — DIABETES MGMT
Diabetes Plan of Care    Assessment:  Pt admitted with osteomyelitis of right 5th toe s/p amputation and past medical history of T2DM (A1C - 8.3%, use of  basal and corrective insulins at home). Most blood glucose readings in target range (5 out of last 6 readings). Recommendations:  1. Continue 25 units Lantus plus corrective lispro, normal insulin sensitivity ACHS  2. On-going diabetes education    1112  Addendum:  Pt seen and reports he expects to go home today. He states he has everything he needs for diabetes care at home except he needs to  his refill for Lantus (states he will contact his pharmacy to confirm it will be ready for ). He states he has a supply of Humalog and BG testing supplies at home. Discussed BG monitoring for home. Most recent blood glucose values:  7/22:  POC - 87  7/21:  POC - 123, 143, 166, 173, 183        Within target range   -   yes    Current A1c  Lab Results   Component Value Date/Time    Hemoglobin A1c 8.3 (H) 07/19/2021 03:42 AM    Hemoglobin A1c (POC) 11.4 09/08/2017 10:26 AM     Adequate glycemic control PTA:     No     Current hospital diabetes medications:  Lantus 25 units daily  Corrective lispro, normal insulin sensitivity, 4 times daily    Diet -   ADULT DIET Regular; 4 carb choices (60 gm/meal)   Meal Intake:   Patient Vitals for the past 168 hrs:   % Diet Eaten   07/19/21 0826 1 - 25%   07/19/21 0800 51 - 75%     Supplement Intake:  No data found.      TDD previous day = 29 units   25 units lantus  4 units lispro   POC BG 87 mg/dl this am     Home diabetes medications;  25 units lantus nightly  SS humalog with meals     Goals: Blood glucose will be within target of 70 - 180 mg/dl by:  7/22/2021     Education:  __X___ Refer to Diabetes Education Record                       _____ Education not indicated at this time         Ginger Burns MS, RD, [facility]  Diabetes and Glycemic Control   [employer]

## 2021-07-22 NOTE — DISCHARGE SUMMARY
[address] [place] [address] Dr Beck Fang [facility], [address]     DISCHARGE SUMMARY    Name: Woody Turcios MRN: [id number]   Age / Sex: 60 y.o. / male CSN: [id number]   YOB: 1965 Length of Stay: 4 days   Admit Date: 7/18/2021 Discharge Date:        PRIMARY CARE PHYSICIAN: Ernesto Turner MD      DISCHARGE DIAGNOSES:    Left foot fifth toe diabetic ulcer with osteomyelitis  Type 2 diabetes  Chronic kidney disease stage II  Elevated blood pressures  Chronic anemia    CONSULTS CALLED: Podiatry, ID      PROCEDURES DONE: Debridement by podiatry      Robert Booth 63: This is a 52-year-old male who was brought to the ED with complaints of diabetic foot ulcer. Patient was evaluated and was admitted. Podiatry was consulted. Cultures were obtained. Patient was started on antibiotics. Patient underwent debridement. Cultures were followed. ID was consulted. Blood pressure regimen was optimized. ID cleared the patient for discharge and made recommendations for discharge antibiotics. Podiatry cleared the patient for discharge. Case management was involved. Discharge plans were discussed with the patient and he verbalized understanding and agreed. Home health care was requested. MEDICATIONS ON DISCHARGE:    Current Discharge Medication List      START taking these medications    Details   amLODIPine (NORVASC) 2.5 mg tablet Take 1 Tablet by mouth daily. Qty: 30 Tablet, Refills: 0  Start date: 7/23/2021      docusate sodium (COLACE) 100 mg capsule Take 1 Capsule by mouth two (2) times a day. Qty: 60 Capsule, Refills: 0  Start date: 7/22/2021      famotidine (PEPCID) 20 mg tablet Take 1 Tablet by mouth daily. Qty: 14 Tablet, Refills: 0  Start date: 7/22/2021      L. acidophilus,casei,rhamnosus (BIO-K PLUS) 50 billion cell cpDR capsule Take 1 Capsule by mouth daily for 7 days.   Qty: 7 Capsule, Refills: 0  Start date: 7/23/2021, End date: 7/30/2021      levoFLOXacin (LEVAQUIN) 500 mg tablet Take 1 Tablet by mouth every twenty-four (24) hours for 7 days. Qty: 7 Tablet, Refills: 0  Start date: 7/23/2021, End date: 7/30/2021      ondansetron (ZOFRAN ODT) 4 mg disintegrating tablet Take 1 Tablet by mouth every eight (8) hours as needed for Nausea or Vomiting. Qty: 10 Tablet, Refills: 0  Start date: 7/22/2021      OTHER This is to certify that Tyler Snellen was admitted to [facility] on 7/18/2021 and discharged on 7/22/2021, and has been advised to take rest at home for 10 ( ten ) more days and then resume work if symptom free. Qty: 1 Each, Refills: 0  Start date: 7/22/2021      oxyCODONE-acetaminophen (Percocet) 5-325 mg per tablet Take 1 Tablet by mouth every six (6) hours as needed for Pain for up to 5 days. Max Daily Amount: 4 Tablets. No driving for 24 hours after taking this medication  Qty: 12 Tablet, Refills: 0  Start date: 7/22/2021, End date: 7/27/2021    Associated Diagnoses: Diabetic foot ulcer associated with type 2 diabetes mellitus, unspecified laterality, unspecified part of foot, unspecified ulcer stage ([facility]         CONTINUE these medications which have NOT CHANGED    Details   insulin lispro (HUMALOG) 100 unit/mL injection Use tid per sliding scale  Indications: type 2 diabetes mellitus  Qty: 3 Vial, Refills: 0    Comments: Pharmacy Assistance program      Syringe with Needle, Disp, (Allergy Syringe) 1 mL 27 x 1/2\" syrg FOR USE WITH INTRACAVERNOSAL INJECTIONS. Qty: 25 Pen Needle, Refills: 2      insulin glargine (LANTUS) 100 unit/mL injection 25 Units by SubCUTAneous route nightly.  Indications: type 2 diabetes mellitus  Qty: 1 Vial, Refills: 0    Comments: Pharmacy Assistance Program medication  Associated Diagnoses: Type 2 diabetes mellitus with microalbuminuria, with long-term current use of insulin (HCC)         STOP taking these medications       celecoxib (CELEBREX PO) Comments:   Reason for Stopping:         amoxicillin-clavulanate (Augmentin) 400-125 mg per tablet Comments:   Reason for Stopping:         doxycycline (MONODOX) 100 mg capsule Comments:   Reason for Stopping:         Injector Device rah Comments:   Reason for Stopping:                 DISCHARGE VITAL SIGNS:  Visit Vitals  BP (!) 165/86   Pulse 62   Temp 97.7 °F (36.5 °C)   Resp 18   Ht 6' 1\" (1.854 m)   Wt 103.4 kg (228 lb)   SpO2 98%   BMI 30.08 kg/m²         CONDITION ON DISCHARGE: Stable. DISPOSITION: Home      FOLLOW-UP RECOMMENDATIONS:   Follow-up Information     Follow up With Specialties Details Why [facility]  Your preferred agency, Chosen to continue managing healthcare needs [place] [address]  [address] [facility] [address]    Rachel Dutta MD Family Medicine   [address] [place] [id number] [phone]            OTHER INSTRUCTIONS:        TIME SPENT ON DISCHARGE ACTIVITIES: More than 35 minutes. Dragon medical dictation software was used for portions of this report. Unintended errors may occur.       Signed:  Sheree Jaime MD      7/22/2021

## 2021-07-22 NOTE — DISCHARGE INSTRUCTIONS
Patient Education      Patient armband removed and shredded  MyChart Activation    Thank you for requesting access to [facility]. Please follow the instructions below to securely access and download your online medical record. [facility] allows you to send messages to your doctor, view your test results, renew your prescriptions, schedule appointments, and more. How Do I Sign Up? 1. In your internet browser, go to [email or web address]  2. Click on the First Time User? Click Here link in the Sign In box. You will be redirect to the New Member Sign Up page. 3. Enter your [facility] Access Code exactly as it appears below. You will not need to use this code after youve completed the sign-up process. If you do not sign up before the expiration date, you must request a new code. [facility] Access Code: [email or web address]  Expires: 2021  6:13 PM (This is the date your [facility] access code will )    4. Enter the last four digits of your Social Security Number (xxxx) and Date of Birth (mm/dd/yyyy) as indicated and click Submit. You will be taken to the next sign-up page. 5. Create a [facility] ID. This will be your [facility] login ID and cannot be changed, so think of one that is secure and easy to remember. 6. Create a [facility] password. You can change your password at any time. 7. Enter your Password Reset Question and Answer. This can be used at a later time if you forget your password. 8. Enter your e-mail address. You will receive e-mail notification when new information is available in [address]. 9. Click Sign Up. You can now view and download portions of your medical record. 10. Click the Download Summary menu link to download a portable copy of your medical information. Additional Information    If you have questions, please visit the Frequently Asked Questions section of the [facility] website at [email or web address] Remember, [facility] is NOT to be used for urgent needs.  For medical emergencies, dial 911. DISCHARGE SUMMARY from Nurse    PATIENT INSTRUCTIONS:    After general anesthesia or intravenous sedation, for 24 hours or while taking prescription Narcotics:  · Limit your activities  · Do not drive and operate hazardous machinery  · Do not make important personal or business decisions  · Do  not drink alcoholic beverages  · If you have not urinated within 8 hours after discharge, please contact your surgeon on call. Report the following to your surgeon:  · Excessive pain, swelling, redness or odor of or around the surgical area  · Temperature over 100.5  · Nausea and vomiting lasting longer than 4 hours or if unable to take medications  · Any signs of decreased circulation or nerve impairment to extremity: change in color, persistent  numbness, tingling, coldness or increase pain  · Any questions    What to do at Home:  Recommended activity: Activity as tolerated,     If you experience any of the following symptoms fever, toe/leg swelling, unhealing wounds, pain in toes, or change in color of toes, please follow up with 911. *  Please give a list of your current medications to your Primary Care Provider. *  Please update this list whenever your medications are discontinued, doses are      changed, or new medications (including over-the-counter products) are added. *  Please carry medication information at all times in case of emergency situations. These are general instructions for a healthy lifestyle:    No smoking/ No tobacco products/ Avoid exposure to second hand smoke  Surgeon General's Warning:  Quitting smoking now greatly reduces serious risk to your health.     Obesity, smoking, and sedentary lifestyle greatly increases your risk for illness    A healthy diet, regular physical exercise & weight monitoring are important for maintaining a healthy lifestyle    You may be retaining fluid if you have a history of heart failure or if you experience any of the following symptoms:  Weight gain of 3 pounds or more overnight or 5 pounds in a week, increased swelling in our hands or feet or shortness of breath while lying flat in bed. Please call your doctor as soon as you notice any of these symptoms; do not wait until your next office visit. The discharge information has been reviewed with the patient. The patient verbalized understanding. Discharge medications reviewed with the patient and appropriate educational materials and side effects teaching were provided. ___________________________________________________________________________________________________________________________________  Learning About Foot and Toenail Care  Foot and toenail care: Overview  Checking your loved one's feet and keeping them clean and soft can help prevent cracks and infection in the skin. This is especially important for people who have diabetes. Keeping toenails trimmed--and polished if that's what the person likes--also helps the person feel well-groomed. If the person you care for has diabetes or has foot problems, such as bad bunions and corns, think about taking them to see a podiatrist. This is a doctor who specializes in the care of the feet. Sometimes a podiatrist will come to the home if the person can't go out for visits. Try to take the person for salon pedicures if that is what they want. It's a chance to get out and see people and continue a favorite activity. You can do basic nail care at home. Usually all you need to do is keep the nails clean and at a safe length. How do you trim someone's toenails? Try to trim the person's nails every week. Or check the nails each week to see if they need to be trimmed. It's easiest to trim nails after the person has had a shower or foot bath. It makes the nails softer and easier to trim. Start by gathering your supplies. You will need toenail clippers and a nail file. You may also need nail polish and nail polish remover.   To trim the nails:  6. Wash and dry your hands. You don't need to wear gloves. 7. Use nail polish remover to take off any polish. 8. Hold the person's foot and toe steady with one hand while you trim the nail with your other hand. Trim the nails straight across. Leave the nails a little longer at the corners so that the sharp ends don't cut into the skin. 9. Keep the nails no longer than the tip of the toes. 10. Let the nails dry if they are still damp and soft. 11. Use a nail file to gently smooth the edges of the nails, especially at the corners. They may be sharp after the nails are cut straight. 12. Apply nail polish, if the person wants it. If the person's nails are thick and discolored, it may be safest to have a podiatrist cut them. What else do you need to know? When you're caring for someone's nails, it is important to remember not to trim or cut the cuticles. A minor cut in a cuticle could lead to an infection. Wash the feet daily in the shower or bath or in a basin made for washing feet. It's extra important to wash the feet carefully if the person has diabetes. After washing the feet, dry gently. Put lotion on the feet, especially on the heels. But don't put it between the toes. If the person doesn't have diabetes and you see signs of athlete's foot (such as dry, cracking, or itchy skin between the toes), you can try an over-the-counter medicine. These medicines can kill the fungus that causes athlete's foot. If the problem doesn't go away, talk to the person's doctor. Look every day for cuts or signs of infection, such as pain, swelling, redness, or warmth. If you see any of these signs--especially in someone who has diabetes--call the doctor. Where can you learn more? Go to http://www.gray.com/  Enter A726 in the search box to learn more about \"Learning About Foot and Toenail Care. \"  Current as of: July 17, 2020               Content Version: 12.8  © 1940-6872 Healthwise, Incorporated. Care instructions adapted under license by [employer] (which disclaims liability or warranty for this information). If you have questions about a medical condition or this instruction, always ask your healthcare professional. [place] any warranty or liability for your use of this information. Patient Education     Learning About Diabetes and Your Teeth  How does diabetes affect your teeth and gums? When you have diabetes, managing blood sugar levels and taking good care of your teeth and gums are both important. When blood sugar levels are high, there's a greater risk for:  · Gum (periodontal) disease. · Tooth decay. · Fungal infections in the mouth, like thrush. · Dry mouth, or xerostomia (say \"zee-ruh-STO-evert-uh\"). The mouth needs saliva to neutralize the acids in your mouth. These acids can lead to gum disease and tooth decay. Keeping your blood sugar levels in your target range can help prevent problems with the teeth and gums. If you have any problems with your teeth or gums, see your dentist.  How do you care for your teeth and gums when you have diabetes? · Brush your teeth twice a day. · Floss daily. Make sure to press the floss against your teeth and not your gums. · Check each day for areas where your gums might be red or painful. Be sure to let your dentist know of any sores in your mouth. · See your dentist regularly for professional cleaning of your teeth and to look for gum problems. Many dentists recommend getting checkups twice a year. Remind your dentist that you have diabetes before any work is done. · Don't smoke or use smokeless tobacco. Tobacco use with diabetes can lead to a greater risk of severe gum disease. If you need help quitting, talk to your doctor about stop-smoking programs and medicines. These can increase your chances of quitting for good. Follow-up care is a key part of your treatment and safety. Be sure to make and go to all appointments, and call your doctor if you are having problems. It's also a good idea to know your test results and keep a list of the medicines you take. Where can you learn more? Go to Superfly.be  Enter H523 in the search box to learn more about \"Learning About Diabetes and Your Teeth. \"   © 7790-2514 Healthwise, Incorporated. Care instructions adapted under license by [facility] (which disclaims liability or warranty for this information). This care instruction is for use with your licensed healthcare professional. If you have questions about a medical condition or this instruction, always ask your healthcare professional. [address] any warranty or liability for your use of this information. Content Version: 54.1.930435; Current as of: May 22, 2015           Patient Education        Diabetes and Preventing Falls: Care Instructions  Your Care Instructions     If you are an older adult who has diabetes, you may have a higher risk of falling. Complications of diabetes--such as nerve damage, foot problems, and reduced vision--may increase your risk of a fall. Some of your medicines also may add to your risk. By making your home safer, you can lower your risk of falling. Doing things to prevent diabetes complications may also help to lower your risk. You can make your home safer with a few simple measures. Follow-up care is a key part of your treatment and safety. Be sure to make and go to all appointments, and call your doctor if you are having problems. It's also a good idea to know your test results and keep a list of the medicines you take. How can you care for yourself at home? Taking care of yourself  · Keep your blood sugar at a target level (which you set with your doctor). · Exercise regularly to improve your strength, muscle tone, and balance. Walk if you can.  Swimming may be a good choice if you cannot walk easily. · Have your vision checked as often as your doctor recommends. It is usually once a year or more often if you have eye problems. · Know the side effects of the medicines you take. Ask your doctor or pharmacist whether the medicines you take can affect your balance. Sleeping pills or sedatives can affect your balance. · Limit the amount of alcohol you drink. Alcohol can impair your balance and other senses. · Have your doctor check your feet during each visit. If you have a foot problem, see your doctor. Preventing falls at home  · Remove raised doorway thresholds, throw rugs, and clutter. Repair loose carpet or raised areas in the floor. · Move furniture and electrical cords to keep them out of walking paths. · Use nonskid floor wax, and wipe up spills right away, especially on ceramic tile floors. · If you use a walker or cane, put rubber tips on it. If you use crutches, clean the bottoms of them regularly with an abrasive pad, such as steel wool. · Keep your house well lit, especially [place], and outside walkways. Use night-lights in areas such as hallways and bathrooms. Add extra light switches or use remote switches (such as switches that go on or off when you clap your hands) to make it easier to turn lights on if you have to get up during the night. · Install sturdy handrails on stairways. Put grab bars near your shower, bathtub, and toilet. · Store household items on low shelves so that you do not have to climb or reach high. Or use a reaching device that you can get at a medical supply store. If you have to climb for something, use a step stool with handrails, or ask someone to get it for you. · Keep a cordless phone and a flashlight with new batteries by your bed. If possible, put a phone in each of the main rooms of your house, or carry a cell phone in case you fall and cannot reach a phone.  Or you can wear a device around your neck or wrist. You push a button that sends a signal for help. · Wear low-heeled shoes that fit well and give your feet good support. Use footwear with nonskid soles. Check the heels and soles of your shoes for wear. Repair or replace worn heels or soles. · Do not wear socks without shoes on wood floors. · Walk on the grass when the sidewalks are slippery. If you live in an area that gets snow and ice in the winter, sprinkle salt on slippery steps and sidewalks. Where can you learn more? Go to http://www.gray.com/  Enter X601 in the search box to learn more about \"Diabetes and Preventing Falls: Care Instructions. \"  Current as of: December 7, 2020               Content Version: 12.8  © 5256-3399 [employer]. Care instructions adapted under license by [employer] (which disclaims liability or warranty for this information). If you have questions about a medical condition or this instruction, always ask your healthcare professional. [place] [address] any warranty or liability for your use of this information. Patient Education        Learning About the Risk of Heart Attack and Stroke With Diabetes  How are diabetes, heart attack, and stroke connected? For some people, diabetes can cause problems that increase the risk of a heart attack or stroke. Many things can lead to a heart attack or stroke. These include high blood sugar, insulin resistance, high cholesterol, and high blood pressure. Lifestyle and genetics may also play a part. But here's the good news: The things you're doing to stay healthy with diabetes also help your heart and blood vessels. That means eating healthy foods, quitting smoking, and getting exercise. What increases your risk for heart attack and stroke? When you have diabetes, your risk for heart attack and stroke is even higher if you have:  · High blood pressure. It pushes blood through the arteries with too much force.  Over time, this damages the walls of the arteries. · High cholesterol. It causes the buildup of a kind of fat inside the blood vessel walls. This buildup can lower blood flow to the heart muscle and raise your risk for having a heart attack or stroke. · Kidney damage. It shares many of the risk factors for heart attack and stroke (such as high blood sugar, high blood pressure, and high cholesterol). How do you keep your heart healthy when you have diabetes? Managing your diabetes and keeping your heart and blood vessels healthy are both important. Here are some things you can do. · Test your blood sugar levels and get your diabetes tests on schedule. Try to keep your numbers within your target range. · Keep track of your blood pressure. Your doctor will give you a goal that's right for you. If your blood pressure is high, your treatment may also include medicine. Changes in your lifestyle, such as staying at a healthy weight, may also help you lower your blood pressure. · Eat heart-healthy foods. These include fruits, vegetables, whole grains, fish, and low-fat or nonfat dairy foods. Limit sodium, alcohol, and sweets. · If your doctor recommends it, get more exercise. Walking is a good choice. Bit by bit, increase the amount you walk every day. Try for at least 30 minutes on most days of the week. · Don't smoke. Smoking can make diabetes worse and increase your risk of heart attack or stroke. If you need help quitting, talk to your doctor about stop-smoking programs and medicines. These can increase your chances of quitting for good. · Think about taking medicines for your heart. For example, your doctor may suggest taking a statin or daily aspirin. Where can you learn more? Go to http://www.gray.com/  Enter X477 in the search box to learn more about \"Learning About the Risk of Heart Attack and Stroke With Diabetes. \"  Current as of: August 31, 2020               Content Version: 12.8  © 2463-5094 Healthwise, Incorporated. Care instructions adapted under license by [employer] (which disclaims liability or warranty for this information). If you have questions about a medical condition or this instruction, always ask your healthcare professional. [address] any warranty or liability for your use of this information. Patient Education        Osteomyelitis: Care Instructions  Your Care Instructions  Osteomyelitis (say \"os-lkts-wz-cs-bq-WR-tus\") is a bone infection. It is caused by bacteria. The bacteria can infect the bone where it has been injured, or they can be carried through the blood from another area in the body. Osteomyelitis can be a short- or long-term problem. It is treated with antibiotics. You may get the antibiotics as pills or through a needle in a vein (IV). You will probably get treatment in the hospital at first. The type of treatment depends on the type of bacteria causing the infection, the bones affected, and how bad the infection is. Sometimes people need surgery to drain pus from bone or to fix damaged bone. Short-term osteomyelitis that is treated right away usually can be cured. But the long-term form sometimes comes back after treatment. You can help your chances of stopping the infection by taking your medicines as directed. Follow-up care is a key part of your treatment and safety. Be sure to make and go to all appointments, and call your doctor if you are having problems. It's also a good idea to know your test results and keep a list of the medicines you take. How can you care for yourself at home? · Take your antibiotics as directed. Do not stop taking them just because you feel better. You need to take the full course of antibiotics. · Take pain medicines exactly as directed. ? If the doctor gave you a prescription medicine for pain, take it as prescribed.   ? If you are not taking a prescription pain medicine, ask your doctor if you can take an over-the-counter medicine. · Do mild exercise and stretching if your doctor says it is okay. This can help keep your bones and muscles healthy. Avoid strenuous work or exercise until your doctor says you can do it. · Consider physical therapy if your doctor suggests it. Physical therapy may help you have a normal range of movement. · Do not smoke. Smoking can slow healing of the infection. If you need help quitting, talk to your doctor about stop-smoking programs and medicines. These can increase your chances of quitting for good. When should you call for help? Call 911 anytime you think you may need emergency care. For example, call if:    · You have severe bone pain. Call your doctor now or seek immediate medical care if:    · You continue to have bone pain.     · You have signs of infection, such as:  ? Increased pain, swelling, warmth, or redness. ? Red streaks leading from a wound. ? Pus draining from a wound. ? A fever. Watch closely for changes in your health, and be sure to contact your doctor if:    · You do not get better as expected. Where can you learn more? Go to http://www.gray.com/  Enter B364 in the search box to learn more about \"Osteomyelitis: Care Instructions. \"  Current as of: March 4, 2020               Content Version: 12.8  © 8408-5311 "[employer]. Care instructions adapted under license by [facility] (which disclaims liability or warranty for this information). If you have questions about a medical condition or this instruction, always ask your healthcare professional. [place] [address] any warranty or liability for your use of this information.

## 2021-07-22 NOTE — PROGRESS NOTES
Patient is laying in bed in no apparent distress, awake and alert. Complains of some intermittent nausea. No vomiting. Denies any abdominal pain. Has been tolerating his diet. Patient wishes to go home. Discussed with ID and she cleared the patient for discharge on levofloxacin 500 mg daily through July 29. I discussed the discharge plans with the patient and he verbalized understanding and agreed. Patient requested to change his pain medication from [address] to Percocet. I counseled the patient to not drive for 24 hours after taking Percocet. Patient verbalized understanding. Home with home health care today. Counseled compliance.   Discussed with RN

## 2021-07-22 NOTE — PROGRESS NOTES
Infectious Disease  Note        Reason: Right fifth toe infection    Current abx Prior abx   Cefepime since 7/19/2021      Lines:       Assessment :      55-year-old gentleman with a history of uncontrolled diabetes mellitus type 2 (last hgbA1C 8.3 on 7/19/21),  osteoarthritis of the knee, chronic kidney disease, hypertension who presented  to the emergency department for admission on 7/18/21 per request of podiatry. Wound culture 7/10- MSSA, klebsiella s/p ciprofloxacin, augmentin    Now with swelling right fifth toe, nonhealing ulcer, x-ray changes suggestive of osteomyelitis    Clinical presentation consistent with partially treated acute on chronic osteomyelitis right fifth toe. S/p right fifth toe amputation on 7/19/21. Podiatry follow-up appreciated. Intra-Op cultures - 7/19/21 Klebsiella, e.colil     Results of bone biopsy noted. Bone biopsy of clean margins negative for osteomyelitis    Recommendations:    1. Discontinue cefepime. Start p.o. levofloxacin till 7/29/2021  2. Wound care per podiatrist  3. Discharge planning per primary team     Above plan was discussed in details with patient, dr. Denver Couch. Please call me if any further questions or concerns. Will continue to participate in the care of this patient. HPI:    Feels fine. No new complaints      home Medication List    Details   celecoxib (CELEBREX PO) Take  by mouth. insulin lispro (HUMALOG) 100 unit/mL injection Use tid per sliding scale  Indications: type 2 diabetes mellitus  Qty: 3 Vial, Refills: 0    Comments: Pharmacy Assistance program      Syringe with Needle, Disp, (Allergy Syringe) 1 mL 27 x 1/2\" syrg FOR USE WITH INTRACAVERNOSAL INJECTIONS. Qty: 25 Pen Needle, Refills: 2      Injector Device rah DISPENSE INJECT-EASE AUTO-INJECTOR FOR USE WITH BI-MIX INJECTIONS. Qty: 1 Each, Refills: 1      insulin glargine (LANTUS) 100 unit/mL injection 25 Units by SubCUTAneous route nightly.  Indications: type 2 diabetes mellitus  Qty: 1 Vial, Refills: 0    Comments: Pharmacy Assistance Program medication  Associated Diagnoses: Type 2 diabetes mellitus with microalbuminuria, with long-term current use of insulin (HCC)          Current Facility-Administered Medications   Medication Dose Route Frequency    amLODIPine (NORVASC) tablet 2.5 mg  2.5 mg Oral DAILY    cefepime (MAXIPIME) 2 g in sterile water (preservative free) 10 mL IV syringe  2 g IntraVENous Q8H    docusate sodium (COLACE) capsule 100 mg  100 mg Oral BID    L. acidophilus,casei,rhamnosus (BIO-K PLUS) capsule 1 Capsule  1 Capsule Oral DAILY    HYDROcodone-acetaminophen (NORCO) 5-325 mg per tablet 1 Tablet  1 Tablet Oral Q4H PRN    celecoxib (CELEBREX) capsule 100 mg  100 mg Oral DAILY    insulin glargine (LANTUS) injection 25 Units  25 Units SubCUTAneous QHS    sodium chloride (NS) flush 5-40 mL  5-40 mL IntraVENous Q8H    sodium chloride (NS) flush 5-40 mL  5-40 mL IntraVENous PRN    acetaminophen (TYLENOL) tablet 650 mg  650 mg Oral Q6H PRN    Or    acetaminophen (TYLENOL) suppository 650 mg  650 mg Rectal Q6H PRN    polyethylene glycol (MIRALAX) packet 17 g  17 g Oral DAILY PRN    ondansetron (ZOFRAN ODT) tablet 4 mg  4 mg Oral Q8H PRN    Or    ondansetron (ZOFRAN) injection 4 mg  4 mg IntraVENous Q6H PRN    heparin (porcine) injection 5,000 Units  5,000 Units SubCUTAneous Q8H    insulin lispro (HUMALOG) injection   SubCUTAneous AC&HS    glucose chewable tablet 16 g  4 Tablet Oral PRN    dextrose (D50W) injection syrg 12.5-25 g  25-50 mL IntraVENous PRN    glucagon (GLUCAGEN) injection 1 mg  1 mg IntraMUSCular PRN       Allergies: Voltaren [diclofenac sodium] and Zosyn [piperacillin-tazobactam]    Temp (24hrs), Av.9 °F (36.6 °C), Min:97.7 °F (36.5 °C), Max:98.1 °F (36.7 °C)    Visit Vitals  BP (!) 165/86   Pulse 62   Temp 97.7 °F (36.5 °C)   Resp 18   Ht 6' 1\" (1.854 m)   Wt 103.4 kg (228 lb)   SpO2 98%   BMI 30.08 kg/m²       ROS: 12 point ROS obtained in details. Pertinent positives as mentioned in HPI,   otherwise negative    Physical Exam:    General:   awake alert and oriented   Skin:   no rashes or skin lesions noted on limited exam   HEENT:  Normocephalic, atraumatic, EOMI, no scleral icterus or pallor; no conjunctival hemmohage;  Neck supple, no bruits. Lymph Nodes:   no cervical, axillary or inguinal adenopathy   Lungs:   non-labored, bilaterally clear to aspiration- no crackles wheezes rales or rhonchi   Heart:  RRR, s1 and s2; no edema, + pedal pulses   Abdomen:  soft, non-distended, active bowel sounds, no hepatomegaly, no splenomegaly. Non-tender   Genitourinary:  deferred   Extremities:   no clubbing, cyanosis; decreased range of movement of right knee due to pain. No erythema/swelling right knee; muscle mass appropriate for age; swelling of the right fifth toe. Ulceration present on the right fifth toe. No erythema right fifth toe or right foot. Neurologic:  No gross focal sensory abnormalities; 5/5 muscle strength to upper and lower extremities. Speech appropirate. Cranial nerves intact   Psychiatric:   appropriate and interactive. Labs: Results:   Chemistry Recent Labs     07/20/21  0648   *      K 4.3      CO2 31   BUN 16   CREA 1.06   CA 8.3*   AGAP 2*   BUCR 15      CBC w/Diff Recent Labs     07/20/21  0648   WBC 8.5   RBC 3.89*   HGB 11.7*   HCT 34.6*      GRANS 66   LYMPH 23   EOS 3      Microbiology Recent Labs     07/19/21  1929   CULT LIGHT GRAM NEGATIVE RODS*  CHECKING FOR POSSIBLE 2ND GRAM NEGATIVE GUILLAUME*          RADIOLOGY:    All available imaging studies/reports in [facility] for this admission were reviewed      Disclaimer: Sections of this note are dictated utilizing voice recognition software, which may have resulted in some phonetic based errors in grammar and contents.  Even though attempts were made to correct all the mistakes, some may have been missed, and remained in the body of the document. If questions arise, please contact our department.     Dr. Jay Jay Stanley, Infectious Disease Specialist  [phone]  July 22, 2021  11:20 AM

## 2021-07-22 NOTE — PROGRESS NOTES
Progress Note    Patient: Breanna Parekh MRN: [id number]  SSN: [id number]    YOB: 1965  Age: 60 y.o. Sex: male      Admit Date: 7/18/2021  2 Days Post-Op     Procedure:   Procedure(s):  RIGHT FOOT FIFTH TOE AMPUTATION    Subjective:     Patient seen resting quiet and comfortably and no apparent distress. Patient denies any pain to surgical site. OR pathology clean margin negative for osteomyelitis. Status post: osteomyelitis    Objective:     Visit Vitals  BP (!) 182/83 (BP 1 Location: Left arm, BP Patient Position: At rest)   Pulse 64   Temp 98.1 °F (36.7 °C)   Resp 18   Ht 6' 1\" (1.854 m)   Wt 103.4 kg (228 lb)   SpO2 98%   BMI 30.08 kg/m²        Physical Exam:  Dressings intact to right foot. No strike through noted. CFT intact to remaining digits on both foot. Labs/Radiology Review: images and reports reviewed    Assessment:     Hospital Problems  Date Reviewed: 11/24/2020        Codes Class Noted POA    Osteomyelitis [facility]) ICD-10-CM: M86.9  ICD-9-CM: 730.20  7/18/2021 Unknown              Plan/Recommendations/Medical Decision Making:     - f/u OR micro. Abx per ID recommendation.   - Remain NWB to right forefoot in surgical shoe. - Home health set up to perform dressing change with betadine and dry gauze every 2 days.   - Medical management per hospitalist.   - Will see him outpatient in 1 week.

## 2021-07-23 ENCOUNTER — HOME CARE VISIT (OUTPATIENT)
Dept: SCHEDULING | Facility: HOME HEALTH | Age: 56
End: 2021-07-23
Payer: MEDICAID

## 2021-07-23 ENCOUNTER — HOME CARE VISIT (OUTPATIENT)
Dept: HOME HEALTH SERVICES | Facility: HOME HEALTH | Age: 56
End: 2021-07-23

## 2021-07-23 LAB
BACTERIA SPEC CULT: ABNORMAL
GRAM STN SPEC: ABNORMAL
GRAM STN SPEC: ABNORMAL
SERVICE CMNT-IMP: ABNORMAL

## 2021-07-23 PROCEDURE — A6446 CONFORM BAND S W>=3" <5"/YD: HCPCS

## 2021-07-23 PROCEDURE — A4452 WATERPROOF TAPE: HCPCS

## 2021-07-23 PROCEDURE — 400013 HH SOC

## 2021-07-23 PROCEDURE — A9270 NON-COVERED ITEM OR SERVICE: HCPCS

## 2021-07-23 PROCEDURE — G0299 HHS/HOSPICE OF RN EA 15 MIN: HCPCS

## 2021-07-24 VITALS
HEART RATE: 66 BPM | OXYGEN SATURATION: 100 % | RESPIRATION RATE: 16 BRPM | TEMPERATURE: 97.9 F | SYSTOLIC BLOOD PRESSURE: 149 MMHG | DIASTOLIC BLOOD PRESSURE: 86 MMHG

## 2021-07-24 NOTE — HOME HEALTH
Skilled services/Home bound verification:     Skilled Reason for admission/summary of clinical condition: Patient was recently hospitalized for R 5th toe Osteomyelitis, requiring observation by a SN for s/s of decomposition or adverse effects resulting from newly prescribed medications. Skilled observation needed to determine if new medication regimen prescribed requires modifications or other therapeutic interventions considered until pt's clinical condition or treatment has stabilized. .  This patient is homebound for the following reasons Requires considerable and taxing effort to leave the home , Requires the assistance of 1 or more persons to leave the home  and Only leaves the home for medical reasons or [employer] services and are infrequent and of short duration for other reasons  Caregiver: friend. Caregiver assists with Caregiver assists patient with bathing, dressing, bathroom, meal prep and setup, medication management, grocery shopping, household chores, transportation to MD appointment and home exercise program..    Medications reconciled and all medications are available in the home this visit. The following education was provided regarding medications, medication interactions, and look alike medications (specify): **Oxycodone-Acetaminophen / Percocet -  Dose/Freq: 5-325 mg 1 tab, Q 4 hours, PRN  -  Purpose:  pain  management         Precautions/Side Effects/Adverse reactions: constipation, nausea, dizziness, itching     -  Report medication questions or  problems to [address] or MD.  Medications  are effective at this time.       High risk medication teaching regarding anticoagulants, hyperglycemic agents or opoid narcotics performed (specify) oxycodone-acetaminophen (Percocet) 5-325 mg per tablet, see above, insulin glargine (LANTUS) 100 unit/mL injection, insulin lispro (HUMALOG) 100 unit/mL injection    Bonney Severs, MD and Corbett Barthel, MD  notified of any discrepancies/medication interactions. Sliding scale missing, patient able to tell SN scale    Home health supplies by type and quantity ordered/delivered this visit include: Betadine, gauze, Kerlix tape    Patient education provided this visit to include:   . FAYE Hinojosa Disease Management: Wound care, DM, HTN, protein drinks to promote wound care pain management, constipation prevention, fall precautions, s/s of infection, deep breathing exercises. Karel Sigifredo.. Tana Hinojosa Diet/Nutrition: Importance of a healthy AHA, ADA, Glucerna protein diet, to promote healing and wellness, proper hydration, pineapple juice for inflammation and bruising  . X. .Joanna Hinojosa Incontinence Exercises (HEP): HEP in home health booklet teaching  . PATRIZIA. .. Tana Hinojosa Timed Voiding: bathroom visits Q 2 hours while awake  . Pearla Sigifredo... Wound Care:   Keep wound clean, covered and dry, s/s of infection, elevate   . Pearla Sigifredo... Infection Control:   Proper hand washing with soap and water, after each trip to the bathroom and after eating meals  . Pearla Sigifredo... Other:  safety and fall precautions, importance of repositioning self to reduce pressure points, Gabriel's sign and importance of wearing MICKI hose to reduce edema and prevent DVTs    Patient/caregiver degree of understanding:good    Home exercise program/Homework provided: NA    Pt/Caregiver instructed on plan of care and are agreeable to plan of care at this time. Physician Tor Kincaid MD and Jesusita Rene MD  notified of patient admission to home health and plan of care including anticipated frequency of SN and treatments/interventions/modalities of SN. Discharge planning discussed with patient and caregiver. Discharge planning as follows: SN discharge when teaching and goals are met. Tana Hinojosa Pt/Caregiver did verbalize understanding of discharge planning.      Next MD appointment TBD (date) with Jesusita Rene MD.  Patient/caregiver encouraged/instructed to keep appointment as lack of follow through with physician appointment could result in discontinuation of home care services for non-compliance.

## 2021-07-27 ENCOUNTER — HOME CARE VISIT (OUTPATIENT)
Dept: HOME HEALTH SERVICES | Facility: HOME HEALTH | Age: 56
End: 2021-07-27
Payer: MEDICAID

## 2021-07-30 ENCOUNTER — HOME CARE VISIT (OUTPATIENT)
Dept: SCHEDULING | Facility: HOME HEALTH | Age: 56
End: 2021-07-30
Payer: MEDICAID

## 2021-07-30 VITALS
OXYGEN SATURATION: 97 % | HEART RATE: 88 BPM | SYSTOLIC BLOOD PRESSURE: 142 MMHG | TEMPERATURE: 98.9 F | RESPIRATION RATE: 16 BRPM | DIASTOLIC BLOOD PRESSURE: 88 MMHG

## 2021-07-30 PROCEDURE — A4216 STERILE WATER/SALINE, 10 ML: HCPCS

## 2021-07-30 PROCEDURE — G0299 HHS/HOSPICE OF RN EA 15 MIN: HCPCS

## 2021-07-30 PROCEDURE — A6216 NON-STERILE GAUZE<=16 SQ IN: HCPCS

## 2021-07-30 PROCEDURE — A6446 CONFORM BAND S W>=3" <5"/YD: HCPCS

## 2021-07-30 NOTE — HOME HEALTH
Skilled reason for visit: wound care to right little toe     Caregiver involvement: wife and family care for all needs and is available 24/7. Medications reviewed and all medications are available in the home this visit. The following education was provided regarding medications, medication interactions, and look alike medications (specify): none. Medications  are effective at this time.       Home health supplies by type and quantity ordered/delivered this visit include: gauze, gauze wraps, betadine ,saline     Patient education provided this visit: SN educated on s/s of infeciton,     Patient's Progress towards personal goals: patient stated that he was pleased with the progress and ready to put on a shoe     Home exercise program: breathing techqnues     Continued need for the following skills: Nursing    Plan for next visit: wound care     Patient and/or caregiver notified and agrees to changes in the Plan of Care N/A      The following discharge planning was discussed with the pt/caregiver: when goals met and education is complete

## 2021-08-06 ENCOUNTER — HOME CARE VISIT (OUTPATIENT)
Dept: SCHEDULING | Facility: HOME HEALTH | Age: 56
End: 2021-08-06
Payer: MEDICAID

## 2021-08-06 VITALS — OXYGEN SATURATION: 98 % | TEMPERATURE: 97.4 F | RESPIRATION RATE: 18 BRPM | HEART RATE: 78 BPM

## 2021-08-06 PROCEDURE — G0299 HHS/HOSPICE OF RN EA 15 MIN: HCPCS

## 2021-08-09 NOTE — HOME HEALTH
Skilled reason for visit: wound care  Caregiver involvement: Patient clean, dressed and well nourished. Home neat and clean. Medications reconciled and all medications are available in the home this visit. Medications  are effective at this time. Home health supplies by type and quantity ordered/delivered this visit include: None this visit  Patient education provided this visit: Medication  Progress toward goals: Progressing  Home exercise program: Participating  Continued need for the following skills: Nursing  The following discharge planning was discussed with the pt/caregiver:  Will d/c to home/self care when goals are met

## 2021-09-07 ENCOUNTER — HOME CARE VISIT (OUTPATIENT)
Dept: HOME HEALTH SERVICES | Facility: HOME HEALTH | Age: 56
End: 2021-09-07

## 2021-10-05 PROBLEM — S02.19XA CLOSED FRACTURE OF ORBITAL PLATE OF ETHMOID BONE (HCC): Status: ACTIVE | Noted: 2021-03-26

## 2021-10-05 PROBLEM — F12.90 CANNABIS USE, UNCOMPLICATED: Status: ACTIVE | Noted: 2021-03-29

## 2021-10-05 PROBLEM — S02.85XA ORBIT FRACTURE, RIGHT, CLOSED, INITIAL ENCOUNTER (HCC): Status: ACTIVE | Noted: 2021-03-26

## 2021-10-05 PROBLEM — M23.206 OLD TEAR OF MENISCUS OF RIGHT KNEE: Status: ACTIVE | Noted: 2017-02-23

## 2021-10-05 PROBLEM — S02.0XXB OPEN FRACTURE OF FRONTAL BONE (HCC): Status: ACTIVE | Noted: 2021-03-26
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
## 2021-10-05 PROBLEM — N52.9 ERECTILE DYSFUNCTION: Status: ACTIVE | Noted: 2017-02-23

## 2021-10-05 PROBLEM — F14.90 COCAINE USE: Status: ACTIVE | Noted: 2021-03-29

## 2021-10-05 PROBLEM — S06.33AA FOCAL HEMORRHAGIC CONTUSION OF CEREBRUM: Status: ACTIVE | Noted: 2021-03-26

## 2021-10-14 ENCOUNTER — HOSPITAL ENCOUNTER (EMERGENCY)
Age: 56
Discharge: HOME | End: 2021-10-14
Attending: EMERGENCY MEDICINE
Payer: MEDICAID

## 2021-10-14 ENCOUNTER — APPOINTMENT (OUTPATIENT)
Dept: GENERAL RADIOLOGY | Age: 56
End: 2021-10-14
Attending: PHYSICIAN ASSISTANT
Payer: MEDICAID

## 2021-10-14 VITALS
HEART RATE: 84 BPM | WEIGHT: 228 LBS | SYSTOLIC BLOOD PRESSURE: 134 MMHG | HEIGHT: 73 IN | DIASTOLIC BLOOD PRESSURE: 87 MMHG | TEMPERATURE: 98.9 F | BODY MASS INDEX: 30.22 KG/M2 | OXYGEN SATURATION: 98 % | RESPIRATION RATE: 19 BRPM

## 2021-10-14 DIAGNOSIS — M25.461 KNEE EFFUSION, RIGHT: Primary | ICD-10-CM

## 2021-10-14 DIAGNOSIS — M54.32 SCIATICA OF LEFT SIDE: ICD-10-CM

## 2021-10-14 PROCEDURE — 99282 EMERGENCY DEPT VISIT SF MDM: CPT

## 2021-10-14 PROCEDURE — 73564 X-RAY EXAM KNEE 4 OR MORE: CPT

## 2021-10-14 RX ORDER — HYDROCODONE BITARTRATE AND ACETAMINOPHEN 5; 325 MG/1; MG/1
1 TABLET ORAL
Qty: 10 TABLET | Refills: 0 | Status: SHIPPED | OUTPATIENT
Start: 2021-10-14 | End: 2021-10-17

## 2021-10-14 NOTE — ED PROVIDER NOTES
EMERGENCY DEPARTMENT HISTORY AND PHYSICAL EXAM    Date: 10/14/2021  Patient Name: Ashlyn Lindquist    History of Presenting Illness     Chief Complaint   Patient presents with    Hip Pain    Knee Pain         History Provided By: Patient    Chief Complaint: Left hip/buttock pain, right knee pain  Duration: Has been going on for a year however worsened yesterday  Timing: Worsened yesterday  Location: Right knee and left hip/buttock  Quality: \"Like something is not right\"  Severity: Moderate  Modifying Factors: Worse after going to physical therapy yesterday and doing some new exercises  Associated Symptoms: none       Additional History (Context): Ashlyn Lindquist is a 60 y.o. male with a history of osteomyelitis, diabetes and right knee injury who presents today for issues listed above. Patient reports roughly a year ago he stepped in a hole at work whilst working at [employer] and has been dealing with right knee pain since. Patient reports he has had a very difficult time dealing with [employer]. and is very unhappy with the providers there. States he does not feel that they listen to him. Patient reports he has now gotten  involved. Patient states yesterday he went to physical therapy where they had him doing some new exercises and then woke up this morning and had worsening right knee and left hip pain. Patient denies any true fall. Denies any IV drug abuse history, history of cancer or loss of bowel or bladder. Patient has not tried anything for this at home and states that his orthopedist will not give him any pain medication or recommend him to pain management. Patient states that he has \"been overcompensating for my knee\" and thinks that may be contributing to his left hip/buttocks pain. Denies history of sciatica.       PCP: Noni Maza MD    Current Outpatient Medications   Medication Sig Dispense Refill    HYDROcodone-acetaminophen (Norco) 5-325 mg per tablet Take 1 Tablet by mouth every six (6) hours as needed for Pain for up to 3 days. Max Daily Amount: 4 Tablets. 10 Tablet 0    fluticasone propionate (FLONASE) 50 mcg/actuation nasal spray       piroxicam (FELDENE) 20 mg capsule  (Patient not taking: Reported on 10/5/2021)      nystatin (MYCOSTATIN) 100,000 unit/gram ointment       tadalafiL (CIALIS) 5 mg tablet Take 1 Tablet by mouth daily as needed for Erectile Dysfunction. 90 Tablet 0    sildenafiL, pulmonary hypertension, (Revatio) 20 mg tablet Take 1-5 tabs by mouth one hour prior to sexual activity on an empty stomach. 90 Tablet 3    amLODIPine (NORVASC) 2.5 mg tablet Take 1 Tablet by mouth daily. 30 Tablet 0    docusate sodium (COLACE) 100 mg capsule Take 1 Capsule by mouth two (2) times a day. (Patient not taking: Reported on 10/5/2021) 60 Capsule 0    famotidine (PEPCID) 20 mg tablet Take 1 Tablet by mouth daily. 14 Tablet 0    ondansetron (ZOFRAN ODT) 4 mg disintegrating tablet Take 1 Tablet by mouth every eight (8) hours as needed for Nausea or Vomiting. 10 Tablet 0    OTHER This is to certify that Nelda White was admitted to [facility] on 7/18/2021 and discharged on 7/22/2021, and has been advised to take rest at home for 10 ( ten ) more days and then resume work if symptom free. 1 Each 0    insulin lispro (HUMALOG) 100 unit/mL injection Use tid per sliding scale  Indications: type 2 diabetes mellitus (Patient taking differently: by SubCUTAneous route Before breakfast, lunch, and dinner. Use tid per sliding scale:  <150 - 0 untis  >150 - 4 untis  >200 - 6 units  > 250 - 8 units  Indications: type 2 diabetes mellitus) 3 Vial 0    Syringe with Needle, Disp, (Allergy Syringe) 1 mL 27 x 1/2\" syrg FOR USE WITH INTRACAVERNOSAL INJECTIONS. 25 Pen Needle 2    insulin glargine (LANTUS) 100 unit/mL injection 25 Units by SubCUTAneous route nightly.  Indications: type 2 diabetes mellitus 1 Vial 0       Past History     Past Medical History:  Past Medical History:   Diagnosis Date    Arthritis of left knee     Diabetes [facility]     Erectile dysfunction     Knee pain     Osteoarthritis of right knee     Osteomyelitis ([facility]     Sinusitis        Past Surgical History:  Past Surgical History:   Procedure Laterality Date    COLONOSCOPY N/A 2/5/2020    COLONOSCOPY performed by Aaliyah Beal MD at [address] HX AMPUTATION TOE Left 12/2018    left great toe    HX MENISCUS REPAIR Right 2015       Family History:  Family History   Problem Relation Age of Onset    Seizures Mother     Hypertension Mother     No Known Problems Father     No Known Problems Sister     No Known Problems Brother     No Known Problems Sister     No Known Problems Brother        Social History:  Social History     Tobacco Use    Smoking status: Former Smoker    Smokeless tobacco: Never Used    Tobacco comment: quit 0ver 25 years ago   Vaping Use    Vaping Use: Never used   Substance Use Topics    Alcohol use: Yes     Alcohol/week: 2.0 standard drinks     Types: 2 Cans of beer per week     Comment: occ.  Drug use: No       Allergies: Allergies   Allergen Reactions    Animal Dander Runny Nose    Grass Pollen Runny Nose    House Dust Runny Nose    Voltaren [Diclofenac Sodium] Nausea Only    Zosyn [Piperacillin-Tazobactam] Nausea and Vomiting         Review of Systems   Review of Systems   Constitutional: Negative for chills and fever. HENT: Negative for congestion, rhinorrhea and sore throat. Respiratory: Negative for cough and shortness of breath. Cardiovascular: Negative for chest pain. Gastrointestinal: Negative for abdominal pain, blood in stool, constipation, diarrhea, nausea and vomiting. Genitourinary: Negative for dysuria, frequency and hematuria. Musculoskeletal: Positive for arthralgias, back pain and myalgias. Skin: Negative for rash and wound. Neurological: Negative for dizziness and headaches.    All other systems reviewed and are negative. All Other Systems Negative  Physical Exam     Vitals:    10/14/21 1321   BP: 134/87   Pulse: 84   Resp: 19   Temp: 98.9 °F (37.2 °C)   SpO2: 98%   Weight: 103.4 kg (228 lb)   Height: 6' 1\" (1.854 m)     Physical Exam  Vitals and nursing note reviewed. Constitutional:       General: He is not in acute distress. Appearance: He is well-developed. He is not diaphoretic. HENT:      Head: Normocephalic and atraumatic. Eyes:      Conjunctiva/sclera: Conjunctivae normal.   Cardiovascular:      Rate and Rhythm: Normal rate and regular rhythm. Heart sounds: Normal heart sounds. Pulmonary:      Effort: Pulmonary effort is normal. No respiratory distress. Breath sounds: Normal breath sounds. Chest:      Chest wall: No tenderness. Abdominal:      General: Bowel sounds are normal. There is no distension. Palpations: Abdomen is soft. Tenderness: There is no abdominal tenderness. There is no guarding or rebound. Musculoskeletal:         General: No deformity. Cervical back: Normal range of motion and neck supple. Lumbar back: Tenderness present. No swelling or deformity. Normal range of motion. Positive left straight leg raise test.      Right knee: No swelling, deformity or effusion. Tenderness present over the medial joint line. No MCL, LCL, ACL or PCL tenderness. No LCL laxity, MCL laxity, ACL laxity or PCL laxity. Comments: No midline vertebral bony point step-off. No foot drop. Distal sensation intact bilaterally, normal gait, no saddle anesthesia.  + left straight leg raise. Skin:     General: Skin is warm and dry. Neurological:      Mental Status: He is alert and oriented to person, place, and time. Psychiatric:         Behavior: Behavior is agitated (mild). Diagnostic Study Results     Labs -   No results found for this or any previous visit (from the past 12 hour(s)).     Radiologic Studies -   XR KNEE RT MIN 4 V   Final Result      No acute osseous abnormality. Mild to moderate tricompartmental osteoarthrosis   and small joint effusion. CT Results  (Last 48 hours)    None        CXR Results  (Last 48 hours)    None            Medical Decision Making   I am the first provider for this patient. I reviewed the vital signs, available nursing notes, past medical history, past surgical history, family history and social history. Vital Signs-Reviewed the patient's vital signs. Records Reviewed: Nursing Notes and Old Medical Records     Procedures: None   Procedures    Provider Notes (Medical Decision Making):     Differential Diagnosis: Musculoskeletal pain, myofascial strain/sprain, muscle spasm, spondylolisthesis, spondylosis, DJD, OA, sciatica, cauda equina syndrome, Knee strain, OA, RA, gout, ACL tear/strain, PCL tear/strain, MCL tear/strain, LCL tear/strain, medial meniscus tear, lateral meniscus tear    Plan:  Pt presents ambulatory Without difficulties, well-hydrated, non-toxic in appearance, with reassuring vitals. Exam reveals TTP right knee medial joint line and patient has a positive left leg raise. Have discussed with patient he does not meet criteria for an emergent lumbar spine MRI or an emergent right knee MRI. Extensive conversation has been had with the patient regarding this, witnessed by JUDE ANAYA. Have agreed to order an x-ray of the right knee although I have discussed I do not feel this is the best imaging study and patient will most likely need an MRI in the near future given change in pain and new injury that occurred yesterday. Patient agrees. 2:40 PM  Have discussed x-ray findings with patient. Did discuss that a small joint effusion could represent a new injury. Have advised close orthopedic follow-up. Have discussed rice care for home, did offer Ace wrap but he states he has one at home.  Have agreed to discharge home with a small amount of Norco. Will give patient copy of x-ray read and a CD disc with imaging. Will discharge home. MED RECONCILIATION:  No current facility-administered medications for this encounter. Current Outpatient Medications   Medication Sig    HYDROcodone-acetaminophen (Norco) 5-325 mg per tablet Take 1 Tablet by mouth every six (6) hours as needed for Pain for up to 3 days. Max Daily Amount: 4 Tablets.  fluticasone propionate (FLONASE) 50 mcg/actuation nasal spray     piroxicam (FELDENE) 20 mg capsule  (Patient not taking: Reported on 10/5/2021)    nystatin (MYCOSTATIN) 100,000 unit/gram ointment     tadalafiL (CIALIS) 5 mg tablet Take 1 Tablet by mouth daily as needed for Erectile Dysfunction.  sildenafiL, pulmonary hypertension, (Revatio) 20 mg tablet Take 1-5 tabs by mouth one hour prior to sexual activity on an empty stomach.  amLODIPine (NORVASC) 2.5 mg tablet Take 1 Tablet by mouth daily.  docusate sodium (COLACE) 100 mg capsule Take 1 Capsule by mouth two (2) times a day. (Patient not taking: Reported on 10/5/2021)    famotidine (PEPCID) 20 mg tablet Take 1 Tablet by mouth daily.  ondansetron (ZOFRAN ODT) 4 mg disintegrating tablet Take 1 Tablet by mouth every eight (8) hours as needed for Nausea or Vomiting.  OTHER This is to certify that Jarrod Joe was admitted to [facility] on 7/18/2021 and discharged on 7/22/2021, and has been advised to take rest at home for 10 ( ten ) more days and then resume work if symptom free.  insulin lispro (HUMALOG) 100 unit/mL injection Use tid per sliding scale  Indications: type 2 diabetes mellitus (Patient taking differently: by SubCUTAneous route Before breakfast, lunch, and dinner. Use tid per sliding scale:  <150 - 0 untis  >150 - 4 untis  >200 - 6 units  > 250 - 8 units  Indications: type 2 diabetes mellitus)    Syringe with Needle, Disp, (Allergy Syringe) 1 mL 27 x 1/2\" syrg FOR USE WITH INTRACAVERNOSAL INJECTIONS.     insulin glargine (LANTUS) 100 unit/mL injection 25 Units by SubCUTAneous route nightly. Indications: type 2 diabetes mellitus       Disposition:  Home     DISCHARGE NOTE:   Pt has been reexamined. Patient has no new complaints, changes, or physical findings. Care plan outlined and precautions discussed. Results of workup were reviewed with the patient. All medications were reviewed with the patient. All of pt's questions and concerns were addressed. Patient was instructed and agrees to follow up with PCP/Ortho as well as to return to the ED upon further deterioration. Patient is ready to go home. Follow-up Information     Follow up With Specialties Details Why Contact Info    [id number] [facility] EMERGENCY DEPT Emergency Medicine  As needed [address] Debra Nguyen MD Family Medicine Schedule an appointment as soon as possible for a visit   [address] [facility] [address]            Current Discharge Medication List      START taking these medications    Details   HYDROcodone-acetaminophen (Norco) 5-325 mg per tablet Take 1 Tablet by mouth every six (6) hours as needed for Pain for up to 3 days. Max Daily Amount: 4 Tablets. Qty: 10 Tablet, Refills: 0  Start date: 10/14/2021, End date: 10/17/2021    Associated Diagnoses: Knee effusion, right                 Diagnosis     Clinical Impression:   1. Knee effusion, right    2. Sciatica of left side          \"Please note that this dictation was completed with [employer], the computer voice recognition software. Quite often unanticipated grammatical, syntax, homophones, and other interpretive errors are inadvertently transcribed by the computer software. Please disregard these errors. Please excuse any errors that have escaped final proofreading. \"

## 2021-10-14 NOTE — Clinical Note
[address] [facility] EMERGENCY DEPT  [address] [facility] [address]  [phone]    Work/School Note    Date: 10/14/2021    To Whom It May concern:      Jm Briseno was seen and treated today in the emergency room by the following provider(s):  Attending Provider: Amy Munoz MD  Physician Assistant: ARACELIS Cochran. Jm Briseno is excused from work/school on 10/14/21. He is clear to return to work/school on 10/15/21.         Sincerely,          ARACELIS Ross

## 2021-10-14 NOTE — ED NOTES
Pt is in physical therapy for knee pain but compensated now has hip pain.  States yesterday he started a new exercise and now has pain in knee and left hip

## 2021-11-15 ENCOUNTER — OFFICE VISIT (OUTPATIENT)
Dept: ORTHOPEDIC SURGERY | Age: 56
End: 2021-11-15

## 2021-11-15 VITALS — HEIGHT: 73 IN | WEIGHT: 228 LBS | OXYGEN SATURATION: 98 % | BODY MASS INDEX: 30.22 KG/M2 | HEART RATE: 77 BPM

## 2021-11-15 NOTE — PROGRESS NOTES
Aline Lopez  1965   No chief complaint on file. HISTORY OF PRESENT ILLNESS  Aline Lopez is a 60 y.o. male who presents today for reevaluation of right knee. Patient rates pain as 5/10 today. Pain has been present for over 3 months. Pt notes he fell in a hole while at work, works at [employer]. Pt states he had an ulcer on a toe on his right foot prior to this fall at work. He states the ulcer opened back up after this fall. His podiatrist recommended that he follow up with an orthopedic doctor regarding his knee swelling. Pt reports hx of prior surgery on the right meniscus. Pt notes swelling in the knee, did not have swelling prior to the injury at work. Had an increase in pain after this injury. Pain with steps, sitting. Still notes swelling today. Patient denies any fever, chills, chest pain, shortness of breath or calf pain. The remainder of the review of systems is negative. There are no new illness or injuries to report since last seen in the office. There are no changes to medications, allergies, family or social history. Pain Assessment  4/27/2021   Location of Pain Knee   Location Modifiers Right   Severity of Pain 8   Quality of Pain Other (Comment)   Quality of Pain Comment swelling   Duration of Pain Persistent   Frequency of Pain Constant   Date Pain First Started -   Date Pain First Started Comment -   Aggravating Factors Bending;Stretching;Walking;Standing   Aggravating Factors Comment -   Limiting Behavior Yes   Relieving Factors Rest   Result of Injury -   Work-Related Injury -   Type of Injury -       PHYSICAL EXAM:   There were no vitals taken for this visit. The patient is a well-developed, well-nourished male   in no acute distress. The patient is alert and oriented times three. The patient is alert and oriented times three.  Mood and affect are normal.  LYMPHATIC: lymph nodes are not enlarged and are within normal limits  SKIN: normal in color and non tender to palpation. There are no bruises or abrasions noted. NEUROLOGICAL: Motor sensory exam is within normal limits. Reflexes are equal bilaterally. There is normal sensation to pinprick and light touch  MUSCULOSKELETAL:  Examination Right knee   Skin Intact   Range of motion 0-130   Effusion +   Medial joint line tenderness +   Lateral joint line tenderness -   Tenderness Pes Bursa -   Tenderness insertion MCL -   Tenderness insertion LCL -   Seans -   Patella crepitus +   Patella grind -   Lachman -   Pivot shift -   Anterior drawer -   Posterior drawer -   Varus stress -   Valgus stress -   Neurovascular Intact   Calf Swelling and Tenderness to Palpation -   Gabriel's Test -   Hamstring Cord Tightness -         IMAGING:  MRI of right knee dated 11/06/2020 was reviewed and read by Dr. Anju Murillo:   IMPRESSION:  1. Tiny oblique tear is seen focally within the superior articular aspect of the  medial meniscus posterior horn. Overlying, probably related grade 1 MCL abnormality. 2. Mild tricompartmental degenerative osteoarthropathy with asymmetric joint  space narrowing and mild partial-thickness cartilage loss. 3. Mild distal quadriceps insertional tendinosis. 4. Small right knee joint effusion.           XR of right knee from [facility] dated 9/15/2020 was reviewed and read by Dr. Anju Murillo:   IMPRESSION:  1.   Moderate predominantly medial compartment osteoarthritis which is  progressed since prior imaging. 2.  Small joint effusion. 3.  No fracture or dislocation. IMPRESSION:    No diagnosis found. PLAN:   1. Pt presents today with right knee pain due to an MRI-documented medial meniscus tear and primary OA. Surgery was discussed with the patient today and he would like to proceed with scheduling surgery for the right knee. Was also given a work note today stating: The patient  has a right knee medial meniscus tear from a work-related injury. I feel that he will be limited in his capacity to work.  These limitations will be for sedentary work only. I discussed the risks and benefits and potential adverse outcomes of both operative vs non operative treatment of right knee medial meniscus tear with the patient and patient wishes to proceed with arthroscopic right partial medial meniscectomy. Risks of operative intervention include but not limited to bleeding, infection, deep vein thrombosis, pulmonary embolism, death, limb length discrepancy, reflexive sympathetic dystrophy, fat embolism syndrome,damage to blood vessels and nerves, malunion, non-union, delayed union, failure of hardware, post traumatic arthritis, stroke, heart attack, and death. Patient understands that infection may arise and may require numerous surgeries. The patient was counseled at length about the risks of axel Covid-19 during their perioperative period and any recovery window from their procedure. The patient was made aware that axel Covid-19  may worsen their prognosis for recovering from their procedure and lend to a higher morbidity and/or mortality risk. All material risks, benefits, and reasonable alternatives including postponing the procedure were discussed. The patient does  wish to proceed with the procedure at this time. History and physical exam to be preformed at a later date. Risk factors include: dm  2. No ultrasound exam indicated today  3. No cortisone injection indicated today   4. No Physical/Occupational Therapy indicated today  5. No diagnostic test indicated today:   6. No durable medical equipment indicated today  7. No referral indicated today   8. No medications indicated today:   9. No Narcotic indicated today      RTC H&P      Scribed by [facility]) as dictated by MD KIET Sheets, Dr. Abilio Couch, confirm that all documentation is accurate.     Abilio Couch M.D.   [facility]

## 2021-11-15 NOTE — PROGRESS NOTES
This encounter was created in error - please disregard. Patient came in for discussion for a surgery that a previous provider performed. No records have been received.

## 2022-02-23 NOTE — TELEPHONE ENCOUNTER
Ok to provide note as requested. Can have copy of last office visit. Ok to be out of work, can provide note, until evaluated by Dr. Ben Gatica. 25.1

## 2022-03-08 ENCOUNTER — DOCUMENTATION ONLY (OUTPATIENT)
Dept: ORTHOPEDIC SURGERY | Age: 57
End: 2022-03-08

## 2022-03-08 NOTE — PROGRESS NOTES
Received request for medical records from [facility] [id number] [place] [address] on 02/28/22. Request faxed to [facility], forms scanned into patient's chart.

## 2022-03-18 PROBLEM — S02.0XXB OPEN FRACTURE OF FRONTAL BONE (HCC): Status: ACTIVE | Noted: 2021-03-26

## 2022-03-18 PROBLEM — M86.9 OSTEOMYELITIS OF GREAT TOE OF RIGHT FOOT (HCC): Status: ACTIVE | Noted: 2020-09-15

## 2022-03-18 PROBLEM — S02.85XA ORBIT FRACTURE, RIGHT, CLOSED, INITIAL ENCOUNTER (HCC): Status: ACTIVE | Noted: 2021-03-26

## 2022-03-18 PROBLEM — L97.509 DIABETIC FOOT ULCER (HCC): Status: ACTIVE | Noted: 2018-12-27

## 2022-03-18 PROBLEM — E11.621 DIABETIC FOOT ULCER (HCC): Status: ACTIVE | Noted: 2018-12-27
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
## 2022-03-19 PROBLEM — G62.9 NEUROPATHY: Status: ACTIVE | Noted: 2017-02-24

## 2022-03-19 PROBLEM — M23.206 OLD TEAR OF MENISCUS OF RIGHT KNEE: Status: ACTIVE | Noted: 2017-02-23

## 2022-03-19 PROBLEM — S06.33AA FOCAL HEMORRHAGIC CONTUSION OF CEREBRUM (HCC): Status: ACTIVE | Noted: 2021-03-26

## 2022-03-19 PROBLEM — N52.9 ERECTILE DYSFUNCTION: Status: ACTIVE | Noted: 2017-02-23

## 2022-03-19 PROBLEM — S02.19XA CLOSED FRACTURE OF ORBITAL PLATE OF ETHMOID BONE (HCC): Status: ACTIVE | Noted: 2021-03-26

## 2022-03-19 PROBLEM — F14.90 COCAINE USE: Status: ACTIVE | Noted: 2021-03-29

## 2022-03-19 PROBLEM — R60.0 EDEMA OF RIGHT LOWER EXTREMITY: Status: ACTIVE | Noted: 2020-09-15

## 2022-03-19 PROBLEM — F12.90 CANNABIS USE, UNCOMPLICATED: Status: ACTIVE | Noted: 2021-03-29

## 2022-03-19 PROBLEM — M86.9 OSTEOMYELITIS (HCC): Status: ACTIVE | Noted: 2021-07-18

## 2022-05-21 NOTE — DISCHARGE SUMMARY
Discharge Summary    Patient: Arielle Saldana               Sex: male          DOA: 9/15/2020         YOB: 1965      Age:  60 y.o.        LOS:  LOS: 3 days                Admit Date: 9/15/2020    Discharge Date: 9/18/2020    Primary care physician: Hui Anne MD    Discharge Diagnoses:    1. Ulceration and Osteomyelitis of great toe of right foot associate with diabetes mellitus   2. Hyperglycemia with T2DM, HgbA1c 8.6%   3.  Acute renal insufficiency, resolved   4. Normocytic anemia of chronic disease   5. Right lower leg edema, likely from great toe infection. Negative DVT        Noted DJD on right knee and history of meniscus pathology  6. Suspected ALEJANDRA per report   7. Hypertension. Patient declined antihypertensive medication    Discharge Condition: Good  Disposition: home with home health  Code Status:* full code     Follow up for Primary Care Physician:  1) he needs follow up with Podiatry for further care in 1-2 weeks  2) he needs follow up with Orthopaedic for further care of his knee pain  3) please advice that he needs antihypertensive medications  4)  Consider testing him for obstructive sleep apnea       Hospital Course:   60 y.o male with uncontrolled type 2 diabetes mellitus presented with non-healing right toe infection and ulceration for 3 months. In the ER, workup was reassuring but his toe was suspected to have infection and with osteomyelitis. Podiatry was consulted. MRI right foot was done with listed result below. Duplex of leg was negative for DVT. He was taken to OR for partial amputation of right great toe per Podiatry on 9/15/20. ID was consulted for further antibiotics selection as he continue on zosyn and vancomycin from initial presentation. He continue to voice leg pain and swelling. Arterial duplex of leg was negative for pathology. Ortho was consulted but recommended outpatient follow for his OA knee.    Otherwise, his acute renal insufficiency resolved. He has persistent elevated BP and advised for antihypertensive but he repeatedly declined for treatment     Last 24 Hours: no overnight event. His refused antihypertensive medications. Intraoperative culture grew citrobacter. His biopsy margin was clean. ID recommended another 10days of Levofloxacin and Augmentin. Wound care continue at home. ROS: No current fever/chills, no headache, no dizziness, no facial pain, no sinus congestion,   No swallowing pain, No chest pain, no palpitation, no shortness of breath, no abd pain,  No diarrhea, no urinary complaint, +right leg pain or swelling    VS:   Visit Vitals  BP (!) 202/100 Comment: Mamual B/P. Pulse 73   Temp 97.4 °F (36.3 °C)   Resp 20   Ht 6' 1\" (1.854 m)   Wt 103.4 kg (228 lb)   SpO2 100%   BMI 30.08 kg/m²      Tmax/24hrs: Temp (24hrs), Av.4 °F (36.3 °C), Min:97.1 °F (36.2 °C), Max:98.2 °F (36.8 °C)      Intake/Output Summary (Last 24 hours) at 2020 1505  Last data filed at 2020 0433  Gross per 24 hour   Intake 1470 ml   Output    Net 1470 ml       Tele:   General:  Cooperative, Not in acute distress, speaks in full sentence while in bed  HEENT: PERRL, EOMI, supple neck, no JVD, dry oral mucosa  Cardiovascular: S1S2 regular, no rub/gallop   Pulmonary: Clear air entry bilaterally, no wheezing, no crackle  GI:  Soft, non tender, non distended, +bs, no guarding   Extremities:  +right pedal edema, +distal pulses appreciated   Right toe dress, right knee without significant swelling, ROM intact   Neuro: AOx3, moving all extremities, no gross deficit. Consults:   Podiatry: Dr. Abena Oneil  ID: dr. Bronson Relic: Dr. Celi Simms Studies:   XR Results (most recent):  Results from Hospital Encounter encounter on 09/15/20   XR ANKLE RT AP/LAT    Narrative EXAM: XR ANKLE RT AP/LAT    INDICATION: ankle pain/swelling    COMPARISON: 2020. FINDINGS: 2 views of the right ankle    No fracture or malalignment.  Joint spaces are preserved. Soft tissue swelling. No soft tissue gas. Impression IMPRESSION:    Soft tissue swelling without osseous abnormality of the ankle. MRI Results (most recent):  Results from [place] encounter on 09/12/20   MRI FOOT RT W WO CONT    Narrative Examination: MRI right forefoot with and without contrast.    HISTORY: 60year-old diabetic patient with osteomyelitis of the right foot,  pain, swelling. TECHNIQUE: MRI of the right forefoot was performed utilizing a local coil. Coronal and sagittal STIR and T1-weighted images as well as axial T1 and T2  fat-suppressed images are obtained before the uneventful intravenous  administration of 20 mL Dotarem intravenous gadolinium contrast. Postcontrast  imaging was performed in axial, coronal, and sagittal planes utilizing T1  fat-suppressed techniques. COMPARISON: No relevant prior imaging of the right foot is available for review. FINDINGS:    In keeping with the provided history, there is diffuse T1 marrow hypointensity  which involves the distal phalanx of the great toe. Parallel STIR signal  abnormality along with abnormal intramedullary enhancement demonstrated on  postcontrast imaging related to the distal phalanx of the great toe. There is no  evidence of necrotic or nonenhancing bone formation. Joint effusion and  synovitis at the IP joint of the great toe is noted. Adjacent proximal phalanx  of the great toe demonstrates normal marrow signal.    There is moderately severe chondrosis of the right first MTP joint with  additional involvement of the tibial greater than fibular sesamoids. MTP and IP  joints of the lesser toes appear within normal limits. Included flexor and extensor tendons appear intact.     Diffuse abnormalities of intrinsic muscle bulk and signal are noted, mild in  nature, and characterized by mild intrinsic atrophy with superimposed  intramuscular edema signal. There is no evidence of an organized or drainable  intramuscular or subcutaneous fluid collection. Moderate dorsal pedal edema is  present. Impression IMPRESSION:  1. Osteomyelitis involving the great toe distal phalanx without evidence of  necrotic or nonenhancing bone. No evidence of an organized or drainable fluid  collection. 2. Moderately severe chondrosis of the right first MTP and MTS joint. 3. Diffuse abnormalities of intrinsic muscle bulk and signal typical for changes  related to subacute denervation. 4. Moderate dorsal pedal edema. Indications: The patient was admitted to the hospital for right great toe osteomyelitis. Patient had an outpatient MRI confirming osteomyelitis of 1st distal phalanx. Patient has ulcer on right great toe probing deep to bone. Patient needed amputation of distal portion of right great toe. All risks, benefits, complications of procedure discussed in detail with patient. Possible complications include non-healing of surgical site, requirement of further proximal amputation, loss of limb. No guarantee made to outcome of procedure. Patient voiced understanding and signed consent.         Date of Surgery: 9/15/2020      Preoperative Diagnosis:  OSTEOMYELITIS OF RIGHT FIRST DISTAL PHALANX     Postoperative Diagnosis: OSTEOMYELITIS OF  RIGHT FIRST DISTAL PHALANX      Surgeon(s) and Role:     * Jordan Hernandez DPM - Primary       Surgical Assistants: Operating Room Staff     Anesthesia: MAC with local     Procedure:  Procedure(s):  PARTIAL AMPUTATION RIGHT GREAT TOE     Procedure in Detail:     The patient was brought to the operative room and kept on hospital bed in supine position. After IV sedation from department of anesthesia, local block consisting of 20 cc of 1:1 mixture of 1% lidocaine plain and 0.5% marcaine plain administered as hallux block.  Right foot prepped and draped in usual sterile fashion.      Attention then directed to right great toe where fish-mouth incision performed at base of distal phalanx down to bone with # 10 blade. Distal phalanx grabbed with towel clamp and disarticulated at IPJ and placed on back [place] table and sent to histology in formalin container. Swab wound culture also obtained from amputated toe. Extensor and flexor tendons transected as proximal as possible. Surgical site irrigated with normal saline solution. Using bony rongeur, piece of bone resected from head of proximal phalanx and sent to histology as clean margin. Redundant tissue resected with # 15 blade as necessary. Subcutaneous tissue approximated with 3-0 vicryl. Skin was closed with 3-0 prolene using simple interrupted technique.      The patient tolerated the procedure and anesthesia well. The patient was sent to the recovery room in apparent satisfactory condition.  Normal color, turgor, and texture were present in all remaining digits.       Findings:  1st distal phalanx noted to be brittle and soft, it fragmented while removing it.     Estimated Blood Loss:  20 cc     Drains: none           Specimens:   ID Type Source Tests Collected by Time Destination   1 : RIGHT GREAT TOE, CLEAN MARGIN Preservative Toe   Dania Butter, [facility] 9/15/2020 1822 Pathology   2 : RIGHT GREAT TOE  Preservative Toe   Dania Butter, [facility] 9/15/2020 1838 Pathology   1 : RIGHT GREAT TOE DISTAL PHALANX , AEROBIC, ANEROBIC C/S Wound   CULTURE, ANAEROBIC, CULTURE, WOUND W GRAM STAIN, CULTURE, Singh Farfan, [facility] 9/15/2020 1829 Microbiology               Implants:  * No implants in log *           Complications:  None      Lab/Data Review:  Labs: Results:       Chemistry Recent Labs     09/18/20  0528 09/17/20  0125 09/16/20  0311   GLU 68* 132* 139*    140 140   K 4.0 4.3 4.4    107 109   CO2 31 30 28   BUN 12 19* 22*   CREA 0.94 1.02 1.30   CA 8.1* 7.8* 7.5*   AGAP 2* 3 3   BUCR 13 19 17   AP 52 60 59   TP 6.7 6.9 6.9   ALB 2.5* 2.5* 2.5*   GLOB 4.2* 4.4* 4.4*   AGRAT 0.6* 0.6* 0.6*      CBC w/Diff Recent Labs 09/18/20  0528 09/17/20  0125 09/16/20  0311   WBC 8.3 8.0 7.2   RBC 3.55* 3.37* 3.33*   HGB 10.3* 10.0* 9.7*   HCT 31.7* 30.6* 30.5*    243 260   GRANS 68 60 61   LYMPH 20* 28 31   EOS 3 3 2      Coagulation No results for input(s): PTP, INR, APTT, INREXT, INREXT in the last 72 hours. Iron/Ferritin No results for input(s): IRON in the last 72 hours. No lab exists for component: TIBCCALC   BNP No results for input(s): BNPP in the last 72 hours. Cardiac Enzymes No results for input(s): CPK, CKND1, DARREN in the last 72 hours. No lab exists for component: CKRMB, TROIP   Liver Enzymes Recent Labs     09/18/20 0528   TP 6.7   ALB 2.5*   AP 52      Thyroid Studies No results for input(s): T4, T3U, TSH, TSHEXT, TSHEXT in the last 72 hours. No lab exists for component: T3RU       All Micro Results     Procedure Component Value Units Date/Time    CULTURE, ANAEROBIC [[id number]]  (Abnormal) Collected:  09/15/20 1829    Order Status:  Completed Specimen:  Great Toe Updated:  09/18/20 1501     Special Requests: RIGHT GREAT TOE     Culture result:       MODERATE ANAEROBIC GRAM POSITIVE RODS                  MODERATE ANAEROBIC GRAM POSITIVE COCCI          CULTURE, Karan Loft STAIN [[id number]]  (Abnormal)  (Susceptibility) Collected:  09/15/20 1829    Order Status:  Completed Specimen:  Great Toe Updated:  09/18/20 1208     Special Requests: RIGHT GREAT TOE     Laroy Cindy STAIN RARE WBCS SEEN               FEW GRAM POSITIVE COCCI IN CLUSTERS           Culture result: LIGHT CITROBACTER KOSERI         LIGHT DIPHTHEROIDS         . [place] [facility] [place] [facility] REFER TO [id number] FOR POSSIBLE ANAEROBES    CULTURE, BLOOD [[id number]] Collected:  09/15/20 0220    Order Status:  Completed Specimen:  Blood Updated:  09/18/20 0714     Special Requests: NO SPECIAL REQUESTS        Culture result: NO GROWTH 3 DAYS       CULTURE, BLOOD [[id number]] Collected:  09/15/20 0235    Order Status:  Completed Specimen:  Blood Updated:  09/18/20 0714     Special Requests: NO SPECIAL REQUESTS        Culture result: NO GROWTH 3 DAYS       CULTURE, Justin Chappell [[id number]] Collected:  09/15/20 0503    Order Status:  Completed Specimen:  Foot Updated:  09/17/20 1455     Special Requests: NO SPECIAL REQUESTS        GRAM STAIN RARE WBCS SEEN         NO ORGANISMS SEEN        Culture result: NO GROWTH 2 DAYS       CULTURE, FUNGUS [[id number]] Collected:  09/15/20 1829    Order Status:  Completed Updated:  09/16/20 0032                Medications at discharge  including reasons for change and indications for new ones:   Current Discharge Medication List      START taking these medications    Details   amoxicillin-clavulanate (Augmentin) 875-125 mg per tablet Take 1 Tab by mouth every twelve (12) hours for 10 days. Indications: diabetic foot infection  Qty: 20 Tab, Refills: 0      levoFLOXacin (LEVAQUIN) 500 mg tablet Take 1 Tab by mouth daily for 10 days. Indications: diabetic foot infection  Qty: 10 Tab, Refills: 0      acetaminophen (TYLENOL) 500 mg tablet Take 2 Tabs by mouth every eight (8) hours for 15 days. Indications: pain associated with arthritis  Qty: 90 Tab, Refills: 0      lactobacillus sp. 50 billion cpu (BIO-K PLUS) 50 billion cell -375 mg cap capsule Take 1 Cap by mouth daily for 15 days. Qty: 15 Cap, Refills: 0         CONTINUE these medications which have NOT CHANGED    Details   insulin lispro (HUMALOG) 100 unit/mL injection Use tid per sliding scale  Indications: type 2 diabetes mellitus  Qty: 3 Vial, Refills: 0    Comments: Pharmacy Assistance program      Syringe with Needle, Disp, (Allergy Syringe) 1 mL 27 x 1/2\" syrg FOR USE WITH INTRACAVERNOSAL INJECTIONS. Qty: 25 Pen Needle, Refills: 2      Injector Device rah DISPENSE INJECT-EASE AUTO-INJECTOR FOR USE WITH BI-MIX INJECTIONS. Qty: 1 Each, Refills: 1      insulin glargine (LANTUS) 100 unit/mL injection 25 Units by SubCUTAneous route nightly.  Indications: type 2 diabetes mellitus  Qty: 1 Vial, Refills: 0    Comments: Pharmacy Assistance Program medication  Associated Diagnoses: Type 2 diabetes mellitus with microalbuminuria, with long-term current use of insulin (HCC)      testosterone 30 mg/actuation (1.5 mL) slpm APPLY 2 PUMPS (60MG) TO SKIN DAILY IN THE MORNING TO THE UNDERARM      sildenafil, pulm. hypertension, (REVATIO) 20 mg tablet Take 2-5 tablets as needed.   Qty: 90 Tab, Refills: 3         STOP taking these medications       amoxicillin (AMOXIL) 500 mg capsule Comments:   Reason for Stopping:         tadalafiL (Cialis) 5 mg tablet Comments:   Reason for Stopping:         fluticasone propionate (Flonase Allergy Relief) 50 mcg/actuation nasal spray Comments:   Reason for Stopping:         tri mix compound Comments:   Reason for Stopping:                       Pending laboratory work and tests: none    Activity: Activity as tolerated    Diet: Cardiac Diet, Diabetic Diet and Low fat, Low cholesterol    Wound Care: Keep wound clean and dry        Zeinab Harper MD  9/18/2020  1:15 PM Warm/Dry

## 2022-07-06 ENCOUNTER — OFFICE VISIT (OUTPATIENT)
Dept: ORTHOPEDIC SURGERY | Age: 57
End: 2022-07-06
Payer: MEDICAID

## 2022-07-06 VITALS — WEIGHT: 220 LBS | TEMPERATURE: 98.2 F | BODY MASS INDEX: 29.16 KG/M2 | HEIGHT: 73 IN

## 2022-07-06 DIAGNOSIS — M25.561 RIGHT KNEE PAIN, UNSPECIFIED CHRONICITY: Primary | ICD-10-CM

## 2022-07-06 DIAGNOSIS — S83.241D TEAR OF MEDIAL MENISCUS OF RIGHT KNEE, CURRENT, UNSPECIFIED TEAR TYPE, SUBSEQUENT ENCOUNTER: ICD-10-CM

## 2022-07-06 PROCEDURE — 99214 OFFICE O/P EST MOD 30 MIN: CPT | Performed by: ORTHOPAEDIC SURGERY

## 2022-07-06 NOTE — PROGRESS NOTES
Marianne Mathur  1965   Chief Complaint   Patient presents with    Knee Pain     rt        HISTORY OF PRESENT ILLNESS  Marianne Mathur is a 60 y.o. male who presents today for reevaluation of right knee. Patient rates pain as 8/10 today. Patient denies any fever, chills, chest pain, shortness of breath or calf pain. The remainder of the review of systems is negative. There are no new illness or injuries to report since last seen in the office. There are no changes to medications, allergies, family or social history. Pain Assessment  7/6/2022   Location of Pain Knee   Location Modifiers Right   Severity of Pain 8   Quality of Pain Throbbing; Donelda Goodpasture; Aching;Dull   Quality of Pain Comment -   Duration of Pain Persistent   Frequency of Pain Constant   Date Pain First Started (No Data)   Date Pain First Started Comment follow up   Aggravating Factors Stairs; Walking;Standing   Aggravating Factors Comment -   Limiting Behavior Yes   Relieving Factors Nothing   Result of Injury Yes   Work-Related Injury Yes   Type of Injury Fall           PHYSICAL EXAM:   Visit Vitals  Temp 98.2 °F (36.8 °C) (Temporal)   Ht 6' 1\" (1.854 m)   Wt 220 lb (99.8 kg)   BMI 29.03 kg/m²     The patient is a well-developed, well-nourished male   in no acute distress. The patient is alert and oriented times three. The patient is alert and oriented times three. Mood and affect are normal.  LYMPHATIC: lymph nodes are not enlarged and are within normal limits  SKIN: normal in color and non tender to palpation. There are no bruises or abrasions noted. NEUROLOGICAL: Motor sensory exam is within normal limits. Reflexes are equal bilaterally.  There is normal sensation to pinprick and light touch  MUSCULOSKELETAL:  unchanged      PROCEDURE: None    IMAGING: XR of right knee with 4 views dated 05/16/2022 was read and reviewed by Dr. Renata Ely decreased joint space about 50% of the medial compartment with moderate degenerative changes in the patellofemoral joint    IMPRESSION:    Medial meniscus tear  2. Osteoarthritis. PLAN:   1. This time we will obtain an MRI as the last MRI that he had was a year ago. Continues to have problems despite the fact that his injections and has been treated by multiple physicians of we will try to get more organized and get a current MRI and see what his standard  2. No ultrasound exam indicated today  3. No cortisone injection indicated today   4. No Physical/Occupational Therapy indicated today  5. diagnostic test indicated today: MRI of right knee   6. No durable medical equipment indicated today  7. No referral indicated today   8. No medications indicated today:   9. No Narcotic indicated today for short term acute pain secondary to knee pain. Patient given pain medication for short term acute pain relief. Goal is to treat patient according to above plan and to ultimately have patient off all pain medications once appropriate. If chronic pain management is required beyond what is expected for current orthopedic problem, will refer patient to pain management.  was reviewed and will be reviewed with every medication refill request.       RTC after MRI results      Scribed by Joselyn Chu [address]) as dictated by MD KIET Brady, Dr. Elicia Grayson, confirm that all documentation is accurate.     Elicia Grayson M.D.   [address] [facility] and Spine Specialist

## 2022-07-25 ENCOUNTER — TELEPHONE (OUTPATIENT)
Dept: ORTHOPEDIC SURGERY | Age: 57
End: 2022-07-25

## 2022-07-25 ENCOUNTER — HOSPITAL ENCOUNTER (OUTPATIENT)
Dept: MRI IMAGING | Age: 57
Discharge: HOME | End: 2022-07-25
Attending: ORTHOPAEDIC SURGERY
Payer: MEDICAID

## 2022-07-25 DIAGNOSIS — M25.561 RIGHT KNEE PAIN, UNSPECIFIED CHRONICITY: ICD-10-CM

## 2022-07-25 PROCEDURE — 73721 MRI JNT OF LWR EXTRE W/O DYE: CPT

## 2022-07-27 ENCOUNTER — OFFICE VISIT (OUTPATIENT)
Dept: ORTHOPEDIC SURGERY | Age: 57
End: 2022-07-27
Payer: MEDICAID

## 2022-07-27 VITALS — TEMPERATURE: 97.3 F | HEIGHT: 73 IN | BODY MASS INDEX: 28.28 KG/M2 | WEIGHT: 213.4 LBS

## 2022-07-27 DIAGNOSIS — M17.11 PRIMARY OSTEOARTHRITIS OF RIGHT KNEE: Primary | ICD-10-CM

## 2022-07-27 PROCEDURE — 99213 OFFICE O/P EST LOW 20 MIN: CPT | Performed by: PHYSICIAN ASSISTANT

## 2022-07-27 NOTE — PROGRESS NOTES
Nikki Raza  1965   Chief Complaint   Patient presents with    Results     MRI on Rt knee           HISTORY OF PRESENT ILLNESS  Nikki Raza is a 60 y.o. male who presents today for reevaluation of right knee and MRI review. Patient rates pain as 7/10 today. He was seen in the office by Dr. Vicki Burns in 2020 at which time he presented with an MRI-documented medial meniscus tear and osteoarthritis s/p a fall at work. At that time, a knee arthroscopy was discussed. He was then sent to Dr. Hanh Angelo for a second opinion per Workmen's [employer] who performed a knee arthroscopy on the patient. He states they did not send him to PT after this surgery. Was then told he had a bone bruise several months after this surgery and was referred for another second opinion with Dr. Taty Hanson at which time he was told he likely needed a knee replacement. Today, he describes a sharp pain in the right knee with twisting the leg a certain direction. Presents with swelling today. He notes he is frustrated because he had minimal issues with his knee prior to his injury at work around 2 years ago. He is also not happy with his postoperative care and results from surgery with Dr. Hanh Angelo, states he was not sent to PT after surgery and was given a Velcro knee brace as opposed to a custom knee brace. He has started to develop sciatic-type nerve pain in his back. He has been seen by a spine specialist who recommended PT for his back but his [employer] denied. He has tried gel injections and one prior cortisone injection for the knee without significant relief. Patient denies any fever, chills, chest pain, shortness of breath or calf pain. The remainder of the review of systems is negative. There are no new illness or injuries to report since last seen in the office. There are no changes to medications, allergies, family or social history.        PHYSICAL EXAM:   Visit Vitals  Temp 97.3 °F (36.3 °C) (Temporal)   Ht 6' 1\" (1.854 m)   Wt 213 lb 6.4 oz (96.8 kg)   BMI 28.15 kg/m²       The patient is a well-developed, well-nourished male   in no acute distress. The patient is alert and oriented times three. The patient is alert and oriented times three. Mood and affect are normal.  LYMPHATIC: lymph nodes are not enlarged and are within normal limits  SKIN: normal in color and non tender to palpation. There are no bruises or abrasions noted. NEUROLOGICAL: Motor sensory exam is within normal limits. Reflexes are equal bilaterally. There is normal sensation to pinprick and light touch  MUSCULOSKELETAL:  Examination Right knee   Skin Intact   Range of motion 0-120   Effusion -   Medial joint line tenderness +   Lateral joint line tenderness -   Tenderness Pes Bursa -   Tenderness insertion MCL -   Tenderness insertion LCL -   Seans -   Patella crepitus +   Patella grind +   Lachman -   Pivot shift -   Anterior drawer -   Posterior drawer -   Varus stress -   Valgus stress -   Neurovascular Intact   Calf Swelling and Tenderness to Palpation -   Gabriel's Test -   Hamstring Cord Tightness -       PROCEDURE: None    IMAGING: MRI of right knee dated 7/25/2022 was reviewed and read by myself reveals:   IMPRESSION:  1. Evidence of prior arthroscopy with postsurgical changes versus progressive tearing and degeneration of the posterior horn and body of the medial meniscus. 2.  Mild to moderate tricompartmental osteoarthritic changes including  progressive chondromalacia mostly in the medial and patellofemoral compartments. 3.  Small right joint effusion. XR of right knee with 4 views dated 05/16/2022 was read and reviewed by Dr. Sita Mckeon decreased joint space about 50% of the medial compartment with moderate degenerative changes in the patellofemoral joint    IMPRESSION:    Encounter Diagnosis   Name Primary? Primary osteoarthritis of right knee Yes           PLAN:   1.   Pt presents with right knee pain and MRI reveals postsurgical changes and mild to moderate osteoarthritis. He notes he is frustrated because he had minimal issues with his knee prior to his injury at work around 2 years ago. He is also not happy with his postoperative care and results from surgery with Dr. Joshua Amos, states he was not sent to PT after surgery and was given a Velcro knee brace as opposed to a custom knee brace. He was offered an order for a custom medial  knee brace and was also given information for Dr. Casimiro Browne in the office today if pt wishes to proceed with second opinion. We discussed that per Dr. Triny Alaniz note from 2020 and my own opinion, his work injury 2 years ago exacerbated his knee osteoarthritis and resulted in MMT. 2. No ultrasound exam indicated today  3. No cortisone injection indicated today   4. No Physical/Occupational Therapy indicated today  5. No diagnostic test indicated today:  6. No durable medical equipment indicated today  7. No referral indicated today   8. No medications indicated today:   9.  No Narcotic indicated today       RTC prn      Scribed by Kathy Mariama[address]) as dictated by JARED Howard PA-C [phone] and Spine Specialist

## 2023-03-14 ENCOUNTER — CLINICAL DOCUMENTATION (OUTPATIENT)
Age: 58
End: 2023-03-14

## 2023-03-21 ENCOUNTER — OFFICE VISIT (OUTPATIENT)
Age: 58
End: 2023-03-21
Payer: MEDICAID

## 2023-03-21 VITALS — HEIGHT: 73 IN | BODY MASS INDEX: 29.42 KG/M2 | WEIGHT: 222 LBS | TEMPERATURE: 97.8 F

## 2023-03-21 DIAGNOSIS — M25.562 BILATERAL CHRONIC KNEE PAIN: Primary | ICD-10-CM

## 2023-03-21 DIAGNOSIS — M17.11 PRIMARY OSTEOARTHRITIS OF RIGHT KNEE: ICD-10-CM

## 2023-03-21 DIAGNOSIS — G89.29 CHRONIC RIGHT SHOULDER PAIN: ICD-10-CM

## 2023-03-21 DIAGNOSIS — M19.011 GLENOHUMERAL ARTHRITIS, RIGHT: ICD-10-CM

## 2023-03-21 DIAGNOSIS — M54.42 CHRONIC LEFT-SIDED LOW BACK PAIN WITH LEFT-SIDED SCIATICA: ICD-10-CM

## 2023-03-21 DIAGNOSIS — M17.12 PRIMARY OSTEOARTHRITIS OF LEFT KNEE: ICD-10-CM

## 2023-03-21 DIAGNOSIS — M25.511 CHRONIC RIGHT SHOULDER PAIN: ICD-10-CM

## 2023-03-21 DIAGNOSIS — M25.561 BILATERAL CHRONIC KNEE PAIN: Primary | ICD-10-CM

## 2023-03-21 DIAGNOSIS — G89.29 CHRONIC LEFT-SIDED LOW BACK PAIN WITH LEFT-SIDED SCIATICA: ICD-10-CM

## 2023-03-21 DIAGNOSIS — M25.559 HIP PAIN: ICD-10-CM

## 2023-03-21 DIAGNOSIS — G89.29 BILATERAL CHRONIC KNEE PAIN: Primary | ICD-10-CM

## 2023-03-21 PROCEDURE — 73560 X-RAY EXAM OF KNEE 1 OR 2: CPT | Performed by: PHYSICIAN ASSISTANT

## 2023-03-21 PROCEDURE — 73030 X-RAY EXAM OF SHOULDER: CPT | Performed by: PHYSICIAN ASSISTANT

## 2023-03-21 PROCEDURE — 99214 OFFICE O/P EST MOD 30 MIN: CPT | Performed by: PHYSICIAN ASSISTANT

## 2023-03-21 RX ORDER — TOPIRAMATE 25 MG/1
25 TABLET ORAL 2 TIMES DAILY
Qty: 60 TABLET | Refills: 3 | Status: SHIPPED | OUTPATIENT
Start: 2023-03-21

## 2023-03-21 NOTE — PROGRESS NOTES
posterior horn and body of the medial meniscus. 2.  Mild to moderate tricompartmental osteoarthritic changes including   progressive chondromalacia mostly in the medial and patellofemoral compartments. 3.  Small right joint effusion. XR of right knee with 4 views dated 05/16/2022 was read and reviewed by Dr. Malik Murcia decreased joint space about 50% of the medial compartment with moderate degenerative changes in the patellofemoral joint      IMPRESSION:      ICD-10-CM    1. Bilateral chronic knee pain  M25.561 [69365] Knee 1-2V    M25.562 [42274] Knee 1-2V    G89.29       2. Chronic right shoulder pain  M25.511 [79564] Shoulder 2V or more    G89.29       3. Chronic left-sided low back pain with left-sided sciatica  M54.42     G89.29       4. Hip pain  M25.559       5. Primary osteoarthritis of left knee  M17.12       6. Primary osteoarthritis of right knee  M17.11       7. Glenohumeral arthritis, right  M19.011            PLAN:   1. Pt presents with b/l knee pain due to primary OA. We discussed treatment options for the knees including cortisone injections, visco-supplementation injections, and TKA. He would like to proceed with his own holistic regimen of omega 3 oil to see what impact this has on his symptoms. Will continue to monitor. I recommended he continue working on hamstring stretching as well. He also reports right shoulder pain with degenerative changes seen on XR. We discussed treatment options for the shoulder including cortisone injections, PT, and further workup with MRI. Patient would like to proceed with right shoulder MRI. Will order right shoulder MRI to r/o RCT. He also notes left sided sciatic pain and I will be referring him to the [address] [place] [address] for further evaluation. He also reports hip pain and I will be referring him to Dr. Meléndez July for further evaluation. Was also prescribed Topamax for his sciatic and hip pain. Risk factors include: dm  2.  No cortisone injection indicated today

## 2023-04-19 ENCOUNTER — HOSPITAL ENCOUNTER (OUTPATIENT)
Facility: HOSPITAL | Age: 58
Discharge: HOME | End: 2023-04-22
Payer: MEDICAID

## 2023-04-19 DIAGNOSIS — M19.011 GLENOHUMERAL ARTHRITIS, RIGHT: ICD-10-CM

## 2023-04-19 PROCEDURE — 73221 MRI JOINT UPR EXTREM W/O DYE: CPT

## 2023-04-28 ENCOUNTER — OFFICE VISIT (OUTPATIENT)
Age: 58
End: 2023-04-28
Payer: MEDICAID

## 2023-04-28 VITALS — WEIGHT: 220 LBS | HEIGHT: 73 IN | BODY MASS INDEX: 29.16 KG/M2

## 2023-04-28 DIAGNOSIS — M75.21 BICEPS TENDINITIS OF RIGHT SHOULDER: Primary | ICD-10-CM

## 2023-04-28 PROCEDURE — 99213 OFFICE O/P EST LOW 20 MIN: CPT | Performed by: PHYSICIAN ASSISTANT

## 2023-04-28 NOTE — PROGRESS NOTES
and Shift -   [place] -   Speeds -   Impingement sign +   Supraspinatus/Empty Can -, 5/5   External Rotation Strength -, 5/5   Lift Off/Belly Press -, 5/5   Neurovascular Intact       IMAGING: XR of the bilateral knee with 2 views obtained in the office dated 3/21/2023 reviewed and read by myself reveals: Right knee has degenerative changes, worse in medial compartment and patellofemoral compartment with joint space narrowing. Left knee shows mild degenerative changes, worse in patellofemoral compartment    XR of the right shoulder with 3 views obtained in the office dated 3/21/2023 reviewed and read by myself reveals: Inferior spur of right glenoid. No other acute abnormalities. XR of the right knee with 3 views obtained at [facility] dated 8/26/2022 reviewed and read by myself reveals: Mild tricompartmental osteoarthritis. MRI of right knee dated 7/25/2022 was reviewed and read by myself reveals:    IMPRESSION:  1. Evidence of prior arthroscopy with postsurgical changes versus progressive tearing and degeneration of the posterior horn and body of the medial meniscus. 2.  Mild to moderate tricompartmental osteoarthritic changes including   progressive chondromalacia mostly in the medial and patellofemoral compartments. 3.  Small right joint effusion. XR of right knee with 4 views dated 05/16/2022 was read and reviewed by Dr. Crystal Ponce decreased joint space about 50% of the medial compartment with moderate degenerative changes in the patellofemoral joint      MRI of right shoulder read and reviewed by myself reveals: 1. No rotator cuff tendon tear. Tendinosis with subacromial bursitis. Biceps  tenosynovitis  2. Inflammation in the rotator interval and inferior glenohumeral ligament,  findings often seen in adhesive capsulitis. 3. Type II acromion. AC joint hypertrophy and inflammation indenting  supraspinatus myotendinous junction. IMPRESSION:    No diagnosis found. PLAN:   1.   Patient with

## 2023-05-02 ENCOUNTER — OFFICE VISIT (OUTPATIENT)
Age: 58
End: 2023-05-02
Payer: MEDICAID

## 2023-05-02 VITALS — BODY MASS INDEX: 29.29 KG/M2 | HEIGHT: 73 IN | WEIGHT: 221 LBS

## 2023-05-02 DIAGNOSIS — E11.9 TYPE 2 DIABETES MELLITUS WITHOUT COMPLICATION, UNSPECIFIED WHETHER LONG TERM INSULIN USE (HCC): ICD-10-CM

## 2023-05-02 DIAGNOSIS — M17.11 PRIMARY OSTEOARTHRITIS OF RIGHT KNEE: Primary | ICD-10-CM

## 2023-05-02 PROCEDURE — 99214 OFFICE O/P EST MOD 30 MIN: CPT | Performed by: ORTHOPAEDIC SURGERY

## 2023-05-02 RX ORDER — MELOXICAM 15 MG/1
15 TABLET ORAL DAILY
Qty: 30 TABLET | Refills: 2 | Status: SHIPPED | OUTPATIENT
Start: 2023-05-02 | End: 2023-07-31

## 2023-05-02 RX ORDER — ACETAMINOPHEN 500 MG
1000 TABLET ORAL EVERY 8 HOURS
Qty: 180 TABLET | Refills: 0 | Status: SHIPPED | OUTPATIENT
Start: 2023-05-02 | End: 2023-06-01

## 2023-05-02 NOTE — ASSESSMENT & PLAN NOTE
61-year-old male with right knee osteoarthritis. He was referred by KIYA Toledo. He had a right knee arthroscopy which was his second for a medial meniscus tear in the right knee in 2020 by Dr. Franc Gutierrez in [facility]. This was a Workmen's Comp. case. He has not gotten significant relief of his pain since that time. He has an MRI from July 2022 which shows edema in the medial femoral condyle and near complete loss of the medial meniscus. His x-rays show significant joint space narrowing with osteophytosis subchondral sclerosis. He has tried an off  brace which did not help him and he has tried muscle relaxers as well at this oxycodone, which upsets his stomach and he does not like it. He is also taking Naprosyn and Tylenol. He is a type II diabetic and has had a small toe amputation secondary to this. His A1c fluctuates between 7.5 and 14. I discussed his options and told him that I do not feel like an arthroscopic procedure would give him significant pain relief or any prolonged period of time. On physical exam he does have significant crepitus and pain anteriorly as well as in the medial joint line so I suggested that a total knee replacement would be his  The treatment. He has sciatic down the left leg and he says that this is far worse than his right knee pain. He has an appointment with Dr. Echo Zee on Thursday to address his sciatic pain. The plan is to provide him with Mobic and Tylenol prescriptions as he does not tolerate lidocaine patches or Voltaren gel. I will see him back in a month after has been able to see his primary care get a new A1c. If his A1c is below 7.5 we will move forward with scheduling him for knee replacement.

## 2023-05-02 NOTE — PROGRESS NOTES
Patient: Arnie Mcneil                MRN: [id number]       SSN: [id number]  YOB: 1965        AGE: 59 y.o. SEX: male    PCP: Digna Badillo MD  05/02/23    Chief Complaint: Knee Pain (Right knee)      HPI:  Arnie Mcneil is a 59 y.o. male with chief complaint of   Chief Complaint   Patient presents with    Knee Pain     Right knee       1. Primary osteoarthritis of right knee  Assessment & Plan:  64-year-old male with right knee osteoarthritis. He was referred by KIYA Gooden. He had a right knee arthroscopy which was his second for a medial meniscus tear in the right knee in 2020 by Dr. Zackery Zimmer in [facility]. This was a Workmen's Comp. case. He has not gotten significant relief of his pain since that time. He has an MRI from July 2022 which shows edema in the medial femoral condyle and near complete loss of the medial meniscus. His x-rays show significant joint space narrowing with osteophytosis subchondral sclerosis. He has tried an off  brace which did not help him and he has tried muscle relaxers as well at this oxycodone, which upsets his stomach and he does not like it. He is also taking Naprosyn and Tylenol. He is a type II diabetic and has had a small toe amputation secondary to this. His A1c fluctuates between 7.5 and 14. I discussed his options and told him that I do not feel like an arthroscopic procedure would give him significant pain relief or any prolonged period of time. On physical exam he does have significant crepitus and pain anteriorly as well as in the medial joint line so I suggested that a total knee replacement would be his  The treatment. He has sciatic down the left leg and he says that this is far worse than his right knee pain. He has an appointment with Dr. Zackery Fraga on Thursday to address his sciatic pain. The plan is to provide him with Mobic and Tylenol prescriptions as he does not tolerate lidocaine patches or Voltaren gel.   I

## 2023-05-15 ENCOUNTER — OFFICE VISIT (OUTPATIENT)
Age: 58
End: 2023-05-15

## 2023-05-15 VITALS
TEMPERATURE: 97.7 F | WEIGHT: 222.2 LBS | BODY MASS INDEX: 29.45 KG/M2 | OXYGEN SATURATION: 100 % | HEIGHT: 73 IN | HEART RATE: 72 BPM

## 2023-05-15 DIAGNOSIS — M54.16 LUMBAR RADICULITIS: ICD-10-CM

## 2023-05-15 DIAGNOSIS — M47.816 LUMBAR FACET ARTHROPATHY: ICD-10-CM

## 2023-05-15 DIAGNOSIS — R29.898 WEAKNESS OF BOTH LOWER EXTREMITIES: ICD-10-CM

## 2023-05-15 DIAGNOSIS — M23.206 OLD TEAR OF MENISCUS OF RIGHT KNEE, UNSPECIFIED MENISCUS, UNSPECIFIED TEAR TYPE: ICD-10-CM

## 2023-05-15 DIAGNOSIS — R26.9 GAIT ABNORMALITY: ICD-10-CM

## 2023-05-15 DIAGNOSIS — M54.9 BACK PAIN, UNSPECIFIED BACK LOCATION, UNSPECIFIED BACK PAIN LATERALITY, UNSPECIFIED CHRONICITY: Primary | ICD-10-CM

## 2023-05-15 DIAGNOSIS — E11.69 TYPE 2 DIABETES MELLITUS WITH OTHER SPECIFIED COMPLICATION, WITH LONG-TERM CURRENT USE OF INSULIN (HCC): ICD-10-CM

## 2023-05-15 DIAGNOSIS — Z79.4 TYPE 2 DIABETES MELLITUS WITH OTHER SPECIFIED COMPLICATION, WITH LONG-TERM CURRENT USE OF INSULIN (HCC): ICD-10-CM

## 2023-05-15 ASSESSMENT — PATIENT HEALTH QUESTIONNAIRE - PHQ9
1. LITTLE INTEREST OR PLEASURE IN DOING THINGS: 0
SUM OF ALL RESPONSES TO PHQ QUESTIONS 1-9: 1
SUM OF ALL RESPONSES TO PHQ9 QUESTIONS 1 & 2: 1
2. FEELING DOWN, DEPRESSED OR HOPELESS: 1
SUM OF ALL RESPONSES TO PHQ QUESTIONS 1-9: 1

## 2023-05-15 NOTE — PROGRESS NOTES
Bekah Cramer presents today for   Chief Complaint   Patient presents with    Back Pain       Is someone accompanying this pt? no    Is the patient using any DME equipment during OV? no    Depression Screening:  No flowsheet data found. Learning Assessment:  No flowsheet data found. Abuse Screening:  No flowsheet data found. Fall Risk  No flowsheet data found. OPIOID RISK TOOL  No flowsheet data found. Coordination of Care:  1. Have you been to the ER, urgent care clinic since your last visit? no  Hospitalized since your last visit? no    2. Have you seen or consulted any other health care providers outside of the [address] [place] [address] since your last visit? no Include any pap smears or colon screening.  no
\"due or due soon\" health maintenance. I have asked that he contact his primary care provider, Mauro Robert MD, for follow-up on this health maintenance. 5) Lumbar 4V x-rays were ordered and personally reviewed  6) Lumbar MRI ordered to assess for stenosis, impingement and inflammation  7)  demonstrated consistency with prescribing. Return in about 6 weeks (around 6/26/2023) for PT follow up, Diagnostic follow up, Medication follow up. HISTORY OF PRESENT ILLNESS:  Shreyas Chappell is a 59 y.o.  male with history of lumbar and right knee pain x 2 years. Pt presents to the office today as a new patient referred by KIYA Combs. Pt complains of left hip and  knee pain that radiates down to his calf secondary to falling in a hole at work back in 2020, tearing his right meniscus. Pt describes the pain as getting worse over time. Pt endorses lower left extremity tingling and numbness. Pt states he has difficulty with sleeping due to his lower left extremity pain and describes it as \"excruciating. \" Pt states he  went to physical therapy for his pain and that resulted in more issues. Pt reports he needs knee replacement. Pt mentions he is a type 2 diabetic. He states his blood sugars are well controlled. Pt reports having a gel shots in his knee with minimum benefit. Pt states he had a cortisone shot a few months ago. Pt denies having back surgery. Pt mentions he lives alone. He is taking Topamax 25 mg, Naproxen 500 mg and Mobic 15 mg. Pt at this time desires to proceed with PT and lumbar MRI. Pain Scale: 10 - Worst pain ever/10     PCP: Mauro Robert MD        Diagnosis Date    Arthritis of left knee     Diabetes [facility])     Erectile dysfunction     Knee pain     Osteoarthritis of right knee     Osteomyelitis ([address]     Sinusitis         Social History     Tobacco Use    Smoking status: Former    Smokeless tobacco: Never   Substance Use Topics    Alcohol use:  Yes     Alcohol/week: 2.0 standard

## 2023-06-20 ENCOUNTER — HOSPITAL ENCOUNTER (OUTPATIENT)
Facility: HOSPITAL | Age: 58
Setting detail: RECURRING SERIES
End: 2023-06-20
Payer: MEDICAID

## 2023-06-20 NOTE — PROGRESS NOTES
PHYSICAL / OCCUPATIONAL THERAPY - DAILY TREATMENT NOTE (updated )  For Eval visit    Patient Name: Gayleen Dance    Date: 2023    : 1965  Insurance: Payor: [address] / Plan: [address] [id number] / Product Type: *No Product type* /      Patient  verified yes     Visit #   Current / Total 1 12-18   Time   In / Out 12:35 1:30   Pain   In / Out 10/10 7/10   Subjective Functional Status/Changes: See POC     TREATMENT AREA =  see POC    OBJECTIVE           50 min   Eval - untimed                      Therapeutic Procedures: Tx Min Billable or 1:1 Min (if diff from Tx Min) Procedure, Rationale, Specifics   5  12840 Self Care/Home Management (timed):  improve patient knowledge and understanding of pain reducing techniques, positioning, posture/ergonomics, home safety, activity modification, diagnosis/prognosis, and physical therapy expectations, procedures and progression  to improve patient's ability to progress to PLOF and address remaining functional goals. (see flow sheet as applicable)     Details if applicable:  pt ed on theory of centralization; provided with PPU HEP; reviewed lifting mechanics and avoidance of repetitive flexion and flexion w/ rotation.              Details if applicable:            Details if applicable:            Details if applicable:            Details if applicable:     5  [facility] BC Totals Reminder: bill using total billable min of TIMED therapeutic procedures (example: do not include dry needle or estim unattended, both untimed codes, in totals to left)  8-22 min = 1 unit; 23-37 min = 2 units; 38-52 min = 3 units; 53-67 min = 4 units; 68-82 min = 5 units   Total Total     [x]  Patient Education billed concurrently with other procedures   [x] Review HEP    [] Progressed/Changed HEP, detail:    [] Other detail:       Objective Information/Functional Measures/Assessment    See POC    Patient will continue to benefit from skilled PT / OT

## 2023-06-20 NOTE — THERAPY EVALUATION
[address] [place] [address] PHYSICAL THERAPY  [facility], [place], [address]  Phone: [phone]   [phone]  Plan of Care / Statement of Necessity for Physical Therapy Services     Patient Name: Thaddeus Warren : 1965   Medical   Diagnosis: Other low back pain [M54.59] Treatment Diagnosis:  M54.42  LUMBAGO WITH SCIATICA, LEFT SIDE    Onset Date: 2023     Referral Source: Migdalia Kumar MD Start of [facility]): 2023   Prior Hospitalization: See medical history Provider #: [id number]   Prior Level of Function: Musician; 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 Comorbidities: PSHx: R menisectomy 2014; R 5th toe amputation; PMHx: DM, B peripheral neuropathy;      Assessment / key information: Pt is a 58 y/o M who presents to PT w/ c/o chronic LBP w/ L sided radicular sx, that have been present since 2023. Pain began in his L lower back and L glute; states he went to ED to have his kidneys assessed and was dx with sciatica. Pt notes hx of R menisectomy, and feels pain began as a result of compensations from that. Pt notes pain ranges 5 to 8/10, made worse with prolonged sitting (<60 mins), prolonged walking; better with . Describes pain as sharp, burning, tingling, located L glute, L posterior thigh, lateral calf and occasionally into the L lateral foot. Pt does endorse occasional buckling of his LE, occasionally utilizes walker for support with walking. Testing/imaging has included x-rays showing mild degenerative changes; MRI is pending. Prior treatment has included nothing to date. Red flags negative for bowel/bladder incontinence and saddle anesthesia. Clinical Exam Findings:  POSTURE/OBSERVATION: antalgic gait, forward flexed posturing, labored transfers sit<>stand and bed mobility.    FUNCTIONAL ASSESSMENT: heel walk painful, toe walk unable 2' L glute pain; bridge to 100% pain-free; TA isometric poor, upper trunk and neck

## 2023-06-21 ENCOUNTER — HOSPITAL ENCOUNTER (OUTPATIENT)
Facility: HOSPITAL | Age: 58
Setting detail: RECURRING SERIES
Discharge: HOME | End: 2023-06-24
Payer: MEDICAID

## 2023-06-21 PROCEDURE — 97162 PT EVAL MOD COMPLEX 30 MIN: CPT

## 2023-07-19 ENCOUNTER — HOSPITAL ENCOUNTER (OUTPATIENT)
Facility: HOSPITAL | Age: 58
Setting detail: RECURRING SERIES
Discharge: HOME | End: 2023-07-22
Payer: MEDICAID

## 2023-07-19 PROCEDURE — 97535 SELF CARE MNGMENT TRAINING: CPT

## 2023-07-19 PROCEDURE — 97110 THERAPEUTIC EXERCISES: CPT

## 2023-07-19 PROCEDURE — 97530 THERAPEUTIC ACTIVITIES: CPT

## 2023-07-19 NOTE — PROGRESS NOTES
PHYSICAL / OCCUPATIONAL THERAPY - DAILY TREATMENT NOTE (updated )    Patient Name: Jalil Party    Date: 2023    : 1965  Insurance: Payor: [facility] [address] / Plan: [address] [place] [address] / Product Type: *No Product type* /      Patient  verified Yes     Visit #   Current / Total 3 12-18   Time   In / Out 553 625   Pain   In / Out 10/10 10/10   Subjective Functional Status/Changes: See PN     TREATMENT AREA =  Other low back pain [M54.59]    OBJECTIVE         Therapeutic Procedures: Tx Min Billable or 1:1 Min (if diff from Tx Min) Procedure, Rationale, Specifics   10  24867 Therapeutic Exercise (timed):  increase ROM, strength, coordination, balance, and proprioception to improve patient's ability to progress to PLOF and address remaining functional goals. (see flow sheet as applicable)     Details if applicable:        Therapeutic Activity (timed):  use of dynamic activities replicating functional movements to increase ROM, strength, coordination, balance, and proprioception in order to improve patient's ability to progress to PLOF and address remaining functional goals. (see flow sheet as applicable)     Details if applicable:     10  60953 Self Care/Home Management (timed):  improve patient knowledge and understanding of pain reducing techniques, positioning, activity modification, and diagnosis/prognosis  to improve patient's ability to progress to PLOF and address remaining functional goals.   (see flow sheet as applicable)     Details if applicable:            Details if applicable:            Details if applicable:       [facility] BC Totals Reminder: bill using total billable min of TIMED therapeutic procedures (example: do not include dry needle or estim unattended, both untimed codes, in totals to left)  8-22 min = 1 unit; 23-37 min = 2 units; 38-52 min = 3 units; 53-67 min = 4 units; 68-82 min = 5 units   Total Total     [x]  Patient Education billed

## 2023-07-19 NOTE — THERAPY RECERTIFICATION
when walking  Goal Met?  no    3. Pt will demo fair TA isometric w/o compensation to improve axial stability for transfers/bed mobility  Status at last Eval: TA isometric poor  Current Status: TA isometric fair  Goal Met? Some progress    LONG TERM GOALS:    Pt will report max pain </= 4/10 to improve activity tolerance  Status at last Eval: 10/10  Current Status: 10/10  Goal Met?  no    2. Improve B glute med/max MMT to at least 4/5 to improve LE stability for ambulation  Status at last Eval: Ext 4-/5 (P!) B; abd R 3+5 (P!), L 4/5  Current Status: hip ext R 4-/5, 3+/5; abd R 4/5, L 3+/5  Goal Met?  no    3. Pt will demo good core stability by ability to perform level 2 deadbugs with good maintenance of TA isometric to improve axial stability with ADLs  Status at last Eval: Poor TA iso  Current Status:  unable to maintain appropriate lumbar stabilization during movement. Goal Met?  no     4. Pt will report at least 75% overall improvement in sx to enable pt return to PLOF     Status at last Eval: n/a  Current Status: minimal improvement  Goal Met?  no      Payor: [address] [place] [address] Road / Plan: [address] [facility] / Product Type: *No Product type* /     Non-Medicare, can change goals, can adjust or add frequency duration, no signature required      New Goals to be achieved in __4__ weeks  1. Pt will report max pain </= 4/10 to improve activity tolerance  2. Improve B glute med/max MMT to at least 4/5 to improve LE stability for ambulation  3. Pt will demo good core stability by ability to perform level 2 deadbugs with good maintenance of TA isometric to improve axial stability with ADLs  4. Pt will report at least 75% overall improvement in sx to enable pt return to PLOF    Frequency / Duration:   Patient to be seen   1-2   times per week for   4    weeks:    RECOMMENDATIONS  We would like to continue therapy for progress to goals stated above. Continue per initial Plan of Care.     If

## 2023-07-24 ENCOUNTER — OFFICE VISIT (OUTPATIENT)
Age: 58
End: 2023-07-24
Payer: MEDICAID

## 2023-07-24 VITALS
WEIGHT: 220.6 LBS | HEART RATE: 72 BPM | TEMPERATURE: 97.8 F | DIASTOLIC BLOOD PRESSURE: 68 MMHG | OXYGEN SATURATION: 97 % | BODY MASS INDEX: 29.24 KG/M2 | HEIGHT: 73 IN | SYSTOLIC BLOOD PRESSURE: 125 MMHG | RESPIRATION RATE: 18 BRPM

## 2023-07-24 DIAGNOSIS — Z79.4 TYPE 2 DIABETES MELLITUS WITH OTHER SPECIFIED COMPLICATION, WITH LONG-TERM CURRENT USE OF INSULIN (HCC): ICD-10-CM

## 2023-07-24 DIAGNOSIS — R29.898 WEAKNESS OF BOTH LOWER EXTREMITIES: ICD-10-CM

## 2023-07-24 DIAGNOSIS — M47.816 LUMBAR FACET ARTHROPATHY: ICD-10-CM

## 2023-07-24 DIAGNOSIS — M54.16 LUMBAR RADICULITIS: Primary | ICD-10-CM

## 2023-07-24 DIAGNOSIS — E11.69 TYPE 2 DIABETES MELLITUS WITH OTHER SPECIFIED COMPLICATION, WITH LONG-TERM CURRENT USE OF INSULIN (HCC): ICD-10-CM

## 2023-07-24 DIAGNOSIS — R74.8 ELEVATED CREATINE KINASE: ICD-10-CM

## 2023-07-24 PROCEDURE — 99214 OFFICE O/P EST MOD 30 MIN: CPT | Performed by: PHYSICAL MEDICINE & REHABILITATION

## 2023-07-24 RX ORDER — METHYLPREDNISOLONE 4 MG/1
TABLET ORAL
Qty: 1 KIT | Refills: 0 | Status: SHIPPED | OUTPATIENT
Start: 2023-07-24 | End: 2023-07-30

## 2023-07-24 NOTE — PROGRESS NOTES
Jalil Party presents today for   Chief Complaint   Patient presents with    Back Problem    Hip Pain    Leg Pain       Is someone accompanying this pt? NO    Is the patient using any DME equipment during OV? NO    Depression Screening:  No flowsheet data found. Learning Assessment:  No flowsheet data found. Abuse Screening:  No flowsheet data found. Fall Risk  No flowsheet data found. OPIOID RISK TOOL  No flowsheet data found. Coordination of Care:  1. Have you been to the ER, urgent care clinic since your last visit? YES PAIN  Hospitalized since your last visit? NO    2. Have you seen or consulted any other health care providers outside of the [address] [place] [address] since your last visit? NO Include any pap smears or colon screening.  NO

## 2023-07-24 NOTE — PROGRESS NOTES
[facility]  [address] [place] [address], [address] [facility], [address] [place] [address]   Phone: [phone]  Fax: [phone]    Pt's YOB: 1965    Ronak Browning was seen today for back problem, hip pain and leg pain. Diagnoses and all orders for this visit:    Lumbar radiculitis    Lumbar facet arthropathy  -     methylPREDNISolone (MEDROL DOSEPACK) 4 MG tablet; Take as directed    Weakness of both lower extremities    Elevated creatine kinase  -     Basic Metabolic Panel; Future    Type 2 diabetes mellitus with other specified complication, with long-term current use of insulin ([address])  -     Basic Metabolic Panel; Future         IMPRESSION AND PLAN:  Millicent Estrada is a 59 y.o. male with history of lumbar and right knee pain and presents to the office today for PT, diagnostic and medication follow up. Pt continues to complain of left hip and right knee pain that radiates down to his calf secondary to falling in a hole at work back in 2020, tearing his right meniscus and numbness and tingling in the left lower extremity, unchanged since his last office visit. Pt did not receive the previously ordered lumbar spine MRI due to possible scheduling errors. Pt reports he needs a knee replacement. He reports receiving gel shots in his knee with minimum improvement. He is taking Topamax 25 mg, Naproxen 500 mg and Mobic 15 mg.     1) I advised he hold off on taking Mobic due to his worsening kidney function. 2) I recommended pt use walking poles to assist with ambulation. 3) I recommended pt proceed with a knee replacement. 4) Pt will follow up with his endocrinologist.   5) Basic metabolic profile was ordered to assess for elevated creatinine and high blood pressure   6) A Medrol Dosepak was prescribed for acute inflammatory pain; pt will continue to monitor BS and adjust his insulin as needed   7) Mr. Kristin Simms has a reminder for a \"due or due soon\" health maintenance.  I

## 2023-07-25 DIAGNOSIS — R29.898 WEAKNESS OF BOTH LOWER EXTREMITIES: ICD-10-CM

## 2023-07-25 DIAGNOSIS — M47.816 LUMBAR FACET ARTHROPATHY: ICD-10-CM

## 2023-07-25 DIAGNOSIS — M54.16 LUMBAR RADICULITIS: ICD-10-CM

## 2023-07-25 DIAGNOSIS — M54.9 BACK PAIN, UNSPECIFIED BACK LOCATION, UNSPECIFIED BACK PAIN LATERALITY, UNSPECIFIED CHRONICITY: ICD-10-CM

## 2023-07-25 DIAGNOSIS — R26.9 GAIT ABNORMALITY: ICD-10-CM

## 2023-08-02 NOTE — PROGRESS NOTES
PHYSICAL / OCCUPATIONAL THERAPY - DAILY TREATMENT NOTE (updated )    Patient Name: Talat Casas    Date: 2023    : 1965  Insurance: Payor: [facility] PL / Plan: [address] [place] [address] / Product Type: *No Product type* /      Patient  verified Yes     Visit #   Current / Total 4 7-11   Time   In / Out 5:40 6:19   Pain   In / Out 4- back and down LLE to calf 5- back and down LLE to calf   Subjective Functional Status/Changes: Pt c/o sharp L knee pain \"inside and on the top and back\" that started up this morning; \"my sciatic pain was on full blast this morning\". ~2 weeks out of PT due to limited availability as he has returned to work; he has now been scheduled out for evening. Pt has started steroid dose pack this AM; he is confused as to how he is supposed to be taking it (PT advised pt to go to pharmacy and speak with pharmacist to clarify)     TREATMENT AREA =  Other low back pain [M54.59]    OBJECTIVE         Therapeutic Procedures: Tx Min Billable or 1:1 Min (if diff from Tx Min) Procedure, Rationale, Specifics   11  37465 Therapeutic Exercise (timed):  increase ROM, strength, coordination, balance, and proprioception to improve patient's ability to progress to PLOF and address remaining functional goals. (see flow sheet as applicable)     Details if applicable:       20  01565 Therapeutic Activity (timed):  use of dynamic activities replicating functional movements to increase ROM, strength, coordination, balance, and proprioception in order to improve patient's ability to progress to PLOF and address remaining functional goals.   (see flow sheet as applicable)     Details if applicable:     8  60754 Neuromuscular Re-Education (timed):  improve balance, coordination, kinesthetic sense, posture, core stability and proprioception to improve patient's ability to develop conscious control of individual muscles and awareness of position of extremities in

## 2023-08-03 ENCOUNTER — HOSPITAL ENCOUNTER (OUTPATIENT)
Facility: HOSPITAL | Age: 58
Setting detail: RECURRING SERIES
Discharge: HOME | End: 2023-08-06
Payer: MEDICAID

## 2023-08-03 PROCEDURE — 97530 THERAPEUTIC ACTIVITIES: CPT

## 2023-08-03 PROCEDURE — 97110 THERAPEUTIC EXERCISES: CPT

## 2023-08-03 PROCEDURE — 97112 NEUROMUSCULAR REEDUCATION: CPT

## 2023-08-14 ENCOUNTER — HOSPITAL ENCOUNTER (OUTPATIENT)
Facility: HOSPITAL | Age: 58
Setting detail: RECURRING SERIES
Discharge: HOME | End: 2023-08-17
Payer: MEDICAID

## 2023-08-14 PROCEDURE — 97530 THERAPEUTIC ACTIVITIES: CPT

## 2023-08-14 PROCEDURE — 97112 NEUROMUSCULAR REEDUCATION: CPT

## 2023-08-14 PROCEDURE — 97110 THERAPEUTIC EXERCISES: CPT

## 2023-08-14 NOTE — PROGRESS NOTES
PHYSICAL / OCCUPATIONAL THERAPY - DAILY TREATMENT NOTE (updated )    Patient Name: Chauncey Precise    Date: 2023    : 1965  Insurance: Payor: [facility] / Plan: [address] [place] [address] / Product Type: *No Product type* /      Patient  verified Yes     Visit #   Current / Total 5 7-11   Time   In / Out 5:40 pm 6:22 pm   Pain   In / Out 4/10 4/10   Subjective Functional Status/Changes: \"Every night my back seems to tighten up. \"     TREATMENT AREA =  Other low back pain [M54.59]    OBJECTIVE    Therapeutic Procedures: Tx Min Billable or 1:1 Min (if diff from Tx Min) Procedure, Rationale, Specifics   10  70475 Therapeutic Exercise (timed):  increase ROM, strength, coordination, balance, and proprioception to improve patient's ability to progress to PLOF and address remaining functional goals. (see flow sheet as applicable)     Details if applicable:    Back strengthening interventions     15  43530 Neuromuscular Re-Education (timed):  improve balance, coordination, kinesthetic sense, posture, core stability and proprioception to improve patient's ability to develop conscious control of individual muscles and awareness of position of extremities in order to progress to PLOF and address remaining functional goals. (see flow sheet as applicable)     Details if applicable:    Educated on TA bracing while in supine, with BKFO, and in standing   15  01354 Therapeutic Activity (timed):  use of dynamic activities replicating functional movements to increase ROM, strength, coordination, balance, and proprioception in order to improve patient's ability to progress to PLOF and address remaining functional goals.   (see flow sheet as applicable)     Details if applicable:     36  [facility] Totals Reminder: bill using total billable min of TIMED therapeutic procedures (example: do not include dry needle or estim unattended, both untimed codes, in totals to left)  8-22 min = 1

## 2023-08-16 ENCOUNTER — HOSPITAL ENCOUNTER (OUTPATIENT)
Facility: HOSPITAL | Age: 58
Setting detail: RECURRING SERIES
Discharge: HOME | End: 2023-08-19
Payer: MEDICAID

## 2023-08-16 ENCOUNTER — TELEPHONE (OUTPATIENT)
Age: 58
End: 2023-08-16

## 2023-08-16 PROCEDURE — 97112 NEUROMUSCULAR REEDUCATION: CPT

## 2023-08-16 PROCEDURE — 97110 THERAPEUTIC EXERCISES: CPT

## 2023-08-16 PROCEDURE — 97530 THERAPEUTIC ACTIVITIES: CPT

## 2023-08-16 NOTE — PROGRESS NOTES
[facility]  [address] [address], [place]  Phone: [phone]   [phone]  PHYSICAL THERAPY PROGRESS NOTE  Patient Name: Millicent Estrada : 1965   Treatment/Medical Diagnosis: Other low back pain [M54.59]   Referral Source: Sixto Ballesteros MD     Date of Initial Visit: 23 Attended Visits: 5 Missed Visits: 2     SUMMARY OF TREATMENT  Therapeutic Exercise for lumbar/LE mobility/flexibility, postural awareness/strengthening, lumbopelvic/core stabilization, patient education and HEP. Progress has been limited 2' inconsistent attendance; 2 NS and only attended 5 sessions in 2 month time frame. CURRENT STATUS  % improvement: 30%  Max pain 8/10  Avg pain 4-5/10    HEP compliance: inconsistent      Current improvements: increased lumbar and LE mobility/flexibility during the day; able to manage pain first thing upon waking with stretching  Remaining functional limitations: pain with initial activity in the morning,  laying in bed, prolonged sitting > 3 hours, prolonged standing/walking > 1 hour. Objective measures:  Lumbar AROM= Flexion= 25% p!;; Extension= 60%; Side bending= R= full/WFL's, L= full/WFL's     LE/hip strength measurements/MMT= (R/L)= Flexion= 5/5, 4/5 p!;  Abduction= 4+/5, 4-/5 p!; Extension= 4/5, 3+/5 p!    (+) Slump test L @ -75 degrees of L knee extension  w/ exaggerated pain response  Bridge 50%    FOTO 42/100      LONG TERM GOALS:    Pt will report max pain </= 4/10 to improve activity tolerance  Status at last Eval: 10/10  Current Status: 8/10  Goal Met?   progress    2. Improve B glute med/max MMT to at least 4/5 to improve LE stability for ambulation  Status at last Eval:  hip ext R 4-/5, L 3+/5; abd R 4/5, L 3+/5  Current Status:  hip ext R 4/5, L 3+/5 p!; abd R 4+/5, L 4-/5 p! Goal Met?   progress    3.  Pt will demo good core stability by ability to perform level 2 deadbugs with good

## 2023-08-16 NOTE — TELEPHONE ENCOUNTER
Patient called and is asking for a call back from [id number] [facility] [address]. Patient said it was in regards to the mri of the Lumbar Spine that he had done over a week ago at [facility].     Patient tel. [phone]. Note : patient has an appt already schedule to see  on 9/19/23.

## 2023-08-16 NOTE — PROGRESS NOTES
PHYSICAL / OCCUPATIONAL THERAPY - DAILY TREATMENT NOTE (updated )    Patient Name: Lilly Méndez    Date: 2023    : 1965  Insurance: Payor: [facility] PL / Plan: [address] [place] [address] / Product Type: *No Product type* /      Patient  verified Yes     Visit #   Current / Total 6 7-11   Time   In / Out 5:40 6:19   Pain   In / Out 4/10 6/10   Subjective Functional Status/Changes: I am still feeling the pain going down the back of my left leg to my calf and it feels tight pretty every morning when I get out of bed before I work it out. TREATMENT AREA =  Other low back pain [M54.59]    OBJECTIVE         Therapeutic Procedures: Tx Min Billable or 1:1 Min (if diff from Tx Min) Procedure, Rationale, Specifics   9  36374 Therapeutic Exercise (timed):  increase ROM, strength, coordination, balance, and proprioception to improve patient's ability to progress to PLOF and address remaining functional goals. (see flow sheet as applicable)     Details if applicable:       8  67500 Neuromuscular Re-Education (timed):  improve balance, coordination, kinesthetic sense, posture, core stability and proprioception to improve patient's ability to develop conscious control of individual muscles and awareness of position of extremities in order to progress to PLOF and address remaining functional goals. (see flow sheet as applicable)     Details if applicable:     22  79801 Therapeutic Activity (timed):  use of dynamic activities replicating functional movements to increase ROM, strength, coordination, balance, and proprioception in order to improve patient's ability to progress to PLOF and address remaining functional goals.   (see flow sheet as applicable)     Details if applicable:            Details if applicable:            Details if applicable:     44  [facility] Totals Reminder: bill using total billable min of TIMED therapeutic procedures (example: do not include dry needle

## 2023-08-17 NOTE — TELEPHONE ENCOUNTER
ALEXIS to let patient know this is the soonest we have at the time. I will put his name on the wait list.  I also informed him that he could call to see if we have any cancellations.

## 2023-08-21 ENCOUNTER — HOSPITAL ENCOUNTER (OUTPATIENT)
Facility: HOSPITAL | Age: 58
Setting detail: RECURRING SERIES
Discharge: HOME | End: 2023-08-24
Payer: MEDICAID

## 2023-08-21 PROCEDURE — 97530 THERAPEUTIC ACTIVITIES: CPT

## 2023-08-21 PROCEDURE — 97110 THERAPEUTIC EXERCISES: CPT

## 2023-08-21 PROCEDURE — 97112 NEUROMUSCULAR REEDUCATION: CPT

## 2023-08-21 NOTE — PROGRESS NOTES
continue to benefit from skilled PT / OT services to modify and progress therapeutic interventions, analyze and address functional mobility deficits, analyze and address ROM deficits, analyze and address strength deficits, analyze and address soft tissue restrictions, analyze and cue for proper movement patterns, analyze and modify for postural abnormalities, and instruct in home and community integration to address functional deficits and attain remaining goals. Progress toward goals / Updated goals:  []  See Progress Note/Recertification  New Goals to be achieved in __6-8__ treatments  1. Pt will report max pain </= 4/10 to improve activity tolerance  Status at last note/certification: 0/92  Current:     2. Improve L glute med/max MMT to at least 4/5 to improve LE stability for ambulation  Status at last note/certification:  L 3+/5 p!; L 4-/5 p! Current:     3. Pt will demo good core stability by ability to perform level 2 deadbugs with good maintenance of TA isometric to improve axial stability with ADLs  Status at last note/certification: level 1 dead bug stabs with significant challenge engaging TA; requires verbal and tactile cueing  Current: 4. Pt will report at least 75% overall improvement in sx to enable pt return to PLOF  Status at last note/certification: 28% improvement reported  Current:       Next PN/ RC due 9/16/23  Auth due SABAS    PLAN  Yes  Continue plan of care  [x]  Upgrade activities as tolerated  []  Discharge due to :  []  Other:    Leticia Barnes, PTA    8/21/2023    5:54 PM    Future Appointments   Date Time Provider [address]   8/24/2023  5:40 PM Anusha Kelly, PT [facility]   8/28/2023  5:40 PM [facility] PT [place] [address] [facility]   9/19/2023  3:40 PM Genie Nguyen MD [facility]

## 2023-08-24 ENCOUNTER — HOSPITAL ENCOUNTER (OUTPATIENT)
Facility: HOSPITAL | Age: 58
Setting detail: RECURRING SERIES
End: 2023-08-24
Payer: MEDICAID

## 2023-08-24 NOTE — PROGRESS NOTES
PHYSICAL / OCCUPATIONAL THERAPY - DAILY TREATMENT NOTE (updated )    Patient Name: Daniel Abernathy    Date: 2023    : 1965  Insurance: Payor: [facility] / Plan: [address] [place] [address] / Product Type: *No Product type* /      Patient  verified Yes     Visit #   Current / Total 8 12-14   Time   In / Out *** ***   Pain   In / Out *** ***   Subjective Functional Status/Changes: ***     TREATMENT AREA =  Other low back pain [M54.59]    OBJECTIVE    Modalities Rationale:     decrease pain and increase tissue extensibility to improve patient's ability to progress to PLOF and address remaining functional goals. min [] Estim Unattended, type/location:                                      []  w/ice    []  w/heat    min [] Estim Attended, type/location:                                     []  w/US     []  w/ice    []  w/heat    []  TENS insruct      min []  Mechanical Traction: type/lbs                   []  pro   []  sup   []  int   []  cont    []  before manual    []  after manual    min []  Ultrasound, settings/location:     10*** min  unbill []  Ice     [x]  Heat    location/position: Lumbar/prone     min []  Paraffin,  details:     min []  Vasopneumatic Device, press/temp:     min []  Binh Jana / Levern Boss: If using vaso (only need to measure limb vaso being performed on)      pre-treatment girth :       post-treatment girth :       measured at (landmark location) :      min []  Other:    Skin assessment post-treatment:  Redness, no adverse reactions      Therapeutic Procedures: Tx Min Billable or 1:1 Min (if diff from Tx Min) Procedure, Rationale, Specifics   ***  19288 Therapeutic Exercise (timed):  increase ROM, strength, coordination, balance, and proprioception to improve patient's ability to progress to PLOF and address remaining functional goals.  (see flow sheet as applicable)     Details if applicable:       ***  22470 Neuromuscular Re-Education

## 2023-08-28 ENCOUNTER — APPOINTMENT (OUTPATIENT)
Facility: HOSPITAL | Age: 58
End: 2023-08-28
Payer: MEDICAID

## 2023-09-12 ENCOUNTER — TELEPHONE (OUTPATIENT)
Facility: HOSPITAL | Age: 58
End: 2023-09-12

## 2023-09-12 NOTE — TELEPHONE ENCOUNTER
Called pt as he is on the Wait List and offered him an opeing that we had for today at 1020.  Pt stated that he can only do after 4:20 pm.

## 2023-09-14 ENCOUNTER — HOSPITAL ENCOUNTER (OUTPATIENT)
Facility: HOSPITAL | Age: 58
Setting detail: RECURRING SERIES
Discharge: HOME | End: 2023-09-17
Payer: MEDICAID

## 2023-09-14 PROCEDURE — 97530 THERAPEUTIC ACTIVITIES: CPT

## 2023-09-14 PROCEDURE — 97110 THERAPEUTIC EXERCISES: CPT

## 2023-09-14 PROCEDURE — 97112 NEUROMUSCULAR REEDUCATION: CPT

## 2023-09-14 NOTE — PROGRESS NOTES
PHYSICAL / OCCUPATIONAL THERAPY - DAILY TREATMENT NOTE (updated )    Patient Name: Ryan Araiza    Date: 2023    : 1965  Insurance: Payor: [address] [place] [address] / Plan: [address] [facility] / Product Type: *No Product type* /      Patient  verified Yes     Visit #   Current / Total 2 8   Time   In / Out [id number]   Pain   In / Out 5 3   Subjective Functional Status/Changes: Patient states he has most pain in the morning. TREATMENT AREA =  Other low back pain [M54.59]    OBJECTIVE    Ice (UNBILLED):  location/position: prone, left hip     Min Rationale   10 decrease pain to improve patient's ability to progress to PLOF and address remaining functional goals. Skin assessment post-treatment:   Intact     Therapeutic Procedures:    09046 Therapeutic Exercise (timed):  increase ROM, strength, coordination, balance, and proprioception to improve patient's ability to progress to PLOF and address remaining functional goals. Tx Min Billable or 1:1 Min   (if diff from Tx Min) Details:   15  See flow sheet as applicable     48749 Neuromuscular Re-Education (timed):  improve balance, coordination, kinesthetic sense, posture, core stability and proprioception to improve patient's ability to develop conscious control of individual muscles and awareness of position of extremities in order to progress to PLOF and address remaining functional goals. Tx Min Billable or 1:1 Min   (if diff from Tx Min) Details:   15  See flow sheet as applicable     27624 Therapeutic Activity (timed):  use of dynamic activities replicating functional movements to increase ROM, strength, coordination, balance, and proprioception in order to improve patient's ability to progress to PLOF and address remaining functional goals.    Tx Min Billable or 1:1 Min   (if diff from Tx Min) Details:   9  See flow sheet as applicable       44  [facility] Totals Reminder: bill using total billable min of TIMED

## 2023-09-14 NOTE — PROGRESS NOTES
[facility] PHYSICAL THERAPY  [address], [address] [address] Ph: [phone] Fx: [phone]  PHYSICAL THERAPY PROGRESS NOTE  Patient Name: Jyoti Maya : 1965   Treatment/Medical Diagnosis: Other low back pain [M54.59]   Referral Source: Silas Hummel MD     Date of Initial Visit: 2023 Attended Visits: 7 Missed Visits: 5     SUMMARY OF TREATMENT  Patient reports 30% improvement with overall symptoms. Patient states his pain is worse in the morning. Patient has made little to no progress toward set therapy goals since last progress note due to poor attendance to therapy (only having 2 follow up visits since PN on 23) and poor compliance with HEP. CURRENT STATUS  1. Pt will report max pain </= 4/10 to improve activity tolerance  Status at last note/certification:   Current: regressed 10/10    2. Improve L glute med/max MMT to at least 4/5 to improve LE stability for ambulation  Status at last note/certification:  L 3+/5 p!; L 4-/5 p! Current:left glute max 3/5. Left glute med 3/5. 23    3. Pt will demo good core stability by ability to perform level 2 deadbugs with good maintenance of TA isometric to improve axial stability with ADLs  Status at last note/certification: level 1 dead bug stabs with significant challenge engaging TA; requires verbal and tactile cueing  Current:remains; poor core activation with performing deab bug. 4.Pt will report at least 75% overall improvement in sx to enable pt return to PLOF  Status at last note/certification: 84% improvement reported  Current: remains: 30% improvement since [facility]. 23         Payor: [address] [place] [address] Road / Plan: [address] [facility] / Product Type: *No Product type* /     Non-Medicare, can change goals, can adjust or add frequency duration, no signature required      New Goals to be achieved in __8__ treatments  1.  Pt will report max pain </= 4/10 to

## 2023-09-15 ENCOUNTER — TELEPHONE (OUTPATIENT)
Facility: HOSPITAL | Age: 58
End: 2023-09-15

## 2023-09-15 NOTE — TELEPHONE ENCOUNTER
Called pt to offer him an opening that we had today at 1020 am. LVM for pt to call us back if he was available for this slot. Subjective   History of Present Illness  This is a 62-year-old female that presents the ER with recurrent, progressive worsening pelvic pain after total hysterectomy in March, 2022 per Dr. Marques Solis.  Patient says that surgery apparently went well, but she started having persistent almost daily pelvic pain with sitting after surgery.  She describes it as an aching, pressure, and burning sensation at time.  She has increased pressure to the perineum as well as the rectum.  She followed up with Dr. Solis and he prescribed estrogen cream and then later testosterone cream.  She was advised that the tissues looked good and went to see Denice Savage for a second opinion, he recommended follow-up with chronic pain management due to probable neuropathic pain.  Patient also advised that she had a large golf ball sized nabothian cyst removed.  She also says that she had to have an overnight observation following surgery due to urinary retention.  She has daily bowel movements and urinates well, but she says that she does not really recognize the urge to urinate or have a bowel movement.  She takes daily MiraLAX and had a normal bowel movement earlier today.  Patient reports some mild white vaginal discharge.  She denies any vaginal itching or swelling.  This evening, pain was much worse and and constant, regardless of sitting.  She denies any fever or chills.  She denies any particular abdominal pain.  She also started having some increased pain to the buttocks as well as numbness and tingling in both feet.  She denies any significant low back pain.  Patient is scheduled for nerve conduction study on 10/6/2022 and she also has referral to urology scheduled on 9/30/2022.  Patient says that she was recently seen by PCP last week on 9/19/2022 and diagnosed with UTI.  She was prescribed cefuroxime 500 mg by mouth twice daily x10 days and continues to take that.    History provided by:  Patient  Pelvic Pain  Location:  Chronic  pelvic pain after complete hysterectomy in 3/22 per Dr. Marques Solis.  Context:  Pt had complete hysterectomy in 3/22 per Dr. Marques Solis.  Constant pelvic pain after surgery, worse with sitting.  Pain is now constant, aching, burning, pressure, to perineum and even rectum. Rx for Estrogen and Testosterone cream.  Second opinion with  Sneha Savage and told tissue looked normal.  Worsened by:  Sitting worsens the pain but now pain is constant.  Ineffective treatments:  Estrogen and testosterone creams  Associated symptoms: no abdominal pain, no chest pain, no cough, no diarrhea, no fatigue, no fever, no myalgias, no nausea, no rash, no shortness of breath and no vomiting    Risk factors:  Second opinion, Sneha Savage, recommended pain management consult for injection for neuropathic pain.  Also, large golf ball sized nabothian cyst removed, and uterine prolapse.  Also a sacrocolplexy performed.  She had urinary retention with admission following surgery.      Review of Systems   Constitutional: Negative.  Negative for activity change, appetite change, chills, diaphoresis, fatigue and fever.   Respiratory: Negative.  Negative for cough and shortness of breath.    Cardiovascular: Negative.  Negative for chest pain, palpitations and leg swelling.   Gastrointestinal: Negative for abdominal distention, abdominal pain, blood in stool, constipation (Normal BM daily with Miralax, but patient feels like she doesn't even need to have a BM.  She just sits on the toilet and then she has a BM.  She is able to push and control her sphincter. No bowel incontinence.), diarrhea, nausea and vomiting.        Increased pressure in rectum.     Genitourinary: Positive for pelvic pain. Negative for dysuria, enuresis, flank pain and frequency.        No urinary incontinence.  Pt has had chronic pelvic pain, pressure, burning, aching to pelvis after total hysterectomy in 3/22 per Dr. Marques Solis.   Musculoskeletal: Negative for back  "pain and myalgias.   Skin: Negative for rash.   Neurological: Positive for numbness (\"Feels like both feet and toes are asleep\").   All other systems reviewed and are negative.      History reviewed. No pertinent past medical history.    Allergies   Allergen Reactions   • Macrobid [Nitrofurantoin] Hives       Past Surgical History:   Procedure Laterality Date   • CORNEAL TRANSPLANT Right 2017    PARTIAL REPLACEMENT   • HERNIA REPAIR     • HYSTERECTOMY         Family History   Problem Relation Age of Onset   • Breast cancer Neg Hx    • Ovarian cancer Neg Hx        Social History     Socioeconomic History   • Marital status: Unknown   Tobacco Use   • Smoking status: Never Smoker   • Smokeless tobacco: Never Used   Vaping Use   • Vaping Use: Never used   Substance and Sexual Activity   • Alcohol use: Never   • Drug use: Defer   • Sexual activity: Defer           Objective   Physical Exam  Vitals and nursing note reviewed.   Constitutional:       General: She is not in acute distress.     Appearance: Normal appearance. She is not ill-appearing or diaphoretic.      Comments: Thin build.  No acute sign of pain or distress.   HENT:      Head: Normocephalic and atraumatic.      Right Ear: Tympanic membrane normal.      Left Ear: Tympanic membrane normal.      Nose: Nose normal.      Mouth/Throat:      Mouth: Mucous membranes are moist.      Pharynx: Oropharynx is clear.   Eyes:      Extraocular Movements: Extraocular movements intact.      Conjunctiva/sclera: Conjunctivae normal.      Pupils: Pupils are equal, round, and reactive to light.   Cardiovascular:      Rate and Rhythm: Normal rate and regular rhythm.  No extrasystoles are present.     Pulses: Normal pulses.      Heart sounds: Normal heart sounds.      Comments: Regular rate and rhythm.  No ectopy.  Pulmonary:      Effort: Pulmonary effort is normal.      Breath sounds: Normal breath sounds.      Comments: Lungs are clear to auscultation bilaterally  Abdominal:     "  General: Bowel sounds are normal. There is no distension.      Palpations: Abdomen is soft.      Tenderness: There is no abdominal tenderness. There is no right CVA tenderness, left CVA tenderness, guarding or rebound.      Comments: Abdomen soft without distention.  Active bowel sounds in all 4 quadrants.  No particular tenderness to palpation to the abdomen.  No flank or CVA tenderness.  Abdominal exam is benign and nonsurgical.   Genitourinary:     Vagina: Vaginal discharge present. No erythema, tenderness or prolapsed vaginal walls.      Comments: No labial swelling or erythema.  Mild, non-malodorous white discharge.  Bimanual exam is within normal limits.  Nontender.  No bladder prolapse.  Status post total hysterectomy.  Musculoskeletal:         General: Normal range of motion.      Cervical back: Normal range of motion and neck supple.      Lumbar back: No swelling, edema, deformity, signs of trauma, lacerations, spasms, tenderness or bony tenderness. Normal range of motion. Negative right straight leg raise test and negative left straight leg raise test. No scoliosis.      Right lower leg: No edema.      Left lower leg: No edema.      Comments: No particular LS spinal tenderness.  Mild tenderness to bilateral buttocks.  Negative straight leg raise bilaterally.  1+ patellar reflexes bilaterally.  Normal dorsi/plantar flexion bilaterally.   Skin:     General: Skin is warm and dry.   Neurological:      General: No focal deficit present.      Mental Status: She is alert.      Cranial Nerves: Cranial nerves are intact.      Sensory: Sensation is intact.      Motor: Motor function is intact.      Coordination: Coordination is intact.      Deep Tendon Reflexes:      Reflex Scores:       Patellar reflexes are 1+ on the right side and 1+ on the left side.        Procedures           ED Course  ED Course as of 09/26/22 0115   Sun Sep 25, 2022   2051 Pt told that she could not have an MRI until nerve conduction study  is performed on 10/6/22 due to insurance coverage. [FC]   2321 CBC and chemistries were within normal limits.  KOH revealed no yeast or fungal elements.  Wet prep showed 1+ white blood cells but no clue cells.  Urinalysis revealed 21-30 white blood cells, no bacteria, and moderate leukocytes.  Urine culture is in process.  CT the abdomen/pelvis without contrast reveals no renal or ureteral stones and no hydronephrosis.  No bowel obstruction or appendicitis.  Moderate amount of stool.  No acute intra-abdominal abnormalities.  Discussed the case with Dr. Mills, ER attending physician and we will proceed with MRI of the lumbar spine with and without contrast for further assessment of recurrent progressive worsening pelvic pain with now radicular symptoms to lower extremities and numbness/tingling to the feet. [FC]   Mon Sep 26, 2022   0109 MRI of the lumbar spine reveals mild multilevel degenerative disc disease and mild to moderate facet arthropathy.  No high-grade canal stenosis.  Mild degenerative listhesis at L4-5 with mild degenerative changes resulting in mild to moderate canal narrowing.  No mass or abnormal enhancement.  I updated patient on all results.  She said that Toradol helped significantly.  She is scheduled for some type of nerve block in the near future and already has close follow-up with urology and for nerve conduction study.  We will prescribe oral Toradol as well as Vistaril as needed for anxiety.  Vital signs, exam, and ER work-up are reassuring.  She needs to complete course of cefuroxime and we have another urine culture in process.  Return to the ER if any worsening symptoms.  Patient says that she has been researching and is concerned about pudendal nerve injury or entrapment.  Discussed the case with Dr. Mills, ER attending physician. [FC]      ED Course User Index  [FC] Roro Gayle PA-C           Recent Results (from the past 24 hour(s))   CBC Auto Differential    Collection Time:  09/25/22  6:27 PM    Specimen: Blood   Result Value Ref Range    WBC 4.39 3.40 - 10.80 10*3/mm3    RBC 4.54 3.77 - 5.28 10*6/mm3    Hemoglobin 14.0 12.0 - 15.9 g/dL    Hematocrit 42.0 34.0 - 46.6 %    MCV 92.5 79.0 - 97.0 fL    MCH 30.8 26.6 - 33.0 pg    MCHC 33.3 31.5 - 35.7 g/dL    RDW 11.9 (L) 12.3 - 15.4 %    RDW-SD 40.6 37.0 - 54.0 fl    MPV 10.0 6.0 - 12.0 fL    Platelets 195 140 - 450 10*3/mm3    Neutrophil % 63.1 42.7 - 76.0 %    Lymphocyte % 21.2 19.6 - 45.3 %    Monocyte % 13.2 (H) 5.0 - 12.0 %    Eosinophil % 1.6 0.3 - 6.2 %    Basophil % 0.7 0.0 - 1.5 %    Immature Grans % 0.2 0.0 - 0.5 %    Neutrophils, Absolute 2.77 1.70 - 7.00 10*3/mm3    Lymphocytes, Absolute 0.93 0.70 - 3.10 10*3/mm3    Monocytes, Absolute 0.58 0.10 - 0.90 10*3/mm3    Eosinophils, Absolute 0.07 0.00 - 0.40 10*3/mm3    Basophils, Absolute 0.03 0.00 - 0.20 10*3/mm3    Immature Grans, Absolute 0.01 0.00 - 0.05 10*3/mm3    nRBC 0.0 0.0 - 0.2 /100 WBC   Comprehensive Metabolic Panel    Collection Time: 09/25/22  6:27 PM    Specimen: Blood   Result Value Ref Range    Glucose 110 (H) 65 - 99 mg/dL    BUN 13 8 - 23 mg/dL    Creatinine 0.76 0.57 - 1.00 mg/dL    Sodium 140 136 - 145 mmol/L    Potassium 4.7 3.5 - 5.2 mmol/L    Chloride 99 98 - 107 mmol/L    CO2 31.0 (H) 22.0 - 29.0 mmol/L    Calcium 9.7 8.6 - 10.5 mg/dL    Total Protein 7.2 6.0 - 8.5 g/dL    Albumin 4.60 3.50 - 5.20 g/dL    ALT (SGPT) 23 1 - 33 U/L    AST (SGOT) 33 (H) 1 - 32 U/L    Alkaline Phosphatase 68 39 - 117 U/L    Total Bilirubin 0.2 0.0 - 1.2 mg/dL    Globulin 2.6 gm/dL    A/G Ratio 1.8 g/dL    BUN/Creatinine Ratio 17.1 7.0 - 25.0    Anion Gap 10.0 5.0 - 15.0 mmol/L    eGFR 87.6 >60.0 mL/min/1.73   Green Top (Gel)    Collection Time: 09/25/22  6:27 PM   Result Value Ref Range    Extra Tube Hold for add-ons.    Lavender Top    Collection Time: 09/25/22  6:27 PM   Result Value Ref Range    Extra Tube hold for add-on    Gold Top - SST    Collection Time: 09/25/22  6:27  PM   Result Value Ref Range    Extra Tube Hold for add-ons.    Gray Top    Collection Time: 09/25/22  6:27 PM   Result Value Ref Range    Extra Tube Hold for add-ons.    Light Blue Top    Collection Time: 09/25/22  6:27 PM   Result Value Ref Range    Extra Tube Hold for add-ons.    Urinalysis With Microscopic If Indicated (No Culture) - Urine, Clean Catch    Collection Time: 09/25/22  6:34 PM    Specimen: Urine, Clean Catch   Result Value Ref Range    Color, UA Yellow Yellow, Straw    Appearance, UA Turbid (A) Clear    pH, UA 7.0 5.0 - 8.0    Specific Gravity, UA 1.016 1.001 - 1.030    Glucose, UA Negative Negative    Ketones, UA Negative Negative    Bilirubin, UA Negative Negative    Blood, UA Trace (A) Negative    Protein, UA Negative Negative    Leuk Esterase, UA Moderate (2+) (A) Negative    Nitrite, UA Negative Negative    Urobilinogen, UA 0.2 E.U./dL 0.2 - 1.0 E.U./dL   Urinalysis, Microscopic Only - Urine, Clean Catch    Collection Time: 09/25/22  6:34 PM    Specimen: Urine, Clean Catch   Result Value Ref Range    RBC, UA 7-12 (A) None Seen, 0-2 /HPF    WBC, UA 21-30 (A) None Seen, 0-2 /HPF    Bacteria, UA None Seen None Seen, Trace /HPF    Squamous Epithelial Cells, UA 3-6 (A) None Seen, 0-2 /HPF    Hyaline Casts, UA 0-6 0 - 6 /LPF    Methodology Automated Microscopy    ABBIE Prep - Swab, Vagina    Collection Time: 09/25/22  9:48 PM    Specimen: Vagina; Swab   Result Value Ref Range    KOH Prep No yeast or hyphal elements seen No yeast or hyphal elements seen   Wet Prep, Genital - Swab, Vagina    Collection Time: 09/25/22  9:48 PM    Specimen: Vagina; Swab   Result Value Ref Range    YEAST No yeast seen No yeast seen    HYPHAL ELEMENTS No Hyphal elements seen No Hyphal elements seen    WBC'S 1+ WBC's seen (A) No WBC's seen    Clue Cells, Wet Prep No Clue cells seen No Clue cells seen    Trichomonas, Wet Prep No Trichomonas seen No Trichomonas seen     Note: In addition to lab results from this visit, the labs  listed above may include labs taken at another facility or during a different encounter within the last 24 hours. Please correlate lab times with ED admission and discharge times for further clarification of the services performed during this visit.    MRI Lumbar Spine With & Without Contrast   Final Result      1. Mild multilevel degenerative disc disease and mild to moderate facet arthropathy. No high-grade canal stenosis.      2. Mild degenerative listhesis at L4-5 with mild degenerative changes resulting in mild-to-moderate canal narrowing.      3. No mass or abnormal enhancement.         Electronically signed by:  Alexis Morton M.D.     9/25/2022 10:26 PM Mountain Time      CT Abdomen Pelvis Without Contrast   Final Result   1. No renal stones or hydronephrosis..   2. No bowel obstruction or appendicitis.   3. Moderate amount of stool.   3. No acute intra-abdominal abnormalities.         Slot 94.            Electronically signed by:  Esau Mcgee D.O.     9/25/2022 8:26 PM Mountain Time        Vitals:    09/25/22 2000 09/25/22 2030 09/25/22 2045 09/25/22 2230   BP: 153/98 146/90 152/94 132/76   BP Location:       Patient Position:       Pulse: 68 61 75 63   Resp:    17   Temp:       TempSrc:       SpO2: 98% 96% 98% 96%   Weight:       Height:         Medications   sodium chloride 0.9 % flush 10 mL (has no administration in time range)   ketorolac (TORADOL) injection 30 mg (30 mg Intravenous Given 9/25/22 2152)   gadobenate dimeglumine (MULTIHANCE) injection 10 mL (10 mL Intravenous Given 9/25/22 2336)     ECG/EMG Results (last 24 hours)     ** No results found for the last 24 hours. **        No orders to display                                       MDM    Final diagnoses:   Chronic pelvic pain in female   History of UTI   Degenerative disc disease, lumbar   History of total hysterectomy   History of hyperlipidemia   History of osteoporosis   Anxiety   Sleep disturbance       ED Disposition  ED  Disposition     ED Disposition   Discharge    Condition   Stable    Comment   --             [facility] Emergency Department  [address] [facility] [address] [phone]    If symptoms worsen         Medication List      New Prescriptions    hydrOXYzine pamoate 25 MG capsule  Commonly known as: Vistaril  Take 1 capsule by mouth Every 6 (Six) Hours As Needed for Anxiety.     ketorolac 10 MG tablet  Commonly known as: TORADOL  Take 1 tablet by mouth Every 6 (Six) Hours As Needed for Moderate Pain.        Stop    ibuprofen 200 MG tablet  Commonly known as: ADVIL,MOTRIN     Premarin 0.625 MG/GM vaginal cream  Generic drug: conjugated estrogens           Where to Get Your Medications      These medications were sent to [facility]/pharmacy #[id number] - [place], [place] - [address] [facility] - [phone]  - [phone]   [address] [address]    Hours: 24-hours Phone: [phone]   · hydrOXYzine pamoate 25 MG capsule  · ketorolac 10 MG tablet          Roro Gayle PA-C  09/26/22 0115

## 2023-09-18 ENCOUNTER — TELEPHONE (OUTPATIENT)
Facility: HOSPITAL | Age: 58
End: 2023-09-18

## 2023-09-18 NOTE — TELEPHONE ENCOUNTER
Pt reports there was a death in the family and that he forgot he had an appoinment. Confirmed pt's appointment on Thursday 9/21.

## 2023-09-19 ENCOUNTER — OFFICE VISIT (OUTPATIENT)
Age: 58
End: 2023-09-19
Payer: MEDICAID

## 2023-09-19 VITALS
TEMPERATURE: 97.7 F | HEART RATE: 71 BPM | BODY MASS INDEX: 29.69 KG/M2 | WEIGHT: 225 LBS | RESPIRATION RATE: 18 BRPM | OXYGEN SATURATION: 97 %

## 2023-09-19 DIAGNOSIS — R79.89 ELEVATED SERUM CREATININE: ICD-10-CM

## 2023-09-19 DIAGNOSIS — M54.16 LUMBAR RADICULITIS: Primary | ICD-10-CM

## 2023-09-19 DIAGNOSIS — M48.061 SPINAL STENOSIS OF LUMBAR REGION WITHOUT NEUROGENIC CLAUDICATION: ICD-10-CM

## 2023-09-19 DIAGNOSIS — M47.816 LUMBAR FACET ARTHROPATHY: ICD-10-CM

## 2023-09-19 PROCEDURE — 99214 OFFICE O/P EST MOD 30 MIN: CPT | Performed by: PHYSICAL MEDICINE & REHABILITATION

## 2023-09-19 RX ORDER — PREGABALIN 50 MG/1
CAPSULE ORAL
Qty: 90 CAPSULE | Refills: 1 | Status: SHIPPED | OUTPATIENT
Start: 2023-09-19 | End: 2023-11-16

## 2023-09-19 RX ORDER — PREGABALIN 50 MG/1
CAPSULE ORAL
Qty: 90 CAPSULE | Refills: 1 | Status: SHIPPED | OUTPATIENT
Start: 2023-09-19 | End: 2023-09-19 | Stop reason: SDUPTHER

## 2023-09-19 NOTE — PROGRESS NOTES
[facility]  [address] [place] [address], [address] [facility], [address] [place] [address] Dr  Phone: [phone]  Fax: [phone]    Pt's YOB: 1965    Becky Small was seen today for back problem. Diagnoses and all orders for this visit:    Lumbar radiculitis  -     Discontinue: pregabalin (LYRICA) 50 MG capsule; Take 1 in the morning and 2 in the evening as directed for neuropathic symptoms  -     pregabalin (LYRICA) 50 MG capsule; Take 1 in the morning and 2 in the evening as directed for neuropathic symptoms    Lumbar facet arthropathy    Spinal stenosis of lumbar region without neurogenic claudication    Elevated serum creatinine         IMPRESSION AND PLAN:  Inga Cano is a 59 y.o. male with history of lumbar and right knee pain and presents to the office today for medication and diagnostic follow up. Pt continues to complain of left hip and knee pain that radiates down to his calf secondary to falling in a hole at work back in 2020 and tearing his right meniscus and numbness and tingling in the left lower extremity; unchanged since his last office visit. Pt is currently undergoing physical therapy. He is taking Topamax 25 mg, Naproxen 500 mg, Mobic 15 mg and will start Lyrica 50 mg 1 in the morning and 2 in the evening for neuropathic symptoms. 1) Lumbar spine MRI from 08/08/2023 was reviewed with the pt at today's office visit. 2) Pt was prescribed Lyrica 50 mg 1 in the morning and 2 in the evening for neuropathic symptoms. 3) Pt was given the information for multiple primary care physicians. 4) Mr. Poncho Chan has a reminder for a \"due or due soon\" health maintenance. I have asked that he contact his primary care provider, Fan Espinoza MD, for follow-up on this health maintenance. 5)  demonstrated consistency with prescribing.    6) Pt is not a candidate for NSAIDs due to elevated creatinine  7) Lumbar injections discussed ; pt defers at this

## 2023-09-21 ENCOUNTER — TELEPHONE (OUTPATIENT)
Facility: HOSPITAL | Age: 58
End: 2023-09-21

## 2023-11-28 ENCOUNTER — HOSPITAL ENCOUNTER (EMERGENCY)
Facility: HOSPITAL | Age: 58
Discharge: HOME | End: 2023-11-28
Attending: EMERGENCY MEDICINE
Payer: MEDICAID

## 2023-11-28 ENCOUNTER — APPOINTMENT (OUTPATIENT)
Facility: HOSPITAL | Age: 58
End: 2023-11-28
Payer: MEDICAID

## 2023-11-28 VITALS
OXYGEN SATURATION: 100 % | BODY MASS INDEX: 29.16 KG/M2 | RESPIRATION RATE: 17 BRPM | HEART RATE: 73 BPM | DIASTOLIC BLOOD PRESSURE: 69 MMHG | HEIGHT: 73 IN | WEIGHT: 220 LBS | TEMPERATURE: 98.5 F | SYSTOLIC BLOOD PRESSURE: 157 MMHG

## 2023-11-28 DIAGNOSIS — S91.109A OPEN WOUND OF TOE, INITIAL ENCOUNTER: Primary | ICD-10-CM

## 2023-11-28 DIAGNOSIS — R73.9 HYPERGLYCEMIA: ICD-10-CM

## 2023-11-28 LAB — GLUCOSE BLD STRIP.AUTO-MCNC: 233 MG/DL (ref 70–110)

## 2023-11-28 PROCEDURE — 73660 X-RAY EXAM OF TOE(S): CPT

## 2023-11-28 PROCEDURE — 6370000000 HC RX 637 (ALT 250 FOR IP): Performed by: EMERGENCY MEDICINE

## 2023-11-28 PROCEDURE — 82962 GLUCOSE BLOOD TEST: CPT

## 2023-11-28 PROCEDURE — 6370000000 HC RX 637 (ALT 250 FOR IP)

## 2023-11-28 PROCEDURE — 99283 EMERGENCY DEPT VISIT LOW MDM: CPT

## 2023-11-28 RX ORDER — BACITRACIN ZINC 500 [USP'U]/G
OINTMENT TOPICAL
Status: COMPLETED | OUTPATIENT
Start: 2023-11-28 | End: 2023-11-28

## 2023-11-28 RX ORDER — SULFAMETHOXAZOLE AND TRIMETHOPRIM 800; 160 MG/1; MG/1
1 TABLET ORAL 2 TIMES DAILY
Qty: 14 TABLET | Refills: 0 | Status: SHIPPED | OUTPATIENT
Start: 2023-11-28 | End: 2023-12-05

## 2023-11-28 RX ORDER — BACITRACIN ZINC 500 [USP'U]/G
OINTMENT TOPICAL
Status: COMPLETED
Start: 2023-11-28 | End: 2023-11-28

## 2023-11-28 RX ORDER — SULFAMETHOXAZOLE AND TRIMETHOPRIM 800; 160 MG/1; MG/1
1 TABLET ORAL ONCE
Status: COMPLETED | OUTPATIENT
Start: 2023-11-28 | End: 2023-11-28

## 2023-11-28 RX ADMIN — SULFAMETHOXAZOLE AND TRIMETHOPRIM 1 TABLET: 800; 160 TABLET ORAL at 20:06

## 2023-11-28 RX ADMIN — BACITRACIN ZINC: 500 OINTMENT TOPICAL at 21:39

## 2023-11-28 ASSESSMENT — PAIN - FUNCTIONAL ASSESSMENT: PAIN_FUNCTIONAL_ASSESSMENT: NONE - DENIES PAIN

## 2023-11-29 NOTE — ED NOTES
Pt states he has a cut on his left pinky toe. States he does not know how or when it happened. Small abrasion noted to left pinky toe. No bleeding or discharge noted at this time. Pt has hx of diabetes and recent partial toe amputation.       Sadia Caro RN  11/28/23 1955

## 2023-11-29 NOTE — ED TRIAGE NOTES
Pt ambulatory to room c/o cut on L pinky toe for and undetermined amount of time.  Hx of type 2 diabetes and partial amputation of R 3rd toe last month

## 2023-11-29 NOTE — ED PROVIDER NOTES
[facility] EMERGENCY DEPT  EMERGENCY DEPARTMENT ENCOUNTER      Pt Name: Clinton Lozano  MRN: [id number]  [address] [facility] [place] 1965  Date of evaluation: 11/28/2023  Provider: Mell Sullivan MD    CHIEF COMPLAINT       Chief Complaint   Patient presents with    Laceration       HPI:  Clinton Lozano is a 59 y.o. male who presents to the emergency department pt c/o left 5th toe wound, noted bleeding today, unsure when wound occurred, thinks may have hit toe few days ago. Bleeding resolved w pressure pta. No curr pain. Has dm w neuropathy though, dec sensation. No new sensation change. HPI    Nursing Notes were reviewed. REVIEW OF SYSTEMS    (2-9 systems for level 4, 10 or more for level 5)     Review of Systems    Except as noted above the remainder of the review of systems was reviewed and negative. PAST MEDICAL HISTORY     Past Medical History:   Diagnosis Date    Arthritis of left knee     Diabetes ([address])     Erectile dysfunction     Knee pain     Osteoarthritis of right knee     Osteomyelitis ([address])     Sinusitis          SURGICAL HISTORY       Past Surgical History:   Procedure Laterality Date    COLONOSCOPY N/A 2/5/2020    COLONOSCOPY performed by Celine Regalado MD at [address] Right 2015    TOE AMPUTATION Left 12/2018    left great toe         CURRENT MEDICATIONS       Discharge Medication List as of 11/28/2023  9:36 PM        CONTINUE these medications which have NOT CHANGED    Details   insulin glargine (LANTUS) 100 UNIT/ML injection vial Inject 25 Units into the skinHistorical Med      insulin lispro (HUMALOG) 100 UNIT/ML SOLN injection vial [The details of the medication are not available because there are pending changes by a home health clinician. ]Historical Med             ALLERGIES     Dust mite extract, Grass pollen([id number] yaneth), Diclofenac sodium, and Piperacillin sod-tazobactam so    FAMILY HISTORY       Family History   Problem Relation Age of Onset    No Known Problems

## 2023-11-29 NOTE — ED NOTES
Discharge teaching provided to pt regarding treatment received, medications prescribed, and follow-up care. Pt verbalized understanding directions and follow up care. Pt left ambulatory with discharge paperwork in hand.       Angus Lujan RN  11/28/23 0032

## 2023-11-29 NOTE — ED NOTES
Left pinky toe cleaned with dermal wound  and covered in bacitracin. Wound wrapped with 4x4 and kerlex. Pt tolerated procedure well. Pt instructed to keep wound clean and dry and to follow up with Podiatrist. Pt verbalized understanding to all.       Gracie Burrell RN  11/28/23 9605

## 2023-11-29 NOTE — ED NOTES
Pt returned from [address] [facility], [address]  11/28/23 2100

## 2024-01-09 ENCOUNTER — HOSPITAL ENCOUNTER (EMERGENCY)
Facility: HOSPITAL | Age: 59
Discharge: HOME | End: 2024-01-09
Payer: MEDICAID

## 2024-01-09 VITALS
OXYGEN SATURATION: 100 % | HEIGHT: 73 IN | SYSTOLIC BLOOD PRESSURE: 104 MMHG | RESPIRATION RATE: 16 BRPM | WEIGHT: 218 LBS | TEMPERATURE: 99.6 F | BODY MASS INDEX: 28.89 KG/M2 | DIASTOLIC BLOOD PRESSURE: 85 MMHG | HEART RATE: 88 BPM

## 2024-01-09 DIAGNOSIS — R05.1 ACUTE COUGH: ICD-10-CM

## 2024-01-09 DIAGNOSIS — J10.1 INFLUENZA A: Primary | ICD-10-CM

## 2024-01-09 DIAGNOSIS — R09.81 NASAL CONGESTION: ICD-10-CM

## 2024-01-09 LAB
FLUAV AG NPH QL IA: POSITIVE
FLUBV AG NOSE QL IA: NEGATIVE
SARS-COV-2 RDRP RESP QL NAA+PROBE: NOT DETECTED
SOURCE: NORMAL

## 2024-01-09 PROCEDURE — 87804 INFLUENZA ASSAY W/OPTIC: CPT

## 2024-01-09 PROCEDURE — 99283 EMERGENCY DEPT VISIT LOW MDM: CPT

## 2024-01-09 PROCEDURE — 87635 SARS-COV-2 COVID-19 AMP PRB: CPT

## 2024-01-09 RX ORDER — GUAIFENESIN 600 MG/1
1200 TABLET, EXTENDED RELEASE ORAL 2 TIMES DAILY
Qty: 28 TABLET | Refills: 0 | Status: SHIPPED | OUTPATIENT
Start: 2024-01-09 | End: 2024-01-16

## 2024-01-09 RX ORDER — OSELTAMIVIR PHOSPHATE 75 MG/1
75 CAPSULE ORAL 2 TIMES DAILY
Qty: 10 CAPSULE | Refills: 0 | Status: SHIPPED | OUTPATIENT
Start: 2024-01-09 | End: 2024-01-14

## 2024-01-09 ASSESSMENT — ENCOUNTER SYMPTOMS
NAUSEA: 0
COUGH: 1
SORE THROAT: 1
VOMITING: 0
ABDOMINAL PAIN: 0
DIARRHEA: 1
RHINORRHEA: 1

## 2024-01-09 ASSESSMENT — PAIN - FUNCTIONAL ASSESSMENT: PAIN_FUNCTIONAL_ASSESSMENT: 0-10

## 2024-01-09 ASSESSMENT — PAIN SCALES - GENERAL: PAINLEVEL_OUTOF10: 9

## 2024-01-09 NOTE — ED PROVIDER NOTES
EMERGENCY DEPARTMENT HISTORY AND PHYSICAL EXAM    4:23 PM      Date: 1/9/2024  Patient Name: Karri Georges    History of Presenting Illness     Chief Complaint   Patient presents with    Cough    Pharyngitis         History Provided By: Patient and medical chart review.    Additional History (Context): Karri Georges is a 58 y.o. male with   Past Medical History:   Diagnosis Date    Arthritis of left knee     Diabetes (HCC)     Erectile dysfunction     Knee pain     Osteoarthritis of right knee     Osteomyelitis (HCC)     Sinusitis    who presents with complaints of cough congestion runny nose body aches sore throat weakness and fatigue that started yesterday.  States he took some NyQuil which helped him sleep.  States he woke up around 1030 and still felt tired so went back to sleep and woke up around 1 and presented here for evaluation.  Patient denies any nausea or vomiting.  States she did have some diarrhea yesterday but none today.  Denies any chest pain or shortness of breath.  Unsure if he had any fevers.  Afebrile on arrival.    PCP: No primary care provider on file.    No current facility-administered medications for this encounter.     Current Outpatient Medications   Medication Sig Dispense Refill    oseltamivir (TAMIFLU) 75 MG capsule Take 1 capsule by mouth 2 times daily for 5 days 10 capsule 0    guaiFENesin (MUCINEX) 600 MG extended release tablet Take 2 tablets by mouth 2 times daily for 7 days 28 tablet 0    insulin glargine (LANTUS) 100 UNIT/ML injection vial Inject 25 Units into the skin      insulin lispro (HUMALOG) 100 UNIT/ML SOLN injection vial [The details of the medication are not available because there are pending changes by a home health clinician.]         Past History     Past Medical History:  Past Medical History:   Diagnosis Date    Arthritis of left knee     Diabetes (HCC)     Erectile dysfunction     Knee pain     Osteoarthritis of right knee     Osteomyelitis (HCC)

## 2024-01-09 NOTE — ED TRIAGE NOTES
Patient complaints of cough, congestion, body aches, and sore throat for past 2 days. Taking otc medication and feels its gettinig worse.

## 2024-01-09 NOTE — DISCHARGE INSTRUCTIONS
Push fluids, use a vaporizer, use Tylenol or OTC NSAID's (Advil, Alleve etc) for fever or achiness, OTC cough suppressant/decongestants such as Robitussin CF and rest.   Take medication as prescribed. Follow-up with your primary care physician within 2 days for reassessment. Bring the results from this visit with you for their review. Return to the ED immediately for any new, worsening, or persistent symptoms, including fever, vomiting, chest pain, shortness of breath, or any other medical concerns.    
no

## 2024-01-23 ENCOUNTER — APPOINTMENT (OUTPATIENT)
Facility: HOSPITAL | Age: 59
End: 2024-01-23
Payer: MEDICAID

## 2024-01-23 ENCOUNTER — HOSPITAL ENCOUNTER (EMERGENCY)
Facility: HOSPITAL | Age: 59
Discharge: HOME | End: 2024-01-23
Attending: EMERGENCY MEDICINE
Payer: MEDICAID

## 2024-01-23 VITALS
HEART RATE: 73 BPM | DIASTOLIC BLOOD PRESSURE: 61 MMHG | BODY MASS INDEX: 28.89 KG/M2 | HEIGHT: 73 IN | WEIGHT: 218 LBS | TEMPERATURE: 98.2 F | SYSTOLIC BLOOD PRESSURE: 120 MMHG | RESPIRATION RATE: 18 BRPM | OXYGEN SATURATION: 99 %

## 2024-01-23 DIAGNOSIS — L08.9 TOE INFECTION: Primary | ICD-10-CM

## 2024-01-23 LAB
ALBUMIN SERPL-MCNC: 3.5 G/DL (ref 3.4–5)
ALBUMIN/GLOB SERPL: 0.9 (ref 0.8–1.7)
ALP SERPL-CCNC: 77 U/L (ref 45–117)
ALT SERPL-CCNC: 22 U/L (ref 16–61)
ANION GAP SERPL CALC-SCNC: 1 MMOL/L (ref 3–18)
AST SERPL-CCNC: 14 U/L (ref 10–38)
BASOPHILS # BLD: 0 K/UL (ref 0–0.1)
BASOPHILS NFR BLD: 0 % (ref 0–2)
BILIRUB SERPL-MCNC: 0.3 MG/DL (ref 0.2–1)
BUN SERPL-MCNC: 24 MG/DL (ref 7–18)
BUN/CREAT SERPL: 17 (ref 12–20)
CALCIUM SERPL-MCNC: 8.6 MG/DL (ref 8.5–10.1)
CHLORIDE SERPL-SCNC: 107 MMOL/L (ref 100–111)
CO2 SERPL-SCNC: 30 MMOL/L (ref 21–32)
CREAT SERPL-MCNC: 1.4 MG/DL (ref 0.6–1.3)
DIFFERENTIAL METHOD BLD: ABNORMAL
EOSINOPHIL # BLD: 0.2 K/UL (ref 0–0.4)
EOSINOPHIL NFR BLD: 2 % (ref 0–5)
ERYTHROCYTE [DISTWIDTH] IN BLOOD BY AUTOMATED COUNT: 12.5 % (ref 11.6–14.5)
GLOBULIN SER CALC-MCNC: 3.8 G/DL (ref 2–4)
GLUCOSE SERPL-MCNC: 190 MG/DL (ref 74–99)
HCT VFR BLD AUTO: 32.7 % (ref 36–48)
HGB BLD-MCNC: 11.2 G/DL (ref 13–16)
IMM GRANULOCYTES # BLD AUTO: 0 K/UL (ref 0–0.04)
IMM GRANULOCYTES NFR BLD AUTO: 0 % (ref 0–0.5)
LYMPHOCYTES # BLD: 1.8 K/UL (ref 0.9–3.6)
LYMPHOCYTES NFR BLD: 26 % (ref 21–52)
MCH RBC QN AUTO: 30.2 PG (ref 24–34)
MCHC RBC AUTO-ENTMCNC: 34.3 G/DL (ref 31–37)
MCV RBC AUTO: 88.1 FL (ref 78–100)
MONOCYTES # BLD: 0.6 K/UL (ref 0.05–1.2)
MONOCYTES NFR BLD: 8 % (ref 3–10)
NEUTS SEG # BLD: 4.6 K/UL (ref 1.8–8)
NEUTS SEG NFR BLD: 64 % (ref 40–73)
NRBC # BLD: 0 K/UL (ref 0–0.01)
NRBC BLD-RTO: 0 PER 100 WBC
PLATELET # BLD AUTO: 352 K/UL (ref 135–420)
PMV BLD AUTO: 9.6 FL (ref 9.2–11.8)
POTASSIUM SERPL-SCNC: 4.7 MMOL/L (ref 3.5–5.5)
PROT SERPL-MCNC: 7.3 G/DL (ref 6.4–8.2)
RBC # BLD AUTO: 3.71 M/UL (ref 4.35–5.65)
SODIUM SERPL-SCNC: 138 MMOL/L (ref 136–145)
WBC # BLD AUTO: 7.2 K/UL (ref 4.6–13.2)

## 2024-01-23 PROCEDURE — 87040 BLOOD CULTURE FOR BACTERIA: CPT

## 2024-01-23 PROCEDURE — 73660 X-RAY EXAM OF TOE(S): CPT

## 2024-01-23 PROCEDURE — 6370000000 HC RX 637 (ALT 250 FOR IP): Performed by: EMERGENCY MEDICINE

## 2024-01-23 PROCEDURE — 80053 COMPREHEN METABOLIC PANEL: CPT

## 2024-01-23 PROCEDURE — 99284 EMERGENCY DEPT VISIT MOD MDM: CPT

## 2024-01-23 PROCEDURE — 85025 COMPLETE CBC W/AUTO DIFF WBC: CPT

## 2024-01-23 RX ORDER — CEPHALEXIN 250 MG/1
500 CAPSULE ORAL
Status: COMPLETED | OUTPATIENT
Start: 2024-01-23 | End: 2024-01-23

## 2024-01-23 RX ORDER — SULFAMETHOXAZOLE AND TRIMETHOPRIM 800; 160 MG/1; MG/1
1 TABLET ORAL 2 TIMES DAILY
Qty: 14 TABLET | Refills: 0 | Status: SHIPPED | OUTPATIENT
Start: 2024-01-23 | End: 2024-01-30

## 2024-01-23 RX ORDER — CEPHALEXIN 250 MG/1
250 CAPSULE ORAL 4 TIMES DAILY
Qty: 28 CAPSULE | Refills: 0 | Status: SHIPPED | OUTPATIENT
Start: 2024-01-23 | End: 2024-01-23

## 2024-01-23 RX ORDER — CEPHALEXIN 250 MG/1
250 CAPSULE ORAL 4 TIMES DAILY
Qty: 28 CAPSULE | Refills: 0 | Status: SHIPPED | OUTPATIENT
Start: 2024-01-23 | End: 2024-01-30

## 2024-01-23 RX ORDER — SULFAMETHOXAZOLE AND TRIMETHOPRIM 800; 160 MG/1; MG/1
1 TABLET ORAL 2 TIMES DAILY
Qty: 14 TABLET | Refills: 0 | Status: SHIPPED | OUTPATIENT
Start: 2024-01-23 | End: 2024-01-23

## 2024-01-23 RX ORDER — SULFAMETHOXAZOLE AND TRIMETHOPRIM 800; 160 MG/1; MG/1
1 TABLET ORAL ONCE
Status: COMPLETED | OUTPATIENT
Start: 2024-01-23 | End: 2024-01-23

## 2024-01-23 RX ADMIN — SULFAMETHOXAZOLE AND TRIMETHOPRIM 1 TABLET: 800; 160 TABLET ORAL at 21:35

## 2024-01-23 RX ADMIN — CEPHALEXIN 500 MG: 250 CAPSULE ORAL at 21:35

## 2024-01-23 ASSESSMENT — PAIN - FUNCTIONAL ASSESSMENT: PAIN_FUNCTIONAL_ASSESSMENT: 0-10

## 2024-01-23 ASSESSMENT — ENCOUNTER SYMPTOMS
VOMITING: 0
CHEST TIGHTNESS: 0
SHORTNESS OF BREATH: 0
ABDOMINAL PAIN: 0
COUGH: 0

## 2024-01-23 ASSESSMENT — LIFESTYLE VARIABLES
HOW OFTEN DO YOU HAVE A DRINK CONTAINING ALCOHOL: NEVER
HOW MANY STANDARD DRINKS CONTAINING ALCOHOL DO YOU HAVE ON A TYPICAL DAY: PATIENT DOES NOT DRINK

## 2024-01-23 ASSESSMENT — PAIN DESCRIPTION - LOCATION: LOCATION: TOE (COMMENT WHICH ONE)

## 2024-01-23 ASSESSMENT — PAIN SCALES - GENERAL: PAINLEVEL_OUTOF10: 6

## 2024-01-24 NOTE — ED NOTES
2118, vesicle on distal tip of left 5th digit, no necrosis, cap refill intact.  Pt urged to wait for x-ray result, pt wants to leave [facility], agrees to wait for short period of time further only. Pt verb risk if decides to leave inc sig infection leading to possible stroke/paralysis/death. + a and o x 3 w capacity to make decision.   2222 now pt in [facility] demanding to leave [facility] now.      Juan C Perez MD  01/25/24 0042

## 2024-01-24 NOTE — ED PROVIDER NOTES
EMERGENCY DEPARTMENT HISTORY AND PHYSICAL EXAM        Date: 1/23/2024  Patient Name: Karri Georges    History of Presenting Illness     Chief Complaint   Patient presents with    foot infection       History Provided By: History obtained from patient    HPI: Karri Georges, 58 y.o. male presents to the ED with cc of left fifth toe discoloration x unknown duration    Patient is diabetic with neuropathy in the left foot, history of great toe partial amputation.  States that his girlfriend noticed discoloration on the fifth toe left foot and recommended he come to the emergency department.  He denies any pain or drainage.  Denies injury to the toe.      No nausea, vomiting, diarrhea, fever, chills, chest pain, shortness of breath, leg swelling     There are no other complaints, changes, or physical findings at this time.    Records Reviewed: ED visit For similar symptoms on the same toe November 28, 2023, treated with Bactrim and bacitracin ointment with resolution of symptoms.    PCP: No primary care provider on file.    No current facility-administered medications on file prior to encounter.     Current Outpatient Medications on File Prior to Encounter   Medication Sig Dispense Refill    insulin glargine (LANTUS) 100 UNIT/ML injection vial Inject 25 Units into the skin      insulin lispro (HUMALOG) 100 UNIT/ML SOLN injection vial [The details of the medication are not available because there are pending changes by a home health clinician.]             Past History     Past Medical History:  Past Medical History:   Diagnosis Date    Arthritis of left knee     Diabetes (HCC)     Erectile dysfunction     Knee pain     Osteoarthritis of right knee     Osteomyelitis (HCC)     Sinusitis        Past Surgical History:  Past Surgical History:   Procedure Laterality Date    COLONOSCOPY N/A 2/5/2020    COLONOSCOPY performed by DINA Frazier MD at [facility] ENDOSCOPY    KNEE SURGERY Right 2015    TOE AMPUTATION Left 12/2018

## 2024-01-24 NOTE — ED NOTES
Pt left AMA. Did not want to wait any longer for wet read of XR. Pt educated on possible worsening condition and verbalized understanding. AMA paperwork signed

## 2024-01-24 NOTE — ED NOTES
Pt requested medication be sent to [facility]. Provider notified  Pulled DALILA until medication sent to correct pharmacy

## 2024-03-19 ENCOUNTER — OFFICE VISIT (OUTPATIENT)
Age: 59
End: 2024-03-19
Payer: MEDICAID

## 2024-03-19 DIAGNOSIS — M47.816 LUMBAR FACET ARTHROPATHY: ICD-10-CM

## 2024-03-19 DIAGNOSIS — M17.11 PRIMARY OSTEOARTHRITIS OF RIGHT KNEE: ICD-10-CM

## 2024-03-19 DIAGNOSIS — M54.16 LUMBAR RADICULITIS: ICD-10-CM

## 2024-03-19 DIAGNOSIS — M25.561 RIGHT KNEE PAIN, UNSPECIFIED CHRONICITY: Primary | ICD-10-CM

## 2024-03-19 DIAGNOSIS — M48.061 SPINAL STENOSIS OF LUMBAR REGION WITHOUT NEUROGENIC CLAUDICATION: ICD-10-CM

## 2024-03-19 PROCEDURE — 73560 X-RAY EXAM OF KNEE 1 OR 2: CPT | Performed by: ORTHOPAEDIC SURGERY

## 2024-03-19 PROCEDURE — 99213 OFFICE O/P EST LOW 20 MIN: CPT | Performed by: ORTHOPAEDIC SURGERY

## 2024-04-12 ENCOUNTER — HOSPITAL ENCOUNTER (OUTPATIENT)
Facility: HOSPITAL | Age: 59
End: 2024-04-12
Attending: ORTHOPAEDIC SURGERY
Payer: MEDICAID

## 2024-04-12 DIAGNOSIS — M54.50 LUMBAR PAIN: ICD-10-CM

## 2024-04-12 DIAGNOSIS — M54.16 LUMBAR RADICULOPATHY: ICD-10-CM

## 2024-04-12 PROCEDURE — 72148 MRI LUMBAR SPINE W/O DYE: CPT

## 2024-04-28 NOTE — MR AVS SNAPSHOT
Visit Information Date & Time Provider Department Dept. Phone Encounter #  
 4/19/2017 10:30 AM Susy Molina NP [address] [place] [address] [id number] Upcoming Health Maintenance Date Due Hepatitis C Screening 1965 HEMOGLOBIN A1C Q6M 1965 LIPID PANEL Q1 1965 MICROALBUMIN Q1 8/18/1973 EYE EXAM RETINAL OR DILATED Q1 8/18/1973 Pneumococcal 19-64 Medium Risk (1 of 1 - PPSV23) 8/18/1982 DTaP/Tdap/Td series (1 - Tdap) 8/18/1984 FOBT Q 1 YEAR AGE 50-75 8/18/2013 INFLUENZA AGE 9 TO ADULT 8/1/2016 FOOT EXAM Q1 2/24/2018 Allergies as of 4/19/2017  Review Complete On: 4/19/2017 By: Luis Eduardo Mcpherson No Known Allergies Current Immunizations  Never Reviewed No immunizations on file. Not reviewed this visit You Were Diagnosed With   
  
 Codes Comments Routine check-up    -  Primary ICD-10-CM: Z00.00 ICD-9-CM: V70.0 Type 2 diabetes mellitus without complication, unspecified long term insulin use status     ICD-10-CM: E11.9 ICD-9-CM: 250.00 Vitals BP Pulse Temp Resp Height(growth percentile) SpO2  
 93/62 (BP 1 Location: Left arm, BP Patient Position: Sitting) 88 98.1 °F (36.7 °C) (Oral) 16 6' 1\" (1.854 m) 100% Smoking Status Never Smoker Vitals History Preferred Pharmacy Pharmacy Name Phone Gavi Orellana [facility] [address], [address] [facility] [phone] Your Updated Medication List  
  
   
This list is accurate as of: 4/19/17 12:19 PM.  Always use your most recent med list.  
  
  
  
  
 HYDROcodone-acetaminophen 5-325 mg per tablet Commonly known as:  Mona Safe Take 2 tablets by mouth every eight (8) hours as needed for Pain (cannot drive or work within 8 hours of taking narcotic pain medicine). insulin glargine 100 unit/mL injection Commonly known as:  LANTUS  
 Inject 18 units subcutaneously every night. Indications: type 2 diabetes mellitus  
  
 insulin lispro 100 unit/mL injection Commonly known as:  HUMALOG Use tid per sliding scale  Indications: type 2 diabetes mellitus  
  
 lisinopril 5 mg tablet Commonly known as:  Rafa Apo Take  by mouth daily. metFORMIN 1,000 mg tablet Commonly known as:  GLUCOPHAGE Take 1,000 mg by mouth two (2) times daily (with meals). naproxen 500 mg tablet Commonly known as:  NAPROSYN Take 1 tablet by mouth two (2) times daily (with meals). We Performed the Following AMB POC GLUCOSE BLOOD, BY GLUCOSE MONITORING DEVICE [50203 CPT(R)] AMB POC HEMOGLOBIN A1C [76354 CPT(R)] Patient Instructions Knee Arthritis: Exercises Your Care Instructions Here are some examples of exercises for knee arthritis. Start each exercise slowly. Ease off the exercise if you start to have pain. Your doctor or physical therapist will tell you when you can start these exercises and which ones will work best for you. How to do the exercises Knee flexion with heel slide Lie on your back with your knees bent. Slide your heel back by bending your affected knee as far as you can. Then hook your other foot around your ankle to help pull your heel even farther back. Hold for about 6 seconds, then rest for up to 10 seconds. Repeat 8 to 12 times. Switch legs and repeat steps 1 through 4, even if only one knee is sore. [employer] Sit with your affected leg straight and supported on the floor or a firm bed. Place a small, rolled-up towel under your knee. Your other leg should be bent, with that foot flat on the floor. Tighten the thigh muscles of your affected leg by pressing the back of your knee down into the towel. Hold for about 6 seconds, then rest for up to 10 seconds. Repeat 8 to 12 times. Switch legs and repeat steps 1 through 4, even if only one knee is sore. Straight-leg raises to the front Lie on your back with your good knee bent so that your foot rests flat on the floor. Your affected leg should be straight. Make sure that your low back has a normal curve. You should be able to slip your hand in between the floor and the small of your back, with your palm touching the floor and your back touching the back of your hand. Tighten the thigh muscles in your affected leg by pressing the back of your knee flat down to the floor. Hold your knee straight. Keeping the thigh muscles tight and your leg straight, lift your affected leg up so that your heel is about 12 inches off the floor. Hold for about 6 seconds, then lower slowly. Relax for up to 10 seconds between repetitions. Repeat 8 to 12 times. Switch legs and repeat steps 1 through 5, even if only one knee is sore. Active knee flexion Lie on your stomach with your knees straight. If your kneecap is uncomfortable, roll up a washcloth and put it under your leg just above your kneecap. Lift the foot of your affected leg by bending the knee so that you bring the foot up toward your buttock. If this motion hurts, try it without bending your knee quite as far. This may help you avoid any painful motion. Slowly move your leg up and down. Repeat 8 to 12 times. Switch legs and repeat steps 1 through 4, even if only one knee is sore. Quadriceps stretch (facedown) Lie flat on your stomach, and rest your face on the floor. Wrap a towel or belt strap around the lower part of your affected leg. Then use the towel or belt strap to slowly pull your heel toward your buttock until you feel a stretch. Hold for about 15 to 30 seconds, then relax your leg against the towel or belt strap. Repeat 2 to 4 times. Switch legs and repeat steps 1 through 4, even if only one knee is sore. Stationary exercise bike If you do not have a stationary exercise bike at home, you can find one to ride at your local health club or community center. Adjust the height of the bike seat so that your knee is slightly bent when your leg is extended downward. If your knee hurts when the pedal reaches the top, you can raise the seat so that your knee does not bend as much. Start slowly. At first, try to do 5 to 10 minutes of cycling with little to no resistance. Then increase your time and the resistance bit by bit until you can do 20 to 30 minutes without pain. If you start to have pain, rest your knee until your pain gets back to the level that is normal for you. Or cycle for less time or with less effort. Follow-up care is a key part of your treatment and safety. Be sure to make and go to all appointments, and call your doctor if you are having problems. It's also a good idea to know your test results and keep a list of the medicines you take. Where can you learn more? Go to http://sumiFirst Stop Healthkayode.info/. Enter C159 in the search box to learn more about \"Knee Arthritis: Exercises. \" Current as of: May 23, 2016 Content Version: 11.2 © 7892-1263 [employer]. Care instructions adapted under license by [employer] (which disclaims liability or warranty for this information). If you have questions about a medical condition or this instruction, always ask your healthcare professional. [address] any warranty or liability for your use of this information. Hamstring Strain: Rehab Exercises Your Care Instructions Here are some examples of typical rehabilitation exercises for your condition. Start each exercise slowly. Ease off the exercise if you start to have pain. Your doctor or physical therapist will tell you when you can start these exercises and which ones will work best for you. How to do the exercises Hamstring set (heel dig) 1. Sit with your affected leg bent. Your good leg should be straight and supported on the floor. 2. Tighten the muscles on the back of your bent leg (hamstring) by pressing your heel into the floor. 3. Hold for about 6 seconds, and then rest for up to 10 seconds. 4. Repeat 8 to 12 times. Hamstring curl 1. Lie on your stomach with your knees straight. Place a pillow under your stomach. If your kneecap is uncomfortable, roll up a washcloth and put it under your leg just above your kneecap. 2. Lift the foot of your affected leg by bending your knee so that you bring your foot up toward your buttock. If this motion hurts, try it without bending your knee quite as far. This may help you avoid any painful motion. 3. Slowly move your leg up and down. 4. Repeat 8 to 12 times. When you can do this exercise with ease and no pain, add some resistance. To do this: · Tie the ends of an exercise band together to form a loop. Attach one end of the loop to a secure object or shut a door on it to hold it in place. (Or you can have someone hold one end of the loop to provide resistance.) · Loop the other end of the exercise band around the lower part of your affected leg. · Repeat steps 1 through 4, slowly pulling back on the exercise band with your leg. Hip extension 1. Stand facing a wall with your hands on the wall at about chest level. 2. Keeping the knee of your affected leg straight, kick that leg straight back behind you. 3. Relax, and lower your leg back to the starting position. 4. Repeat 8 to 12 times. When you can do this exercise with ease and no pain, add some resistance. To do this: · Tie the ends of an exercise band together to form a loop. Attach one end of the loop to a secure object or shut a door on it to hold it in place. (Or you can have someone hold one end of the loop to provide resistance.) · Loop the other end of the exercise band around the lower part of your affected leg. · Repeat steps 1 through 4, slowly pulling back on the exercise band with your leg. Hamstring wall stretch 1. Lie on your back in a doorway, with your good leg through the open door. 2. Slide your affected leg up the wall to straighten your knee. You should feel a gentle stretch down the back of your leg. ¨ Do not arch your back. ¨ Do not bend either knee. ¨ Keep one heel touching the floor and the other heel touching the wall. Do not point your toes. 3. Hold the stretch for at least 1 minute to begin. Then try to lengthen the time you hold the stretch to as long as 6 minutes. 4. Repeat 2 to 4 times. If you do not have a place to do this exercise in a doorway, there is another way to do it: 1. Lie on your back, and bend the knee of your affected leg. 2. Loop a towel under the ball and toes of that foot, and hold the ends of the towel in your hands. 3. Straighten your knee, and slowly pull back on the towel. You should feel a gentle stretch down the back of your leg. 4. Hold the stretch for 15 to 30 seconds. Or even better, hold the stretch for 1 minute if you can. 5. Repeat 2 to 4 times. Calf stretch 1. Stand facing a wall with your hands on the wall at about eye level. Put your affected leg about a step behind your other leg. 2. Keeping your back leg straight and your back heel on the floor, bend your front knee and gently bring your hip and chest toward the wall until you feel a stretch in the calf of your back leg. 3. Hold the stretch for 15 to 30 seconds. 4. Repeat 2 to 4 times. 5. Repeat steps 1 through 4, but this time keep your back knee bent. Single-leg balance 1. Stand on a flat surface with your arms stretched out to your sides like you are making the letter \"T. \" Then lift your good leg off the floor, bending it at the knee. If you are not steady on your feet, use one hand to hold on to a chair, counter, or wall. 2. Standing on your affected leg, keep that knee straight. Try to balance on that leg for up to 30 seconds. Then rest for up to 10 seconds. 3. Repeat 6 to 8 times. 4. When you can balance on your affected leg for 30 seconds with your eyes open, try to balance on it with your eyes closed. When you can do this exercise with your eyes closed for 30 seconds and with ease and no pain, try standing on a pillow or piece of foam, and repeat steps 1 through 4. Follow-up care is a key part of your treatment and safety. Be sure to make and go to all appointments, and call your doctor if you are having problems. It's also a good idea to know your test results and keep a list of the medicines you take. Where can you learn more? Go to http://sumiWhere's Upkayode.info/. Enter [id number] [id number] [id number] in the search box to learn more about \"Hamstring Strain: Rehab Exercises. \" Current as of: May 23, 2016 Content Version: 11.2 © 7506-7469 [employer]. Care instructions adapted under license by [employer] (which disclaims liability or warranty for this information). If you have questions about a medical condition or this instruction, always ask your healthcare professional. [place] [address] any warranty or liability for your use of this information. Knee Arthritis: Care Instructions Your Care Instructions Knee arthritis is a breakdown of the cartilage that cushions your knee joint. When the cartilage wears down, your bones rub against each other. This causes pain and stiffness. Knee arthritis tends to get worse with time. Treatment for knee arthritis involves reducing pain, making the leg muscles stronger, and staying at a healthy body weight. The treatment usually does not improve the health of the cartilage, but it can reduce pain and improve how well your knee works. You can take simple measures to protect your knee joints, ease your pain, and help you stay active. Follow-up care is a key part of your treatment and safety.  Be sure to make and go to all appointments, and call your doctor if you are having problems. It's also a good idea to know your test results and keep a list of the medicines you take. How can you care for yourself at home? · Know that knee arthritis will cause more pain on some days than on others. · Stay at a healthy weight. Lose weight if you are overweight. When you stand up, the pressure on your knees from every pound of body weight is multiplied four times. So if you lose 10 pounds, you will reduce the pressure on your knees by 40 pounds. · Talk to your doctor or physical therapist about exercises that will help ease joint pain. ¨ Stretch to help prevent stiffness and to prevent injury before you exercise. You may enjoy gentle forms of yoga to help keep your knee joints and muscles flexible. ¨ Walk instead of jog. ¨ Ride a bike. This makes your thigh muscles stronger and takes pressure off your knee. ¨ Wear well-fitting and comfortable shoes. ¨ Exercise in chest-deep water. This can help you exercise longer with less pain. ¨ Avoid exercises that include squatting or kneeling. They can put a lot of strain on your knees. ¨ Talk to your doctor to make sure that the exercise you do is not making the arthritis worse. · Do not sit for long periods of time. Try to walk once in a while to keep your knee from getting stiff. · Ask your doctor or physical therapist whether shoe inserts may reduce your arthritis pain. · If you can afford it, get new athletic shoes at least every year. This can help reduce the strain on your knees. · Use a device to help you do everyday activities. ¨ A cane or walking stick can help you keep your balance when you walk. Hold the cane or walking stick in the hand opposite the painful knee. ¨ If you feel like you may fall when you walk, try using crutches or a front-wheeled walker. These can prevent falls that could cause more damage to your knee. ¨ A knee brace may help keep your knee stable and prevent pain. ¨ You also can use other things to make life easier, such as a higher toilet seat and handrails in the bathtub or shower. · Take pain medicines exactly as directed. ¨ Do not wait until you are in severe pain. You will get better results if you take it sooner. ¨ If you are not taking a prescription pain medicine, take an over-the-counter medicine such as acetaminophen (Tylenol), ibuprofen (Advil, Motrin), or naproxen (Aleve). Read and follow all instructions on the label. ¨ Do not take two or more pain medicines at the same time unless the doctor told you to. Many pain medicines have acetaminophen, which is Tylenol. Too much acetaminophen (Tylenol) can be harmful. ¨ Tell your doctor if you take a blood thinner, have diabetes, or have allergies to shellfish. · Ask your doctor if you might benefit from a shot of steroid medicine into your knee. This may provide pain relief for several months. · Many people take the supplements glucosamine and chondroitin for osteoarthritis. Some people feel they help, but the medical research does not show that they work. Talk to your doctor before you take these supplements. When should you call for help? Call your doctor now or seek immediate medical care if: 
· You have sudden swelling, warmth, or pain in your knee. · You have knee pain and a fever or rash. · You have such bad pain that you cannot use your knee. Watch closely for changes in your health, and be sure to contact your doctor if you have any problems. Where can you learn more? Go to http://sumi-kayode.info/. Enter X073 in the search box to learn more about \"Knee Arthritis: Care Instructions. \" Current as of: October 31, 2016 Content Version: 11.2 © 5742-1227 [employer]. Care instructions adapted under license by [employer] (which disclaims liability or warranty for this information).  If you have questions about a medical condition or LABS  CBC (04-28 @ 14:14)                              15.6                           16.24<H>  )----------------(  301        74.2  % Neutrophils, 8.9<L>% Lymphocytes, ANC: 12.05<H>                              45.9      BMP (04-28 @ 14:14)             136     |  101     |  17    		Ca++ --      Ca 9.8                ---------------------------------( 121<H>		Mg --                 4.1     |  25      |  0.9   			Ph --        LFTs (04-28 @ 14:14)      TPro 7.6 / Alb 4.8 / TBili 0.7 / DBili -- / AST 24 / ALT 24 / AlkPhos 111            --------------------------------------------------------------------------------------------    MICROBIOLOGY  Urinalysis (04-28 @ 14:14):     Color:  / Appearance:  / SG:  / pH:  / Gluc: 121<H> / Ketones:  / Bili:  / Urobili:  / Protein : / Nitrites:  / Leuk.Est:  / RBC:  / WBC:  / Sq Epi:  / Non Sq Epi:  / Bacteria          --------------------------------------------------------------------------------------------    I&O's Summary    < from: CT Abdomen and Pelvis w/ IV Cont (04.28.24 @ 14:26) >    IMPRESSION:      Acute appendicitis with likely obstructing proximal appendicolith. No   evidence of abscess or perforation.    --- End of Report ---    < end of copied text > this instruction, always ask your healthcare professional. [place] [address] any warranty or liability for your use of this information. Introducing [facility]! Rikki Coy introduces [employer] patient portal. Now you can access parts of your medical record, email your doctor's office, and request medication refills online. 1. In your internet browser, go to [email or web address] 2. Click on the First Time User? Click Here link in the Sign In box. You will see the New Member Sign Up page. 3. Enter your [employer] Access Code exactly as it appears below. You will not need to use this code after youve completed the sign-up process. If you do not sign up before the expiration date, you must request a new code. · [employer] Access Code: [email or web address] Expires: 5/3/2017  5:12 PM 
 
4. Enter the last four digits of your Social Security Number (xxxx) and Date of Birth (mm/dd/yyyy) as indicated and click Submit. You will be taken to the next sign-up page. 5. Create a [employer] ID. This will be your [employer] login ID and cannot be changed, so think of one that is secure and easy to remember. 6. Create a [employer] password. You can change your password at any time. 7. Enter your Password Reset Question and Answer. This can be used at a later time if you forget your password. 8. Enter your e-mail address. You will receive e-mail notification when new information is available in [address] 9. Click Sign Up. You can now view and download portions of your medical record. 10. Click the Download Summary menu link to download a portable copy of your medical information. If you have questions, please visit the Frequently Asked Questions section of the [employer] website. Remember, [employer] is NOT to be used for urgent needs. For medical emergencies, dial 911. Now available from your iPhone and Android! Please provide this summary of care documentation to your next provider. Your primary care clinician is listed as [address]. If you have any questions after today's visit, please call [phone].

## 2024-08-03 ENCOUNTER — APPOINTMENT (OUTPATIENT)
Facility: HOSPITAL | Age: 59
End: 2024-08-03
Payer: MEDICAID

## 2024-08-03 ENCOUNTER — HOSPITAL ENCOUNTER (EMERGENCY)
Facility: HOSPITAL | Age: 59
Discharge: HOME | End: 2024-08-03
Attending: EMERGENCY MEDICINE
Payer: MEDICAID

## 2024-08-03 VITALS
TEMPERATURE: 97.8 F | HEART RATE: 76 BPM | SYSTOLIC BLOOD PRESSURE: 122 MMHG | RESPIRATION RATE: 18 BRPM | OXYGEN SATURATION: 99 % | WEIGHT: 220 LBS | HEIGHT: 73 IN | BODY MASS INDEX: 29.16 KG/M2 | DIASTOLIC BLOOD PRESSURE: 72 MMHG

## 2024-08-03 DIAGNOSIS — S16.1XXA ACUTE CERVICAL MYOFASCIAL STRAIN, INITIAL ENCOUNTER: ICD-10-CM

## 2024-08-03 DIAGNOSIS — S39.012A STRAIN OF LUMBAR REGION, INITIAL ENCOUNTER: Primary | ICD-10-CM

## 2024-08-03 PROCEDURE — 72100 X-RAY EXAM L-S SPINE 2/3 VWS: CPT

## 2024-08-03 PROCEDURE — 99284 EMERGENCY DEPT VISIT MOD MDM: CPT

## 2024-08-03 PROCEDURE — 96372 THER/PROPH/DIAG INJ SC/IM: CPT

## 2024-08-03 PROCEDURE — 72040 X-RAY EXAM NECK SPINE 2-3 VW: CPT

## 2024-08-03 PROCEDURE — 6360000002 HC RX W HCPCS: Performed by: EMERGENCY MEDICINE

## 2024-08-03 RX ORDER — KETOROLAC TROMETHAMINE 15 MG/ML
15 INJECTION, SOLUTION INTRAMUSCULAR; INTRAVENOUS
Status: DISCONTINUED | OUTPATIENT
Start: 2024-08-03 | End: 2024-08-03

## 2024-08-03 RX ORDER — OXYCODONE HYDROCHLORIDE AND ACETAMINOPHEN 5; 325 MG/1; MG/1
1 TABLET ORAL EVERY 6 HOURS PRN
Qty: 18 TABLET | Refills: 0 | Status: SHIPPED | OUTPATIENT
Start: 2024-08-03 | End: 2024-08-08

## 2024-08-03 RX ORDER — LIDOCAINE 50 MG/G
1 PATCH TOPICAL DAILY
Qty: 10 PATCH | Refills: 0 | Status: SHIPPED | OUTPATIENT
Start: 2024-08-03 | End: 2024-08-13

## 2024-08-03 RX ORDER — IBUPROFEN 800 MG/1
800 TABLET ORAL 2 TIMES DAILY PRN
Qty: 60 TABLET | Refills: 1 | Status: SHIPPED | OUTPATIENT
Start: 2024-08-03

## 2024-08-03 RX ORDER — KETOROLAC TROMETHAMINE 15 MG/ML
15 INJECTION, SOLUTION INTRAMUSCULAR; INTRAVENOUS
Status: COMPLETED | OUTPATIENT
Start: 2024-08-03 | End: 2024-08-03

## 2024-08-03 RX ORDER — LIDOCAINE 4 G/G
1 PATCH TOPICAL DAILY
Qty: 30 PATCH | Refills: 0 | Status: SHIPPED | OUTPATIENT
Start: 2024-08-03 | End: 2024-09-02

## 2024-08-03 RX ADMIN — KETOROLAC TROMETHAMINE 15 MG: 15 INJECTION, SOLUTION INTRAMUSCULAR; INTRAVENOUS at 07:08

## 2024-08-03 ASSESSMENT — PAIN SCALES - GENERAL
PAINLEVEL_OUTOF10: 9
PAINLEVEL_OUTOF10: 9

## 2024-08-03 ASSESSMENT — PAIN DESCRIPTION - ORIENTATION: ORIENTATION: LOWER

## 2024-08-03 ASSESSMENT — PAIN DESCRIPTION - LOCATION
LOCATION: BACK
LOCATION: BACK;SHOULDER;NECK

## 2024-08-03 ASSESSMENT — ENCOUNTER SYMPTOMS
GASTROINTESTINAL NEGATIVE: 1
BACK PAIN: 1
RESPIRATORY NEGATIVE: 1

## 2024-08-03 ASSESSMENT — PAIN - FUNCTIONAL ASSESSMENT: PAIN_FUNCTIONAL_ASSESSMENT: 0-10
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
## 2025-03-16 ENCOUNTER — APPOINTMENT (OUTPATIENT)
Facility: HOSPITAL | Age: 60
DRG: 617 | End: 2025-03-16

## 2025-03-16 ENCOUNTER — HOSPITAL ENCOUNTER (INPATIENT)
Facility: HOSPITAL | Age: 60
LOS: 5 days | Discharge: HOME | DRG: 617 | End: 2025-03-21
Attending: STUDENT IN AN ORGANIZED HEALTH CARE EDUCATION/TRAINING PROGRAM | Admitting: PODIATRIST

## 2025-03-16 DIAGNOSIS — M86.9 OSTEOMYELITIS OF GREAT TOE OF RIGHT FOOT (HCC): ICD-10-CM

## 2025-03-16 DIAGNOSIS — E11.628 DIABETIC FOOT INFECTION (HCC): ICD-10-CM

## 2025-03-16 DIAGNOSIS — M86.9 OSTEOMYELITIS OF RIGHT FOOT, UNSPECIFIED TYPE (HCC): ICD-10-CM

## 2025-03-16 DIAGNOSIS — L08.9 DIABETIC FOOT INFECTION (HCC): ICD-10-CM

## 2025-03-16 DIAGNOSIS — E11.65 HYPERGLYCEMIA DUE TO DIABETES MELLITUS (HCC): Primary | ICD-10-CM

## 2025-03-16 PROBLEM — N18.30 STAGE 3 CHRONIC KIDNEY DISEASE (HCC): Status: ACTIVE | Noted: 2025-03-16

## 2025-03-16 LAB
ALBUMIN SERPL-MCNC: 2.8 G/DL (ref 3.4–5)
ALBUMIN/GLOB SERPL: 0.5 (ref 0.8–1.7)
ALP SERPL-CCNC: 116 U/L (ref 45–117)
ALT SERPL-CCNC: 12 U/L (ref 16–61)
AMPHET UR QL SCN: NEGATIVE
ANION GAP SERPL CALC-SCNC: 7 MMOL/L (ref 3–18)
APPEARANCE UR: CLEAR
AST SERPL-CCNC: 13 U/L (ref 10–38)
BACTERIA URNS QL MICRO: NEGATIVE /HPF
BARBITURATES UR QL SCN: NEGATIVE
BASOPHILS # BLD: 0.02 K/UL (ref 0–0.1)
BASOPHILS NFR BLD: 0.1 % (ref 0–2)
BENZODIAZ UR QL: NEGATIVE
BILIRUB SERPL-MCNC: 0.4 MG/DL (ref 0.2–1)
BILIRUB UR QL: NEGATIVE
BUN SERPL-MCNC: 23 MG/DL (ref 7–18)
BUN/CREAT SERPL: 12 (ref 12–20)
CALCIUM SERPL-MCNC: 8.8 MG/DL (ref 8.5–10.1)
CANNABINOIDS UR QL SCN: NEGATIVE
CHLORIDE SERPL-SCNC: 89 MMOL/L (ref 100–111)
CO2 SERPL-SCNC: 28 MMOL/L (ref 21–32)
COCAINE UR QL SCN: NEGATIVE
COLOR UR: YELLOW
CREAT SERPL-MCNC: 1.99 MG/DL (ref 0.6–1.3)
CRP SERPL-MCNC: 12 MG/DL (ref 0–0.3)
DIFFERENTIAL METHOD BLD: ABNORMAL
EOSINOPHIL # BLD: 0.02 K/UL (ref 0–0.4)
EOSINOPHIL NFR BLD: 0.1 % (ref 0–5)
EPITH CASTS URNS QL MICRO: NORMAL /LPF (ref 0–5)
ERYTHROCYTE [DISTWIDTH] IN BLOOD BY AUTOMATED COUNT: 11.9 % (ref 11.6–14.5)
ERYTHROCYTE [SEDIMENTATION RATE] IN BLOOD: >130 MM/HR (ref 0–20)
EST. AVERAGE GLUCOSE BLD GHB EST-MCNC: ABNORMAL MG/DL
GLOBULIN SER CALC-MCNC: 5.2 G/DL (ref 2–4)
GLUCOSE BLD STRIP.AUTO-MCNC: 221 MG/DL (ref 70–110)
GLUCOSE BLD STRIP.AUTO-MCNC: 248 MG/DL (ref 70–110)
GLUCOSE BLD STRIP.AUTO-MCNC: 343 MG/DL (ref 70–110)
GLUCOSE BLD STRIP.AUTO-MCNC: 382 MG/DL (ref 70–110)
GLUCOSE BLD STRIP.AUTO-MCNC: 402 MG/DL (ref 70–110)
GLUCOSE BLD STRIP.AUTO-MCNC: 551 MG/DL (ref 70–110)
GLUCOSE BLD STRIP.AUTO-MCNC: >600 MG/DL (ref 70–110)
GLUCOSE BLD STRIP.AUTO-MCNC: >600 MG/DL (ref 70–110)
GLUCOSE BLD-MCNC: 248 MG/DL
GLUCOSE SERPL-MCNC: 785 MG/DL (ref 74–99)
GLUCOSE UR STRIP.AUTO-MCNC: >1000 MG/DL
HBA1C MFR BLD: >14 % (ref 4.2–5.6)
HCT VFR BLD AUTO: 32.9 % (ref 36–48)
HGB BLD-MCNC: 10.6 G/DL (ref 13–16)
HGB UR QL STRIP: ABNORMAL
IMM GRANULOCYTES # BLD AUTO: 0.08 K/UL (ref 0–0.04)
IMM GRANULOCYTES NFR BLD AUTO: 0.6 % (ref 0–0.5)
KETONES UR QL STRIP.AUTO: NEGATIVE MG/DL
LACTATE BLD-SCNC: 1.68 MMOL/L (ref 0.4–2)
LEUKOCYTE ESTERASE UR QL STRIP.AUTO: NEGATIVE
LYMPHOCYTES # BLD: 1.11 K/UL (ref 0.9–3.6)
LYMPHOCYTES NFR BLD: 7.8 % (ref 21–52)
Lab: NORMAL
MCH RBC QN AUTO: 29.1 PG (ref 24–34)
MCHC RBC AUTO-ENTMCNC: 32.2 G/DL (ref 31–37)
MCV RBC AUTO: 90.4 FL (ref 78–100)
METHADONE UR QL: NEGATIVE
MONOCYTES # BLD: 1.01 K/UL (ref 0.05–1.2)
MONOCYTES NFR BLD: 7.1 % (ref 3–10)
NEUTS SEG # BLD: 12.05 K/UL (ref 1.8–8)
NEUTS SEG NFR BLD: 84.3 % (ref 40–73)
NITRITE UR QL STRIP.AUTO: NEGATIVE
NRBC # BLD: 0 K/UL (ref 0–0.01)
NRBC BLD-RTO: 0 PER 100 WBC
OPIATES UR QL: NEGATIVE
OSMOLALITY SERPL: 317 MOSM/KG H2O (ref 280–301)
PCP UR QL: NEGATIVE
PH UR STRIP: 5.5 (ref 5–8)
PLATELET # BLD AUTO: 338 K/UL (ref 135–420)
PMV BLD AUTO: 10.2 FL (ref 9.2–11.8)
POTASSIUM SERPL-SCNC: 4.6 MMOL/L (ref 3.5–5.5)
PROCALCITONIN SERPL-MCNC: 0.22 NG/ML
PROT SERPL-MCNC: 8 G/DL (ref 6.4–8.2)
PROT UR STRIP-MCNC: 30 MG/DL
RBC # BLD AUTO: 3.64 M/UL (ref 4.35–5.65)
RBC #/AREA URNS HPF: NORMAL /HPF (ref 0–5)
SODIUM SERPL-SCNC: 124 MMOL/L (ref 136–145)
SP GR UR REFRACTOMETRY: 1.03 (ref 1–1.03)
UROBILINOGEN UR QL STRIP.AUTO: 0.2 EU/DL (ref 0.2–1)
WBC # BLD AUTO: 14.3 K/UL (ref 4.6–13.2)
WBC URNS QL MICRO: NORMAL /HPF (ref 0–5)

## 2025-03-16 PROCEDURE — 99223 1ST HOSP IP/OBS HIGH 75: CPT | Performed by: PHYSICIAN ASSISTANT

## 2025-03-16 PROCEDURE — 80053 COMPREHEN METABOLIC PANEL: CPT

## 2025-03-16 PROCEDURE — 6370000000 HC RX 637 (ALT 250 FOR IP): Performed by: PHYSICIAN ASSISTANT

## 2025-03-16 PROCEDURE — 83036 HEMOGLOBIN GLYCOSYLATED A1C: CPT

## 2025-03-16 PROCEDURE — 2500000003 HC RX 250 WO HCPCS: Performed by: PHYSICIAN ASSISTANT

## 2025-03-16 PROCEDURE — 82962 GLUCOSE BLOOD TEST: CPT

## 2025-03-16 PROCEDURE — 83605 ASSAY OF LACTIC ACID: CPT

## 2025-03-16 PROCEDURE — 85025 COMPLETE CBC W/AUTO DIFF WBC: CPT

## 2025-03-16 PROCEDURE — 87040 BLOOD CULTURE FOR BACTERIA: CPT

## 2025-03-16 PROCEDURE — 85652 RBC SED RATE AUTOMATED: CPT

## 2025-03-16 PROCEDURE — 6370000000 HC RX 637 (ALT 250 FOR IP): Performed by: STUDENT IN AN ORGANIZED HEALTH CARE EDUCATION/TRAINING PROGRAM

## 2025-03-16 PROCEDURE — 6360000002 HC RX W HCPCS: Performed by: PHYSICIAN ASSISTANT

## 2025-03-16 PROCEDURE — 99285 EMERGENCY DEPT VISIT HI MDM: CPT

## 2025-03-16 PROCEDURE — 86140 C-REACTIVE PROTEIN: CPT

## 2025-03-16 PROCEDURE — 84145 PROCALCITONIN (PCT): CPT

## 2025-03-16 PROCEDURE — 81001 URINALYSIS AUTO W/SCOPE: CPT

## 2025-03-16 PROCEDURE — 80307 DRUG TEST PRSMV CHEM ANLYZR: CPT

## 2025-03-16 PROCEDURE — 1100000003 HC PRIVATE W/ TELEMETRY

## 2025-03-16 PROCEDURE — 2580000003 HC RX 258: Performed by: STUDENT IN AN ORGANIZED HEALTH CARE EDUCATION/TRAINING PROGRAM

## 2025-03-16 PROCEDURE — 96365 THER/PROPH/DIAG IV INF INIT: CPT

## 2025-03-16 PROCEDURE — 83930 ASSAY OF BLOOD OSMOLALITY: CPT

## 2025-03-16 PROCEDURE — 73630 X-RAY EXAM OF FOOT: CPT

## 2025-03-16 RX ORDER — INSULIN LISPRO 100 [IU]/ML
0-16 INJECTION, SOLUTION INTRAVENOUS; SUBCUTANEOUS
Status: DISCONTINUED | OUTPATIENT
Start: 2025-03-16 | End: 2025-03-21 | Stop reason: HOSPADM

## 2025-03-16 RX ORDER — ONDANSETRON 4 MG/1
4 TABLET, ORALLY DISINTEGRATING ORAL EVERY 8 HOURS PRN
Status: DISCONTINUED | OUTPATIENT
Start: 2025-03-16 | End: 2025-03-21 | Stop reason: HOSPADM

## 2025-03-16 RX ORDER — MORPHINE SULFATE 2 MG/ML
2 INJECTION, SOLUTION INTRAMUSCULAR; INTRAVENOUS
Status: DISCONTINUED | OUTPATIENT
Start: 2025-03-16 | End: 2025-03-21 | Stop reason: HOSPADM

## 2025-03-16 RX ORDER — POLYETHYLENE GLYCOL 3350 17 G/17G
17 POWDER, FOR SOLUTION ORAL DAILY PRN
Status: DISCONTINUED | OUTPATIENT
Start: 2025-03-16 | End: 2025-03-21 | Stop reason: HOSPADM

## 2025-03-16 RX ORDER — POTASSIUM CHLORIDE 1500 MG/1
40 TABLET, EXTENDED RELEASE ORAL PRN
Status: DISCONTINUED | OUTPATIENT
Start: 2025-03-16 | End: 2025-03-21 | Stop reason: HOSPADM

## 2025-03-16 RX ORDER — DEXTROSE MONOHYDRATE 100 MG/ML
INJECTION, SOLUTION INTRAVENOUS CONTINUOUS PRN
Status: DISCONTINUED | OUTPATIENT
Start: 2025-03-16 | End: 2025-03-21 | Stop reason: HOSPADM

## 2025-03-16 RX ORDER — VANCOMYCIN 2 G/400ML
2000 INJECTION, SOLUTION INTRAVENOUS ONCE
Status: DISCONTINUED | OUTPATIENT
Start: 2025-03-16 | End: 2025-03-16

## 2025-03-16 RX ORDER — OXYCODONE AND ACETAMINOPHEN 5; 325 MG/1; MG/1
1 TABLET ORAL EVERY 4 HOURS PRN
Refills: 0 | Status: DISCONTINUED | OUTPATIENT
Start: 2025-03-16 | End: 2025-03-21 | Stop reason: HOSPADM

## 2025-03-16 RX ORDER — VANCOMYCIN 2 G/400ML
2000 INJECTION, SOLUTION INTRAVENOUS ONCE
Status: COMPLETED | OUTPATIENT
Start: 2025-03-16 | End: 2025-03-17

## 2025-03-16 RX ORDER — POTASSIUM CHLORIDE 7.45 MG/ML
10 INJECTION INTRAVENOUS PRN
Status: DISCONTINUED | OUTPATIENT
Start: 2025-03-16 | End: 2025-03-21 | Stop reason: HOSPADM

## 2025-03-16 RX ORDER — SODIUM CHLORIDE 0.9 % (FLUSH) 0.9 %
5-40 SYRINGE (ML) INJECTION PRN
Status: DISCONTINUED | OUTPATIENT
Start: 2025-03-16 | End: 2025-03-21 | Stop reason: HOSPADM

## 2025-03-16 RX ORDER — DEXTROSE MONOHYDRATE 100 MG/ML
INJECTION, SOLUTION INTRAVENOUS CONTINUOUS PRN
Status: DISCONTINUED | OUTPATIENT
Start: 2025-03-16 | End: 2025-03-18 | Stop reason: SDUPTHER

## 2025-03-16 RX ORDER — SODIUM CHLORIDE 0.9 % (FLUSH) 0.9 %
5-40 SYRINGE (ML) INJECTION EVERY 12 HOURS SCHEDULED
Status: DISCONTINUED | OUTPATIENT
Start: 2025-03-16 | End: 2025-03-21 | Stop reason: HOSPADM

## 2025-03-16 RX ORDER — INSULIN GLARGINE 100 [IU]/ML
0.15 INJECTION, SOLUTION SUBCUTANEOUS DAILY
Status: DISCONTINUED | OUTPATIENT
Start: 2025-03-16 | End: 2025-03-16

## 2025-03-16 RX ORDER — SODIUM CHLORIDE 9 MG/ML
INJECTION, SOLUTION INTRAVENOUS PRN
Status: DISCONTINUED | OUTPATIENT
Start: 2025-03-16 | End: 2025-03-21 | Stop reason: HOSPADM

## 2025-03-16 RX ORDER — ACETAMINOPHEN 325 MG/1
650 TABLET ORAL EVERY 6 HOURS PRN
Status: DISCONTINUED | OUTPATIENT
Start: 2025-03-16 | End: 2025-03-21 | Stop reason: HOSPADM

## 2025-03-16 RX ORDER — MAGNESIUM SULFATE IN WATER 40 MG/ML
2000 INJECTION, SOLUTION INTRAVENOUS PRN
Status: DISCONTINUED | OUTPATIENT
Start: 2025-03-16 | End: 2025-03-21 | Stop reason: HOSPADM

## 2025-03-16 RX ORDER — ENOXAPARIN SODIUM 100 MG/ML
40 INJECTION SUBCUTANEOUS DAILY
Status: DISCONTINUED | OUTPATIENT
Start: 2025-03-17 | End: 2025-03-21 | Stop reason: HOSPADM

## 2025-03-16 RX ORDER — 0.9 % SODIUM CHLORIDE 0.9 %
1000 INTRAVENOUS SOLUTION INTRAVENOUS ONCE
Status: DISCONTINUED | OUTPATIENT
Start: 2025-03-16 | End: 2025-03-16

## 2025-03-16 RX ORDER — ACETAMINOPHEN 650 MG/1
650 SUPPOSITORY RECTAL EVERY 6 HOURS PRN
Status: DISCONTINUED | OUTPATIENT
Start: 2025-03-16 | End: 2025-03-21 | Stop reason: HOSPADM

## 2025-03-16 RX ORDER — ONDANSETRON 2 MG/ML
4 INJECTION INTRAMUSCULAR; INTRAVENOUS EVERY 6 HOURS PRN
Status: DISCONTINUED | OUTPATIENT
Start: 2025-03-16 | End: 2025-03-21 | Stop reason: HOSPADM

## 2025-03-16 RX ORDER — INSULIN GLARGINE 100 [IU]/ML
0.15 INJECTION, SOLUTION SUBCUTANEOUS NIGHTLY
Status: DISCONTINUED | OUTPATIENT
Start: 2025-03-16 | End: 2025-03-20

## 2025-03-16 RX ORDER — 0.9 % SODIUM CHLORIDE 0.9 %
1000 INTRAVENOUS SOLUTION INTRAVENOUS ONCE
Status: COMPLETED | OUTPATIENT
Start: 2025-03-16 | End: 2025-03-16

## 2025-03-16 RX ORDER — VANCOMYCIN 1.75 G/350ML
1250 INJECTION, SOLUTION INTRAVENOUS EVERY 24 HOURS
Status: DISCONTINUED | OUTPATIENT
Start: 2025-03-17 | End: 2025-03-18

## 2025-03-16 RX ORDER — DEXTROSE MONOHYDRATE 100 MG/ML
INJECTION, SOLUTION INTRAVENOUS CONTINUOUS PRN
Status: CANCELLED | OUTPATIENT
Start: 2025-03-16

## 2025-03-16 RX ORDER — BISACODYL 10 MG
10 SUPPOSITORY, RECTAL RECTAL DAILY PRN
Status: DISCONTINUED | OUTPATIENT
Start: 2025-03-16 | End: 2025-03-21 | Stop reason: HOSPADM

## 2025-03-16 RX ADMIN — INSULIN HUMAN 10.8 UNITS/HR: 1 INJECTION, SOLUTION INTRAVENOUS at 16:26

## 2025-03-16 RX ADMIN — WATER 2000 MG: 1 INJECTION INTRAMUSCULAR; INTRAVENOUS; SUBCUTANEOUS at 21:28

## 2025-03-16 RX ADMIN — SODIUM CHLORIDE 1000 ML: 0.9 INJECTION, SOLUTION INTRAVENOUS at 16:32

## 2025-03-16 RX ADMIN — VANCOMYCIN 2000 MG: 2 INJECTION, SOLUTION INTRAVENOUS at 21:33

## 2025-03-16 RX ADMIN — INSULIN GLARGINE 15 UNITS: 100 INJECTION, SOLUTION SUBCUTANEOUS at 22:40

## 2025-03-16 RX ADMIN — SODIUM CHLORIDE, PRESERVATIVE FREE 10 ML: 5 INJECTION INTRAVENOUS at 22:40

## 2025-03-16 RX ADMIN — OXYCODONE HYDROCHLORIDE AND ACETAMINOPHEN 1 TABLET: 5; 325 TABLET ORAL at 22:39

## 2025-03-16 RX ADMIN — INSULIN LISPRO 16 UNITS: 100 INJECTION, SOLUTION INTRAVENOUS; SUBCUTANEOUS at 22:40

## 2025-03-16 ASSESSMENT — PAIN SCALES - GENERAL
PAINLEVEL_OUTOF10: 7
PAINLEVEL_OUTOF10: 5
PAINLEVEL_OUTOF10: 8

## 2025-03-16 ASSESSMENT — PAIN DESCRIPTION - PAIN TYPE
TYPE: ACUTE PAIN
TYPE: ACUTE PAIN

## 2025-03-16 ASSESSMENT — PAIN - FUNCTIONAL ASSESSMENT
PAIN_FUNCTIONAL_ASSESSMENT: 0-10
PAIN_FUNCTIONAL_ASSESSMENT: ACTIVITIES ARE NOT PREVENTED

## 2025-03-16 ASSESSMENT — PAIN DESCRIPTION - DESCRIPTORS
DESCRIPTORS: ACHING;SORE
DESCRIPTORS: ACHING;SORE

## 2025-03-16 ASSESSMENT — PAIN DESCRIPTION - ONSET: ONSET: ON-GOING

## 2025-03-16 ASSESSMENT — PAIN DESCRIPTION - LOCATION
LOCATION: TOE (COMMENT WHICH ONE)
LOCATION: FOOT;TOE (COMMENT WHICH ONE)

## 2025-03-16 ASSESSMENT — PAIN DESCRIPTION - ORIENTATION
ORIENTATION: RIGHT
ORIENTATION: RIGHT

## 2025-03-16 ASSESSMENT — PAIN DESCRIPTION - FREQUENCY: FREQUENCY: CONTINUOUS

## 2025-03-16 NOTE — ED TRIAGE NOTES
Pt came ambulatory to triage c/o wound check of right 2nd toe. Pt states that he has a wound and has not seen a podiatrist in a year.     Pt also reports \"feeling shaky\" on the way to ED and thought it was his blood sugar and ate 7-8pcs of skillet.  BG reading in triage HI.    Hx of DM.

## 2025-03-16 NOTE — PROGRESS NOTES
[facility]   Pharmacy Pharmacokinetic Monitoring Service - Vancomycin    Indication: Bone and Joint Infection  Target Concentration: Goal AUC/TERESA 400-600 mg*hr/L  Day of Therapy: 1  Additional Antimicrobials: Cefepime    Pertinent Laboratory Values:   Temp: 98.6 °F (37 °C), Weight - Scale: 99.8 kg (220 lb)  Recent Labs     03/16/25  1315   CREATININE 1.99*   BUN 23*   WBC 14.3*       Estimated Creatinine Clearance: 48 mL/min (A) (based on SCr of 1.99 mg/dL (H)).    Pertinent Cultures:  Culture Date Source Results   3/16 blood pending   3/16 Wound, foot pending   MRSA Nasal Swab: N/A. Non-respiratory infection    Assessment:  Date/Time Current Dose Concentration Timing of Concentration (h) AUC   3/16 2,000mg - - -     Note: Serum concentrations collected for AUC dosing may appear elevated if collected in close proximity to the dose administered, this is not necessarily an indication of toxicity    Plan:  Vancomycin 2,000mg x1 followed by 1,250mg q24h  Projected AUCss/T = 511/15.1  Renal labs as indicated   Vancomycin concentration ordered for AM labs tomorrow  Pharmacy will continue to monitor patient and adjust therapy as indicated    Thank you for the consult,  TRINITY JONES RPH  3/16/2025

## 2025-03-16 NOTE — ED NOTES
Called into pt's room. Pt upset, states his monitor is going off and insulin drip is beeping. Pt reconnected to monitor and insulin drip fixed. Pt states he was told he can eat per MD. New diet added to chart 1749. Pt heated up meal and given to pt. Pt upset about meal type. Tried to accommodate pt, pt still not satisfied.

## 2025-03-16 NOTE — PROGRESS NOTES
Pharmacy Note     Cefepime 2gm q8h ordered for treatment of bone & joint infection. Per [facility] Policy, cefepime will be changed to 2 gm  q12h.     Estimated Creatinine Clearance: Estimated Creatinine Clearance: 48 mL/min (A) (based on SCr of 1.99 mg/dL (H)).  Dialysis Status, APURVA, CKD: -    BMI:  Body mass index is 29.03 kg/m².    Rationale for Adjustment:  [facility] B-Lactam extended infusion policy    Pharmacy will continue to monitor and adjust dose as necessary.      Please call with any questions.    Thank you,  TRINITY JONES, [facility]

## 2025-03-16 NOTE — ED PROVIDER NOTES
EMERGENCY DEPARTMENT HISTORY AND PHYSICAL EXAM      Date: 3/16/2025  Patient Name: Karri Georges    History of Presenting Illness     Chief Complaint   Patient presents with    Hyperglycemia    Wound Check       History (Context): Karri Georges is a 59 y.o. male  presents to the ED today with chief complaint of wound on toe of his right foot.  Pt has Hx of DM2, on insulin, states that due to his toe/foot pain he has been unable to walk to take his insulin.  States that he takes 30units long acting at baseline at home with 8-10 units per meal.  Pt has had issues with his foot in the past, states that he has not seen his podiatrist in over a year as he has not had any foot issues.  Current presentation, Pt with concern for progressively worsening pain over the last week.  States that last night and today it was the worst, he covered the toe with betadine and wrapped with gauze and coban before proceeding to ED.      PCP: No primary care provider on file.    Current Facility-Administered Medications   Medication Dose Route Frequency Provider Last Rate Last Admin    glucose chewable tablet 16 g  4 tablet Oral PRN AlfonsoLyndsey MD        dextrose bolus 10% 125 mL  125 mL IntraVENous PRN AlfonsoLyndsey aragon MD        Or    dextrose bolus 10% 250 mL  250 mL IntraVENous PRN AlfonsoLyndsey MD        glucagon injection 1 mg  1 mg SubCUTAneous PRN Lyndsey Hamilton MD        dextrose 10 % infusion   IntraVENous Continuous PRN Lnydsey Hamilton MD        ceFEPIme (MAXIPIME) 2,000 mg in sterile water 20 mL IV syringe  2,000 mg IntraVENous Once Delia Dunn PA        Followed by    [START ON 3/17/2025] ceFEPIme (MAXIPIME) 2,000 mg in sodium chloride 0.9 % 100 mL IVPB (addEASE)  2,000 mg IntraVENous Q12H Delia Dunn PA        vancomycin (VANCOCIN) 2000 mg in 400 mL IVPB  2,000 mg IntraVENous Once Delia Dunn PA        [START ON 3/17/2025] vancomycin (VANCOCIN) 1250 mg in 250 mL IVPB  1,250 mg  Globulin 5.2 (H) 2.0 - 4.0 g/dL    Albumin/Globulin Ratio 0.5 (L) 0.8 - 1.7     Sedimentation Rate    Collection Time: 03/16/25  1:15 PM   Result Value Ref Range    Sed Rate, Automated >130 (H) 0 - 20 mm/hr   C-Reactive Protein    Collection Time: 03/16/25  1:15 PM   Result Value Ref Range    CRP 12.0 (H) 0 - 0.3 mg/dL   Osmolality    Collection Time: 03/16/25  1:15 PM   Result Value Ref Range    Serum Osmolality 317 (H) 280 - 301 MOSM/kg H2O   Hemoglobin A1c    Collection Time: 03/16/25  1:15 PM   Result Value Ref Range    Hemoglobin A1C >14.0 (H) 4.2 - 5.6 %    Estimated Avg Glucose Cannot be calculated mg/dL   Procalcitonin    Collection Time: 03/16/25  1:15 PM   Result Value Ref Range    Procalcitonin 0.22 ng/mL   Urine Drug Screen    Collection Time: 03/16/25  3:41 PM   Result Value Ref Range    Benzodiazepines, Urine Negative NEG      Barbiturates, Urine Negative NEG      THC, TH-Cannabinol, Urine Negative NEG      Opiates, Urine Negative NEG      Phencyclidine, Urine Negative NEG      Cocaine, Urine Negative NEG      Amphetamine, Urine Negative NEG      Methadone, Urine Negative NEG      Comments: (NOTE)    POCT Glucose    Collection Time: 03/16/25  4:30 PM   Result Value Ref Range    POC Glucose 551 (HH) 70 - 110 mg/dL   POCT lactic acid (lactate)    Collection Time: 03/16/25  5:30 PM   Result Value Ref Range    POC Lactic Acid 1.68 0.40 - 2.00 mmol/L   POCT Glucose    Collection Time: 03/16/25  5:53 PM   Result Value Ref Range    POC Glucose 343 (H) 70 - 110 mg/dL   POCT Glucose    Collection Time: 03/16/25  7:00 PM   Result Value Ref Range    POC Glucose 221 (H) 70 - 110 mg/dL      Labs Reviewed   CBC WITH AUTO DIFFERENTIAL - Abnormal; Notable for the following components:       Result Value    WBC 14.3 (*)     RBC 3.64 (*)     Hemoglobin 10.6 (*)     Hematocrit 32.9 (*)     Neutrophils % 84.3 (*)     Lymphocytes % 7.8 (*)     Immature Granulocytes % 0.6 (*)     Neutrophils Absolute 12.05 (*)     Immature

## 2025-03-16 NOTE — H&P
History and Physical          Subjective     HPI: Karri Georges is a 59 y.o. male with a PMHx of uncontrolled DM who presented to the ED with c/o right second toe wound that has been present for 1 week. Pain started about 2-3 days ago. Pain is 7/10. He has not seen a podiatrist in over a year. He states he's not been checking his blood sugars or taking his insulin in over 2 weeks due to sciatic pain making it difficult for him to go up and down the stairs. He does admit that his diet has not been good for a few months now, so he's not surprised his A1C is back up to 14%. He denies fever, chills, cough, sob, abd pain, nvd.     In the ED, VSS. Labs with Na 124, Cl 89, BUN 23, Cr 1.99, , CRP 12, ESR > 130, alb 2.8, WBC 14.3, hgb 10.6, UA negative for infection. XR  shows Near absent soft tissue overlying the end of second distal phalanx. Osteomyelitis is difficult to exclude. ID and podiatry were consulted.     PMHx:  Past Medical History:   Diagnosis Date    Arthritis of left knee     Diabetes (HCC)     Erectile dysfunction     Knee pain     Osteoarthritis of right knee     Osteomyelitis (HCC)     Sinusitis        PSurgHx:  Past Surgical History:   Procedure Laterality Date    COLONOSCOPY N/A 2/5/2020    COLONOSCOPY performed by DINA Frazier MD at [facility] ENDOSCOPY    KNEE SURGERY Right 2015    TOE AMPUTATION Left 12/2018    left great toe       SocialHx:  Social History     Socioeconomic History    Marital status: Single   Tobacco Use    Smoking status: Former     Passive exposure: Never    Smokeless tobacco: Never   Substance and Sexual Activity    Alcohol use: Yes     Alcohol/week: 2.0 standard drinks of alcohol    Drug use: No       FamilyHx:  Family History   Problem Relation Age of Onset    No Known Problems Sister     No Known Problems Brother     No Known Problems Brother     No Known Problems Father     Hypertension Mother     Seizures Mother     No Known Problems Sister        Home

## 2025-03-17 ENCOUNTER — ANESTHESIA EVENT (OUTPATIENT)
Facility: HOSPITAL | Age: 60
End: 2025-03-17

## 2025-03-17 LAB
ANION GAP SERPL CALC-SCNC: 8 MMOL/L (ref 3–18)
BASOPHILS # BLD: 0.03 K/UL (ref 0–0.1)
BASOPHILS NFR BLD: 0.2 % (ref 0–2)
BUN SERPL-MCNC: 22 MG/DL (ref 7–18)
BUN/CREAT SERPL: 12 (ref 12–20)
CALCIUM SERPL-MCNC: 8.5 MG/DL (ref 8.5–10.1)
CHLORIDE SERPL-SCNC: 95 MMOL/L (ref 100–111)
CO2 SERPL-SCNC: 28 MMOL/L (ref 21–32)
CREAT SERPL-MCNC: 1.8 MG/DL (ref 0.6–1.3)
DIFFERENTIAL METHOD BLD: ABNORMAL
EOSINOPHIL # BLD: 0.03 K/UL (ref 0–0.4)
EOSINOPHIL NFR BLD: 0.2 % (ref 0–5)
ERYTHROCYTE [DISTWIDTH] IN BLOOD BY AUTOMATED COUNT: 11.9 % (ref 11.6–14.5)
EST. AVERAGE GLUCOSE BLD GHB EST-MCNC: ABNORMAL MG/DL
GLUCOSE BLD STRIP.AUTO-MCNC: 159 MG/DL (ref 70–110)
GLUCOSE BLD STRIP.AUTO-MCNC: 239 MG/DL (ref 70–110)
GLUCOSE BLD STRIP.AUTO-MCNC: 271 MG/DL (ref 70–110)
GLUCOSE BLD STRIP.AUTO-MCNC: 312 MG/DL (ref 70–110)
GLUCOSE SERPL-MCNC: 314 MG/DL (ref 74–99)
HBA1C MFR BLD: >14 % (ref 4.2–5.6)
HCT VFR BLD AUTO: 30.7 % (ref 36–48)
HGB BLD-MCNC: 10.1 G/DL (ref 13–16)
IMM GRANULOCYTES # BLD AUTO: 0.05 K/UL (ref 0–0.04)
IMM GRANULOCYTES NFR BLD AUTO: 0.4 % (ref 0–0.5)
LYMPHOCYTES # BLD: 2.05 K/UL (ref 0.9–3.6)
LYMPHOCYTES NFR BLD: 15.4 % (ref 21–52)
MCH RBC QN AUTO: 29.7 PG (ref 24–34)
MCHC RBC AUTO-ENTMCNC: 32.9 G/DL (ref 31–37)
MCV RBC AUTO: 90.3 FL (ref 78–100)
MONOCYTES # BLD: 1.3 K/UL (ref 0.05–1.2)
MONOCYTES NFR BLD: 9.8 % (ref 3–10)
NEUTS SEG # BLD: 9.84 K/UL (ref 1.8–8)
NEUTS SEG NFR BLD: 74 % (ref 40–73)
NRBC # BLD: 0 K/UL (ref 0–0.01)
NRBC BLD-RTO: 0 PER 100 WBC
PLATELET # BLD AUTO: 337 K/UL (ref 135–420)
PMV BLD AUTO: 10.1 FL (ref 9.2–11.8)
POTASSIUM SERPL-SCNC: 4.5 MMOL/L (ref 3.5–5.5)
RBC # BLD AUTO: 3.4 M/UL (ref 4.35–5.65)
SODIUM SERPL-SCNC: 131 MMOL/L (ref 136–145)
VANCOMYCIN SERPL-MCNC: 25.1 UG/ML (ref 5–40)
WBC # BLD AUTO: 13.3 K/UL (ref 4.6–13.2)

## 2025-03-17 PROCEDURE — 94761 N-INVAS EAR/PLS OXIMETRY MLT: CPT

## 2025-03-17 PROCEDURE — 36415 COLL VENOUS BLD VENIPUNCTURE: CPT

## 2025-03-17 PROCEDURE — 83036 HEMOGLOBIN GLYCOSYLATED A1C: CPT

## 2025-03-17 PROCEDURE — 80202 ASSAY OF VANCOMYCIN: CPT

## 2025-03-17 PROCEDURE — 2580000003 HC RX 258: Performed by: PHYSICIAN ASSISTANT

## 2025-03-17 PROCEDURE — 85025 COMPLETE CBC W/AUTO DIFF WBC: CPT

## 2025-03-17 PROCEDURE — 6370000000 HC RX 637 (ALT 250 FOR IP): Performed by: STUDENT IN AN ORGANIZED HEALTH CARE EDUCATION/TRAINING PROGRAM

## 2025-03-17 PROCEDURE — 82962 GLUCOSE BLOOD TEST: CPT

## 2025-03-17 PROCEDURE — 6370000000 HC RX 637 (ALT 250 FOR IP): Performed by: PHYSICIAN ASSISTANT

## 2025-03-17 PROCEDURE — 6360000002 HC RX W HCPCS: Performed by: PHYSICIAN ASSISTANT

## 2025-03-17 PROCEDURE — 1100000003 HC PRIVATE W/ TELEMETRY

## 2025-03-17 PROCEDURE — 99232 SBSQ HOSP IP/OBS MODERATE 35: CPT | Performed by: STUDENT IN AN ORGANIZED HEALTH CARE EDUCATION/TRAINING PROGRAM

## 2025-03-17 PROCEDURE — 80048 BASIC METABOLIC PNL TOTAL CA: CPT

## 2025-03-17 PROCEDURE — 2500000003 HC RX 250 WO HCPCS: Performed by: PHYSICIAN ASSISTANT

## 2025-03-17 RX ORDER — INSULIN LISPRO 100 [IU]/ML
7 INJECTION, SOLUTION INTRAVENOUS; SUBCUTANEOUS
Status: DISCONTINUED | OUTPATIENT
Start: 2025-03-17 | End: 2025-03-21 | Stop reason: HOSPADM

## 2025-03-17 RX ADMIN — INSULIN LISPRO 8 UNITS: 100 INJECTION, SOLUTION INTRAVENOUS; SUBCUTANEOUS at 11:36

## 2025-03-17 RX ADMIN — CEFEPIME 2000 MG: 2 INJECTION, POWDER, FOR SOLUTION INTRAVENOUS at 17:41

## 2025-03-17 RX ADMIN — INSULIN LISPRO 7 UNITS: 100 INJECTION, SOLUTION INTRAVENOUS; SUBCUTANEOUS at 11:37

## 2025-03-17 RX ADMIN — SODIUM CHLORIDE, PRESERVATIVE FREE 10 ML: 5 INJECTION INTRAVENOUS at 20:20

## 2025-03-17 RX ADMIN — INSULIN LISPRO 7 UNITS: 100 INJECTION, SOLUTION INTRAVENOUS; SUBCUTANEOUS at 17:29

## 2025-03-17 RX ADMIN — OXYCODONE HYDROCHLORIDE AND ACETAMINOPHEN 1 TABLET: 5; 325 TABLET ORAL at 21:27

## 2025-03-17 RX ADMIN — OXYCODONE HYDROCHLORIDE AND ACETAMINOPHEN 1 TABLET: 5; 325 TABLET ORAL at 17:30

## 2025-03-17 RX ADMIN — ENOXAPARIN SODIUM 40 MG: 100 INJECTION SUBCUTANEOUS at 08:18

## 2025-03-17 RX ADMIN — VANCOMYCIN 1250 MG: 1.75 INJECTION, SOLUTION INTRAVENOUS at 10:11

## 2025-03-17 RX ADMIN — INSULIN LISPRO 4 UNITS: 100 INJECTION, SOLUTION INTRAVENOUS; SUBCUTANEOUS at 20:19

## 2025-03-17 RX ADMIN — CEFEPIME 2000 MG: 2 INJECTION, POWDER, FOR SOLUTION INTRAVENOUS at 06:39

## 2025-03-17 RX ADMIN — OXYCODONE HYDROCHLORIDE AND ACETAMINOPHEN 1 TABLET: 5; 325 TABLET ORAL at 10:08

## 2025-03-17 RX ADMIN — INSULIN LISPRO 12 UNITS: 100 INJECTION, SOLUTION INTRAVENOUS; SUBCUTANEOUS at 08:18

## 2025-03-17 RX ADMIN — INSULIN GLARGINE 15 UNITS: 100 INJECTION, SOLUTION SUBCUTANEOUS at 21:27

## 2025-03-17 ASSESSMENT — PAIN DESCRIPTION - FREQUENCY: FREQUENCY: INTERMITTENT

## 2025-03-17 ASSESSMENT — PAIN DESCRIPTION - DESCRIPTORS
DESCRIPTORS: ACHING;SHARP
DESCRIPTORS: ACHING

## 2025-03-17 ASSESSMENT — PAIN DESCRIPTION - LOCATION
LOCATION: FOOT
LOCATION: FOOT

## 2025-03-17 ASSESSMENT — PAIN SCALES - GENERAL
PAINLEVEL_OUTOF10: 4
PAINLEVEL_OUTOF10: 10
PAINLEVEL_OUTOF10: 0
PAINLEVEL_OUTOF10: 10
PAINLEVEL_OUTOF10: 0
PAINLEVEL_OUTOF10: 6

## 2025-03-17 ASSESSMENT — PAIN DESCRIPTION - PAIN TYPE: TYPE: ACUTE PAIN

## 2025-03-17 ASSESSMENT — PAIN DESCRIPTION - ONSET: ONSET: GRADUAL

## 2025-03-17 ASSESSMENT — PAIN DESCRIPTION - ORIENTATION: ORIENTATION: RIGHT

## 2025-03-17 ASSESSMENT — PAIN - FUNCTIONAL ASSESSMENT: PAIN_FUNCTIONAL_ASSESSMENT: ACTIVITIES ARE NOT PREVENTED

## 2025-03-17 NOTE — PROGRESS NOTES
OT orders received and medical chart review completed.   Pt reports he is at baseline as independent  with ADLs and functional mobility w/o AD. Formal OT evaluation not indicated at this time. OT to sign off.      22

## 2025-03-17 NOTE — PROGRESS NOTES
[facility]  Progress Note  Date:3/17/2025       Room:[facility]  Patient Name:Karri Georges     YOB: 1965     Age:59 y.o.        Subjective    Subjective:  Symptoms:  Stable.    Diet:  Adequate intake.    Activity level: Impaired due to pain.       Review of Systems    Patient reports that he has not followed a diabetic diet for the past few months. He has not been compliant with insulin at home. He is now frustrated that his food had \"Pancakes with syrup\". I counseled him that diabetic diet does not mean no carbs at all, but reduced carbs. He did not think it was appropriate. He also wants more salt in his diet.    Disposition: Likely stable for discharge home on 3/20. Pending surgery on 3/18.    Objective         Vitals Last 24 Hours:  TEMPERATURE:  Temp  Av.7 °F (37.1 °C)  Min: 98.2 °F (36.8 °C)  Max: 99.1 °F (37.3 °C)  RESPIRATIONS RANGE: Resp  Av.1  Min: 14  Max: 25  PULSE OXIMETRY RANGE: SpO2  Av.7 %  Min: 94 %  Max: 100 %  PULSE RANGE: Pulse  Av.5  Min: 54  Max: 91  BLOOD PRESSURE RANGE: Systolic (24hrs), Av , Min:103 , Max:126     ; Diastolic (24hrs), Av, Min:53, Max:83      I/O (24Hr):    Intake/Output Summary (Last 24 hours) at 3/17/2025 1653  Last data filed at 3/17/2025 1552  Gross per 24 hour   Intake 800 ml   Output --   Net 800 ml     Objective:  General Appearance:  Comfortable.    Vital signs: (most recent): Blood pressure (!) 107/56, pulse 54, temperature 98.6 °F (37 °C), temperature source Oral, resp. rate 22, height 1.854 m (6' 1\"), weight 93.4 kg (205 lb 14.6 oz), SpO2 98%.    Output: Producing urine.    HEENT: Normal HEENT exam.    Lungs:  Normal effort and normal respiratory rate.  Breath sounds clear to auscultation.    Heart: Normal rate.  Regular rhythm.    Abdomen: Abdomen is soft and flat.  Bowel sounds are normal.   There is no abdominal tenderness.     Extremities: Normal range of motion.  (Right 5th  polysubstance use. UDS neg  ).     Plan:   Consults: infectious disease, podiatry.  Regular diet.   (    - IV abx: cefepime, vanc  - appreciate ID and podiatry assistance  - f/up cultures  - monitor CBC, temp  - NPO p MN  - PRN pain control  - Lantus, scheduled preprandial insulin when on diet, SSI  - Dietician referral for diabetic diet education  - Stricter diabetic diet with 3 carb restriction, modified diet for no concentrated sweet nor chocolate).       Case discussed with:  [x]Patient  []Family  [x]Nursing  [x]Case Management  DVT Prophylaxis:  [x]Lovenox  []Hep SQ  []SCDs  []Coumadin   []Heparin gtt   []Xarelto   []Eliquis    Leilani Gutierrez DO, MBA, MS  [facility]  Hospitalist

## 2025-03-17 NOTE — CONSULTS
consulted.  I have been consulted for further recommendations.    Patient denies any fever, chills.  He is not aware of any drainage from the right second toe.  He does not recollect any injury to the right second toe.  No prior history of MRSA colonization or infection    Past Medical History:   Diagnosis Date    Arthritis of left knee     Diabetes (HCC)     Erectile dysfunction     Knee pain     Osteoarthritis of right knee     Osteomyelitis (HCC)     Sinusitis        Past Surgical History:   Procedure Laterality Date    COLONOSCOPY N/A 2/5/2020    COLONOSCOPY performed by DINA Frazier MD at [facility] ENDOSCOPY    KNEE SURGERY Right 2015    TOE AMPUTATION Left 12/2018    left great toe       [unfilled]    Current Facility-Administered Medications   Medication Dose Route Frequency    glucose chewable tablet 16 g  4 tablet Oral PRN    dextrose bolus 10% 125 mL  125 mL IntraVENous PRN    Or    dextrose bolus 10% 250 mL  250 mL IntraVENous PRN    glucagon injection 1 mg  1 mg SubCUTAneous PRN    dextrose 10 % infusion   IntraVENous Continuous PRN    ceFEPIme (MAXIPIME) 2,000 mg in sodium chloride 0.9 % 100 mL IVPB (addEASE)  2,000 mg IntraVENous Q12H    vancomycin (VANCOCIN) 1250 mg in 250 mL IVPB  1,250 mg IntraVENous Q24H    sodium chloride flush 0.9 % injection 5-40 mL  5-40 mL IntraVENous 2 times per day    sodium chloride flush 0.9 % injection 5-40 mL  5-40 mL IntraVENous PRN    0.9 % sodium chloride infusion   IntraVENous PRN    potassium chloride (KLOR-CON M) extended release tablet 40 mEq  40 mEq Oral PRN    Or    potassium bicarb-citric acid (EFFER-K) effervescent tablet 40 mEq  40 mEq Oral PRN    Or    potassium chloride 10 mEq/100 mL IVPB (Peripheral Line)  10 mEq IntraVENous PRN    magnesium sulfate 2000 mg in 50 mL IVPB premix  2,000 mg IntraVENous PRN    enoxaparin (LOVENOX) injection 40 mg  40 mg SubCUTAneous Daily    ondansetron (ZOFRAN-ODT) disintegrating tablet 4 mg  4 mg Oral Q8H PRN    Or     Cranial nerves intact   Psychiatric:   appropriate and interactive.        Labs: Results:   Chemistry Recent Labs     03/16/25  1315 03/16/25 2015 03/17/25  0331   GLUCOSE 785* 248 314*   *  --  131*   K 4.6  --  4.5   CL 89*  --  95*   CO2 28  --  28   BUN 23*  --  22*   CREATININE 1.99*  --  1.80*   GLOB 5.2*  --   --    ALT 12*  --   --    AST 13  --   --       CBC w/Diff Recent Labs     03/16/25  1315 03/17/25  0331   WBC 14.3* 13.3*   RBC 3.64* 3.40*   HGB 10.6* 10.1*   HCT 32.9* 30.7*    337      Microbiology Results       Procedure Component Value Units Date/Time    Blood Culture 2 [[id number]] Collected: 03/16/25 1724    Order Status: Completed Specimen: Blood Updated: 03/17/25 0652     Special Requests NO SPECIAL REQUESTS        Culture NO GROWTH AFTER 13 HOURS       Blood Culture 1 [[id number]] Collected: 03/16/25 1715    Order Status: Completed Specimen: Blood Updated: 03/17/25 0652     Special Requests NO SPECIAL REQUESTS        Culture NO GROWTH AFTER 13 HOURS       Culture, Wound (with Gram Stain) [[id number]]     Order Status: Sent Specimen: Foot                 RADIOLOGY:    All available imaging studies/reports in [facility] for this admission were reviewed      Disclaimer: Sections of this note are dictated utilizing voice recognition software, which may have resulted in some phonetic based errors in grammar and contents. Even though attempts were made to correct all the mistakes, some may have been missed, and remained in the body of the document. If questions arise, please contact our department.    Dr. Kita Mas, Infectious Disease Specialist  [phone]  March 17, 2025  7:52 AM

## 2025-03-17 NOTE — CARE COORDINATION
03/17/25 1241   Service Assessment   Patient Orientation Alert and Oriented   Cognition Alert   History Provided By Patient   Primary Caregiver Self   Accompanied By/Relationship no one at bedside   Support Systems Family Members   Patient's Healthcare Decision Maker is: Patient Declined (Legal Next of Kin Remains as Decision Maker)   PCP Verified by CM No  (Patient will see the [facility])   Prior Functional Level Independent in ADLs/IADLs   Current Functional Level Independent in ADLs/IADLs   Can patient return to prior living arrangement Yes   Ability to make needs known: Good   Family able to assist with home care needs: Yes   Would you like for me to discuss the discharge plan with any other family members/significant others, and if so, who? No   Financial Resources None   Community Resources None   CM/SW Referral Other (see comment)  (not applicable)   Social/Functional History   Lives With Alone   Type of Home House   Home Layout Two level   Home Access Stairs to enter with rails   Entrance Stairs - Number of Steps 3   Entrance Stairs - Rails Both   Bathroom Shower/Tub Tub/Shower unit   Bathroom Toilet Standard   Bathroom Equipment None   Bathroom Accessibility Accessible   Home Equipment None   Receives Help From Family   Prior Level of Assist for ADLs Independent   Prior Level of Assist for Homemaking Independent   Homemaking Responsibilities Yes   Ambulation Assistance Independent   Prior Level of Assist for Transfers Independent   Active  Yes   Mode of Transportation Car   Education   (not applicable)   Occupation Full time employment   Type of Occupation  for [employer]   Discharge Planning   Type of Residence House   Living Arrangements Alone   Current Services Prior To Admission None   Potential Assistance Needed N/A   DME Ordered? No   Potential Assistance Purchasing Medications No   Type of Home Care Services None   Patient expects to be discharged to: House   Follow Up Appointment:

## 2025-03-17 NOTE — PROGRESS NOTES
Bedside and Verbal shift change report given to JOHN Kruse (oncoming nurse) by JOHN Siegel (offgoing nurse). Report included the following information Nurse Handoff Report, Index, ED Encounter Summary, ED SBAR, Adult Overview, Surgery Report, Intake/Output, MAR, Recent Results, and Med Rec Status.

## 2025-03-17 NOTE — CONSULTS
Wound care consult from nursing for \"right foot second toe\", podiatry has been consulted. Wound care team will sign off for now, as podiatry will give orders for wound care, for which unit staff should provide.    Preethi GE, RN, [facility], COCN, CLIN IV  [facility] Wound Care Dept.  Office: [phone]  Please use Work Cell: [phone]

## 2025-03-17 NOTE — PROGRESS NOTES
PT orders received and chart reviewed.   0918 First attempt, patient with nursing. Will follow up.   1059 Second attempt, politely declines formal PT evaluation d/t receiving pain medicine 20 minutes prior. Reports episodes of vertigo with amb to bathroom during admission. Will follow up.

## 2025-03-17 NOTE — PROGRESS NOTES
Pt complaining about the food, says its not the right diet for him, and that its cold, also his call lights are not being answered as fast as he would like to, and that its been happening since he arrived here in ED yesterday. Would  like to speak to pt advocate. Escalated to alice Siegel.

## 2025-03-17 NOTE — PROGRESS NOTES
[facility]   Pharmacy Pharmacokinetic Monitoring Service - Vancomycin    Indication: Bone and Joint Infection  Target Concentration: Goal AUC/TERESA 400-600 mg*hr/L  Day of Therapy: 2  Additional Antimicrobials: Cefepime    Pertinent Laboratory Values:   Temp: 98.8 °F (37.1 °C), Weight - Scale: 93.4 kg (205 lb 14.6 oz)  Recent Labs     03/16/25  1315 03/17/25  0331   CREATININE 1.99* 1.80*   BUN 23* 22*   WBC 14.3* 13.3*   PROCAL 0.22  --        Estimated Creatinine Clearance: 49 mL/min (A) (based on SCr of 1.8 mg/dL (H)).    Pertinent Cultures:  Culture Date Source Results   3/16 Blood NGTD   3/16 Foot wound pending   MRSA Nasal Swab: N/A. Non-respiratory infection    Assessment:  Date/Time Current Dose Concentration Timing of Concentration (h) AUC   3/16 2000 mg x 1 25.1 6 -   3/17 1250 mg q24h        Note: Serum concentrations collected for AUC dosing may appear elevated if collected in close proximity to the dose administered, this is not necessarily an indication of toxicity    Plan:  Current dosing regimen is 1250 mg IV Q24h and began this morning @ 1011  Continue current dose of 1250 mg IV q24hr  Renal labs as indicated   Vancomycin concentration ordered for  Weds., 3/19  Pharmacy will continue to monitor patient and adjust therapy as indicated    Thank you for the consult,  PRICILLA CADENA VÍCTOR  3/17/2025

## 2025-03-17 NOTE — CONSULTS
[unfilled]   Consult    Patient: Karri Georges MRN: [id number]  SSN: [id number]    YOB: 1965  Age: 59 y.o.  Sex: male      Subjective:      Karri Georges is a 59 y.o. male who is being seen for asked to evaluate and treat infected right second toe. .    Past Medical History:   Diagnosis Date    Arthritis of left knee     Diabetes (HCC)     Erectile dysfunction     Knee pain     Osteoarthritis of right knee     Osteomyelitis (HCC)     Sinusitis      Past Surgical History:   Procedure Laterality Date    COLONOSCOPY N/A 2/5/2020    COLONOSCOPY performed by DINA Frazier MD at [facility] ENDOSCOPY    KNEE SURGERY Right 2015    TOE AMPUTATION Left 12/2018    left great toe      Family History   Problem Relation Age of Onset    No Known Problems Sister     No Known Problems Brother     No Known Problems Brother     No Known Problems Father     Hypertension Mother     Seizures Mother     No Known Problems Sister      Social History     Tobacco Use    Smoking status: Former     Passive exposure: Never    Smokeless tobacco: Never   Substance Use Topics    Alcohol use: Yes     Alcohol/week: 2.0 standard drinks of alcohol      Current Facility-Administered Medications   Medication Dose Route Frequency Provider Last Rate Last Admin    insulin lispro (HUMALOG,ADMELOG) injection vial 7 Units  7 Units SubCUTAneous TID  Matt, Leilani Rao, DO   7 Units at 03/17/25 1137    glucose chewable tablet 16 g  4 tablet Oral PRN Lyndsey Hamilton MD        dextrose bolus 10% 125 mL  125 mL IntraVENous PRN Lyndsey Hamilton MD        Or    dextrose bolus 10% 250 mL  250 mL IntraVENous PRN Lyndsey Hamilton MD        dextrose 10 % infusion   IntraVENous Continuous PRN Lyndsey Hamilton MD        ceFEPIme (MAXIPIME) 2,000 mg in sodium chloride 0.9 % 100 mL IVPB (addEASE)  2,000 mg IntraVENous Q12H Delia Dunn PA   Stopped at 03/17/25 1101    vancomycin (VANCOCIN) 1250 mg in 250 mL IVPB  1,250 mg IntraVENous Q24H

## 2025-03-17 NOTE — H&P (VIEW-ONLY)
[unfilled]   Consult    Patient: Karri Georges MRN: [id number]  SSN: [id number]    YOB: 1965  Age: 59 y.o.  Sex: male      Subjective:      Karri Georges is a 59 y.o. male who is being seen for asked to evaluate and treat infected right second toe. .    Past Medical History:   Diagnosis Date    Arthritis of left knee     Diabetes (HCC)     Erectile dysfunction     Knee pain     Osteoarthritis of right knee     Osteomyelitis (HCC)     Sinusitis      Past Surgical History:   Procedure Laterality Date    COLONOSCOPY N/A 2/5/2020    COLONOSCOPY performed by DINA Frazier MD at [facility] ENDOSCOPY    KNEE SURGERY Right 2015    TOE AMPUTATION Left 12/2018    left great toe      Family History   Problem Relation Age of Onset    No Known Problems Sister     No Known Problems Brother     No Known Problems Brother     No Known Problems Father     Hypertension Mother     Seizures Mother     No Known Problems Sister      Social History     Tobacco Use    Smoking status: Former     Passive exposure: Never    Smokeless tobacco: Never   Substance Use Topics    Alcohol use: Yes     Alcohol/week: 2.0 standard drinks of alcohol      Current Facility-Administered Medications   Medication Dose Route Frequency Provider Last Rate Last Admin    insulin lispro (HUMALOG,ADMELOG) injection vial 7 Units  7 Units SubCUTAneous TID  Matt, Leilani Rao, DO   7 Units at 03/17/25 1137    glucose chewable tablet 16 g  4 tablet Oral PRN Lyndsey Hamilton MD        dextrose bolus 10% 125 mL  125 mL IntraVENous PRN Lyndsey Hamilton MD        Or    dextrose bolus 10% 250 mL  250 mL IntraVENous PRN Lyndsey Hamilton MD        dextrose 10 % infusion   IntraVENous Continuous PRN Lyndsey Hamilton MD        ceFEPIme (MAXIPIME) 2,000 mg in sodium chloride 0.9 % 100 mL IVPB (addEASE)  2,000 mg IntraVENous Q12H Delia Dunn PA   Stopped at 03/17/25 1101    vancomycin (VANCOCIN) 1250 mg in 250 mL IVPB  1,250 mg IntraVENous Q24H  injection vial 0-16 Units  0-16 Units SubCUTAneous 4x Daily AC & HS Delia Dunn PA   8 Units at 03/17/25 1136    insulin glargine (LANTUS) injection vial 15 Units  0.15 Units/kg SubCUTAneous Nightly Delia Dunn PA   15 Units at 03/16/25 2240        Allergies   Allergen Reactions    Dust Mite Extract      Other reaction(s): Runny Nose    Grass Pollen(K-O-R-T-Swt Jesus)      Other reaction(s): Runny Nose    Diclofenac Sodium Nausea Only    Piperacillin Sod-Tazobactam So Nausea And Vomiting       Review of Systems:  A comprehensive review of systems was negative except for that written in the History of Present Illness.    Objective:     Vitals:    03/17/25 0815 03/17/25 1008 03/17/25 1038 03/17/25 1121   BP: 126/66   (!) 111/53   Pulse: 68   61   Resp: 17 17 18 18   Temp: 98.2 °F (36.8 °C)   98.8 °F (37.1 °C)   TempSrc: Oral   Oral   SpO2: 97%   94%   Weight:       Height:            Physical Exam:  Seen at bedside awake and alert.  Swollen enlarged right second toe, swelling and erythema forefoot.  Distal second toe ulcer with pus.   Charcot, mallet toe.      X-ray indicates lysis distal phalange.     Assessment:     Oe and forefoot, osteitis distal second toe with cellulitis forefoot.       Plan:     Wound care and antibiotics to hopefully consolidate, toe ampuattion tommorow. Discussed plan with Dr Mas/ID who agrees.     Signed By: Kristian Oneil DPM     March 17, 2025

## 2025-03-17 NOTE — PROGRESS NOTES
4 Eyes Skin Assessment     NAME:  Karri Georges  YOB: 1965  MEDICAL RECORD NUMBER:  [id number]    The patient is being assessed for  Admission    I agree that at least one RN has performed a thorough Head to Toe Skin Assessment on the patient. ALL assessment sites listed below have been assessed.      Areas assessed by both nurses:    Head, Face, Ears, Shoulders, Back, Chest, Arms, Elbows, Hands, Sacrum. Buttock, Coccyx, Ischium, Legs. Feet and Heels, and Under Medical Devices         Does the Patient have a Wound? Yes wound(s) were present on assessment. LDA wound assessment was Initiated and completed by RN       Lee Prevention initiated by RN: No  Wound Care Orders initiated by RN: No    Pressure Injury (Stage 3,4, Unstageable, DTI, NWPT, and Complex wounds) if present, place Wound referral order by RN under : Yes    New Ostomies, if present place, Ostomy referral order under : No     Nurse 1 eSignature: Electronically signed by ZOE NUNEZ RN on 3/17/25 at 12:44 AM EDT    **SHARE this note so that the co-signing nurse can place an eSignature**    Nurse 2 eSignature: Electronically signed by Preston Romero RN on 3/17/25 at 6:01 AM EDT

## 2025-03-18 ENCOUNTER — ANESTHESIA (OUTPATIENT)
Facility: HOSPITAL | Age: 60
End: 2025-03-18

## 2025-03-18 LAB
AMPHET UR QL SCN: NEGATIVE
ANION GAP SERPL CALC-SCNC: 4 MMOL/L (ref 3–18)
ANION GAP SERPL CALC-SCNC: 9 MMOL/L (ref 3–18)
APPEARANCE UR: CLEAR
BACTERIA URNS QL MICRO: ABNORMAL /HPF
BARBITURATES UR QL SCN: NEGATIVE
BASOPHILS # BLD: 0.02 K/UL (ref 0–0.1)
BASOPHILS NFR BLD: 0.2 % (ref 0–2)
BENZODIAZ UR QL: NEGATIVE
BILIRUB UR QL: NEGATIVE
BUN SERPL-MCNC: 19 MG/DL (ref 7–18)
BUN SERPL-MCNC: 25 MG/DL (ref 7–18)
BUN/CREAT SERPL: 15 (ref 12–20)
BUN/CREAT SERPL: 17 (ref 12–20)
CALCIUM SERPL-MCNC: 8 MG/DL (ref 8.5–10.1)
CALCIUM SERPL-MCNC: 8.5 MG/DL (ref 8.5–10.1)
CANNABINOIDS UR QL SCN: NEGATIVE
CHLORIDE SERPL-SCNC: 102 MMOL/L (ref 100–111)
CHLORIDE SERPL-SCNC: 97 MMOL/L (ref 100–111)
CO2 SERPL-SCNC: 25 MMOL/L (ref 21–32)
CO2 SERPL-SCNC: 26 MMOL/L (ref 21–32)
COCAINE UR QL SCN: NEGATIVE
COLOR UR: YELLOW
CREAT SERPL-MCNC: 1.3 MG/DL (ref 0.6–1.3)
CREAT SERPL-MCNC: 1.5 MG/DL (ref 0.6–1.3)
DIFFERENTIAL METHOD BLD: ABNORMAL
EOSINOPHIL # BLD: 0.1 K/UL (ref 0–0.4)
EOSINOPHIL NFR BLD: 0.9 % (ref 0–5)
EPITH CASTS URNS QL MICRO: ABNORMAL /LPF (ref 0–5)
ERYTHROCYTE [DISTWIDTH] IN BLOOD BY AUTOMATED COUNT: 11.7 % (ref 11.6–14.5)
GLUCOSE BLD STRIP.AUTO-MCNC: 152 MG/DL (ref 70–110)
GLUCOSE BLD STRIP.AUTO-MCNC: 156 MG/DL (ref 70–110)
GLUCOSE BLD STRIP.AUTO-MCNC: 166 MG/DL (ref 70–110)
GLUCOSE BLD STRIP.AUTO-MCNC: 166 MG/DL (ref 70–110)
GLUCOSE BLD STRIP.AUTO-MCNC: 257 MG/DL (ref 70–110)
GLUCOSE SERPL-MCNC: 183 MG/DL (ref 74–99)
GLUCOSE SERPL-MCNC: 262 MG/DL (ref 74–99)
GLUCOSE UR STRIP.AUTO-MCNC: 250 MG/DL
GRAN CASTS URNS QL MICRO: ABNORMAL /LPF
HCT VFR BLD AUTO: 31.4 % (ref 36–48)
HGB BLD-MCNC: 10.4 G/DL (ref 13–16)
HGB UR QL STRIP: ABNORMAL
HYALINE CASTS URNS QL MICRO: ABNORMAL /LPF (ref 0–2)
IMM GRANULOCYTES # BLD AUTO: 0.05 K/UL (ref 0–0.04)
IMM GRANULOCYTES NFR BLD AUTO: 0.5 % (ref 0–0.5)
KETONES UR QL STRIP.AUTO: ABNORMAL MG/DL
LEUKOCYTE ESTERASE UR QL STRIP.AUTO: NEGATIVE
LYMPHOCYTES # BLD: 1.88 K/UL (ref 0.9–3.6)
LYMPHOCYTES NFR BLD: 17.8 % (ref 21–52)
Lab: ABNORMAL
MCH RBC QN AUTO: 30.1 PG (ref 24–34)
MCHC RBC AUTO-ENTMCNC: 33.1 G/DL (ref 31–37)
MCV RBC AUTO: 90.8 FL (ref 78–100)
METHADONE UR QL: NEGATIVE
MONOCYTES # BLD: 1.05 K/UL (ref 0.05–1.2)
MONOCYTES NFR BLD: 9.9 % (ref 3–10)
MUCOUS THREADS URNS QL MICRO: ABNORMAL /LPF
NEUTS SEG # BLD: 7.46 K/UL (ref 1.8–8)
NEUTS SEG NFR BLD: 70.7 % (ref 40–73)
NITRITE UR QL STRIP.AUTO: NEGATIVE
NRBC # BLD: 0 K/UL (ref 0–0.01)
NRBC BLD-RTO: 0 PER 100 WBC
OPIATES UR QL: POSITIVE
PCP UR QL: NEGATIVE
PH UR STRIP: 5 (ref 5–8)
PLATELET # BLD AUTO: 349 K/UL (ref 135–420)
PMV BLD AUTO: 10.3 FL (ref 9.2–11.8)
POTASSIUM SERPL-SCNC: 4 MMOL/L (ref 3.5–5.5)
POTASSIUM SERPL-SCNC: 4.4 MMOL/L (ref 3.5–5.5)
PROT UR STRIP-MCNC: 300 MG/DL
RBC # BLD AUTO: 3.46 M/UL (ref 4.35–5.65)
RBC #/AREA URNS HPF: ABNORMAL /HPF (ref 0–5)
SODIUM SERPL-SCNC: 131 MMOL/L (ref 136–145)
SODIUM SERPL-SCNC: 132 MMOL/L (ref 136–145)
SP GR UR REFRACTOMETRY: 1.02 (ref 1–1.03)
UROBILINOGEN UR QL STRIP.AUTO: 0.2 EU/DL (ref 0.2–1)
WBC # BLD AUTO: 10.6 K/UL (ref 4.6–13.2)
WBC URNS QL MICRO: ABNORMAL /HPF (ref 0–5)

## 2025-03-18 PROCEDURE — 2580000003 HC RX 258: Performed by: PHYSICIAN ASSISTANT

## 2025-03-18 PROCEDURE — 36415 COLL VENOUS BLD VENIPUNCTURE: CPT

## 2025-03-18 PROCEDURE — 0Y6R0Z0 DETACHMENT AT RIGHT 2ND TOE, COMPLETE, OPEN APPROACH: ICD-10-PCS | Performed by: PODIATRIST

## 2025-03-18 PROCEDURE — 6370000000 HC RX 637 (ALT 250 FOR IP): Performed by: PODIATRIST

## 2025-03-18 PROCEDURE — 6360000002 HC RX W HCPCS: Performed by: PODIATRIST

## 2025-03-18 PROCEDURE — 2709999900 HC NON-CHARGEABLE SUPPLY: Performed by: PODIATRIST

## 2025-03-18 PROCEDURE — 87206 SMEAR FLUORESCENT/ACID STAI: CPT

## 2025-03-18 PROCEDURE — 94761 N-INVAS EAR/PLS OXIMETRY MLT: CPT

## 2025-03-18 PROCEDURE — 87186 SC STD MICRODIL/AGAR DIL: CPT

## 2025-03-18 PROCEDURE — 6360000002 HC RX W HCPCS: Performed by: PHYSICIAN ASSISTANT

## 2025-03-18 PROCEDURE — 88305 TISSUE EXAM BY PATHOLOGIST: CPT

## 2025-03-18 PROCEDURE — 2500000003 HC RX 250 WO HCPCS: Performed by: PHYSICIAN ASSISTANT

## 2025-03-18 PROCEDURE — 3700000000 HC ANESTHESIA ATTENDED CARE: Performed by: PODIATRIST

## 2025-03-18 PROCEDURE — 87147 CULTURE TYPE IMMUNOLOGIC: CPT

## 2025-03-18 PROCEDURE — 85025 COMPLETE CBC W/AUTO DIFF WBC: CPT

## 2025-03-18 PROCEDURE — 80307 DRUG TEST PRSMV CHEM ANLYZR: CPT

## 2025-03-18 PROCEDURE — 7100000000 HC PACU RECOVERY - FIRST 15 MIN: Performed by: PODIATRIST

## 2025-03-18 PROCEDURE — 88311 DECALCIFY TISSUE: CPT

## 2025-03-18 PROCEDURE — 2500000003 HC RX 250 WO HCPCS: Performed by: PODIATRIST

## 2025-03-18 PROCEDURE — 7100000001 HC PACU RECOVERY - ADDTL 15 MIN: Performed by: PODIATRIST

## 2025-03-18 PROCEDURE — 87205 SMEAR GRAM STAIN: CPT

## 2025-03-18 PROCEDURE — 2580000003 HC RX 258: Performed by: PODIATRIST

## 2025-03-18 PROCEDURE — 87176 TISSUE HOMOGENIZATION CULTR: CPT

## 2025-03-18 PROCEDURE — 87116 MYCOBACTERIA CULTURE: CPT

## 2025-03-18 PROCEDURE — 99232 SBSQ HOSP IP/OBS MODERATE 35: CPT | Performed by: STUDENT IN AN ORGANIZED HEALTH CARE EDUCATION/TRAINING PROGRAM

## 2025-03-18 PROCEDURE — 1100000003 HC PRIVATE W/ TELEMETRY

## 2025-03-18 PROCEDURE — 6360000002 HC RX W HCPCS: Performed by: NURSE ANESTHETIST, CERTIFIED REGISTERED

## 2025-03-18 PROCEDURE — 82962 GLUCOSE BLOOD TEST: CPT

## 2025-03-18 PROCEDURE — 87086 URINE CULTURE/COLONY COUNT: CPT

## 2025-03-18 PROCEDURE — 3600000012 HC SURGERY LEVEL 2 ADDTL 15MIN: Performed by: PODIATRIST

## 2025-03-18 PROCEDURE — 87077 CULTURE AEROBIC IDENTIFY: CPT

## 2025-03-18 PROCEDURE — 88309 TISSUE EXAM BY PATHOLOGIST: CPT

## 2025-03-18 PROCEDURE — 80048 BASIC METABOLIC PNL TOTAL CA: CPT

## 2025-03-18 PROCEDURE — 87070 CULTURE OTHR SPECIMN AEROBIC: CPT

## 2025-03-18 PROCEDURE — 6370000000 HC RX 637 (ALT 250 FOR IP): Performed by: PHYSICIAN ASSISTANT

## 2025-03-18 PROCEDURE — 6370000000 HC RX 637 (ALT 250 FOR IP): Performed by: NURSE ANESTHETIST, CERTIFIED REGISTERED

## 2025-03-18 PROCEDURE — 81001 URINALYSIS AUTO W/SCOPE: CPT

## 2025-03-18 PROCEDURE — 2580000003 HC RX 258: Performed by: NURSE ANESTHETIST, CERTIFIED REGISTERED

## 2025-03-18 PROCEDURE — 3600000002 HC SURGERY LEVEL 2 BASE: Performed by: PODIATRIST

## 2025-03-18 PROCEDURE — 3700000001 HC ADD 15 MINUTES (ANESTHESIA): Performed by: PODIATRIST

## 2025-03-18 RX ORDER — FENTANYL CITRATE 50 UG/ML
25 INJECTION, SOLUTION INTRAMUSCULAR; INTRAVENOUS EVERY 5 MIN PRN
Status: DISCONTINUED | OUTPATIENT
Start: 2025-03-18 | End: 2025-03-18 | Stop reason: HOSPADM

## 2025-03-18 RX ORDER — DEXTROSE MONOHYDRATE 100 MG/ML
INJECTION, SOLUTION INTRAVENOUS CONTINUOUS PRN
Status: DISCONTINUED | OUTPATIENT
Start: 2025-03-18 | End: 2025-03-18 | Stop reason: HOSPADM

## 2025-03-18 RX ORDER — LIDOCAINE HYDROCHLORIDE 10 MG/ML
1 INJECTION, SOLUTION EPIDURAL; INFILTRATION; INTRACAUDAL; PERINEURAL
Status: DISCONTINUED | OUTPATIENT
Start: 2025-03-18 | End: 2025-03-18 | Stop reason: HOSPADM

## 2025-03-18 RX ORDER — PROPOFOL 10 MG/ML
INJECTION, EMULSION INTRAVENOUS
Status: DISCONTINUED | OUTPATIENT
Start: 2025-03-18 | End: 2025-03-18 | Stop reason: SDUPTHER

## 2025-03-18 RX ORDER — SODIUM CHLORIDE 9 MG/ML
INJECTION, SOLUTION INTRAVENOUS PRN
Status: DISCONTINUED | OUTPATIENT
Start: 2025-03-18 | End: 2025-03-18 | Stop reason: HOSPADM

## 2025-03-18 RX ORDER — FENTANYL CITRATE 50 UG/ML
INJECTION, SOLUTION INTRAMUSCULAR; INTRAVENOUS
Status: DISCONTINUED | OUTPATIENT
Start: 2025-03-18 | End: 2025-03-18 | Stop reason: SDUPTHER

## 2025-03-18 RX ORDER — MIDAZOLAM HYDROCHLORIDE 1 MG/ML
INJECTION, SOLUTION INTRAMUSCULAR; INTRAVENOUS
Status: DISCONTINUED | OUTPATIENT
Start: 2025-03-18 | End: 2025-03-18 | Stop reason: SDUPTHER

## 2025-03-18 RX ORDER — SODIUM CHLORIDE, SODIUM LACTATE, POTASSIUM CHLORIDE, CALCIUM CHLORIDE 600; 310; 30; 20 MG/100ML; MG/100ML; MG/100ML; MG/100ML
INJECTION, SOLUTION INTRAVENOUS CONTINUOUS
Status: DISCONTINUED | OUTPATIENT
Start: 2025-03-18 | End: 2025-03-18 | Stop reason: HOSPADM

## 2025-03-18 RX ORDER — PROMETHAZINE HYDROCHLORIDE 12.5 MG/1
12.5 TABLET ORAL ONCE
Status: COMPLETED | OUTPATIENT
Start: 2025-03-18 | End: 2025-03-18

## 2025-03-18 RX ORDER — SODIUM CHLORIDE 0.9 % (FLUSH) 0.9 %
5-40 SYRINGE (ML) INJECTION EVERY 12 HOURS SCHEDULED
Status: DISCONTINUED | OUTPATIENT
Start: 2025-03-18 | End: 2025-03-18 | Stop reason: HOSPADM

## 2025-03-18 RX ORDER — ONDANSETRON 2 MG/ML
INJECTION INTRAMUSCULAR; INTRAVENOUS
Status: DISCONTINUED | OUTPATIENT
Start: 2025-03-18 | End: 2025-03-18 | Stop reason: SDUPTHER

## 2025-03-18 RX ORDER — ACETAMINOPHEN 325 MG/1
650 TABLET ORAL
Status: DISCONTINUED | OUTPATIENT
Start: 2025-03-18 | End: 2025-03-18 | Stop reason: HOSPADM

## 2025-03-18 RX ORDER — OXYCODONE HYDROCHLORIDE 5 MG/1
5 TABLET ORAL
Status: DISCONTINUED | OUTPATIENT
Start: 2025-03-18 | End: 2025-03-18 | Stop reason: HOSPADM

## 2025-03-18 RX ORDER — INSULIN LISPRO 100 [IU]/ML
0-4 INJECTION, SOLUTION INTRAVENOUS; SUBCUTANEOUS EVERY 6 HOURS SCHEDULED
Status: DISCONTINUED | OUTPATIENT
Start: 2025-03-18 | End: 2025-03-18 | Stop reason: HOSPADM

## 2025-03-18 RX ORDER — METOCLOPRAMIDE HYDROCHLORIDE 5 MG/ML
10 INJECTION INTRAMUSCULAR; INTRAVENOUS
Status: DISCONTINUED | OUTPATIENT
Start: 2025-03-18 | End: 2025-03-18 | Stop reason: HOSPADM

## 2025-03-18 RX ORDER — SODIUM CHLORIDE 0.9 % (FLUSH) 0.9 %
5-40 SYRINGE (ML) INJECTION PRN
Status: DISCONTINUED | OUTPATIENT
Start: 2025-03-18 | End: 2025-03-18 | Stop reason: HOSPADM

## 2025-03-18 RX ORDER — VANCOMYCIN 1.5 G/300ML
1500 INJECTION, SOLUTION INTRAVENOUS EVERY 24 HOURS
Status: DISCONTINUED | OUTPATIENT
Start: 2025-03-19 | End: 2025-03-20

## 2025-03-18 RX ORDER — PROCHLORPERAZINE EDISYLATE 5 MG/ML
5 INJECTION INTRAMUSCULAR; INTRAVENOUS
Status: DISCONTINUED | OUTPATIENT
Start: 2025-03-18 | End: 2025-03-18 | Stop reason: HOSPADM

## 2025-03-18 RX ORDER — FAMOTIDINE 20 MG/1
20 TABLET, FILM COATED ORAL ONCE
Status: COMPLETED | OUTPATIENT
Start: 2025-03-18 | End: 2025-03-18

## 2025-03-18 RX ORDER — NALOXONE HYDROCHLORIDE 0.4 MG/ML
INJECTION, SOLUTION INTRAMUSCULAR; INTRAVENOUS; SUBCUTANEOUS PRN
Status: DISCONTINUED | OUTPATIENT
Start: 2025-03-18 | End: 2025-03-18 | Stop reason: HOSPADM

## 2025-03-18 RX ADMIN — PROMETHAZINE HYDROCHLORIDE 12.5 MG: 12.5 TABLET ORAL at 14:02

## 2025-03-18 RX ADMIN — ONDANSETRON 4 MG: 2 INJECTION INTRAMUSCULAR; INTRAVENOUS at 15:49

## 2025-03-18 RX ADMIN — PROPOFOL 50 MG: 10 INJECTION, EMULSION INTRAVENOUS at 16:00

## 2025-03-18 RX ADMIN — CEFEPIME 2000 MG: 2 INJECTION, POWDER, FOR SOLUTION INTRAVENOUS at 18:38

## 2025-03-18 RX ADMIN — FAMOTIDINE 20 MG: 20 TABLET, FILM COATED ORAL at 14:02

## 2025-03-18 RX ADMIN — VANCOMYCIN 1250 MG: 1.75 INJECTION, SOLUTION INTRAVENOUS at 10:18

## 2025-03-18 RX ADMIN — SODIUM CHLORIDE, PRESERVATIVE FREE 10 ML: 5 INJECTION INTRAVENOUS at 10:18

## 2025-03-18 RX ADMIN — PROPOFOL 50 MG: 10 INJECTION, EMULSION INTRAVENOUS at 16:01

## 2025-03-18 RX ADMIN — CEFEPIME 2000 MG: 2 INJECTION, POWDER, FOR SOLUTION INTRAVENOUS at 05:51

## 2025-03-18 RX ADMIN — INSULIN LISPRO 8 UNITS: 100 INJECTION, SOLUTION INTRAVENOUS; SUBCUTANEOUS at 08:58

## 2025-03-18 RX ADMIN — INSULIN LISPRO 7 UNITS: 100 INJECTION, SOLUTION INTRAVENOUS; SUBCUTANEOUS at 18:03

## 2025-03-18 RX ADMIN — SODIUM CHLORIDE: 0.9 INJECTION, SOLUTION INTRAVENOUS at 14:12

## 2025-03-18 RX ADMIN — INSULIN GLARGINE 15 UNITS: 100 INJECTION, SOLUTION SUBCUTANEOUS at 20:49

## 2025-03-18 RX ADMIN — MIDAZOLAM 2 MG: 1 INJECTION, SOLUTION INTRAMUSCULAR; INTRAVENOUS at 15:49

## 2025-03-18 RX ADMIN — SODIUM CHLORIDE, PRESERVATIVE FREE 10 ML: 5 INJECTION INTRAVENOUS at 20:51

## 2025-03-18 RX ADMIN — OXYCODONE HYDROCHLORIDE AND ACETAMINOPHEN 1 TABLET: 5; 325 TABLET ORAL at 06:00

## 2025-03-18 RX ADMIN — FENTANYL CITRATE 25 MCG: 0.05 INJECTION, SOLUTION INTRAMUSCULAR; INTRAVENOUS at 16:53

## 2025-03-18 RX ADMIN — FENTANYL CITRATE 50 MCG: 50 INJECTION INTRAMUSCULAR; INTRAVENOUS at 15:59

## 2025-03-18 RX ADMIN — OXYCODONE HYDROCHLORIDE AND ACETAMINOPHEN 1 TABLET: 5; 325 TABLET ORAL at 20:48

## 2025-03-18 ASSESSMENT — PAIN DESCRIPTION - PAIN TYPE
TYPE: ACUTE PAIN
TYPE: SURGICAL PAIN

## 2025-03-18 ASSESSMENT — PAIN - FUNCTIONAL ASSESSMENT
PAIN_FUNCTIONAL_ASSESSMENT: 0-10
PAIN_FUNCTIONAL_ASSESSMENT: ACTIVITIES ARE NOT PREVENTED

## 2025-03-18 ASSESSMENT — PAIN DESCRIPTION - DESCRIPTORS
DESCRIPTORS: ACHING;SHARP
DESCRIPTORS: DISCOMFORT
DESCRIPTORS: ACHING;STABBING
DESCRIPTORS: ACHING;SHARP

## 2025-03-18 ASSESSMENT — PAIN DESCRIPTION - ONSET
ONSET: GRADUAL
ONSET: GRADUAL

## 2025-03-18 ASSESSMENT — PAIN SCALES - GENERAL
PAINLEVEL_OUTOF10: 3
PAINLEVEL_OUTOF10: 0
PAINLEVEL_OUTOF10: 7
PAINLEVEL_OUTOF10: 8
PAINLEVEL_OUTOF10: 4
PAINLEVEL_OUTOF10: 0
PAINLEVEL_OUTOF10: 0

## 2025-03-18 ASSESSMENT — PAIN DESCRIPTION - ORIENTATION
ORIENTATION: RIGHT

## 2025-03-18 ASSESSMENT — PAIN DESCRIPTION - LOCATION
LOCATION: FOOT
LOCATION: FOOT;INCISION
LOCATION: FOOT

## 2025-03-18 ASSESSMENT — PAIN DESCRIPTION - FREQUENCY
FREQUENCY: INTERMITTENT
FREQUENCY: INTERMITTENT

## 2025-03-18 NOTE — ANESTHESIA PRE PROCEDURE
(+) renal disease: CRI          Endo/Other:    (+) DiabetesType II DM, no interval change.                 Abdominal:             Vascular:          Other Findings:       Anesthesia Plan      MAC     ASA 3       Induction: intravenous.      Anesthetic plan and risks discussed with patient.      Plan discussed with CRNA.    Attending anesthesiologist reviewed and agrees with Preprocedure content            Lon Oreilly Jr, MD   3/18/2025

## 2025-03-18 NOTE — CONSULTS
Nutrition Education    Educated on diabetic diet  Learners: Patient  Readiness: Refuses  Method:  discussion  Response: No Evidence of Learning  Contact name and number provided.    Pt admitted for management of diabetic foot infection, uncontrolled diabetes mellitus with hyperglycemia. Per H&P, pt reported not checking blood sugars or taking insulin in over 2 weeks. Recent hemoglobin A1C (3/17/25) >14. Consult noted for diabetic diet education. Visited pt- reported extensive previous education and denied questions/concerns. Pt expressing frustration with care, diet orders/food received, and overall experience during admission. Discussed foods patient has received, informed pt of previous diet orders and that food he may not typically eat will still fit as long as they fit into the carbohydrate restriction. Denied other concerns or questions with carbohydrate intake control. Difficult to fully discuss diet r/t pt frequently discussing unrelated topics.     Kelly Whitt RD  Contact Number: [phone]

## 2025-03-18 NOTE — PLAN OF CARE
Problem: Pain  Goal: Verbalizes/displays adequate comfort level or baseline comfort level  3/18/2025 1805 by Christal Santacruz RN  Outcome: Progressing  Flowsheets (Taken 3/18/2025 0519 by Cecy Babb, RN)  Verbalizes/displays adequate comfort level or baseline comfort level:   Assess pain using appropriate pain scale   Implement non-pharmacological measures as appropriate and evaluate response   Administer analgesics based on type and severity of pain and evaluate response     Problem: Safety - Adult  Goal: Free from fall injury  3/18/2025 1805 by Christal Santacruz RN  Outcome: Progressing  Note: Patient will remain free from falls during hospital visit  Nurse will ensure patients bed is in lowest position, wheels locked and alarm on.  Nurse will ensure all fall safety protocols have been initiated including call light within reach, fall sign posted and yellow non slip socks are being worn.  Patient will demonstrate understanding of fall precautions and safety  Patient will demonstrate understanding of utilizing call bell for assistance with needs.      Problem: Skin/Tissue Integrity  Goal: Skin integrity remains intact  Description: 1.  Monitor for areas of redness and/or skin breakdown  2.  Assess vascular access sites hourly  3.  Every 4-6 hours minimum:  Change oxygen saturation probe site  4.  Every 4-6 hours:  If on nasal continuous positive airway pressure, respiratory therapy assess nares and determine need for appliance change or resting period  3/18/2025 1805 by Christal Santacruz RN  Outcome: Progressing  Flowsheets (Taken 3/18/2025 1805)  Skin Integrity Remains Intact: Monitor for areas of redness and/or skin breakdown     Problem: Skin/Tissue Integrity - Adult  Goal: Skin integrity remains intact  Description: 1.  Monitor for areas of redness and/or skin breakdown  2.  Assess vascular access sites hourly  3.  Every 4-6 hours minimum:  Change oxygen saturation probe site  4.  Every 4-6  hours:  If on nasal continuous positive airway pressure, respiratory therapy assess nares and determine need for appliance change or resting period  3/18/2025 1805 by Christal Santacruz RN  Outcome: Progressing  Flowsheets (Taken 3/18/2025 1805)  Skin Integrity Remains Intact: Monitor for areas of redness and/or skin breakdown     Problem: Skin/Tissue Integrity - Adult  Goal: Incisions, wounds, or drain sites healing without S/S of infection  3/18/2025 1805 by Christal Santacruz RN  Outcome: Progressing  Flowsheets (Taken 3/18/2025 1805)  Incisions, Wounds, or Drain Sites Healing Without Sign and Symptoms of Infection: TWICE DAILY: Assess and document skin integrity     Problem: Metabolic/Fluid and Electrolytes - Adult  Goal: Electrolytes maintained within normal limits  3/18/2025 1805 by Christal Santacruz RN  Outcome: Progressing  Flowsheets (Taken 3/18/2025 0519 by Cecy Babb, RN)  Electrolytes maintained within normal limits:   Monitor labs and assess patient for signs and symptoms of electrolyte imbalances   Administer electrolyte replacement as ordered     Problem: Metabolic/Fluid and Electrolytes - Adult  Goal: Glucose maintained within prescribed range  3/18/2025 1805 by Christal Santacruz RN  Outcome: Progressing  Flowsheets (Taken 3/18/2025 1805)  Glucose maintained within prescribed range:   Monitor blood glucose as ordered   Assess for signs and symptoms of hyperglycemia and hypoglycemia   Administer ordered medications to maintain glucose within target range

## 2025-03-18 NOTE — PROGRESS NOTES
Infectious Disease progress Note        Reason: Right second toe osteomyelitis, diabetic right foot infection    Current abx Prior abx   Cefepime, vancomycin since 3/17      Lines:       Assessment :     59-year-old gentleman with a history of uncontrolled diabetes mellitus type 2 ,  osteoarthritis of the knee, chronic kidney disease, hypertension with c/o right second toe wound  for 1 week.    Hospitalization [facility] July 2021 for partially treated acute on chronic osteomyelitis right fifth toe. s/p right fifth toe amputation on 7/19/21. Intra-Op cultures - 7/19/21 Klebsiella, e.coli    Partial right third toe amputation October 2023    Clinical presentation consistent with diabetic right foot infection right foot/leg cellulitis, right second toe distal phalanx osteomyelitis, possible right second toe abscess    Podiatry evaluation appreciated.  Plans for surgery today noted    Persistent purulent drainage right second toe noted on today's exam consistent with right second toe abscess    Recommendations:    Continue cefepime, vancomycin  Obtain wound cultures right second toe-I obtained specimen and gave it to RN  Follow-up intraoperative findings/cultures.  Modify antibiotics accordingly  Follow-up podiatry recommendations  Further recommendations based on above test results, clinical course     Above plan was discussed in details with patient,dr. Gutierrez, dr. Oneil.  Please call me if any further questions or concerns. Will continue to participate in the care of this patient.  HPI:    States that he wants to leave the hospital soon after surgery.  Is frustrated that he has to wait for the cultures    Past Medical History:   Diagnosis Date    Arthritis of left knee     Diabetes (HCC)     Erectile dysfunction     Knee pain     Osteoarthritis of right knee     Osteomyelitis (HCC)     Sinusitis        Past Surgical History:   Procedure Laterality Date    COLONOSCOPY N/A 2/5/2020    COLONOSCOPY performed by DINA Frazier  3.40* 3.46*   HGB 10.6* 10.1* 10.4*   HCT 32.9* 30.7* 31.4*    337 349      Microbiology Results       Procedure Component Value Units Date/Time    Blood Culture 2 [[id number]] Collected: 03/16/25 1724    Order Status: Completed Specimen: Blood Updated: 03/18/25 0645     Special Requests NO SPECIAL REQUESTS        Culture NO GROWTH 2 DAYS       Blood Culture 1 [[id number]] Collected: 03/16/25 1715    Order Status: Completed Specimen: Blood Updated: 03/18/25 0645     Special Requests NO SPECIAL REQUESTS        Culture NO GROWTH 2 DAYS       Culture, Wound (with Gram Stain) [[id number]]     Order Status: Sent Specimen: Foot                 RADIOLOGY:    All available imaging studies/reports in [facility] for this admission were reviewed    High complexity decision making was performed during the evaluation of this patient at high risk for decompensation      Above mentioned total time spent on reviewing the case/medical record/data/notes/EMR/patient examination/documentation/coordinating care with nurse/consultants, exclusive of procedures with complex decision making performed and > 50% time spent in face to face evaluation.        Disclaimer: Sections of this note are dictated utilizing voice recognition software, which may have resulted in some phonetic based errors in grammar and contents. Even though attempts were made to correct all the mistakes, some may have been missed, and remained in the body of the document. If questions arise, please contact our department.    Dr. Kita Mas, Infectious Disease Specialist  [phone]  March 18, 2025  7:20 AM

## 2025-03-18 NOTE — PROGRESS NOTES
monitor CBC, temp  - PRN pain control  - Lantus, scheduled preprandial insulin when on diet, SSI  - Dietician referral for diabetic diet education  - Stricter diabetic diet with 3 carb restriction, modified diet for no concentrated sweet nor chocolate).       Case discussed with:  [x]Patient  []Family  [x]Nursing  [x]Case Management  DVT Prophylaxis:  [x]Lovenox  []Hep SQ  []SCDs  []Coumadin   []Heparin gtt   []Xarelto   []Eliquis    Leilani Gutierrez DO, JETT, MS  [facility]  Hospitalist

## 2025-03-18 NOTE — ANESTHESIA POSTPROCEDURE EVALUATION
Department of Anesthesiology  Postprocedure Note    Patient: Karri Georges  MRN: [id number]  YOB: 1965  Date of evaluation: 3/18/2025    Procedure Summary       Date: 03/18/25 Room / Location: [facility] MAIN 02 / [facility] MAIN OR    Anesthesia Start: 1549 Anesthesia Stop: 1639    Procedure: RIGHT SECOND TOE COMPLETE AMPUTATION (Right: Toes) Diagnosis:       Osteomyelitis of right foot, unspecified type (HCC)      (Osteomyelitis of right foot, unspecified type (HCC) [M86.9])    Surgeons: Kristian Oneil DPM Responsible Provider: Lon Oreilly Jr., MD    Anesthesia Type: MAC ASA Status: 3            Anesthesia Type: MAC    Daphne Phase I: Daphne Score: 10    Daphne Phase II:      Anesthesia Post Evaluation    Patient location during evaluation: bedside  Airway patency: patent  Cardiovascular status: hemodynamically stable  Respiratory status: acceptable  Hydration status: stable  Pain management: adequate    No notable events documented.

## 2025-03-18 NOTE — BRIEF OP NOTE
Brief Postoperative Note      Patient: Karri Georges  YOB: 1965  MRN: [id number]    Date of Procedure: 3/18/2025    Pre-Op Diagnosis Codes:      * Osteomyelitis of right foot, unspecified type (HCC) [M86.9]    Post-Op Diagnosis: Same       Procedure(s):  RIGHT SECOND TOE COMPLETE AMPUTATION    Surgeon(s):  Kristian Oneil DPM    Assistant:  Surgical Assistant: Bryan Petersen    Anesthesia: Monitor Anesthesia Care    Estimated Blood Loss (mL): Minimal    Complications: None    Specimens:   ID Type Source Tests Collected by Time Destination   1 : Right second toe dirty bone Bone Toe CULTURE, WOUND (WITH GRAM STAIN), CULTURE WITH SMEAR, ACID FAST BACILLIUS Kristian Oneil DPM 3/18/2025 1613    2 : Right second toe abscess Bone Toe CULTURE, WOUND (WITH GRAM STAIN), CULTURE WITH SMEAR, ACID FAST BACILLIKristian Underwood DPM 3/18/2025 1614    3 : Right second toe clean margins Bone Toe CULTURE, WOUND (WITH GRAM STAIN), CULTURE WITH SMEAR, ACID FAST Kristian Pickens DPM 3/18/2025 1618    A : Right secnod toe dirty Tissue Toe SURGICAL PATHOLOGY Kristian Oneil DPM 3/18/2025 1618    B : Right secnod toe clean margins Tissue Toe SURGICAL PATHOLOGY Kristian Oneil DPM 3/18/2025 1618        Implants:  * No implants in log *      Drains: * No LDAs found *    Findings:  Infection Present At Time Of Surgery (PATOS) (choose all levels that have infection present):  - Organ Space infection (below fascia) present as evidenced by osteomyelitis  Other Findings: Abscess to the base of the toe.    Electronically signed by Kristian Oneil DPM on 3/18/2025 at 4:31 PM

## 2025-03-18 NOTE — OP NOTE
Operative Note      Patient: Karri Georges  YOB: 1965  MRN: [id number]    Date of Procedure: 3/18/2025    Pre-Op Diagnosis Codes:      * Osteomyelitis of right foot, unspecified type (HCC) [M86.9]    Post-Op Diagnosis: Same       Procedure(s):  RIGHT SECOND TOE COMPLETE AMPUTATION    Surgeon(s):  Kristian Oneil, MICHAEL    Assistant:   Surgical Assistant: Bryan Petersen    Anesthesia: Monitor Anesthesia Care    Estimated Blood Loss (mL): Minimal    Complications: None    Specimens:   ID Type Source Tests Collected by Time Destination   1 : Right second toe dirty bone Bone Toe CULTURE, WOUND (WITH GRAM STAIN), CULTURE WITH SMEAR, ACID FAST BACILLIUS Kristian Oneil DPM 3/18/2025 1613    2 : Right second toe abscess Bone Toe CULTURE, WOUND (WITH GRAM STAIN), CULTURE WITH SMEAR, ACID FAST BACILLIUS Kristian Oneil DPM 3/18/2025 1614    3 : Right second toe clean margins Bone Toe CULTURE, WOUND (WITH GRAM STAIN), CULTURE WITH SMEAR, ACID FAST NILDAUS Kristian Oneil DPM 3/18/2025 1618    A : Right secnod toe dirty Tissue Toe SURGICAL PATHOLOGY Kristian Oneil DPM 3/18/2025 1618    B : Right secnod toe clean margins Tissue Toe SURGICAL PATHOLOGY Krsitian Oneil, MICHAEL 3/18/2025 1618        Implants:  * No implants in log *      Drains: * No LDAs found *    Findings:  Infection Present At Time Of Surgery (PATOS) (choose all levels that have infection present):  - Organ Space infection (below fascia) present as evidenced by osteomyelitis  Other Findings: Abscess to the base of the toe.    Detailed Description of Procedure:   Patient was placed on the operating table in the supine position after adequate induction of MAC anesthesia right lower extremities prepped and draped you sterile fashion.  Attention was directed right second toe.  I immediately noticed that the distal phalange was protruding from the end of the toe and surrounding by full-thickness necrosis of the  distal 20% of the toe.  Foul-smelling pus was noted.  Distal protruding bone was resected with rongeur and sent for culture.  Toe was grossly swollen and erythematous.  2 semielliptical incisions were accomplished around the base of the toe as it was not salvageable.  The the incisions immediately expressed white-gray pus from the base of the toe.  This was cultured.  Seen to be limited to the toe after the toe was completely disarticulated.  No proximal lesion of pus noted good perfusion noted small blood vessels bovied as necessary.  Irrigated with copious amounts of antibiotic solution.  Proximal bone biopsy from the metatarsal was obtained for culture and pathology.  Wound was packed with Penrose drain and very loosely approximated with Prolene suture good perfusion noted compressive soft nonstick dressing applied.  Patient tolerated procedure anesthesia well with vital signs stable throughout transported to recovery room in stable condition  Portion of this note was dictated with [facility].    Electronically signed by Kristian Oneil DPM on 3/18/2025 at 4:33 PM

## 2025-03-18 NOTE — PROGRESS NOTES
Patient extremely insistent on using the restroom despite post-op bedrest orders. Patient was offered a bed pan and assistance with NWB to get onto the BSC. Patient declined both offers. Primary RN (writer) gave education to patient regarding bedrest orders post op. The patient states \"I know what I am doing\". Post-op shoe placed and patient ambulated to restroom. Primary RN and night shift Primary RN waited for patient to return to the bed. Patient resting in bed. No complaints at this time. Plan of care ongoing.

## 2025-03-18 NOTE — INTERVAL H&P NOTE
Update History & Physical    The patient's History and Physical of March 18, surgery was reviewed with the patient and I examined the patient. There was no change. The surgical site was confirmed by the patient and me.     Plan: The risks, benefits, expected outcome, and alternative to the recommended procedure have been discussed with the patient. Patient understands and wants to proceed with the procedure.     Electronically signed by Kristian Oneil DPM on 3/18/2025 at 3:36 PM

## 2025-03-18 NOTE — PROGRESS NOTES
PT order received and chart reviewed. Unable to see patient as he is DALILA for procedure. Please update activity level and weight bearing status as appropriate. Will follow up post procedure. Thank you.   Libby Morales PT, DPT

## 2025-03-18 NOTE — PROGRESS NOTES
Spiritual Health History and Assessment/Progress Note  [facility]    Spiritual/Emotional Needs,  ,  ,      Name: Karri Georges MRN: [id number]    Age: 59 y.o.     Sex: male   Language: English   Mandaen: Hindu   Diabetic foot infection (HCC)     Date: 3/18/2025            Total Time Calculated: 7 min              Spiritual Assessment began in [address] MEDICAL        Referral/Consult From: Rounding   Encounter Overview/Reason: Spiritual/Emotional Needs  Service Provided For: Patient    Kamille, Belief, Meaning:   Patient has beliefs or practices that help with coping during difficult times  Family/Friends No family/friends present      Importance and Influence:  Patient has spiritual/personal beliefs that influence decisions regarding their health  Family/Friends No family/friends present    Community:  Patient is connected with a spiritual community  Family/Friends No family/friends present    Assessment and Plan of Care:     Patient Interventions include: Facilitated expression of thoughts and feelings  Family/Friends Interventions include: No family/friends present    Patient Plan of Care: No spiritual needs identified for follow-up  Family/Friends Plan of Care: No family/friends present    Electronically signed by ELEANOR Asencio on 3/18/2025 at 12:10 PM

## 2025-03-18 NOTE — PROGRESS NOTES
[facility]   Pharmacy Pharmacokinetic Monitoring Service - Vancomycin    Indication: Bone and Joint Infection  Target Concentration: Goal AUC/TERESA 400-600 mg*hr/L  Day of Therapy: 3  Additional Antimicrobials: Cefepime    Pertinent Laboratory Values:   Temp: 98.1 °F (36.7 °C), Weight - Scale: 93.4 kg (205 lb 14.6 oz)  Recent Labs     03/16/25  1315 03/17/25  0331 03/18/25  0451   CREATININE 1.99* 1.80* 1.50*   BUN 23* 22* 25*   WBC 14.3* 13.3* 10.6   PROCAL 0.22  --   --        Estimated Creatinine Clearance: 58 mL/min (A) (based on SCr of 1.5 mg/dL (H)).    Pertinent Cultures:  Culture Date Source Results   3/16 Blood NGTD   3/18 Foot wound pending   MRSA Nasal Swab: N/A. Non-respiratory infection    Assessment:  Date/Time Current Dose Concentration Timing of Concentration (h) AUC   3/16 2000 mg x 1 25.1 6 -   3/17 1250 mg q24h      3/18 1250 mg q24h      3/19 1500 mg q24h        Note: Serum concentrations collected for AUC dosing may appear elevated if collected in close proximity to the dose administered, this is not necessarily an indication of toxicity    Plan:  Continue current dose of 1250 mg IV q24h, will increase the dose to 1500 mg IV q24h @ 0600 on 3/19 for improved renal function.  Projected AUC/Tr = 511/13.4  Renal labs as indicated   Vancomycin concentration ordered for  Weds., 3/20  Pharmacy will continue to monitor patient and adjust therapy as indicated    Thank you for the consult,  PRICILLA CADENA [facility]  3/18/2025

## 2025-03-18 NOTE — PLAN OF CARE
Problem: Chronic Conditions and Co-morbidities  Goal: Patient's chronic conditions and co-morbidity symptoms are monitored and maintained or improved  Outcome: Progressing  Flowsheets (Taken 3/18/2025 0519)  Care Plan - Patient's Chronic Conditions and Co-Morbidity Symptoms are Monitored and Maintained or Improved:   Monitor and assess patient's chronic conditions and comorbid symptoms for stability, deterioration, or improvement   Collaborate with multidisciplinary team to address chronic and comorbid conditions and prevent exacerbation or deterioration     Problem: Pain  Goal: Verbalizes/displays adequate comfort level or baseline comfort level  Outcome: Progressing  Flowsheets (Taken 3/18/2025 0519)  Verbalizes/displays adequate comfort level or baseline comfort level:   Assess pain using appropriate pain scale   Implement non-pharmacological measures as appropriate and evaluate response   Administer analgesics based on type and severity of pain and evaluate response  Note: Patient has PRN pain medication in place     Problem: Safety - Adult  Goal: Free from fall injury  Outcome: Progressing  Note: Encourage patient to utilize call bell when assistance is needed     Problem: Skin/Tissue Integrity  Goal: Skin integrity remains intact  Description: 1.  Monitor for areas of redness and/or skin breakdown  Outcome: Progressing  Flowsheets (Taken 3/18/2025 0519)  Skin Integrity Remains Intact: Monitor for areas of redness and/or skin breakdown  Note: Encourage patient to make frequent turns while in bed     Problem: Skin/Tissue Integrity - Adult  Goal: Incisions, wounds, or drain sites healing without S/S of infection  Outcome: Progressing  Flowsheets (Taken 3/18/2025 0519)  Incisions, Wounds, or Drain Sites Healing Without Sign and Symptoms of Infection:   TWICE DAILY: Assess and document dressing/incision, wound bed, drain sites and surrounding tissue   Implement wound care per orders  Note: Patient has wound care

## 2025-03-19 LAB
ANION GAP SERPL CALC-SCNC: 5 MMOL/L (ref 3–18)
BACTERIA SPEC CULT: NORMAL
BASOPHILS # BLD: 0.03 K/UL (ref 0–0.1)
BASOPHILS NFR BLD: 0.3 % (ref 0–2)
BUN SERPL-MCNC: 19 MG/DL (ref 7–18)
BUN/CREAT SERPL: 15 (ref 12–20)
CALCIUM SERPL-MCNC: 8.2 MG/DL (ref 8.5–10.1)
CHLORIDE SERPL-SCNC: 102 MMOL/L (ref 100–111)
CO2 SERPL-SCNC: 26 MMOL/L (ref 21–32)
CREAT SERPL-MCNC: 1.23 MG/DL (ref 0.6–1.3)
DIFFERENTIAL METHOD BLD: ABNORMAL
EOSINOPHIL # BLD: 0.08 K/UL (ref 0–0.4)
EOSINOPHIL NFR BLD: 0.8 % (ref 0–5)
ERYTHROCYTE [DISTWIDTH] IN BLOOD BY AUTOMATED COUNT: 11.7 % (ref 11.6–14.5)
GLUCOSE BLD STRIP.AUTO-MCNC: 171 MG/DL (ref 70–110)
GLUCOSE BLD STRIP.AUTO-MCNC: 240 MG/DL (ref 70–110)
GLUCOSE BLD STRIP.AUTO-MCNC: 265 MG/DL (ref 70–110)
GLUCOSE BLD STRIP.AUTO-MCNC: 89 MG/DL (ref 70–110)
GLUCOSE SERPL-MCNC: 137 MG/DL (ref 74–99)
HCT VFR BLD AUTO: 29.8 % (ref 36–48)
HGB BLD-MCNC: 9.5 G/DL (ref 13–16)
IMM GRANULOCYTES # BLD AUTO: 0.05 K/UL (ref 0–0.04)
IMM GRANULOCYTES NFR BLD AUTO: 0.5 % (ref 0–0.5)
LYMPHOCYTES # BLD: 1.91 K/UL (ref 0.9–3.6)
LYMPHOCYTES NFR BLD: 19.2 % (ref 21–52)
MCH RBC QN AUTO: 29.4 PG (ref 24–34)
MCHC RBC AUTO-ENTMCNC: 31.9 G/DL (ref 31–37)
MCV RBC AUTO: 92.3 FL (ref 78–100)
MONOCYTES # BLD: 1.12 K/UL (ref 0.05–1.2)
MONOCYTES NFR BLD: 11.2 % (ref 3–10)
NEUTS SEG # BLD: 6.77 K/UL (ref 1.8–8)
NEUTS SEG NFR BLD: 68 % (ref 40–73)
NRBC # BLD: 0 K/UL (ref 0–0.01)
NRBC BLD-RTO: 0 PER 100 WBC
PLATELET # BLD AUTO: 356 K/UL (ref 135–420)
PMV BLD AUTO: 10.3 FL (ref 9.2–11.8)
POTASSIUM SERPL-SCNC: 4 MMOL/L (ref 3.5–5.5)
RBC # BLD AUTO: 3.23 M/UL (ref 4.35–5.65)
SERVICE CMNT-IMP: NORMAL
SODIUM SERPL-SCNC: 133 MMOL/L (ref 136–145)
WBC # BLD AUTO: 10 K/UL (ref 4.6–13.2)

## 2025-03-19 PROCEDURE — 6360000002 HC RX W HCPCS: Performed by: PODIATRIST

## 2025-03-19 PROCEDURE — 80048 BASIC METABOLIC PNL TOTAL CA: CPT

## 2025-03-19 PROCEDURE — 36415 COLL VENOUS BLD VENIPUNCTURE: CPT

## 2025-03-19 PROCEDURE — 94761 N-INVAS EAR/PLS OXIMETRY MLT: CPT

## 2025-03-19 PROCEDURE — 6360000002 HC RX W HCPCS: Performed by: INTERNAL MEDICINE

## 2025-03-19 PROCEDURE — 82962 GLUCOSE BLOOD TEST: CPT

## 2025-03-19 PROCEDURE — 2500000003 HC RX 250 WO HCPCS: Performed by: INTERNAL MEDICINE

## 2025-03-19 PROCEDURE — 6370000000 HC RX 637 (ALT 250 FOR IP): Performed by: PODIATRIST

## 2025-03-19 PROCEDURE — 85025 COMPLETE CBC W/AUTO DIFF WBC: CPT

## 2025-03-19 PROCEDURE — 6370000000 HC RX 637 (ALT 250 FOR IP): Performed by: STUDENT IN AN ORGANIZED HEALTH CARE EDUCATION/TRAINING PROGRAM

## 2025-03-19 PROCEDURE — 97162 PT EVAL MOD COMPLEX 30 MIN: CPT

## 2025-03-19 PROCEDURE — 2500000003 HC RX 250 WO HCPCS: Performed by: PODIATRIST

## 2025-03-19 PROCEDURE — 97116 GAIT TRAINING THERAPY: CPT

## 2025-03-19 PROCEDURE — 2580000003 HC RX 258: Performed by: PODIATRIST

## 2025-03-19 PROCEDURE — 99233 SBSQ HOSP IP/OBS HIGH 50: CPT | Performed by: STUDENT IN AN ORGANIZED HEALTH CARE EDUCATION/TRAINING PROGRAM

## 2025-03-19 PROCEDURE — 1100000003 HC PRIVATE W/ TELEMETRY

## 2025-03-19 RX ORDER — NALOXONE HYDROCHLORIDE 0.4 MG/ML
0.4 INJECTION, SOLUTION INTRAMUSCULAR; INTRAVENOUS; SUBCUTANEOUS PRN
Status: DISCONTINUED | OUTPATIENT
Start: 2025-03-19 | End: 2025-03-21 | Stop reason: HOSPADM

## 2025-03-19 RX ADMIN — SODIUM CHLORIDE, PRESERVATIVE FREE 10 ML: 5 INJECTION INTRAVENOUS at 08:14

## 2025-03-19 RX ADMIN — SODIUM CHLORIDE, PRESERVATIVE FREE 5 ML: 5 INJECTION INTRAVENOUS at 20:04

## 2025-03-19 RX ADMIN — INSULIN LISPRO 8 UNITS: 100 INJECTION, SOLUTION INTRAVENOUS; SUBCUTANEOUS at 08:10

## 2025-03-19 RX ADMIN — INSULIN GLARGINE 15 UNITS: 100 INJECTION, SOLUTION SUBCUTANEOUS at 21:34

## 2025-03-19 RX ADMIN — INSULIN LISPRO 4 UNITS: 100 INJECTION, SOLUTION INTRAVENOUS; SUBCUTANEOUS at 12:20

## 2025-03-19 RX ADMIN — OXYCODONE HYDROCHLORIDE AND ACETAMINOPHEN 1 TABLET: 5; 325 TABLET ORAL at 19:48

## 2025-03-19 RX ADMIN — INSULIN LISPRO 7 UNITS: 100 INJECTION, SOLUTION INTRAVENOUS; SUBCUTANEOUS at 12:21

## 2025-03-19 RX ADMIN — CEFEPIME 2000 MG: 2 INJECTION, POWDER, FOR SOLUTION INTRAVENOUS at 07:10

## 2025-03-19 RX ADMIN — BENZOCAINE: 200 SPRAY DENTAL; ORAL; PERIODONTAL at 14:32

## 2025-03-19 RX ADMIN — INSULIN LISPRO 7 UNITS: 100 INJECTION, SOLUTION INTRAVENOUS; SUBCUTANEOUS at 17:35

## 2025-03-19 RX ADMIN — CEFTRIAXONE SODIUM 2000 MG: 2 INJECTION, POWDER, FOR SOLUTION INTRAMUSCULAR; INTRAVENOUS at 15:37

## 2025-03-19 RX ADMIN — INSULIN LISPRO 7 UNITS: 100 INJECTION, SOLUTION INTRAVENOUS; SUBCUTANEOUS at 08:11

## 2025-03-19 RX ADMIN — OXYCODONE HYDROCHLORIDE AND ACETAMINOPHEN 1 TABLET: 5; 325 TABLET ORAL at 08:12

## 2025-03-19 RX ADMIN — VANCOMYCIN 1500 MG: 1.5 INJECTION, SOLUTION INTRAVENOUS at 05:34

## 2025-03-19 ASSESSMENT — PAIN DESCRIPTION - ORIENTATION
ORIENTATION: RIGHT
ORIENTATION: RIGHT

## 2025-03-19 ASSESSMENT — PAIN DESCRIPTION - FREQUENCY: FREQUENCY: INTERMITTENT

## 2025-03-19 ASSESSMENT — PAIN DESCRIPTION - LOCATION
LOCATION: FOOT
LOCATION: FOOT

## 2025-03-19 ASSESSMENT — PAIN SCALES - GENERAL
PAINLEVEL_OUTOF10: 0
PAINLEVEL_OUTOF10: 7
PAINLEVEL_OUTOF10: 8
PAINLEVEL_OUTOF10: 0
PAINLEVEL_OUTOF10: 0
PAINLEVEL_OUTOF10: 4
PAINLEVEL_OUTOF10: 0

## 2025-03-19 ASSESSMENT — PAIN DESCRIPTION - DESCRIPTORS
DESCRIPTORS: ACHING;SHARP
DESCRIPTORS: ACHING;DISCOMFORT

## 2025-03-19 ASSESSMENT — PAIN DESCRIPTION - PAIN TYPE: TYPE: SURGICAL PAIN

## 2025-03-19 ASSESSMENT — PAIN - FUNCTIONAL ASSESSMENT
PAIN_FUNCTIONAL_ASSESSMENT: PREVENTS OR INTERFERES SOME ACTIVE ACTIVITIES AND ADLS
PAIN_FUNCTIONAL_ASSESSMENT: PREVENTS OR INTERFERES WITH MANY ACTIVE NOT PASSIVE ACTIVITIES

## 2025-03-19 ASSESSMENT — PAIN DESCRIPTION - ONSET: ONSET: GRADUAL

## 2025-03-19 NOTE — PROGRESS NOTES
Physical Therapy  PHYSICAL THERAPY EVALUATION/DISCHARGE    Patient: Karri Georges (59 y.o. male)  Date: 3/19/2025  Primary Diagnosis: Diabetic foot infection (HCC) [E11.628, L08.9]  Hyperglycemia due to diabetes mellitus (HCC) [E11.65]  Procedure(s) (LRB):  RIGHT SECOND TOE COMPLETE AMPUTATION (Right) 1 Day Post-Op   Precautions: Weight Bearing, Skin, General Precautions, Right Lower Extremity Weight Bearing: Partial Weight Bearing (through heel),  ,  ,  ,  ,  ,    PLOF: Lives alone in a 2 story home with 4 ALECIA and B rails, 2nd floor set up. Ind prior to admission.     ASSESSMENT AND RECOMMENDATIONS:  Pt cleared by nursing. Pt received bed, educated on PT role, weight bearing status and evaluation process and agreeable. He is ind to EOB with good seated balance. ACE wrap loose and reapplied, assist to rebeca post-op shoe. 5/5 BLE for knee extension, and hip flexion. Intact sensation per pt. Sit to stand ind and ambulates within room with RW, good adherence to WB, pt then moves RW to sie despite safety cues and ambulates with a wide RAYMON, still maintaining WB precautions. Returns to recline and BLE elevated. Call bell and all needs left within reach. Pt is at functional baseline and skilled acute care PT is not indicated at this time, will sign off.       Patient does not require further skilled physical therapy intervention at this level of care.    Further Equipment Recommendations for Discharge: straight cane    [facility]: AM-PAC Inpatient Mobility Raw Score : 24      Current research shows that an AM-PAC score of 18 (14 without stairs) or greater is associated with a discharge to the patient's home setting.    This AMPAC score should be considered in conjunction with interdisciplinary team recommendations to determine the most appropriate discharge setting. Patient's social support, diagnosis, medical stability, and prior level of function should also be taken into consideration.     SUBJECTIVE:   Patient stated                Balance  Sitting: Intact  Standing: Intact;With support          Ambulation/Gait Training:                       Gait  Gait Training: Yes  Left Side Weight Bearing: Full  Right Side Weight Bearing: Heel;Partial (%)  Overall Level of Assistance: Modified independent;Supervision  Distance (ft): 30 Feet  Assistive Device: Walker, rolling;None  Interventions: Safety awareness training;Weight shifting training/pressure relief  Base of Support: Widened  Stance: Right decreased  Gait Abnormalities: Antalgic;Trunk sway increased                     Pain:  Intensity Pre-treatment: 0/10   Intensity Post-treatment: 4/10  Scale: Numeric Rating Scale  Location: Right Foot  Quality: Aching  Intervention(s): Nurse notified, Repositioning , and Rest      Activity Tolerance:   Activity Tolerance: Patient tolerated evaluation without incident  Please refer to the flowsheet for vital signs taken during this treatment.    After treatment:   [x]         Patient left in no apparent distress sitting up in chair  []         Patient left in no apparent distress in bed  [x]         Call bell left within reach  [x]         Nursing notified  []         Caregiver present  []         Bed/chair alarm activated  [x]         No alarmIndependent and Alert and oriented x4  []         SCDs applied    COMMUNICATION/EDUCATION:   Patient Education  Education Given To: Patient  Education Provided: Role of Therapy;Plan of Care;Precautions;Transfer Training;Mobility Training;Fall Prevention Strategies;Equipment  Education Method: Demonstration;Verbal;Teach Back  Barriers to Learning: None  Education Outcome: Verbalized understanding    Thank you for this referral.  Ana Rivera, PT  Minutes: 19      Eval Complexity: Decision Making: Medium Complexity

## 2025-03-19 NOTE — PROGRESS NOTES
Comprehensive Nutrition Assessment    Type and Reason for Visit:  Initial, Consult    Nutrition Recommendations/Plan:   Continue current diet as tolerated.  Order Shady (each provides 80 kcals, 2.5 g collagen protein, 300 mg vitamin C, 9.5 mg zinc) BID  Daily wts.  Continue to monitor tolerance of PO, compliance of oral supplements, weight, labs, and plan of care during admission.     Malnutrition Assessment:  Malnutrition Status:  No malnutrition (03/19/25 1042)    Context:  Acute Illness     Findings of the 6 clinical characteristics of malnutrition:  Energy Intake:  No decrease in energy intake  Weight Loss:  Mild weight loss (-1.8% x7 months)     Body Fat Loss:  No body fat loss     Muscle Mass Loss:  No muscle mass loss    Fluid Accumulation:  No fluid accumulation     Strength:  Not Performed    Nutrition History and Allergies:      Past Medical History:   Diagnosis Date    Arthritis of left knee     Diabetes (HCC)     Erectile dysfunction     Knee pain     Osteoarthritis of right knee     Osteomyelitis (HCC)     Sinusitis      PTA: Per pt no decreased in appetite pta. Pt reports  lbs with no recent/unintentional weight loss.     Weight Hx: 220 lbs Wt change: -15 lb (-6.8%) x 7 months - not significant (IF EMR and last bed weight accurate) Bed weight today 216 lbs (-4 lbs/-1.8% x 7 months)  Wt Readings from Last 10 Encounters:   03/17/25 93.4 kg (205 lb 14.6 oz) Bed scale   08/03/24 99.8 kg (220 lb)   01/23/24 98.9 kg (218 lb)   01/09/24 98.9 kg (218 lb)   11/28/23 99.8 kg (220 lb)   09/19/23 102.1 kg (225 lb)   07/24/23 100.1 kg (220 lb 9.6 oz)   05/15/23 100.8 kg (222 lb 3.2 oz)   05/02/23 100.2 kg (221 lb)   04/28/23 99.8 kg (220 lb)     NFPE: Visual NFPE conducted only. Food Allergies: NKFA    Nutrition Assessment:    Admitted for diabetic foot infection; s/p right 2nd toe amputation. Received request from RN (Paul); pt upset about having cardiac restrictions added to diet without any cardiac

## 2025-03-19 NOTE — PLAN OF CARE
Problem: Safety - Adult  Goal: Free from fall injury  3/19/2025 1821 by Paul Loja, RN  Outcome: Progressing  Flowsheets (Taken 3/19/2025 1821)  Free From Fall Injury:   Instruct family/caregiver on patient safety   Based on caregiver fall risk screen, instruct family/caregiver to ask for assistance with transferring infant if caregiver noted to have fall risk factors  3/19/2025 1821 by Paul Loja, RN  Outcome: Progressing  Flowsheets (Taken 3/19/2025 1821)  Free From Fall Injury:   Instruct family/caregiver on patient safety   Based on caregiver fall risk screen, instruct family/caregiver to ask for assistance with transferring infant if caregiver noted to have fall risk factors

## 2025-03-19 NOTE — PROGRESS NOTES
Infectious Disease progress Note        Reason: Right second toe osteomyelitis, diabetic right foot infection    Current abx Prior abx   Cefepime, vancomycin since 3/17      Lines:       Assessment :     59-year-old gentleman with a history of uncontrolled diabetes mellitus type 2 ,  osteoarthritis of the knee, chronic kidney disease, hypertension with c/o right second toe wound  for 1 week.    Hospitalization [facility] July 2021 for partially treated acute on chronic osteomyelitis right fifth toe. s/p right fifth toe amputation on 7/19/21. Intra-Op cultures - 7/19/21 Klebsiella, e.coli    Partial right third toe amputation October 2023    Clinical presentation consistent with diabetic right foot infection right foot/leg cellulitis, right second toe distal phalanx osteomyelitis, possible right second toe abscess    Podiatry evaluation appreciated.  Status post right second toe amputation on 3/19/2025.  Intraoperative findings discussed with podiatrist.  Significant purulence noted right second toe  IntraOp cultures 3/18-pending  Preoperative culture 3/18-group B streptococcus, possible other organisms    Persistent purulent drainage right second toe noted on today's exam consistent with right second toe abscess    Recommendations:    discontinue cefepime, start ceftriaxone. Will d/c vancomycin in am if no resistant gram positive pathogens/MRSA isolated  Follow-up intraoperative, perioperative wound cultures, bone biopsy of clean margins.  Modify antibiotics accordingly  Follow-up podiatry recommendations  Will finalize antibiotic regimen by 3/21/25 based on cultures, clinical course     Above plan was discussed in details with patient,RN, dr. Bell, dr. Oneil.  Please call me if any further questions or concerns. Will continue to participate in the care of this patient.  HPI:    Denies worsening pain right foot/leg.  Had questions about his discharge    Past Medical History:   Diagnosis Date    Arthritis of left  from Toe Updated: 03/18/25 1716    Culture, Urine [[id number]] Collected: 03/18/25 1530    Order Status: Sent Specimen: Urine, clean catch Updated: 03/18/25 2213    Culture, Wound (with Gram Stain) [[id number]] Collected: 03/18/25 0915    Order Status: Completed Specimen: Foot Updated: 03/18/25 1553     Special Requests NO SPECIAL REQUESTS        Gram Stain OCCASIONAL WBCS SEEN               OCCASIONAL Gram positive cocci IN PAIRS           Culture PENDING    Blood Culture 2 [[id number]] Collected: 03/16/25 1724    Order Status: Completed Specimen: Blood Updated: 03/19/25 0621     Special Requests NO SPECIAL REQUESTS        Culture NO GROWTH 3 DAYS       Blood Culture 1 [[id number]] Collected: 03/16/25 1715    Order Status: Completed Specimen: Blood Updated: 03/19/25 0621     Special Requests NO SPECIAL REQUESTS        Culture NO GROWTH 3 DAYS       Culture, Wound (with Gram Stain) [[id number]] Collected: 03/16/25 1500    Order Status: Canceled Specimen: Foot                 RADIOLOGY:    All available imaging studies/reports in [facility] for this admission were reviewed    High complexity decision making was performed during the evaluation of this patient at high risk for decompensation      Above mentioned total time spent on reviewing the case/medical record/data/notes/EMR/patient examination/documentation/coordinating care with nurse/consultants, exclusive of procedures with complex decision making performed and > 50% time spent in face to face evaluation.        Disclaimer: Sections of this note are dictated utilizing voice recognition software, which may have resulted in some phonetic based errors in grammar and contents. Even though attempts were made to correct all the mistakes, some may have been missed, and remained in the body of the document. If questions arise, please contact our department.    Dr. Kita Mas, Infectious Disease Specialist  [phone]  March 19, 2025  10:01 AM

## 2025-03-19 NOTE — PROGRESS NOTES
Physical Therapy  PT orders received and chart reviewed. Attempted PT eval and pt reporting he \"feels groggy\" and \"could fall asleep any second\" and requesting to come back later.   2nd attempt pt declining stating \"I just got my insulin I'm going to eat.\"    Ana Rivera PT, DPT

## 2025-03-19 NOTE — PROGRESS NOTES
[facility] Hospitalist [facility]  Progress Note    Patient: Karri Georges Age: 59 y.o. : 1965 MR#: [id number] SSN: [id number]  Date/Time: 3/19/2025     Chief Complaint   Patient presents with    Hyperglycemia    Wound Check     Subjective:   Patient seen and examined. No acute events overnight. Long discussion regarding patient's diet. Care plan discussed, questions were addressed.  Denies chest pain, abdominal pain, shortness of breath.  Assessment/Plan:   Diabetic foot infection, concern for osteomyelitis of right second toe. S/p 2nd toe amputation on 3/18  -ID following. Follow cultures to help transition antibiotics.     #Uncontrolled DM2 with hyperglycemia. A1c >14. S/p insulin gtt  Accuchecks, SSI. Tight glycemic control. Dietitician following for diabetic diet education.    #CKD3b. no recent baseline for comparison     #Hx polysubstance use. UDS neg    Dispo plan: Home once medically stable  anticipated discharge date 25    I spent 50 minutes with the patient in face-to-face consultation, of which greater than 50% was spent in counseling and coordination of care as described above.    Family Communication/Updates:  [x]  Patient is clinically able to contact and update Family  []  Patient is NOT clinically able to contact and update Family; Patient requires Clinician to contact and update Family     Case discussed with:  [x]Patient  []Family  [x]Nursing  [x]Case Management  DVT Prophylaxis:  []Lovenox  []Hep SQ  [x]SCDs  []Coumadin   []Eliquis/Xarelto     Objective:   VS: /64   Pulse 64   Temp 97.7 °F (36.5 °C) (Oral)   Resp 18   Ht 1.854 m (6' 0.99\")   Wt 93.4 kg (205 lb 14.6 oz)   SpO2 96%   BMI 27.17 kg/m²    Tmax/24hrs: Temp (24hrs), Av.8 °F (37.1 °C), Min:97.7 °F (36.5 °C), Max:100.2 °F (37.9 °C)  IOBRIEF  Intake/Output Summary (Last 24 hours) at 3/19/2025 1319  Last data filed at 3/18/2025 2052  Gross per 24 hour   Intake 760 ml   Output --   Net 760  11.8 FL    Nucleated RBCs 0.0 0  WBC    nRBC 0.00 0.00 - 0.01 K/uL    Neutrophils % 68.0 40.0 - 73.0 %    Lymphocytes % 19.2 (L) 21.0 - 52.0 %    Monocytes % 11.2 (H) 3.0 - 10.0 %    Eosinophils % 0.8 0.0 - 5.0 %    Basophils % 0.3 0.0 - 2.0 %    Immature Granulocytes % 0.5 0.0 - 0.5 %    Neutrophils Absolute 6.77 1.80 - 8.00 K/UL    Lymphocytes Absolute 1.91 0.90 - 3.60 K/UL    Monocytes Absolute 1.12 0.05 - 1.20 K/UL    Eosinophils Absolute 0.08 0.00 - 0.40 K/UL    Basophils Absolute 0.03 0.00 - 0.10 K/UL    Immature Granulocytes Absolute 0.05 (H) 0.00 - 0.04 K/UL    Differential Type AUTOMATED     Basic Metabolic Panel    Collection Time: 03/19/25  2:12 AM   Result Value Ref Range    Sodium 133 (L) 136 - 145 mmol/L    Potassium 4.0 3.5 - 5.5 mmol/L    Chloride 102 100 - 111 mmol/L    CO2 26 21 - 32 mmol/L    Anion Gap 5 3.0 - 18 mmol/L    Glucose 137 (H) 74 - 99 mg/dL    BUN 19 (H) 7.0 - 18 MG/DL    Creatinine 1.23 0.6 - 1.3 MG/DL    BUN/Creatinine Ratio 15 12 - 20      Est, Glom Filt Rate 67 >60 ml/min/1.73m2    Calcium 8.2 (L) 8.5 - 10.1 MG/DL   POCT Glucose    Collection Time: 03/19/25  7:11 AM   Result Value Ref Range    POC Glucose 265 (H) 70 - 110 mg/dL   POCT Glucose    Collection Time: 03/19/25 12:17 PM   Result Value Ref Range    POC Glucose 240 (H) 70 - 110 mg/dL     Signed By: Jayson Bell DO     March 19, 2025      Disclaimer: Sections of this note are dictated using utilizing voice recognition software.  Minor typographical errors may be present. If questions arise, please do not hesitate to contact me or call our department.

## 2025-03-19 NOTE — PROGRESS NOTES
[facility] Renal Dosing Services    Pharmacist Renal Dosing Note for Cefepime    Previous Regimen 2 gm q12h   Serum Creatinine Lab Results   Component Value Date/Time    CREAPOC 1.3 09/12/2020 12:10 PM      Creatinine Clearance Estimated Creatinine Clearance: 71 mL/min (based on SCr of 1.23 mg/dL).   BUN Lab Results   Component Value Date/Time    BUN 19 03/19/2025 02:12 AM           The following medication: Cefepime was automatically dose-adjusted per [facility] P&T Committee Protocol, with respect to renal function.      Dosage changed to:  2 gm q8h for bone and joint infection    Additional notes:    Pharmacy to continue to monitor patient daily.   Will make dosage adjustments based upon changing renal function.  Signed Moraima Bobo RPH.

## 2025-03-19 NOTE — PLAN OF CARE
Problem: Chronic Conditions and Co-morbidities  Goal: Patient's chronic conditions and co-morbidity symptoms are monitored and maintained or improved  3/19/2025 0101 by Cecy Babb, RN  Outcome: Progressing  Flowsheets (Taken 3/19/2025 0101)  Care Plan - Patient's Chronic Conditions and Co-Morbidity Symptoms are Monitored and Maintained or Improved:   Monitor and assess patient's chronic conditions and comorbid symptoms for stability, deterioration, or improvement   Collaborate with multidisciplinary team to address chronic and comorbid conditions and prevent exacerbation or deterioration     Problem: Pain  Goal: Verbalizes/displays adequate comfort level or baseline comfort level  3/19/2025 0101 by Cecy Babb, RN  Outcome: Progressing  Flowsheets (Taken 3/19/2025 0101)  Verbalizes/displays adequate comfort level or baseline comfort level:   Assess pain using appropriate pain scale   Implement non-pharmacological measures as appropriate and evaluate response   Administer analgesics based on type and severity of pain and evaluate response  Note: Patient has PRN pain medication     Problem: Safety - Adult  Goal: Free from fall injury  3/19/2025 0101 by Cecy Babb, RN  Outcome: Progressing  Note: Encourage patient to utilize call bell when assistance is needed     Problem: Skin/Tissue Integrity  Goal: Skin integrity remains intact  Description: 1.  Monitor for areas of redness and/or skin breakdown  3/19/2025 0101 by Cecy Babb, RN  Outcome: Progressing  Flowsheets (Taken 3/19/2025 0101)  Skin Integrity Remains Intact: Monitor for areas of redness and/or skin breakdown  Note: Encourage patient to make frequent turns while in bed     Problem: Skin/Tissue Integrity - Adult  Goal: Incisions, wounds, or drain sites healing without S/S of infection  3/19/2025 0101 by Cecy Babb, RN  Outcome: Progressing  Flowsheets (Taken 3/19/2025 0101)  Incisions, Wounds, or Drain Sites

## 2025-03-19 NOTE — CARE COORDINATION
Called Dr. Oneil office [phone], Spoke to Clementina she stated to call the office when he is discharged to set-up wound care at the office.     Ling GE, RN   Case Management   [phone]

## 2025-03-19 NOTE — PROGRESS NOTES
[facility]   Pharmacy Pharmacokinetic Monitoring Service - Vancomycin    Indication: Bone and Joint Infection  Target Concentration: Goal AUC/TERESA 400-600 mg*hr/L  Day of Therapy: 4  Additional Antimicrobials: Cefepime    Pertinent Laboratory Values:   Temp: 97.7 °F (36.5 °C), Weight - Scale: 93.4 kg (205 lb 14.6 oz)  Recent Labs     03/16/25  1315 03/17/25  0331 03/18/25  0451 03/18/25  1920 03/19/25  0212   CREATININE 1.99*   < > 1.50* 1.30 1.23   BUN 23*   < > 25* 19* 19*   WBC 14.3*   < > 10.6  --  10.0   PROCAL 0.22  --   --   --   --     < > = values in this interval not displayed.       Estimated Creatinine Clearance: 71 mL/min (based on SCr of 1.23 mg/dL).    Pertinent Cultures:  Culture Date Source Results   3/16 Blood NGTD   3/18 Foot wound GPC   3/18 urine In process   3/18 Wound - toe pending   MRSA Nasal Swab: N/A. Non-respiratory infection    Assessment:  Date/Time Current Dose Concentration Timing of Concentration (h) AUC   3/16 2000 mg x 1 25.1 6 -   3/17 1250 mg q24h - - -   3/18 1250 mg q24h - - -   3/19 1500 mg q24h - - -     Note: Serum concentrations collected for AUC dosing may appear elevated if collected in close proximity to the dose administered, this is not necessarily an indication of toxicity    Plan:  Continue current dose of 1500 mg q24h  Projected AUC/Tss = 588/15.7  Renal labs as indicated   Vancomycin concentration ordered for  3/20  Pharmacy will continue to monitor patient and adjust therapy as indicated    Thank you for the consult,  Moraima Bobo [facility]  3/19/2025

## 2025-03-20 LAB
ACID FAST STN SPEC: NEGATIVE
ANION GAP SERPL CALC-SCNC: 4 MMOL/L (ref 3–18)
BASOPHILS # BLD: 0.03 K/UL (ref 0–0.1)
BASOPHILS NFR BLD: 0.4 % (ref 0–2)
BUN SERPL-MCNC: 22 MG/DL (ref 7–18)
BUN/CREAT SERPL: 16 (ref 12–20)
CALCIUM SERPL-MCNC: 7.9 MG/DL (ref 8.5–10.1)
CHLORIDE SERPL-SCNC: 102 MMOL/L (ref 100–111)
CO2 SERPL-SCNC: 26 MMOL/L (ref 21–32)
CREAT SERPL-MCNC: 1.36 MG/DL (ref 0.6–1.3)
DIFFERENTIAL METHOD BLD: ABNORMAL
EOSINOPHIL # BLD: 0.13 K/UL (ref 0–0.4)
EOSINOPHIL NFR BLD: 1.7 % (ref 0–5)
ERYTHROCYTE [DISTWIDTH] IN BLOOD BY AUTOMATED COUNT: 11.7 % (ref 11.6–14.5)
GLUCOSE BLD STRIP.AUTO-MCNC: 119 MG/DL (ref 70–110)
GLUCOSE BLD STRIP.AUTO-MCNC: 154 MG/DL (ref 70–110)
GLUCOSE BLD STRIP.AUTO-MCNC: 175 MG/DL (ref 70–110)
GLUCOSE BLD STRIP.AUTO-MCNC: 188 MG/DL (ref 70–110)
GLUCOSE BLD STRIP.AUTO-MCNC: 231 MG/DL (ref 70–110)
GLUCOSE SERPL-MCNC: 263 MG/DL (ref 74–99)
HCT VFR BLD AUTO: 29.8 % (ref 36–48)
HGB BLD-MCNC: 9.5 G/DL (ref 13–16)
IMM GRANULOCYTES # BLD AUTO: 0.03 K/UL (ref 0–0.04)
IMM GRANULOCYTES NFR BLD AUTO: 0.4 % (ref 0–0.5)
LYMPHOCYTES # BLD: 1.7 K/UL (ref 0.9–3.6)
LYMPHOCYTES NFR BLD: 22.8 % (ref 21–52)
MCH RBC QN AUTO: 29.2 PG (ref 24–34)
MCHC RBC AUTO-ENTMCNC: 31.9 G/DL (ref 31–37)
MCV RBC AUTO: 91.7 FL (ref 78–100)
MONOCYTES # BLD: 0.73 K/UL (ref 0.05–1.2)
MONOCYTES NFR BLD: 9.8 % (ref 3–10)
MYCOBACTERIUM SPEC QL CULT: NORMAL
NEUTS SEG # BLD: 4.82 K/UL (ref 1.8–8)
NEUTS SEG NFR BLD: 64.9 % (ref 40–73)
NRBC # BLD: 0 K/UL (ref 0–0.01)
NRBC BLD-RTO: 0 PER 100 WBC
PLATELET # BLD AUTO: 369 K/UL (ref 135–420)
PMV BLD AUTO: 10.5 FL (ref 9.2–11.8)
POTASSIUM SERPL-SCNC: 4.4 MMOL/L (ref 3.5–5.5)
RBC # BLD AUTO: 3.25 M/UL (ref 4.35–5.65)
SODIUM SERPL-SCNC: 132 MMOL/L (ref 136–145)
SPECIMEN PREPARATION: NORMAL
SPECIMEN SOURCE: NORMAL
VANCOMYCIN SERPL-MCNC: 15.5 UG/ML (ref 5–40)
WBC # BLD AUTO: 7.4 K/UL (ref 4.6–13.2)

## 2025-03-20 PROCEDURE — 85025 COMPLETE CBC W/AUTO DIFF WBC: CPT

## 2025-03-20 PROCEDURE — 2500000003 HC RX 250 WO HCPCS: Performed by: INTERNAL MEDICINE

## 2025-03-20 PROCEDURE — 80202 ASSAY OF VANCOMYCIN: CPT

## 2025-03-20 PROCEDURE — 99232 SBSQ HOSP IP/OBS MODERATE 35: CPT | Performed by: STUDENT IN AN ORGANIZED HEALTH CARE EDUCATION/TRAINING PROGRAM

## 2025-03-20 PROCEDURE — 82962 GLUCOSE BLOOD TEST: CPT

## 2025-03-20 PROCEDURE — 1100000003 HC PRIVATE W/ TELEMETRY

## 2025-03-20 PROCEDURE — 6370000000 HC RX 637 (ALT 250 FOR IP): Performed by: STUDENT IN AN ORGANIZED HEALTH CARE EDUCATION/TRAINING PROGRAM

## 2025-03-20 PROCEDURE — 80048 BASIC METABOLIC PNL TOTAL CA: CPT

## 2025-03-20 PROCEDURE — 6370000000 HC RX 637 (ALT 250 FOR IP): Performed by: PODIATRIST

## 2025-03-20 PROCEDURE — 36415 COLL VENOUS BLD VENIPUNCTURE: CPT

## 2025-03-20 PROCEDURE — 6360000002 HC RX W HCPCS: Performed by: PODIATRIST

## 2025-03-20 PROCEDURE — 6360000002 HC RX W HCPCS: Performed by: INTERNAL MEDICINE

## 2025-03-20 PROCEDURE — 2500000003 HC RX 250 WO HCPCS: Performed by: PODIATRIST

## 2025-03-20 RX ORDER — INSULIN GLARGINE 100 [IU]/ML
18 INJECTION, SOLUTION SUBCUTANEOUS NIGHTLY
Status: DISCONTINUED | OUTPATIENT
Start: 2025-03-20 | End: 2025-03-21 | Stop reason: HOSPADM

## 2025-03-20 RX ADMIN — VANCOMYCIN 1500 MG: 1.5 INJECTION, SOLUTION INTRAVENOUS at 06:22

## 2025-03-20 RX ADMIN — CEFTRIAXONE SODIUM 2000 MG: 2 INJECTION, POWDER, FOR SOLUTION INTRAMUSCULAR; INTRAVENOUS at 16:40

## 2025-03-20 RX ADMIN — BENZOCAINE: 200 SPRAY DENTAL; ORAL; PERIODONTAL at 16:48

## 2025-03-20 RX ADMIN — SODIUM CHLORIDE, PRESERVATIVE FREE 10 ML: 5 INJECTION INTRAVENOUS at 08:27

## 2025-03-20 RX ADMIN — INSULIN LISPRO 7 UNITS: 100 INJECTION, SOLUTION INTRAVENOUS; SUBCUTANEOUS at 16:55

## 2025-03-20 RX ADMIN — OXYCODONE HYDROCHLORIDE AND ACETAMINOPHEN 1 TABLET: 5; 325 TABLET ORAL at 20:56

## 2025-03-20 RX ADMIN — INSULIN LISPRO 4 UNITS: 100 INJECTION, SOLUTION INTRAVENOUS; SUBCUTANEOUS at 08:27

## 2025-03-20 RX ADMIN — SODIUM CHLORIDE, PRESERVATIVE FREE 10 ML: 5 INJECTION INTRAVENOUS at 20:59

## 2025-03-20 RX ADMIN — OXYCODONE HYDROCHLORIDE AND ACETAMINOPHEN 1 TABLET: 5; 325 TABLET ORAL at 08:30

## 2025-03-20 RX ADMIN — INSULIN GLARGINE 18 UNITS: 100 INJECTION, SOLUTION SUBCUTANEOUS at 20:55

## 2025-03-20 RX ADMIN — INSULIN LISPRO 4 UNITS: 100 INJECTION, SOLUTION INTRAVENOUS; SUBCUTANEOUS at 11:42

## 2025-03-20 RX ADMIN — INSULIN LISPRO 7 UNITS: 100 INJECTION, SOLUTION INTRAVENOUS; SUBCUTANEOUS at 08:27

## 2025-03-20 RX ADMIN — BENZOCAINE: 200 SPRAY DENTAL; ORAL; PERIODONTAL at 08:28

## 2025-03-20 RX ADMIN — INSULIN LISPRO 7 UNITS: 100 INJECTION, SOLUTION INTRAVENOUS; SUBCUTANEOUS at 11:41

## 2025-03-20 RX ADMIN — BENZOCAINE: 200 SPRAY DENTAL; ORAL; PERIODONTAL at 11:41

## 2025-03-20 RX ADMIN — OXYCODONE HYDROCHLORIDE AND ACETAMINOPHEN 1 TABLET: 5; 325 TABLET ORAL at 03:55

## 2025-03-20 RX ADMIN — OXYCODONE HYDROCHLORIDE AND ACETAMINOPHEN 1 TABLET: 5; 325 TABLET ORAL at 16:55

## 2025-03-20 ASSESSMENT — PAIN DESCRIPTION - DESCRIPTORS
DESCRIPTORS: OTHER (COMMENT)
DESCRIPTORS: ACHING;BURNING;CRUSHING;CRAMPING
DESCRIPTORS: ACHING
DESCRIPTORS: SORE
DESCRIPTORS: ACHING;THROBBING

## 2025-03-20 ASSESSMENT — PAIN DESCRIPTION - ORIENTATION
ORIENTATION: RIGHT

## 2025-03-20 ASSESSMENT — PAIN DESCRIPTION - ONSET
ONSET: PROGRESSIVE
ONSET: GRADUAL
ONSET: ON-GOING
ONSET: ON-GOING

## 2025-03-20 ASSESSMENT — PAIN DESCRIPTION - FREQUENCY
FREQUENCY: INTERMITTENT
FREQUENCY: CONTINUOUS
FREQUENCY: CONTINUOUS
FREQUENCY: INTERMITTENT

## 2025-03-20 ASSESSMENT — PAIN SCALES - GENERAL
PAINLEVEL_OUTOF10: 7
PAINLEVEL_OUTOF10: 0
PAINLEVEL_OUTOF10: 0
PAINLEVEL_OUTOF10: 4
PAINLEVEL_OUTOF10: 0
PAINLEVEL_OUTOF10: 7
PAINLEVEL_OUTOF10: 6
PAINLEVEL_OUTOF10: 2
PAINLEVEL_OUTOF10: 0
PAINLEVEL_OUTOF10: 6

## 2025-03-20 ASSESSMENT — PAIN - FUNCTIONAL ASSESSMENT
PAIN_FUNCTIONAL_ASSESSMENT: PREVENTS OR INTERFERES SOME ACTIVE ACTIVITIES AND ADLS
PAIN_FUNCTIONAL_ASSESSMENT: ACTIVITIES ARE NOT PREVENTED
PAIN_FUNCTIONAL_ASSESSMENT: PREVENTS OR INTERFERES WITH MANY ACTIVE NOT PASSIVE ACTIVITIES
PAIN_FUNCTIONAL_ASSESSMENT: PREVENTS OR INTERFERES WITH MANY ACTIVE NOT PASSIVE ACTIVITIES
PAIN_FUNCTIONAL_ASSESSMENT: PREVENTS OR INTERFERES SOME ACTIVE ACTIVITIES AND ADLS

## 2025-03-20 ASSESSMENT — PAIN DESCRIPTION - LOCATION
LOCATION: MOUTH
LOCATION: FOOT

## 2025-03-20 ASSESSMENT — PAIN DESCRIPTION - PAIN TYPE
TYPE: ACUTE PAIN
TYPE: SURGICAL PAIN
TYPE: ACUTE PAIN
TYPE: ACUTE PAIN

## 2025-03-20 ASSESSMENT — PAIN SCALES - WONG BAKER: WONGBAKER_NUMERICALRESPONSE: NO HURT

## 2025-03-20 NOTE — PROGRESS NOTES
[facility]   Pharmacy Pharmacokinetic Monitoring Service - Vancomycin    Indication: Bone and Joint Infection  Target Concentration: Goal AUC/TERESA 400-600 mg*hr/L  Day of Therapy: 5  Additional Antimicrobials: Cefepime    Pertinent Laboratory Values:   Temp: 97.5 °F (36.4 °C), Weight - Scale: 97.3 kg (214 lb 8.1 oz)  Recent Labs     03/19/25  0212 03/20/25  0016   CREATININE 1.23 1.36*   BUN 19* 22*   WBC 10.0 7.4       Estimated Creatinine Clearance: 70 mL/min (A) (based on SCr of 1.36 mg/dL (H)).    Pertinent Cultures:  Culture Date Source Results   3/16 Blood NGTD   3/18 Foot wound GPC   3/18 urine In process   3/18 Wound - toe pending   MRSA Nasal Swab: N/A. Non-respiratory infection    Assessment:  Date/Time Current Dose Concentration Timing of Concentration (h) AUC   3/16 2000 mg x 1 25.1 6 -   3/17 1250 mg q24h - - -   3/18 1250 mg q24h - - -   3/19 1500 mg q24h - - -   3/20 1500 mg q24h 15.5 18.75 498     Note: Serum concentrations collected for AUC dosing may appear elevated if collected in close proximity to the dose administered, this is not necessarily an indication of toxicity    Plan:  Continue current dose of 1500 mg q24h  Projected AUC/Tss = 501/12.8  Renal labs as indicated   Vancomycin concentration   as needed based on renal changes  Pharmacy will continue to monitor patient and adjust therapy as indicated    Thank you for the consult,  PRICILLA CADENA [facility]  3/20/2025

## 2025-03-20 NOTE — PROGRESS NOTES
Seen at bedside awake and alert.  Dressing clean and intact.  Ambulating with partial protected  Weight on heel,training observed.  Stable after surgery.

## 2025-03-20 NOTE — PROGRESS NOTES
[facility]  Progress Note    Patient: Karri Georges Age: 59 y.o. : 1965 MR#: [id number] SSN: [id number]  Date/Time: 3/20/2025     Chief Complaint   Patient presents with    Hyperglycemia    Wound Check     Subjective:   Patient seen and examined. No acute events overnight. Care plan discussed, questions were addressed.  Denies chest pain, abdominal pain, shortness of breath.  Assessment/Plan:   Diabetic foot infection, concern for osteomyelitis of right second toe. S/p 2nd toe amputation on 3/18  -ID following. Follow cultures to help transition antibiotics.     #Uncontrolled DM2 with hyperglycemia. A1c >14. S/p insulin gtt  Accuchecks, SSI. Tight glycemic control. Dietitician following for diabetic diet education.    #CKD3b. no recent baseline for comparison     #Hx polysubstance use. UDS neg    Dispo plan: Home once medically stable, anticipated discharge date 25    I spent 40 minutes with the patient in face-to-face consultation, of which greater than 50% was spent in counseling and coordination of care as described above.    Family Communication/Updates:  [x]  Patient is clinically able to contact and update Family  []  Patient is NOT clinically able to contact and update Family; Patient requires Clinician to contact and update Family     Case discussed with:  [x]Patient  []Family  [x]Nursing  [x]Case Management  DVT Prophylaxis:  []Lovenox  []Hep SQ  [x]SCDs  []Coumadin   []Eliquis/Xarelto     Objective:   VS: /63   Pulse 55   Temp 97.6 °F (36.4 °C) (Oral)   Resp 18   Ht 1.854 m (6' 0.99\")   Wt 97.3 kg (214 lb 8.1 oz)   SpO2 97%   BMI 28.31 kg/m²    Tmax/24hrs: Temp (24hrs), Av.9 °F (36.6 °C), Min:97.5 °F (36.4 °C), Max:98.4 °F (36.9 °C)  IOBRIEF  Intake/Output Summary (Last 24 hours) at 3/20/2025 1411  Last data filed at 3/20/2025 1117  Gross per 24 hour   Intake 1059.77 ml   Output --   Net 1059.77 ml     General:  Alert,

## 2025-03-20 NOTE — PLAN OF CARE
Problem: Chronic Conditions and Co-morbidities  Goal: Patient's chronic conditions and co-morbidity symptoms are monitored and maintained or improved  3/20/2025 0308 by Preston Romero RN  Outcome: Progressing  Flowsheets (Taken 3/19/2025 1955)  Care Plan - Patient's Chronic Conditions and Co-Morbidity Symptoms are Monitored and Maintained or Improved:   Monitor and assess patient's chronic conditions and comorbid symptoms for stability, deterioration, or improvement   Collaborate with multidisciplinary team to address chronic and comorbid conditions and prevent exacerbation or deterioration   Update acute care plan with appropriate goals if chronic or comorbid symptoms are exacerbated and prevent overall improvement and discharge  Note: Educated patient on importance of monitoring vital signs every 4 hours. Educated patient on adhering to prescribed medication regimen and treatment including on an outpatient basis. Educated patient on importance of keeping heart monitor on at all times.   3/19/2025 1821 by Paul Loja, RN  Outcome: Progressing  3/19/2025 1821 by Paul Loja RN  Outcome: Progressing  Flowsheets (Taken 3/19/2025 1821)  Care Plan - Patient's Chronic Conditions and Co-Morbidity Symptoms are Monitored and Maintained or Improved:   Monitor and assess patient's chronic conditions and comorbid symptoms for stability, deterioration, or improvement   Collaborate with multidisciplinary team to address chronic and comorbid conditions and prevent exacerbation or deterioration   Update acute care plan with appropriate goals if chronic or comorbid symptoms are exacerbated and prevent overall improvement and discharge     Problem: Pain  Goal: Verbalizes/displays adequate comfort level or baseline comfort level  3/20/2025 0308 by Preston Romero, RN  Outcome: Progressing  Flowsheets (Taken 3/19/2025 1821 by Paul Loja RN)  Verbalizes/displays adequate comfort level or baseline comfort level:    patient.  Goal: Maintain proper alignment of affected body part  Outcome: Progressing  Flowsheets (Taken 3/19/2025 1955)  Maintain proper alignment of affected body part: Support and protect limb and body alignment per provider's orders  Note: Educated patient on use of call bell for assistance as needed before getting out of bed. Patient is able to ambulate with NWB on right foot. PT is following patient.  Goal: Return ADL status to a safe level of function  Outcome: Progressing  Flowsheets (Taken 3/19/2025 1955)  Return ADL Status to a Safe Level of Function:   Administer medication as ordered   Assess activities of daily living deficits and provide assistive devices as needed   Obtain physical therapy/occupational therapy consults as needed  Note: Educated patient on use of call bell for assistance as needed before getting out of bed. Patient is able to ambulate with NWB on right foot. PT is following patient.     Problem: Nutrition Deficit:  Goal: Optimize nutritional status  Outcome: Progressing  Flowsheets (Taken 3/20/2025 0308)  Nutrient intake appropriate for improving, restoring, or maintaining nutritional needs:   Assess nutritional status and recommend course of action   Recommend appropriate diets, oral nutritional supplements, and vitamin/mineral supplements  Note: Educated patient on adequate oral intake and prescribed diet.

## 2025-03-20 NOTE — CARE COORDINATION
Called [facility] [phone], who stated the only insurance that can back date is the state Medicaid and to call them.     [place]ed [place] who stated it is not showing any active coverage or that any renewal has taken place. There is a possibility that the file has not been updated yet. It can take time. She stated for the patient to speak with  about back dated services.     Member ID is [id number].     Ling GE, RN   Case Management   [phone]

## 2025-03-20 NOTE — PROGRESS NOTES
Culture, Wound (with Gram Stain) [[id number]]  (Abnormal) Collected: 03/18/25 0915    Order Status: Completed Specimen: Foot Updated: 03/19/25 1124     Special Requests NO SPECIAL REQUESTS        Gram Stain OCCASIONAL WBCS SEEN               OCCASIONAL Gram positive cocci IN PAIRS           Culture       MODERATE STREPTOCOCCI, BETA HEMOLYTIC GROUP B Penicillin and ampicillin are drugs of choice for treatment of beta-hemolytic streptococcal infections. Susceptibility testing of penicillins and beta-lactams approved by the FDA for treatment of beta-hemolytic streptococcal infections need not be performed routinely, because nonsusceptible isolates are extremely rare. CLSI 2012              Checking for possible other organisms          Blood Culture 2 [[id number]] Collected: 03/16/25 1724    Order Status: Completed Specimen: Blood Updated: 03/19/25 0621     Special Requests NO SPECIAL REQUESTS        Culture NO GROWTH 3 DAYS       Blood Culture 1 [[id number]] Collected: 03/16/25 1715    Order Status: Completed Specimen: Blood Updated: 03/19/25 0621     Special Requests NO SPECIAL REQUESTS        Culture NO GROWTH 3 DAYS       Culture, Wound (with Gram Stain) [[id number]] Collected: 03/16/25 1500    Order Status: Canceled Specimen: Foot                 RADIOLOGY:    All available imaging studies/reports in [facility] for this admission were reviewed          Disclaimer: Sections of this note are dictated utilizing voice recognition software, which may have resulted in some phonetic based errors in grammar and contents. Even though attempts were made to correct all the mistakes, some may have been missed, and remained in the body of the document. If questions arise, please contact our department.    Dr. Kita Mas, Infectious Disease Specialist  [phone]  March 20, 2025  8:22 AM

## 2025-03-21 VITALS
TEMPERATURE: 97.9 F | BODY MASS INDEX: 28.34 KG/M2 | RESPIRATION RATE: 18 BRPM | DIASTOLIC BLOOD PRESSURE: 82 MMHG | WEIGHT: 213.85 LBS | HEART RATE: 65 BPM | HEIGHT: 73 IN | SYSTOLIC BLOOD PRESSURE: 133 MMHG | OXYGEN SATURATION: 97 %

## 2025-03-21 LAB
ANION GAP SERPL CALC-SCNC: 4 MMOL/L (ref 3–18)
BACTERIA SPEC CULT: ABNORMAL
BASOPHILS # BLD: 0.05 K/UL (ref 0–0.1)
BASOPHILS NFR BLD: 0.6 % (ref 0–2)
BUN SERPL-MCNC: 22 MG/DL (ref 7–18)
BUN/CREAT SERPL: 19 (ref 12–20)
CALCIUM SERPL-MCNC: 7.7 MG/DL (ref 8.5–10.1)
CHLORIDE SERPL-SCNC: 102 MMOL/L (ref 100–111)
CO2 SERPL-SCNC: 27 MMOL/L (ref 21–32)
CREAT SERPL-MCNC: 1.14 MG/DL (ref 0.6–1.3)
DIFFERENTIAL METHOD BLD: ABNORMAL
EOSINOPHIL # BLD: 0.19 K/UL (ref 0–0.4)
EOSINOPHIL NFR BLD: 2.5 % (ref 0–5)
ERYTHROCYTE [DISTWIDTH] IN BLOOD BY AUTOMATED COUNT: 11.8 % (ref 11.6–14.5)
GLUCOSE BLD STRIP.AUTO-MCNC: 106 MG/DL (ref 70–110)
GLUCOSE BLD STRIP.AUTO-MCNC: 256 MG/DL (ref 70–110)
GLUCOSE SERPL-MCNC: 264 MG/DL (ref 74–99)
GRAM STN SPEC: ABNORMAL
HCT VFR BLD AUTO: 30 % (ref 36–48)
HGB BLD-MCNC: 9.4 G/DL (ref 13–16)
IMM GRANULOCYTES # BLD AUTO: 0.03 K/UL (ref 0–0.04)
IMM GRANULOCYTES NFR BLD AUTO: 0.4 % (ref 0–0.5)
LYMPHOCYTES # BLD: 2.03 K/UL (ref 0.9–3.6)
LYMPHOCYTES NFR BLD: 26.2 % (ref 21–52)
MCH RBC QN AUTO: 28.9 PG (ref 24–34)
MCHC RBC AUTO-ENTMCNC: 31.3 G/DL (ref 31–37)
MCV RBC AUTO: 92.3 FL (ref 78–100)
MONOCYTES # BLD: 0.65 K/UL (ref 0.05–1.2)
MONOCYTES NFR BLD: 8.4 % (ref 3–10)
NEUTS SEG # BLD: 4.79 K/UL (ref 1.8–8)
NEUTS SEG NFR BLD: 61.9 % (ref 40–73)
NRBC # BLD: 0 K/UL (ref 0–0.01)
NRBC BLD-RTO: 0 PER 100 WBC
PLATELET # BLD AUTO: 431 K/UL (ref 135–420)
PMV BLD AUTO: 10.9 FL (ref 9.2–11.8)
POTASSIUM SERPL-SCNC: 4.6 MMOL/L (ref 3.5–5.5)
RBC # BLD AUTO: 3.25 M/UL (ref 4.35–5.65)
SERVICE CMNT-IMP: ABNORMAL
SERVICE CMNT-IMP: ABNORMAL
SODIUM SERPL-SCNC: 133 MMOL/L (ref 136–145)
WBC # BLD AUTO: 7.7 K/UL (ref 4.6–13.2)

## 2025-03-21 PROCEDURE — 6370000000 HC RX 637 (ALT 250 FOR IP): Performed by: PODIATRIST

## 2025-03-21 PROCEDURE — 99239 HOSP IP/OBS DSCHRG MGMT >30: CPT | Performed by: STUDENT IN AN ORGANIZED HEALTH CARE EDUCATION/TRAINING PROGRAM

## 2025-03-21 PROCEDURE — 6370000000 HC RX 637 (ALT 250 FOR IP): Performed by: INTERNAL MEDICINE

## 2025-03-21 PROCEDURE — 80048 BASIC METABOLIC PNL TOTAL CA: CPT

## 2025-03-21 PROCEDURE — 82962 GLUCOSE BLOOD TEST: CPT

## 2025-03-21 PROCEDURE — 2500000003 HC RX 250 WO HCPCS: Performed by: PODIATRIST

## 2025-03-21 PROCEDURE — 36415 COLL VENOUS BLD VENIPUNCTURE: CPT

## 2025-03-21 PROCEDURE — 85025 COMPLETE CBC W/AUTO DIFF WBC: CPT

## 2025-03-21 RX ORDER — LEVOFLOXACIN 500 MG/1
500 TABLET, FILM COATED ORAL EVERY 24 HOURS
Qty: 27 TABLET | Refills: 0 | Status: SHIPPED | OUTPATIENT
Start: 2025-03-22 | End: 2025-03-27

## 2025-03-21 RX ORDER — LOSARTAN POTASSIUM 50 MG/1
50 TABLET ORAL DAILY
Status: DISCONTINUED | OUTPATIENT
Start: 2025-03-21 | End: 2025-03-21 | Stop reason: HOSPADM

## 2025-03-21 RX ORDER — LOSARTAN POTASSIUM 50 MG/1
50 TABLET ORAL DAILY
Qty: 30 TABLET | Refills: 3 | Status: SHIPPED | OUTPATIENT
Start: 2025-03-22

## 2025-03-21 RX ORDER — LEVOFLOXACIN 500 MG/1
500 TABLET, FILM COATED ORAL EVERY 24 HOURS
Status: DISCONTINUED | OUTPATIENT
Start: 2025-03-21 | End: 2025-03-21 | Stop reason: HOSPADM

## 2025-03-21 RX ORDER — OXYCODONE AND ACETAMINOPHEN 5; 325 MG/1; MG/1
1 TABLET ORAL EVERY 6 HOURS PRN
Qty: 12 TABLET | Refills: 0 | Status: SHIPPED | OUTPATIENT
Start: 2025-03-21 | End: 2025-03-24

## 2025-03-21 RX ADMIN — OXYCODONE HYDROCHLORIDE AND ACETAMINOPHEN 1 TABLET: 5; 325 TABLET ORAL at 08:06

## 2025-03-21 RX ADMIN — LEVOFLOXACIN 500 MG: 500 TABLET, FILM COATED ORAL at 12:44

## 2025-03-21 RX ADMIN — INSULIN LISPRO 7 UNITS: 100 INJECTION, SOLUTION INTRAVENOUS; SUBCUTANEOUS at 12:45

## 2025-03-21 RX ADMIN — BENZOCAINE: 200 SPRAY DENTAL; ORAL; PERIODONTAL at 09:12

## 2025-03-21 RX ADMIN — INSULIN LISPRO 8 UNITS: 100 INJECTION, SOLUTION INTRAVENOUS; SUBCUTANEOUS at 08:03

## 2025-03-21 RX ADMIN — INSULIN LISPRO 7 UNITS: 100 INJECTION, SOLUTION INTRAVENOUS; SUBCUTANEOUS at 09:10

## 2025-03-21 RX ADMIN — AMOXICILLIN AND CLAVULANATE POTASSIUM 1 TABLET: 875; 125 TABLET, FILM COATED ORAL at 12:48

## 2025-03-21 RX ADMIN — SODIUM CHLORIDE, PRESERVATIVE FREE 10 ML: 5 INJECTION INTRAVENOUS at 09:13

## 2025-03-21 ASSESSMENT — PAIN DESCRIPTION - ORIENTATION: ORIENTATION: RIGHT

## 2025-03-21 ASSESSMENT — PAIN DESCRIPTION - LOCATION: LOCATION: FOOT

## 2025-03-21 ASSESSMENT — PAIN DESCRIPTION - DESCRIPTORS: DESCRIPTORS: ACHING

## 2025-03-21 ASSESSMENT — PAIN DESCRIPTION - FREQUENCY: FREQUENCY: INTERMITTENT

## 2025-03-21 ASSESSMENT — PAIN DESCRIPTION - PAIN TYPE: TYPE: SURGICAL PAIN

## 2025-03-21 ASSESSMENT — PAIN - FUNCTIONAL ASSESSMENT: PAIN_FUNCTIONAL_ASSESSMENT: ACTIVITIES ARE NOT PREVENTED

## 2025-03-21 ASSESSMENT — PAIN SCALES - GENERAL
PAINLEVEL_OUTOF10: 0
PAINLEVEL_OUTOF10: 6
PAINLEVEL_OUTOF10: 0

## 2025-03-21 ASSESSMENT — PAIN DESCRIPTION - ONSET: ONSET: GRADUAL

## 2025-03-21 NOTE — DISCHARGE INSTRUCTIONS
Follow with your PCP and podiatry  .  Antibiotics as prescribed below.  Important finish course of antibiotics even if you continue to feel better  .  Hydrate appropriately.  Blood pressure medication losartan added for diabetes as well as blood pressure.  Track your blood pressure daily and bring to your next primary care appointment.  Short course pain control.

## 2025-03-21 NOTE — PLAN OF CARE
Problem: Chronic Conditions and Co-morbidities  Goal: Patient's chronic conditions and co-morbidity symptoms are monitored and maintained or improved  Outcome: Progressing  Flowsheets (Taken 3/21/2025 0735)  Care Plan - Patient's Chronic Conditions and Co-Morbidity Symptoms are Monitored and Maintained or Improved: Monitor and assess patient's chronic conditions and comorbid symptoms for stability, deterioration, or improvement     Problem: Pain  Goal: Verbalizes/displays adequate comfort level or baseline comfort level  3/21/2025 1017 by Darnell Swift RN  Outcome: Progressing  Flowsheets (Taken 3/19/2025 1821 by Paul Loja, RN)  Verbalizes/displays adequate comfort level or baseline comfort level:   Encourage patient to monitor pain and request assistance   Assess pain using appropriate pain scale   Administer analgesics based on type and severity of pain and evaluate response  3/21/2025 0350 by Jeanne Gonzalez RN  Outcome: Progressing     Problem: Safety - Adult  Goal: Free from fall injury  3/21/2025 1017 by Darnell Swift RN  Outcome: Progressing  Flowsheets (Taken 3/21/2025 0858)  Free From Fall Injury: Instruct family/caregiver on patient safety  3/21/2025 0351 by Jeanne Gonzalez RN  Outcome: Progressing     Problem: Skin/Tissue Integrity  Goal: Skin integrity remains intact  Description: 1.  Monitor for areas of redness and/or skin breakdown  2.  Assess vascular access sites hourly  3.  Every 4-6 hours minimum:  Change oxygen saturation probe site  4.  Every 4-6 hours:  If on nasal continuous positive airway pressure, respiratory therapy assess nares and determine need for appliance change or resting period  Outcome: Progressing  Flowsheets  Taken 3/21/2025 0858  Skin Integrity Remains Intact: Monitor for areas of redness and/or skin breakdown  Taken 3/21/2025 0735  Skin Integrity Remains Intact: Monitor for areas of redness and/or skin breakdown     Problem: Discharge Planning  Goal: Discharge to  home or other facility with appropriate resources  Outcome: Progressing  Flowsheets (Taken 3/21/2025 1018)  Discharge to home or other facility with appropriate resources:   Identify barriers to discharge with patient and caregiver   Arrange for needed discharge resources and transportation as appropriate

## 2025-03-21 NOTE — DISCHARGE SUMMARY
[facility] Hospitalist Group    Discharge Summary    Patient: Karri Georges MRN: [id number]  CSN: [id number]    YOB: 1965  Age: 59 y.o.  Sex: male    DOA: 3/16/2025 LOS:  LOS: 5 days   Discharge Date:      Admission Diagnoses: Diabetic foot infection (HCC) [E11.628, L08.9]  Hyperglycemia due to diabetes mellitus (HCC) [E11.65]    Discharge Diagnoses:    Hospital Problems           Last Modified POA    * (Principal) Diabetic foot infection (HCC) 3/16/2025 Yes    Uncontrolled diabetes mellitus with hyperglycemia (HCC) 3/16/2025 Yes    Osteomyelitis (HCC) 3/16/2025 Yes    Cannabis use, uncomplicated 3/16/2025 Yes    Cocaine use 3/16/2025 Yes    Stage 3 chronic kidney disease (HCC) 3/16/2025 Yes       Discharge Condition: Stable    Discharge To: Home    Consults: Infectious Disease and Podiatry    HPI:   Per admission HPI   59 y.o. male with a PMHx of uncontrolled DM who presented to the ED with c/o right second toe wound that has been present for 1 week. Pain started about 2-3 days ago. Pain is 7/10. He has not seen a podiatrist in over a year. He states he's not been checking his blood sugars or taking his insulin in over 2 weeks due to sciatic pain making it difficult for him to go up and down the stairs. He does admit that his diet has not been good for a few months now, so he's not surprised his A1C is back up to 14%. He denies fever, chills, cough, sob, abd pain, nvd.      In the ED, VSS. Labs with Na 124, Cl 89, BUN 23, Cr 1.99, , CRP 12, ESR > 130, alb 2.8, WBC 14.3, hgb 10.6, UA negative for infection. XR  shows Near absent soft tissue overlying the end of second distal phalanx. Osteomyelitis is difficult to exclude. ID and podiatry were consulted.     Hospital Course:   Upon admission, patient evaluated by podiatry underwent right second toe amputation on 3/19/25.  ID was consulted to help with antibiotic management.  Patient is to continue Augmentin and Levaquin.         Activity: activity as tolerated    Diet: diabetic diet    Wound Care: as directed    Follow-up:   PCP, podiatry  Discharge time: 39 minutes spent coordinating this discharge    Disclaimer: Sections of this note are dictated using utilizing voice recognition software. Minor typographical errors may be present. If questions arise, please do not hesitate to contact me or call our department.       Jayson Bell,   3/21/2025 1:26 PM

## 2025-03-21 NOTE — PROGRESS NOTES
4 Eyes Skin Assessment     NAME:  Karri Georges  YOB: 1965  MEDICAL RECORD NUMBER:  [id number]    The patient is being assessed for  Shift Handoff    I agree that at least one RN has performed a thorough Head to Toe Skin Assessment on the patient. ALL assessment sites listed below have been assessed.      Areas assessed by both nurses:    Head, Face, Ears, Shoulders, Back, Chest, Arms, Elbows, Hands, Sacrum. Buttock, Coccyx, Ischium, Legs. Feet and Heels, and Under Medical Devices         Does the Patient have a Wound? yes       Lee Prevention initiated by RN: Yes  Wound Care Orders initiated by RN: No    Pressure Injury (Stage 3,4, Unstageable, DTI, NWPT, and Complex wounds) if present, place Wound referral order by RN under : No    New Ostomies, if present place, Ostomy referral order under : No     Nurse 1 eSignature: Electronically signed by Jeanne Gonzalez RN on 3/21/25 at 8:34 AM EDT    **SHARE this note so that the co-signing nurse can place an eSignature**    Nurse 2 eSignature: {Esignature:[id number]}

## 2025-03-21 NOTE — PLAN OF CARE
Problem: Chronic Conditions and Co-morbidities  Goal: Patient's chronic conditions and co-morbidity symptoms are monitored and maintained or improved  3/21/2025 1453 by Darnell Swift RN  Outcome: Adequate for Discharge  3/21/2025 1017 by Darnell Swift RN  Outcome: Progressing  Flowsheets (Taken 3/21/2025 0735)  Care Plan - Patient's Chronic Conditions and Co-Morbidity Symptoms are Monitored and Maintained or Improved: Monitor and assess patient's chronic conditions and comorbid symptoms for stability, deterioration, or improvement     Problem: Pain  Goal: Verbalizes/displays adequate comfort level or baseline comfort level  3/21/2025 1453 by Darnell Swift RN  Outcome: Adequate for Discharge  3/21/2025 1017 by Darnell Swift RN  Outcome: Progressing  Flowsheets (Taken 3/19/2025 1821 by Paul Loja RN)  Verbalizes/displays adequate comfort level or baseline comfort level:   Encourage patient to monitor pain and request assistance   Assess pain using appropriate pain scale   Administer analgesics based on type and severity of pain and evaluate response  3/21/2025 0350 by Jeanne Gonzalez RN  Outcome: Progressing     Problem: Safety - Adult  Goal: Free from fall injury  3/21/2025 1453 by Darnell Swift RN  Outcome: Adequate for Discharge  3/21/2025 1017 by Darnell Swift RN  Outcome: Progressing  Flowsheets (Taken 3/21/2025 0858)  Free From Fall Injury: Instruct family/caregiver on patient safety  3/21/2025 0351 by Jeanne Gonzalez RN  Outcome: Progressing     Problem: Skin/Tissue Integrity  Goal: Skin integrity remains intact  Description: 1.  Monitor for areas of redness and/or skin breakdown  2.  Assess vascular access sites hourly  3.  Every 4-6 hours minimum:  Change oxygen saturation probe site  4.  Every 4-6 hours:  If on nasal continuous positive airway pressure, respiratory therapy assess nares and determine need for appliance change or resting period  3/21/2025 1453 by Jeniffer  Darnell Saleem RN  Outcome: Adequate for Discharge  3/21/2025 1017 by Darnell Swift RN  Outcome: Progressing  Flowsheets  Taken 3/21/2025 0858  Skin Integrity Remains Intact: Monitor for areas of redness and/or skin breakdown  Taken 3/21/2025 0735  Skin Integrity Remains Intact: Monitor for areas of redness and/or skin breakdown     Problem: Discharge Planning  Goal: Discharge to home or other facility with appropriate resources  3/21/2025 1453 by Darnell Swift RN  Outcome: Adequate for Discharge  3/21/2025 1018 by Darnell Swift RN  Outcome: Progressing  Flowsheets (Taken 3/21/2025 1018)  Discharge to home or other facility with appropriate resources:   Identify barriers to discharge with patient and caregiver   Arrange for needed discharge resources and transportation as appropriate   NOTE: Discharge paper given to the patient. Provided opportunity to ask question. IV line removed. Discharge to home.

## 2025-03-21 NOTE — CARE COORDINATION
D/C order noted for today. Orders reviewed. No needs identified at this time. Patient provided with local PCP's and [facility] schedule. Patient to go to Dr. Oneil's office for wound care. Indigent medication form faxed to outpatient pharmacy.  remains available if needed.    NANCI ValdezN, RN  Case Management   [phone]

## 2025-03-22 LAB
BACTERIA SPEC CULT: ABNORMAL
BACTERIA SPEC CULT: NORMAL
BACTERIA SPEC CULT: NORMAL
GRAM STN SPEC: ABNORMAL
GRAM STN SPEC: ABNORMAL
SERVICE CMNT-IMP: ABNORMAL
SERVICE CMNT-IMP: NORMAL
SERVICE CMNT-IMP: NORMAL

## 2025-03-22 NOTE — PROGRESS NOTES
Infectious Disease progress Note        Reason: Right second toe osteomyelitis, diabetic right foot infection    Current abx Prior abx   Cefepime, vancomycin since 3/17      Lines:       Assessment :     59-year-old gentleman with a history of uncontrolled diabetes mellitus type 2 ,  osteoarthritis of the knee, chronic kidney disease, hypertension with c/o right second toe wound  for 1 week.    Hospitalization [facility] July 2021 for partially treated acute on chronic osteomyelitis right fifth toe. s/p right fifth toe amputation on 7/19/21. Intra-Op cultures - 7/19/21 Klebsiella, e.coli    Partial right third toe amputation October 2023    Clinical presentation consistent with diabetic right foot infection right foot/leg cellulitis, right second toe distal phalanx osteomyelitis, possible right second toe abscess    Podiatry evaluation appreciated.  Status post right second toe amputation on 3/19/2025.  Intraoperative findings discussed with podiatrist.  Significant purulence noted right second toe  IntraOp cultures 3/18-group B strep, heavy gnr, yeast  Preoperative culture 3/18-group B streptococcus, Klebsiella, staph lugdunensis    Persistent purulent drainage right second toe noted on today's exam consistent with right second toe abscess    Recommendations:    discontinue ceftriaxone. Start po levofloxacin, augmentin till 4/19/25  Probiotics while on abx  Follow-up podiatry recommendations  Discharge planning per primary team     Above plan was discussed in details with patient,, dr. Bell.  Please call me if any further questions or concerns. Will continue to participate in the care of this patient.  HPI:  No new complaints.  Eager to leave  Denies worsening pain right foot/leg.    Past Medical History:   Diagnosis Date    Arthritis of left knee     Diabetes (HCC)     Erectile dysfunction     Knee pain     Osteoarthritis of right knee     Osteomyelitis (HCC)     Sinusitis        Past Surgical History:   Procedure  [id number]  Ayad Corona MD Ph:[phone]          Acid Fast Culture PENDING    Culture with Smear, Acid Fast Bacillius [[id number]] Collected: 03/18/25 1630    Order Status: Completed Specimen: Bone from Toe Updated: 03/20/25 2036     Sample Site RT 2ND TOE CLEAN BONE MARGIN     AFB SPECIMEN PROCESSING Concentration     AFB Smear Negative        Comment: (NOTE)  Performed At: [address] [id number]  Ayad Corona MD Ph:[phone]          Acid Fast Culture PENDING    Culture with Smear, Acid Fast Bacillius [[id number]] Collected: 03/18/25 1630    Order Status: Completed Specimen: Bone from Toe Updated: 03/20/25 2135     Sample Site RT 2ND TOE BONE DIRTY MARGIN     AFB SPECIMEN PROCESSING Comment        Comment: (NOTE)  Tissue Grinding and Digestion/Decontamination          AFB Smear Negative        Comment: (NOTE)  Performed At: [address] [id number]  Ayad Corona MD Ph:[phone]          Acid Fast Culture PENDING    Culture, Urine [[id number]] Collected: 03/18/25 1530    Order Status: Completed Specimen: Urine, clean catch Updated: 03/19/25 1741     Special Requests NO SPECIAL REQUESTS        Culture No growth (<1,000 CFU/ML)       Culture, Wound (with Gram Stain) [[id number]]  (Abnormal)  (Susceptibility) Collected: 03/18/25 0915    Order Status: Completed Specimen: Foot Updated: 03/21/25 1120     Special Requests NO SPECIAL REQUESTS        Gram Stain OCCASIONAL WBCS SEEN               OCCASIONAL Gram positive cocci IN PAIRS           Culture       MODERATE STREPTOCOCCI, BETA HEMOLYTIC GROUP B Penicillin and ampicillin are drugs of choice for treatment of beta-hemolytic streptococcal infections. Susceptibility testing of penicillins and beta-lactams approved by the FDA for treatment of beta-hemolytic streptococcal infections need not be performed routinely, because nonsusceptible isolates are extremely rare. CLSI 2012

## 2025-03-23 LAB
BACTERIA SPEC CULT: NORMAL
GRAM STN SPEC: NORMAL
GRAM STN SPEC: NORMAL
SERVICE CMNT-IMP: NORMAL

## 2025-03-27 ENCOUNTER — OFFICE VISIT (OUTPATIENT)
Age: 60
End: 2025-03-27

## 2025-03-27 VITALS
BODY MASS INDEX: 27.77 KG/M2 | OXYGEN SATURATION: 99 % | SYSTOLIC BLOOD PRESSURE: 104 MMHG | HEART RATE: 78 BPM | HEIGHT: 72 IN | TEMPERATURE: 97.5 F | DIASTOLIC BLOOD PRESSURE: 68 MMHG | RESPIRATION RATE: 20 BRPM | WEIGHT: 205 LBS

## 2025-03-27 DIAGNOSIS — Z53.21 PATIENT LEFT BEFORE EVALUATION BY PHYSICIAN: Primary | ICD-10-CM

## 2025-03-27 ASSESSMENT — PATIENT HEALTH QUESTIONNAIRE - PHQ9
SUM OF ALL RESPONSES TO PHQ QUESTIONS 1-9: 0
2. FEELING DOWN, DEPRESSED OR HOPELESS: NOT AT ALL
SUM OF ALL RESPONSES TO PHQ QUESTIONS 1-9: 0
1. LITTLE INTEREST OR PLEASURE IN DOING THINGS: NOT AT ALL
SUM OF ALL RESPONSES TO PHQ QUESTIONS 1-9: 0
SUM OF ALL RESPONSES TO PHQ QUESTIONS 1-9: 0

## 2025-03-27 NOTE — PROGRESS NOTES
Patient came to van under the impression we will have Lantus available for him. Informed patient he will have to sign up for a program to receive insulin and it could take a few weeks to received insulin. Patient stated his insurance starts on 4/1/25. Informed patient the provider can write him a prescription for Lantus but he will have to pay out of pocket. Also informed patient of the [employer] coupon available online for the Lantus for $35. Patient expressed understanding. Patient declined to be seen and stated he have some insulin to last him until his insurance starts on 4/1/25. Patient left without being seen by the provider.

## 2025-05-02 LAB
ACID FAST STN SPEC: NEGATIVE
MYCOBACTERIUM SPEC QL CULT: NEGATIVE
SPECIMEN PREPARATION: NORMAL
SPECIMEN SOURCE: NORMAL

## (undated) DEVICE — SYR 50ML SLIP TIP NSAF LF STRL --

## (undated) DEVICE — CURITY NON-ADHERENT STRIPS: Brand: CURITY

## (undated) DEVICE — HEX-LOCKING BLADE ELECTRODE: Brand: EDGE

## (undated) DEVICE — GAUZE,SPONGE,4"X4",16PLY,STRL,LF,10/TRAY: Brand: MEDLINE

## (undated) DEVICE — SUTURE VICRYL + SZ 3-0 L27IN ABSRB UD L26MM SH 1/2 CIR VCP416H

## (undated) DEVICE — GOWN ISOL IMPERV UNIV, DISP, OPEN BACK, BLUE --

## (undated) DEVICE — BLANKET WRM AD W50XL85.8IN PACU FULL BODY FORC AIR

## (undated) DEVICE — PACK PROCEDURE SURG MAJ W/ BASIN LF

## (undated) DEVICE — SUTURE PROL SZ 2-0 L36IN NONABSORBABLE BLU V-7 L26MM 1/2 8977H

## (undated) DEVICE — CANNULA ORIG TL CLR W FOAM CUSHIONS AND 14FT SUPL TB 3 CHN

## (undated) DEVICE — NEEDLE HYPO 25GA L1.5IN BVL ORIENTED ECLIPSE

## (undated) DEVICE — SYR 10ML LUER LOK 1/5ML GRAD --

## (undated) DEVICE — SWAB CULT LIQ STUART AGR AERB MOD IN BRK SGL RAYON TIP PLAS 220099] BECTON DICKINSON MICRO]

## (undated) DEVICE — ENDOSCOPY PUMP TUBING/ CAP SET: Brand: ERBE

## (undated) DEVICE — Device

## (undated) DEVICE — SUTURE COAT VCRL PC 5 PRECIS COSM CONVENTIONAL CUT PRIM 3 8 J823H

## (undated) DEVICE — GARMENT,MEDLINE,DVT,INT,CALF,MED, GEN2: Brand: MEDLINE

## (undated) DEVICE — HANDPIECE SET WITH HIGH FLOW TIP AND SUCTION TUBE: Brand: INTERPULSE

## (undated) DEVICE — CATHETER SUCT TR FL TIP 14FR W/ O CTRL

## (undated) DEVICE — FLEX ADVANTAGE 3000CC: Brand: FLEX ADVANTAGE

## (undated) DEVICE — TRAY PREP DRY W/ PREM GLV 2 APPL 6 SPNG 2 UNDPD 1 OVERWRAP

## (undated) DEVICE — KIT CLN UP BON SECOURS MARYV

## (undated) DEVICE — AIRLIFE™ NASAL OXYGEN CANNULA CURVED, NONFLARED TIP WITH 14 FOOT (4.3 M) CRUSH-RESISTANT TUBING, OVER-THE-EAR STYLE: Brand: AIRLIFE™

## (undated) DEVICE — THREE-QUARTER SHEET: Brand: CONVERTORS

## (undated) DEVICE — BLADE SAW OSC COARSE 25X9MM --

## (undated) DEVICE — STOCKINETTE SYN 4INX25YD -- PROTOUCH

## (undated) DEVICE — BNDG ELAS HK LOOP 4X5YD NS -- MATRIX

## (undated) DEVICE — BANDAGE COMPR W4INXL5YD BGE COHESIVE SELF ADH ADBAN CBN1104] AVCOR HEALTHCARE PRODUCTS INC]

## (undated) DEVICE — 3M™ BAIR PAWS FLEX™ WARMING GOWN, STANDARD, 20 PER CASE 81003: Brand: BAIR PAWS™

## (undated) DEVICE — BANDAGE,GAUZE,BULKEE LITE,3"X4.1YD,STRL: Brand: MEDLINE

## (undated) DEVICE — PADDING CAST W4INXL4YD NONSTERILE COT COHESIVE HND TEARABLE

## (undated) DEVICE — TOWEL,OR,DSP,ST,BLUE,STD,4/PK,20PK/CS: Brand: MEDLINE

## (undated) DEVICE — SOLUTION IRRIG 1000ML H2O STRL BLT

## (undated) DEVICE — REM POLYHESIVE ADULT PATIENT RETURN ELECTRODE: Brand: VALLEYLAB

## (undated) DEVICE — CULTURETTE SGL EVAC TUBE PALL -- 100/CA

## (undated) DEVICE — SYRINGE MED 25GA 3ML L5/8IN SUBQ PLAS W/ DETACH NDL SFTY

## (undated) DEVICE — APPLICATOR MEDICATED 26 CC SOLUTION HI LT ORNG CHLORAPREP

## (undated) DEVICE — DRAPE,EXTREMITY,89X128,STERILE: Brand: MEDLINE

## (undated) DEVICE — DRAPE,UNDERBUTTOCKS,PCH,STERILE: Brand: MEDLINE

## (undated) DEVICE — SOLUTION IV 1000ML 0.9% SOD CHL

## (undated) DEVICE — BANDAGE,GAUZE,BULKEE II,4.5"X4.1YD,STRL: Brand: MEDLINE

## (undated) DEVICE — INTENDED FOR TISSUE SEPARATION, AND OTHER PROCEDURES THAT REQUIRE A SHARP SURGICAL BLADE TO PUNCTURE OR CUT.: Brand: BARD-PARKER SAFETY BLADES SIZE 10, STERILE

## (undated) DEVICE — BANDAGE COBAN 4 IN COMPR W4INXL5YD FOAM COHESIVE QUIK STK SELF ADH SFT

## (undated) DEVICE — FLUFF AND POLYMER UNDERPAD,EXTRA HEAVY: Brand: WINGS

## (undated) DEVICE — BNDG,ELSTC,MATRIX,STRL,4"X5YD,LF,HOOK&LP: Brand: MEDLINE

## (undated) DEVICE — STERILE POLYISOPRENE POWDER-FREE SURGICAL GLOVES: Brand: PROTEXIS

## (undated) DEVICE — SUTURE VCRL SZ 3-0 L27IN ABSRB UD L26MM SH 1/2 CIR J416H

## (undated) DEVICE — DRAPE TWL SURG 16X26IN BLU ORB04] ALLCARE INC]

## (undated) DEVICE — KENDALL SCD EXPRESS SLEEVES, KNEE LENGTH, MEDIUM: Brand: KENDALL SCD

## (undated) DEVICE — SUTURE VCRL SZ 3-0 L36IN ABSRB UD L36MM CT-1 1/2 CIR J944H

## (undated) DEVICE — INTENDED FOR TISSUE SEPARATION, AND OTHER PROCEDURES THAT REQUIRE A SHARP SURGICAL BLADE TO PUNCTURE OR CUT.: Brand: BARD-PARKER SAFETY BLADES SIZE 15, STERILE

## (undated) DEVICE — (D)PREP SKN CHLRAPRP APPL 26ML -- CONVERT TO ITEM 371833

## (undated) DEVICE — ELECTRODE PT RET AD L9FT HI MOIST COND ADH HYDRGEL CORDED

## (undated) DEVICE — SHOE POST OPERATIVE SQ TOP LG 10.5-12 IN M POST OPERATIVE

## (undated) DEVICE — PREP SKN CHLRAPRP APL 26ML STR --

## (undated) DEVICE — SPLINT CAST PLASTER 4X15FT -- DYNACAST

## (undated) DEVICE — GARMENT,MEDLINE,DVT,SEQUENTIAL,CALF,MD: Brand: MEDLINE

## (undated) DEVICE — DRESSING PETRO W3XL3IN OIL EMUL N ADH GZ KNIT IMPREG CELOS

## (undated) DEVICE — SUTURE VICRYL SZ 2-0 L27IN ABSRB UD L26MM SH 1/2 CIR J417H

## (undated) DEVICE — MEDI-VAC SUCTION HIGH CAPACITY: Brand: CARDINAL HEALTH

## (undated) DEVICE — DERMACEA GAUZE ROLL: Brand: DERMACEA

## (undated) DEVICE — SOLUTION IRRIG 1000ML 0.9% SOD CHL USP POUR PLAS BTL

## (undated) DEVICE — SYR 20ML LL STRL LF --

## (undated) DEVICE — CLEAN UP KIT: Brand: MEDLINE INDUSTRIES, INC.

## (undated) DEVICE — SUT PROL 3-0 36IN V7 DA BLU --

## (undated) DEVICE — GAUZE SPONGES,16 PLY: Brand: CURITY

## (undated) DEVICE — SUTURE VCRL SZ 2-0 L27IN ABSRB UD L26MM SH 1/2 CIR J417H

## (undated) DEVICE — 3 ML SYRINGE WITH HYPODERMIC SAFETY NEEDLE: Brand: MAGELLAN

## (undated) DEVICE — MEDI-VAC NON-CONDUCTIVE SUCTION TUBING: Brand: CARDINAL HEALTH

## (undated) DEVICE — HOOK LOCK LATEX FREE ELASTIC BANDAGE 4INX5YD

## (undated) DEVICE — SUTURE VICRYL COAT  PC 5 PRECIS COSM CONVENTIONAL CUT PRIM 3 8 J823H